# Patient Record
Sex: MALE | Race: BLACK OR AFRICAN AMERICAN | NOT HISPANIC OR LATINO | Employment: FULL TIME | URBAN - METROPOLITAN AREA
[De-identification: names, ages, dates, MRNs, and addresses within clinical notes are randomized per-mention and may not be internally consistent; named-entity substitution may affect disease eponyms.]

---

## 2022-07-13 ENCOUNTER — APPOINTMENT (INPATIENT)
Dept: NON INVASIVE DIAGNOSTICS | Facility: HOSPITAL | Age: 62
DRG: 112 | End: 2022-07-13
Payer: OTHER MISCELLANEOUS

## 2022-07-13 ENCOUNTER — APPOINTMENT (EMERGENCY)
Dept: RADIOLOGY | Facility: HOSPITAL | Age: 62
DRG: 112 | End: 2022-07-13
Payer: OTHER MISCELLANEOUS

## 2022-07-13 ENCOUNTER — APPOINTMENT (EMERGENCY)
Dept: CT IMAGING | Facility: HOSPITAL | Age: 62
DRG: 112 | End: 2022-07-13
Payer: OTHER MISCELLANEOUS

## 2022-07-13 ENCOUNTER — HOSPITAL ENCOUNTER (INPATIENT)
Facility: HOSPITAL | Age: 62
LOS: 3 days | Discharge: HOME | DRG: 112 | End: 2022-07-16
Attending: EMERGENCY MEDICINE | Admitting: INTERNAL MEDICINE
Payer: OTHER MISCELLANEOUS

## 2022-07-13 DIAGNOSIS — I21.3 STEMI (ST ELEVATION MYOCARDIAL INFARCTION) (HCC): ICD-10-CM
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
## 2022-07-13 DIAGNOSIS — R55 SYNCOPE: ICD-10-CM

## 2022-07-13 DIAGNOSIS — S01.81XA FOREHEAD LACERATION: ICD-10-CM

## 2022-07-13 DIAGNOSIS — I63.9 CVA (CEREBRAL VASCULAR ACCIDENT) (HCC): ICD-10-CM

## 2022-07-13 DIAGNOSIS — I21.3 ST ELEVATION MYOCARDIAL INFARCTION (STEMI), UNSPECIFIED ARTERY (HCC): Primary | ICD-10-CM

## 2022-07-13 DIAGNOSIS — R07.9 CHEST PAIN: ICD-10-CM

## 2022-07-13 DIAGNOSIS — I50.20 SYSTOLIC HEART FAILURE (HCC): ICD-10-CM

## 2022-07-13 PROBLEM — D72.829 LEUKOCYTOSIS: Status: ACTIVE | Noted: 2022-07-13

## 2022-07-13 PROBLEM — R73.9 HYPERGLYCEMIA: Status: ACTIVE | Noted: 2022-07-13

## 2022-07-13 LAB
2HR DELTA HS TROPONIN: ABNORMAL NG/L
4HR DELTA HS TROPONIN: ABNORMAL NG/L
ALBUMIN SERPL BCP-MCNC: 4.1 G/DL (ref 3.5–5)
ALP SERPL-CCNC: 82 U/L (ref 34–104)
ALT SERPL W P-5'-P-CCNC: 17 U/L (ref 7–52)
ANION GAP SERPL CALCULATED.3IONS-SCNC: 14 MMOL/L (ref 4–13)
AORTIC ROOT: 3.5 CM
APICAL FOUR CHAMBER EJECTION FRACTION: 39 %
APTT PPP: 25 SECONDS (ref 23–37)
ASCENDING AORTA: 2.7 CM
AST SERPL W P-5'-P-CCNC: 23 U/L (ref 13–39)
ATRIAL RATE: 59 BPM
ATRIAL RATE: 70 BPM
ATRIAL RATE: 70 BPM
ATRIAL RATE: 77 BPM
ATRIAL RATE: 77 BPM
ATRIAL RATE: 83 BPM
AV LVOT PEAK GRADIENT: 2 MMHG
AV PEAK GRADIENT: 7 MMHG
BACTERIA UR QL AUTO: ABNORMAL /HPF
BASOPHILS # BLD AUTO: 0.04 THOUSANDS/ΜL (ref 0–0.1)
BASOPHILS NFR BLD AUTO: 0 % (ref 0–1)
BILIRUB SERPL-MCNC: 0.71 MG/DL (ref 0.2–1)
BILIRUB UR QL STRIP: NEGATIVE
BUN SERPL-MCNC: 19 MG/DL (ref 5–25)
CALCIUM SERPL-MCNC: 9.4 MG/DL (ref 8.4–10.2)
CARDIAC TROPONIN I PNL SERPL HS: 10 NG/L
CARDIAC TROPONIN I PNL SERPL HS: ABNORMAL NG/L
CARDIAC TROPONIN I PNL SERPL HS: ABNORMAL NG/L
CHLORIDE SERPL-SCNC: 100 MMOL/L (ref 96–108)
CLARITY UR: CLEAR
CO2 SERPL-SCNC: 25 MMOL/L (ref 21–32)
COLOR UR: YELLOW
CREAT SERPL-MCNC: 1.1 MG/DL (ref 0.6–1.3)
E WAVE DECELERATION TIME: 183 MS
EOSINOPHIL # BLD AUTO: 0.05 THOUSAND/ΜL (ref 0–0.61)
EOSINOPHIL NFR BLD AUTO: 0 % (ref 0–6)
ERYTHROCYTE [DISTWIDTH] IN BLOOD BY AUTOMATED COUNT: 11.9 % (ref 11.6–15.1)
FRACTIONAL SHORTENING: 18 % (ref 28–44)
GFR SERPL CREATININE-BSD FRML MDRD: 72 ML/MIN/1.73SQ M
GLUCOSE SERPL-MCNC: 132 MG/DL (ref 65–140)
GLUCOSE SERPL-MCNC: 142 MG/DL (ref 65–140)
GLUCOSE SERPL-MCNC: 154 MG/DL (ref 65–140)
GLUCOSE SERPL-MCNC: 164 MG/DL (ref 65–140)
GLUCOSE SERPL-MCNC: 179 MG/DL (ref 65–140)
GLUCOSE SERPL-MCNC: 247 MG/DL (ref 65–140)
GLUCOSE UR STRIP-MCNC: ABNORMAL MG/DL
HCT VFR BLD AUTO: 44.5 % (ref 36.5–49.3)
HGB BLD-MCNC: 14.4 G/DL (ref 12–17)
HGB UR QL STRIP.AUTO: ABNORMAL
IMM GRANULOCYTES # BLD AUTO: 0.16 THOUSAND/UL (ref 0–0.2)
IMM GRANULOCYTES NFR BLD AUTO: 1 % (ref 0–2)
INR PPP: 1.21 (ref 0.84–1.19)
INTERVENTRICULAR SEPTUM IN DIASTOLE (PARASTERNAL SHORT AXIS VIEW): 1.1 CM
INTERVENTRICULAR SEPTUM: 1.1 CM (ref 0.6–1.1)
KCT BLD-ACNC: 122 SEC (ref 89–137)
KCT BLD-ACNC: 231 SEC (ref 89–137)
KCT BLD-ACNC: 98 SEC (ref 89–137)
KETONES UR STRIP-MCNC: NEGATIVE MG/DL
LAAS-AP2: 17.6 CM2
LAAS-AP4: 17.5 CM2
LEFT ATRIUM SIZE: 4.2 CM
LEFT INTERNAL DIMENSION IN SYSTOLE: 3.3 CM (ref 2.1–4)
LEFT VENTRICULAR INTERNAL DIMENSION IN DIASTOLE: 4 CM (ref 3.5–6)
LEFT VENTRICULAR POSTERIOR WALL IN END DIASTOLE: 1.1 CM
LEFT VENTRICULAR STROKE VOLUME: 27 ML
LEUKOCYTE ESTERASE UR QL STRIP: NEGATIVE
LIPASE SERPL-CCNC: 49 U/L (ref 11–82)
LVSV (TEICH): 27 ML
LYMPHOCYTES # BLD AUTO: 3.06 THOUSANDS/ΜL (ref 0.6–4.47)
LYMPHOCYTES NFR BLD AUTO: 17 % (ref 14–44)
MAGNESIUM SERPL-MCNC: 1.8 MG/DL (ref 1.9–2.7)
MCH RBC QN AUTO: 31 PG (ref 26.8–34.3)
MCHC RBC AUTO-ENTMCNC: 32.4 G/DL (ref 31.4–37.4)
MCV RBC AUTO: 96 FL (ref 82–98)
MONOCYTES # BLD AUTO: 1.15 THOUSAND/ΜL (ref 0.17–1.22)
MONOCYTES NFR BLD AUTO: 6 % (ref 4–12)
MV E'TISSUE VEL-SEP: 7 CM/S
MV PEAK A VEL: 0.97 M/S
MV PEAK E VEL: 73 CM/S
MV STENOSIS PRESSURE HALF TIME: 53 MS
MV VALVE AREA P 1/2 METHOD: 4.15 CM2
NEUTROPHILS # BLD AUTO: 13.62 THOUSANDS/ΜL (ref 1.85–7.62)
NEUTS SEG NFR BLD AUTO: 76 % (ref 43–75)
NITRITE UR QL STRIP: NEGATIVE
NON-SQ EPI CELLS URNS QL MICRO: ABNORMAL /HPF
NRBC BLD AUTO-RTO: 0 /100 WBCS
NT-PROBNP SERPL-MCNC: 69 PG/ML
P AXIS: 76 DEGREES
P AXIS: 77 DEGREES
P AXIS: 80 DEGREES
P AXIS: 81 DEGREES
P AXIS: 86 DEGREES
PH UR STRIP.AUTO: 8 [PH]
PHOSPHATE SERPL-MCNC: 2.6 MG/DL (ref 2.3–4.1)
PLATELET # BLD AUTO: 257 THOUSANDS/UL (ref 149–390)
PMV BLD AUTO: 9.7 FL (ref 8.9–12.7)
POTASSIUM SERPL-SCNC: 3.4 MMOL/L (ref 3.5–5.3)
PR INTERVAL: 262 MS
PR INTERVAL: 264 MS
PR INTERVAL: 282 MS
PR INTERVAL: 302 MS
PR INTERVAL: 312 MS
PROT SERPL-MCNC: 7.4 G/DL (ref 6.4–8.4)
PROT UR STRIP-MCNC: ABNORMAL MG/DL
PROTHROMBIN TIME: 15.3 SECONDS (ref 11.6–14.5)
QRS AXIS: -79 DEGREES
QRS AXIS: -89 DEGREES
QRS AXIS: -89 DEGREES
QRS AXIS: 270 DEGREES
QRSD INTERVAL: 114 MS
QRSD INTERVAL: 118 MS
QRSD INTERVAL: 120 MS
QRSD INTERVAL: 120 MS
QRSD INTERVAL: 124 MS
QRSD INTERVAL: 132 MS
QT INTERVAL: 344 MS
QT INTERVAL: 352 MS
QT INTERVAL: 364 MS
QT INTERVAL: 380 MS
QT INTERVAL: 384 MS
QT INTERVAL: 404 MS
QTC INTERVAL: 399 MS
QTC INTERVAL: 404 MS
QTC INTERVAL: 410 MS
QTC INTERVAL: 411 MS
QTC INTERVAL: 434 MS
QTC INTERVAL: 435 MS
RA PRESSURE ESTIMATED: 8 MMHG
RBC # BLD AUTO: 4.64 MILLION/UL (ref 3.88–5.62)
RBC #/AREA URNS AUTO: ABNORMAL /HPF
RIGHT ATRIUM AREA SYSTOLE A4C: 16.8 CM2
RIGHT VENTRICLE ID DIMENSION: 4.2 CM
RV PSP: 47 MMHG
SL CV LEFT ATRIUM LENGTH A2C: 5.5 CM
SL CV LV EF: 40
SL CV PED ECHO LEFT VENTRICLE DIASTOLIC VOLUME (MOD BIPLANE) 2D: 72 ML
SL CV PED ECHO LEFT VENTRICLE SYSTOLIC VOLUME (MOD BIPLANE) 2D: 45 ML
SODIUM SERPL-SCNC: 139 MMOL/L (ref 135–147)
SP GR UR STRIP.AUTO: >=1.05 (ref 1–1.03)
SPECIMEN SOURCE: ABNORMAL
SPECIMEN SOURCE: NORMAL
SPECIMEN SOURCE: NORMAL
T WAVE AXIS: 29 DEGREES
T WAVE AXIS: 68 DEGREES
T WAVE AXIS: 69 DEGREES
T WAVE AXIS: 7 DEGREES
T WAVE AXIS: 71 DEGREES
T WAVE AXIS: 74 DEGREES
TR MAX PG: 39 MMHG
TR PEAK VELOCITY: 3.1 M/S
TRICUSPID VALVE PEAK REGURGITATION VELOCITY: 3.13 M/S
TSH SERPL DL<=0.05 MIU/L-ACNC: 1.04 UIU/ML (ref 0.45–4.5)
UROBILINOGEN UR STRIP-ACNC: 4 MG/DL
VENTRICULAR RATE: 59 BPM
VENTRICULAR RATE: 70 BPM
VENTRICULAR RATE: 77 BPM
VENTRICULAR RATE: 77 BPM
VENTRICULAR RATE: 83 BPM
VENTRICULAR RATE: 92 BPM
WBC # BLD AUTO: 18.08 THOUSAND/UL (ref 4.31–10.16)
WBC #/AREA URNS AUTO: ABNORMAL /HPF

## 2022-07-13 PROCEDURE — 85730 THROMBOPLASTIN TIME PARTIAL: CPT | Performed by: EMERGENCY MEDICINE

## 2022-07-13 PROCEDURE — 93458 L HRT ARTERY/VENTRICLE ANGIO: CPT | Performed by: INTERNAL MEDICINE

## 2022-07-13 PROCEDURE — 83036 HEMOGLOBIN GLYCOSYLATED A1C: CPT | Performed by: NURSE PRACTITIONER

## 2022-07-13 PROCEDURE — 84100 ASSAY OF PHOSPHORUS: CPT | Performed by: EMERGENCY MEDICINE

## 2022-07-13 PROCEDURE — 93010 ELECTROCARDIOGRAM REPORT: CPT | Performed by: INTERNAL MEDICINE

## 2022-07-13 PROCEDURE — 82948 REAGENT STRIP/BLOOD GLUCOSE: CPT

## 2022-07-13 PROCEDURE — 99285 EMERGENCY DEPT VISIT HI MDM: CPT

## 2022-07-13 PROCEDURE — 93005 ELECTROCARDIOGRAM TRACING: CPT

## 2022-07-13 PROCEDURE — 36415 COLL VENOUS BLD VENIPUNCTURE: CPT | Performed by: EMERGENCY MEDICINE

## 2022-07-13 PROCEDURE — C1769 GUIDE WIRE: HCPCS | Performed by: INTERNAL MEDICINE

## 2022-07-13 PROCEDURE — B2151ZZ FLUOROSCOPY OF LEFT HEART USING LOW OSMOLAR CONTRAST: ICD-10-PCS | Performed by: INTERNAL MEDICINE

## 2022-07-13 PROCEDURE — 96376 TX/PRO/DX INJ SAME DRUG ADON: CPT

## 2022-07-13 PROCEDURE — C1894 INTRO/SHEATH, NON-LASER: HCPCS | Performed by: INTERNAL MEDICINE

## 2022-07-13 PROCEDURE — 84484 ASSAY OF TROPONIN QUANT: CPT | Performed by: EMERGENCY MEDICINE

## 2022-07-13 PROCEDURE — C9606 PERC D-E COR REVASC W AMI S: HCPCS | Performed by: INTERNAL MEDICINE

## 2022-07-13 PROCEDURE — 96375 TX/PRO/DX INJ NEW DRUG ADDON: CPT

## 2022-07-13 PROCEDURE — 92941 PRQ TRLML REVSC TOT OCCL AMI: CPT | Performed by: INTERNAL MEDICINE

## 2022-07-13 PROCEDURE — 71250 CT THORAX DX C-: CPT

## 2022-07-13 PROCEDURE — 99152 MOD SED SAME PHYS/QHP 5/>YRS: CPT | Performed by: INTERNAL MEDICINE

## 2022-07-13 PROCEDURE — 99254 IP/OBS CNSLTJ NEW/EST MOD 60: CPT | Performed by: SURGERY

## 2022-07-13 PROCEDURE — C1757 CATH, THROMBECTOMY/EMBOLECT: HCPCS | Performed by: INTERNAL MEDICINE

## 2022-07-13 PROCEDURE — 70450 CT HEAD/BRAIN W/O DYE: CPT

## 2022-07-13 PROCEDURE — 0HQ1XZZ REPAIR FACE SKIN, EXTERNAL APPROACH: ICD-10-PCS | Performed by: SURGERY

## 2022-07-13 PROCEDURE — 12011 RPR F/E/E/N/L/M 2.5 CM/<: CPT | Performed by: SURGERY

## 2022-07-13 PROCEDURE — C1887 CATHETER, GUIDING: HCPCS | Performed by: INTERNAL MEDICINE

## 2022-07-13 PROCEDURE — 90715 TDAP VACCINE 7 YRS/> IM: CPT | Performed by: PHYSICIAN ASSISTANT

## 2022-07-13 PROCEDURE — 99223 1ST HOSP IP/OBS HIGH 75: CPT | Performed by: INTERNAL MEDICINE

## 2022-07-13 PROCEDURE — 99291 CRITICAL CARE FIRST HOUR: CPT | Performed by: NURSE PRACTITIONER

## 2022-07-13 PROCEDURE — 4A023N7 MEASUREMENT OF CARDIAC SAMPLING AND PRESSURE, LEFT HEART, PERCUTANEOUS APPROACH: ICD-10-PCS | Performed by: INTERNAL MEDICINE

## 2022-07-13 PROCEDURE — 84443 ASSAY THYROID STIM HORMONE: CPT | Performed by: NURSE PRACTITIONER

## 2022-07-13 PROCEDURE — 027034Z DILATION OF CORONARY ARTERY, ONE ARTERY WITH DRUG-ELUTING INTRALUMINAL DEVICE, PERCUTANEOUS APPROACH: ICD-10-PCS | Performed by: INTERNAL MEDICINE

## 2022-07-13 PROCEDURE — 81001 URINALYSIS AUTO W/SCOPE: CPT | Performed by: NURSE PRACTITIONER

## 2022-07-13 PROCEDURE — 93306 TTE W/DOPPLER COMPLETE: CPT | Performed by: INTERNAL MEDICINE

## 2022-07-13 PROCEDURE — 83735 ASSAY OF MAGNESIUM: CPT | Performed by: EMERGENCY MEDICINE

## 2022-07-13 PROCEDURE — G1004 CDSM NDSC: HCPCS

## 2022-07-13 PROCEDURE — 72125 CT NECK SPINE W/O DYE: CPT

## 2022-07-13 PROCEDURE — 74176 CT ABD & PELVIS W/O CONTRAST: CPT

## 2022-07-13 PROCEDURE — 99153 MOD SED SAME PHYS/QHP EA: CPT | Performed by: INTERNAL MEDICINE

## 2022-07-13 PROCEDURE — 80053 COMPREHEN METABOLIC PANEL: CPT | Performed by: EMERGENCY MEDICINE

## 2022-07-13 PROCEDURE — NC001 PR NO CHARGE: Performed by: SURGERY

## 2022-07-13 PROCEDURE — 85610 PROTHROMBIN TIME: CPT | Performed by: EMERGENCY MEDICINE

## 2022-07-13 PROCEDURE — 83690 ASSAY OF LIPASE: CPT | Performed by: EMERGENCY MEDICINE

## 2022-07-13 PROCEDURE — 85025 COMPLETE CBC W/AUTO DIFF WBC: CPT | Performed by: EMERGENCY MEDICINE

## 2022-07-13 PROCEDURE — 99285 EMERGENCY DEPT VISIT HI MDM: CPT | Performed by: EMERGENCY MEDICINE

## 2022-07-13 PROCEDURE — 93306 TTE W/DOPPLER COMPLETE: CPT

## 2022-07-13 PROCEDURE — 84484 ASSAY OF TROPONIN QUANT: CPT | Performed by: NURSE PRACTITIONER

## 2022-07-13 PROCEDURE — 83880 ASSAY OF NATRIURETIC PEPTIDE: CPT | Performed by: EMERGENCY MEDICINE

## 2022-07-13 PROCEDURE — C1874 STENT, COATED/COV W/DEL SYS: HCPCS | Performed by: INTERNAL MEDICINE

## 2022-07-13 PROCEDURE — 96374 THER/PROPH/DIAG INJ IV PUSH: CPT

## 2022-07-13 PROCEDURE — 85347 COAGULATION TIME ACTIVATED: CPT

## 2022-07-13 PROCEDURE — B2111ZZ FLUOROSCOPY OF MULTIPLE CORONARY ARTERIES USING LOW OSMOLAR CONTRAST: ICD-10-PCS | Performed by: INTERNAL MEDICINE

## 2022-07-13 DEVICE — XIENCE SKYPOINT™ EVEROLIMUS ELUTING CORONARY STENT SYSTEM 3.50 MM X 18 MM / RAPID-EXCHANGE
Type: IMPLANTABLE DEVICE | Site: CORONARY | Status: FUNCTIONAL
Brand: XIENCE SKYPOINT™

## 2022-07-13 RX ORDER — LIDOCAINE HYDROCHLORIDE AND EPINEPHRINE 20; 5 MG/ML; UG/ML
5 INJECTION, SOLUTION EPIDURAL; INFILTRATION; INTRACAUDAL; PERINEURAL ONCE
Status: DISCONTINUED | OUTPATIENT
Start: 2022-07-13 | End: 2022-07-16 | Stop reason: HOSPADM

## 2022-07-13 RX ORDER — HEPARIN SODIUM 5000 [USP'U]/ML
4000 INJECTION, SOLUTION INTRAVENOUS; SUBCUTANEOUS ONCE
Status: DISCONTINUED | OUTPATIENT
Start: 2022-07-13 | End: 2022-07-13

## 2022-07-13 RX ORDER — INSULIN LISPRO 100 [IU]/ML
1-6 INJECTION, SOLUTION INTRAVENOUS; SUBCUTANEOUS
Status: DISCONTINUED | OUTPATIENT
Start: 2022-07-13 | End: 2022-07-16 | Stop reason: HOSPADM

## 2022-07-13 RX ORDER — METOPROLOL SUCCINATE 25 MG/1
25 TABLET, EXTENDED RELEASE ORAL DAILY
Status: DISCONTINUED | OUTPATIENT
Start: 2022-07-13 | End: 2022-07-16 | Stop reason: HOSPADM

## 2022-07-13 RX ORDER — INSULIN LISPRO 100 [IU]/ML
1-6 INJECTION, SOLUTION INTRAVENOUS; SUBCUTANEOUS EVERY 6 HOURS SCHEDULED
Status: DISCONTINUED | OUTPATIENT
Start: 2022-07-13 | End: 2022-07-13

## 2022-07-13 RX ORDER — HEPARIN SODIUM 5000 [USP'U]/ML
5000 INJECTION, SOLUTION INTRAVENOUS; SUBCUTANEOUS EVERY 8 HOURS SCHEDULED
Status: DISCONTINUED | OUTPATIENT
Start: 2022-07-13 | End: 2022-07-16 | Stop reason: HOSPADM

## 2022-07-13 RX ORDER — HEPARIN SODIUM 1000 [USP'U]/ML
INJECTION, SOLUTION INTRAVENOUS; SUBCUTANEOUS AS NEEDED
Status: DISCONTINUED | OUTPATIENT
Start: 2022-07-13 | End: 2022-07-13 | Stop reason: HOSPADM

## 2022-07-13 RX ORDER — SODIUM CHLORIDE 9 MG/ML
100 INJECTION, SOLUTION INTRAVENOUS CONTINUOUS
Status: DISPENSED | OUTPATIENT
Start: 2022-07-13 | End: 2022-07-13

## 2022-07-13 RX ORDER — LIDOCAINE HYDROCHLORIDE 10 MG/ML
INJECTION, SOLUTION EPIDURAL; INFILTRATION; INTRACAUDAL; PERINEURAL AS NEEDED
Status: DISCONTINUED | OUTPATIENT
Start: 2022-07-13 | End: 2022-07-13 | Stop reason: HOSPADM

## 2022-07-13 RX ORDER — HEPARIN SODIUM 1000 [USP'U]/ML
4000 INJECTION, SOLUTION INTRAVENOUS; SUBCUTANEOUS ONCE
Status: COMPLETED | OUTPATIENT
Start: 2022-07-13 | End: 2022-07-13

## 2022-07-13 RX ORDER — ACETAMINOPHEN 325 MG/1
650 TABLET ORAL EVERY 4 HOURS PRN
Status: DISCONTINUED | OUTPATIENT
Start: 2022-07-13 | End: 2022-07-16 | Stop reason: HOSPADM

## 2022-07-13 RX ORDER — FENTANYL CITRATE 50 UG/ML
INJECTION, SOLUTION INTRAMUSCULAR; INTRAVENOUS AS NEEDED
Status: DISCONTINUED | OUTPATIENT
Start: 2022-07-13 | End: 2022-07-13 | Stop reason: HOSPADM

## 2022-07-13 RX ORDER — VERAPAMIL HCL 2.5 MG/ML
AMPUL (ML) INTRAVENOUS AS NEEDED
Status: DISCONTINUED | OUTPATIENT
Start: 2022-07-13 | End: 2022-07-13 | Stop reason: HOSPADM

## 2022-07-13 RX ORDER — NITROGLYCERIN 0.4 MG/1
0.4 TABLET SUBLINGUAL
Status: DISCONTINUED | OUTPATIENT
Start: 2022-07-13 | End: 2022-07-16 | Stop reason: HOSPADM

## 2022-07-13 RX ORDER — ONDANSETRON 2 MG/ML
4 INJECTION INTRAMUSCULAR; INTRAVENOUS EVERY 6 HOURS PRN
Status: DISCONTINUED | OUTPATIENT
Start: 2022-07-13 | End: 2022-07-16 | Stop reason: HOSPADM

## 2022-07-13 RX ORDER — ATORVASTATIN CALCIUM 40 MG/1
40 TABLET, FILM COATED ORAL
Status: DISCONTINUED | OUTPATIENT
Start: 2022-07-13 | End: 2022-07-16 | Stop reason: HOSPADM

## 2022-07-13 RX ORDER — MAGNESIUM SULFATE HEPTAHYDRATE 40 MG/ML
2 INJECTION, SOLUTION INTRAVENOUS ONCE
Status: COMPLETED | OUTPATIENT
Start: 2022-07-13 | End: 2022-07-13

## 2022-07-13 RX ORDER — ASPIRIN 81 MG/1
TABLET, CHEWABLE ORAL AS NEEDED
Status: DISCONTINUED | OUTPATIENT
Start: 2022-07-13 | End: 2022-07-13 | Stop reason: HOSPADM

## 2022-07-13 RX ORDER — ASPIRIN 81 MG/1
81 TABLET, CHEWABLE ORAL DAILY
Status: DISCONTINUED | OUTPATIENT
Start: 2022-07-14 | End: 2022-07-16 | Stop reason: HOSPADM

## 2022-07-13 RX ORDER — MIDAZOLAM HYDROCHLORIDE 2 MG/2ML
INJECTION, SOLUTION INTRAMUSCULAR; INTRAVENOUS AS NEEDED
Status: DISCONTINUED | OUTPATIENT
Start: 2022-07-13 | End: 2022-07-13 | Stop reason: HOSPADM

## 2022-07-13 RX ORDER — NITROGLYCERIN 20 MG/100ML
INJECTION INTRAVENOUS AS NEEDED
Status: DISCONTINUED | OUTPATIENT
Start: 2022-07-13 | End: 2022-07-13 | Stop reason: HOSPADM

## 2022-07-13 RX ORDER — ONDANSETRON 2 MG/ML
1 INJECTION INTRAMUSCULAR; INTRAVENOUS ONCE
Status: COMPLETED | OUTPATIENT
Start: 2022-07-13 | End: 2022-07-13

## 2022-07-13 RX ORDER — POTASSIUM CHLORIDE 20MEQ/15ML
40 LIQUID (ML) ORAL ONCE
Status: COMPLETED | OUTPATIENT
Start: 2022-07-13 | End: 2022-07-13

## 2022-07-13 RX ADMIN — NITROGLYCERIN 1 INCH: 20 OINTMENT TOPICAL at 09:22

## 2022-07-13 RX ADMIN — MAGNESIUM SULFATE HEPTAHYDRATE 2 G: 40 INJECTION, SOLUTION INTRAVENOUS at 09:12

## 2022-07-13 RX ADMIN — INSULIN LISPRO 1 UNITS: 100 INJECTION, SOLUTION INTRAVENOUS; SUBCUTANEOUS at 16:42

## 2022-07-13 RX ADMIN — HEPARIN SODIUM 4000 UNITS: 1000 INJECTION INTRAVENOUS; SUBCUTANEOUS at 06:39

## 2022-07-13 RX ADMIN — ATORVASTATIN CALCIUM 40 MG: 40 TABLET, FILM COATED ORAL at 16:42

## 2022-07-13 RX ADMIN — TICAGRELOR 180 MG: 90 TABLET ORAL at 06:39

## 2022-07-13 RX ADMIN — TICAGRELOR 90 MG: 90 TABLET ORAL at 18:07

## 2022-07-13 RX ADMIN — TETANUS TOXOID, REDUCED DIPHTHERIA TOXOID AND ACELLULAR PERTUSSIS VACCINE, ADSORBED 0.5 ML: 5; 2.5; 8; 8; 2.5 SUSPENSION INTRAMUSCULAR at 11:13

## 2022-07-13 RX ADMIN — POTASSIUM CHLORIDE 40 MEQ: 20 SOLUTION ORAL at 09:10

## 2022-07-13 RX ADMIN — HEPARIN SODIUM 5000 UNITS: 5000 INJECTION INTRAVENOUS; SUBCUTANEOUS at 21:40

## 2022-07-13 RX ADMIN — SODIUM CHLORIDE 100 ML/HR: 0.9 INJECTION, SOLUTION INTRAVENOUS at 08:28

## 2022-07-13 NOTE — ASSESSMENT & PLAN NOTE
Fall from standing  Laceration repair; patient tolerated well; 5-0 Ethilon sutures simple interrupted  Well approximated  Will continue to monitor wound  Removal of stitches between 5-7 days

## 2022-07-13 NOTE — CONSULTS
Consultation - Cardiology Team 1  Abhilash Jack 62 y o  male MRN: [id number]  Unit/Bed#: ICU 02 Encounter: [id number]    Assessment/Plan     Principal Problem:    STEMI (ST elevation myocardial infarction) [facility])  Active Problems:    Syncope and collapse    Hx CVA (cerebral vascular accident) ([facility] [address] )    Hyperglycemia    Leukocytosis      Assessment/Plan    1  ACS  S/p urgent cath- mid % stenosis, lesion thrombotic  Large thrombus aspirated  ALEJANDRA placed  First diagonal 50% stenosis  He has been loaded with 180 mg Brilinta ( will check cost)  will follow this with 90 mg b i d  Aspirin 81 mg, high-intensity statin, beta-blocker last today if no bradyarrythmia  No further chest pain  Check TTE  Telemetry  F/u with troponins    2  Syncope  Vasovagal versus arrhythmogenic  Continue telemetry  No evidence of high degree AV block or significant bradycardia  Follow-up with TTE  Follow telemetry  Imaging- no acute injuries    3  History of hypertension-currently on no medical therapy  Normotensive  Will monitor    4  History of type 2 diabetes-currently on no medical therapy  Check HgbAIC  Per primary team    5  History of stroke-right-sided weakness and slow speech    6  Hypomagnesemia/hypokalemia-repleted    History of Present Illness   Physician Requesting Consult: Cathi Casas MD  Reason for Consult / Principal Problem: STEMI    HPI: Abhilash Jack is a 62y o  year old male with HTN, diabetes, stroke, hepatitis B who presents with syncope and chest pain  Patient reports that he had been at the end of his work shift as a  he began to feel hot and lightheaded  He started walking to the cafeteria and had a syncopal event and had head strike on the ground  Unclear time of LOC  Bystanders assisted him to a chair  He be felt nauseous at that point and began to vomit  He then had substernal chest pain  EMS was called  There was concern for ST elevations    CT of the head no acute intracranial abnormality  C-spine no acute fracture or traumatic malalignment  CT chest abdomen pelvis no acute pathology  I am seeing the patient this morning in the ICU about 0845  Documentation by the ED at 3292  States that STEMI alert was called under the direction of Dr Mary Sanabria  Patient was given 4000 units of intravenous heparin along with 180 mg of Brilinta  EKG revealed sinus rhythm with a first-degree AV block  ST elevations inferiorly as well as V4 through V6 with ST depression I, AVR  Urgent catheterization reveals mid % stenosis which was the culprit lesion  Lesion is thrombotic  Clot aspirated  ALEJANDRA successfully placed  0% residual stenosis  Large with the 1st diagonal 50% stenosis  Initial troponin 10  BNP 69  WBC 94042  Potassium 3 4    Patient has not sought medical attention in over 2 years  Denies tobacco abuse, illicit drug use, alcohol use  Reports his father  of a heart attack at age 34    Inpatient consult to Cardiology  Consult performed by: ROSEMARY Lobato  Consult ordered by: ROSEMARY Grimes          Review of Systems   Constitutional: Positive for activity change  HENT: Negative  Eyes: Negative  Respiratory: Negative for shortness of breath  Cardiovascular: Positive for chest pain  Gastrointestinal: Negative  Endocrine: Negative  Genitourinary: Negative  Musculoskeletal: Negative  Skin: Negative  Neurological: Positive for syncope, weakness and light-headedness  Hematological: Negative  Psychiatric/Behavioral: Negative  All other systems reviewed and are negative  Historical Information   No past medical history on file  No past surgical history on file    Social History     Substance and Sexual Activity   Alcohol Use Not on file     Social History     Substance and Sexual Activity   Drug Use Not on file     Social History     Tobacco Use   Smoking Status Not on file   Smokeless Tobacco Not on file     Family History: No family history on file  Meds/Allergies   current meds:   Current Facility-Administered Medications   Medication Dose Route Frequency    acetaminophen (TYLENOL) tablet 650 mg  650 mg Oral Q4H PRN    [START ON 7/14/2022] aspirin chewable tablet 81 mg  81 mg Oral Daily    heparin (porcine) subcutaneous injection 5,000 Units  5,000 Units Subcutaneous Q8H [address]    insulin lispro (HumaLOG) 100 units/mL subcutaneous injection 1-6 Units  1-6 Units Subcutaneous Q6H [address]    lidocaine-epinephrine (XYLOCAINE-MPF/EPINEPHRINE) 2 %-1:200,000 injection 5 mL  5 mL Infiltration Once    magnesium sulfate 2 g/50 mL IVPB (premix) 2 g  2 g Intravenous Once    nitroglycerin (NITRO-BID) 2 % TD ointment 1 inch  1 inch Topical Q6H    nitroglycerin (NITROSTAT) SL tablet 0 4 mg  0 4 mg Sublingual Q5 Min PRN    ondansetron (ZOFRAN) injection 4 mg  4 mg Intravenous Q6H PRN    sodium chloride 0 9 % infusion  100 mL/hr Intravenous Continuous    tetanus-diphtheria-acellular pertussis (BOOSTRIX) IM injection 0 5 mL  0 5 mL Intramuscular Once    ticagrelor (BRILINTA) tablet 90 mg  90 mg Oral BID    and PTA meds:    No medications prior to admission  Allergies   Allergen Reactions    Pollen Extract Allergic Rhinitis       Objective   Vitals: Blood pressure 105/72, pulse 74, temperature 97 8 °F (36 6 °C), temperature source Oral, resp  rate 13, height 5' 9" (1 753 m), weight 80 9 kg (178 lb 5 6 oz), SpO2 96 %    Orthostatic Blood Pressures    Flowsheet Row Most Recent Value   Blood Pressure 105/72 filed at 07/13/2022 1017   Patient Position - Orthostatic VS Lying filed at 07/13/2022 0645            Intake/Output Summary (Last 24 hours) at 7/13/2022 1018  Last data filed at 7/13/2022 0750  Gross per 24 hour   Intake --   Output 10 ml   Net -10 ml       Invasive Devices  Report    Peripheral Intravenous Line  Duration           Peripheral IV 07/13/22 Right Antecubital <1 day                Physical Exam: /72   Pulse 74   Temp 97 8 °F (36 6 °C) (Oral)   Resp 13   Ht 5' 9" (1 753 m)   Wt 80 9 kg (178 lb 5 6 oz)   SpO2 96%   BMI 26 34 kg/m²   General Appearance:    Alert, cooperative, no distress, appears stated age   Head:    Normocephalic, no scleral icterus   Eyes:    PERRL   Nose:   Nares normal, septum midline, mucosa normal, no drainage    Throat:   Lips, mucosa, and tongue normal   Neck:   C collar on   Back:     Symmetric   Lungs:     Clear to auscultation bilaterally, respirations unlabored   Chest Wall:    No tenderness or deformity    Heart:    Regular rate and rhythm, S1 and S2 normal, no murmur, rub   or gallop   Abdomen:     Soft, non-tender, bowel sounds active all four quadrants,     no masses, no organomegaly   Extremities:   Extremities normal, atraumatic, no cyanosis or edema   Pulses:   2+ and symmetric all extremities   Skin:   Skin color, texture, turgor normal, no rashes or lesions   Neurologic:   Alert and oriented to person place and time          Lab Results:   Recent Results (from the past 72 hour(s))   ECG 12 lead    Collection Time: 07/13/22  5:52 AM   Result Value Ref Range    Ventricular Rate 77 BPM    Atrial Rate 77 BPM    MO Interval 312 ms    QRSD Interval 114 ms    QT Interval 364 ms    QTC Interval 411 ms    P Axis 80 degrees    QRS Axis -79 degrees    T Wave Axis 69 degrees   Fingerstick Glucose (POCT)    Collection Time: 07/13/22  5:54 AM   Result Value Ref Range    POC Glucose 154 (H) 65 - 140 mg/dl   Comprehensive metabolic panel    Collection Time: 07/13/22  6:07 AM   Result Value Ref Range    Sodium 139 135 - 147 mmol/L    Potassium 3 4 (L) 3 5 - 5 3 mmol/L    Chloride 100 96 - 108 mmol/L    CO2 25 21 - 32 mmol/L    ANION GAP 14 (H) 4 - 13 mmol/L    BUN 19 5 - 25 mg/dL    Creatinine 1 10 0 60 - 1 30 mg/dL    Glucose 247 (H) 65 - 140 mg/dL    Calcium 9 4 8 4 - 10 2 mg/dL    AST 23 13 - 39 U/L    ALT 17 7 - 52 U/L    Alkaline Phosphatase 82 34 - 104 U/L    Total Protein 7 4 6 4 - 8 4 g/dL    Albumin 4 1 3 5 - 5 0 g/dL    Total Bilirubin 0 71 0 20 - 1 00 mg/dL    eGFR 72 ml/min/1 73sq m   CBC and differential    Collection Time: 07/13/22  6:07 AM   Result Value Ref Range    WBC 18 08 (H) 4 31 - 10 16 Thousand/uL    RBC 4 64 3 88 - 5 62 Million/uL    Hemoglobin 14 4 12 0 - 17 0 g/dL    Hematocrit 44 5 36 5 - 49 3 %    MCV 96 82 - 98 fL    MCH 31 0 26 8 - 34 3 pg    MCHC 32 4 31 4 - 37 4 g/dL    RDW 11 9 11 6 - 15 1 %    MPV 9 7 8 9 - 12 7 fL    Platelets 762 922 - 973 Thousands/uL    nRBC 0 /100 WBCs    Neutrophils Relative 76 (H) 43 - 75 %    Immat GRANS % 1 0 - 2 %    Lymphocytes Relative 17 14 - 44 %    Monocytes Relative 6 4 - 12 %    Eosinophils Relative 0 0 - 6 %    Basophils Relative 0 0 - 1 %    Neutrophils Absolute 13 62 (H) 1 85 - 7 62 Thousands/µL    Immature Grans Absolute 0 16 0 00 - 0 20 Thousand/uL    Lymphocytes Absolute 3 06 0 60 - 4 47 Thousands/µL    Monocytes Absolute 1 15 0 17 - 1 22 Thousand/µL    Eosinophils Absolute 0 05 0 00 - 0 61 Thousand/µL    Basophils Absolute 0 04 0 00 - 0 10 Thousands/µL   Protime-INR    Collection Time: 07/13/22  6:07 AM   Result Value Ref Range    Protime 15 3 (H) 11 6 - 14 5 seconds    INR 1 21 (H) 0 84 - 1 19   APTT    Collection Time: 07/13/22  6:07 AM   Result Value Ref Range    PTT 25 23 - 37 seconds   Lipase    Collection Time: 07/13/22  6:07 AM   Result Value Ref Range    Lipase 49 11 - 82 u/L   Magnesium    Collection Time: 07/13/22  6:07 AM   Result Value Ref Range    Magnesium 1 8 (L) 1 9 - 2 7 mg/dL   Phosphorus    Collection Time: 07/13/22  6:07 AM   Result Value Ref Range    Phosphorus 2 6 2 3 - 4 1 mg/dL   HS Troponin 0hr (reflex protocol)    Collection Time: 07/13/22  6:07 AM   Result Value Ref Range    hs TnI 0hr 10 "Refer to ACS Flowchart"- see link ng/L   NT-BNP PRO    Collection Time: 07/13/22  6:07 AM   Result Value Ref Range    NT-proBNP 69 <125 pg/mL   ECG 12 lead    Collection Time: 07/13/22  6:24 AM   Result Value Ref Range    Ventricular Rate 92 BPM    Atrial Rate 70 BPM    MS Interval  ms    QRSD Interval 124 ms    QT Interval 352 ms    QTC Interval 435 ms    P Axis  degrees    QRS Axis -79 degrees    T Wave Axis 74 degrees   ECG 12 lead    Collection Time: 07/13/22  6:25 AM   Result Value Ref Range    Ventricular Rate 83 BPM    Atrial Rate 83 BPM    MS Interval 302 ms    QRSD Interval 118 ms    QT Interval 344 ms    QTC Interval 404 ms    P Axis 86 degrees    QRS Axis -79 degrees    T Wave Axis 71 degrees   Fingerstick Glucose (POCT)    Collection Time: 07/13/22  9:07 AM   Result Value Ref Range    POC Glucose 179 (H) 65 - 140 mg/dl     Left Main   The vessel is large  The vessel exhibits minimal luminal irregularities  Left Anterior Descending   The vessel is large  Mid LAD lesion is 100% stenosed  Culprit lesion  Culprit for recent MI GEMMA flow is 0  The lesion is thrombotic  Large thrombus aspirated   First Diagonal Branch   1st Diag lesion is 50% stenosed  Left Circumflex   The vessel is large and dominant  There is mild diffuse disease throughout the vessel  Right Coronary Artery   The vessel is small and non-dominant  The vessel exhibits minimal luminal irregularities  Mid LAD lesion   Thrombectomy   The clot was removed mechanically or by aspiration  The catheter used was a CATH THROMBECTOMY EXPORT 6FR 140CM  Passes taken: 3  Fluid removed: 15 mL  Supplies used: CATH THROMBECTOMY EXPORT [id number] 140CM   Stent   Drug-eluting stent was successfully placed  The stent used was a STENT XIENCE SKYPOINT 3 5 X 18MM  Stent was deployed by way of balloon expansion  Maximum pressure: 12 holli  Inflation time: 10 sec  Post-Intervention Lesion Assessment   The intervention was successful  Post-intervention GEMMA flow is 2  There were no complications  There is a 0% residual stenosis post intervention  Imaging: I have personally reviewed pertinent reports      EKG: NSR first degree av block, inferior V4-V6 KWASI  VTE Prophylaxis: Enoxaparin (Lovenox)    Code Status: Level 1 - Full Code  Advance Directive and Living Will:      Power of :    POLST:      Counseling / Coordination of Care  Total floor / unit time spent today 60 minutes  Greater than 50% of total time was spent with the patient and / or family counseling and / or coordination of care

## 2022-07-13 NOTE — ED PROVIDER NOTES
History  Chief Complaint   Patient presents with   Andreas LIRIANO  This is 62-year-old male patient with history of type 2 diabetes, hypertension who presents via EMS after syncopized thing and falling, hitting head on pavement  Has difficulty describing exactly how he felt prior to its syncopized and states he felt dizzy and it was similar to when he had heat stroke in the past   He says that the onset was sudden  He struck his head on the pavement and describes a dull headache as well as some dull central chest pain  Has no history of heart attack, no family history of heart disease that he knows of  None       No past medical history on file  No past surgical history on file  No family history on file  I have reviewed and agree with the history as documented  No existing history information found  No existing history information found  Review of Systems   Constitutional: Negative for chills and fever  HENT: Negative for ear pain and sore throat  Eyes: Negative for pain and visual disturbance  Respiratory: Negative for cough and shortness of breath  Cardiovascular: Positive for chest pain  Negative for palpitations  Gastrointestinal: Negative for abdominal pain and vomiting  Genitourinary: Negative for dysuria and hematuria  Musculoskeletal: Negative for arthralgias and back pain  Skin: Negative for color change and rash  Neurological: Positive for dizziness and headaches  Negative for seizures and syncope  All other systems reviewed and are negative        Physical Exam  ED Triage Vitals [07/13/22 0546]   Temperature Pulse Respirations Blood Pressure SpO2   97 8 °F (36 6 °C) 89 18 157/74 96 %      Temp Source Heart Rate Source Patient Position - Orthostatic VS BP Location FiO2 (%)   Oral Monitor Lying Right arm --      Pain Score       4             Orthostatic Vital Signs  Vitals:    07/13/22 0546 07/13/22 0600 07/13/22 0615 07/13/22 0630   BP: 157/74 141/71 132/71 137/71   Pulse: 89 77 80 86   Patient Position - Orthostatic VS: Lying Lying Lying Lying       Physical Exam  Vitals and nursing note reviewed  Constitutional:       General: He is not in acute distress  Appearance: He is not diaphoretic  HENT:      Head: Normocephalic  Comments: Approximately 1 cm laceration above the left eyebrow     Right Ear: External ear normal       Left Ear: External ear normal       Nose: Nose normal       Mouth/Throat:      Mouth: Mucous membranes are moist       Pharynx: Oropharynx is clear  Eyes:      Extraocular Movements: Extraocular movements intact  Pupils: Pupils are equal, round, and reactive to light  Neck:      Comments: Cervical collar in place  Cardiovascular:      Rate and Rhythm: Normal rate and regular rhythm  Pulmonary:      Effort: Pulmonary effort is normal  No respiratory distress  Breath sounds: Normal breath sounds  Abdominal:      General: Abdomen is flat  There is no distension  Palpations: Abdomen is soft  Tenderness: There is no abdominal tenderness  Musculoskeletal:         General: No swelling, tenderness or deformity  Cervical back: Neck supple  No rigidity or tenderness  Comments: No tenderness of the midline spine on palpation in the lumbar, thoracic, or cervical area   Skin:     General: Skin is warm and dry  Findings: Lesion present  Neurological:      General: No focal deficit present  Mental Status: He is alert  GCS: GCS eye subscore is 4  GCS verbal subscore is 5  GCS motor subscore is 6  Cranial Nerves: Cranial nerves are intact  Motor: Motor function is intact  No weakness        Comments: Speech is slow         ED Medications  Medications   aspirin chewable tablet (324 mg Oral Given 7/13/22 0650)   fentanyl citrate (PF) 100 MCG/2ML (50 mcg Intravenous Given 7/13/22 0654)   midazolam (VERSED) injection (1 mg Intravenous Given 7/13/22 0654)   lidocaine (PF) (XYLOCAINE-MPF) 1 % injection (1 mL Infiltration Given 7/13/22 0653)   verapamil (ISOPTIN) injection (2 5 mg Intravenous Given 7/13/22 0655)   heparin (porcine) injection (4,000 Units Intravenous Given 7/13/22 0655)   nitroGLYcerin 200 mcg/mL IA bolus (200 mcg Intra-arterial Given 7/13/22 0656)   ondansetron (FOR EMS ONLY) (ZOFRAN) 4 mg/2 mL injection 4 mg (0 mg Does not apply Given to EMS 7/13/22 0604)   ticagrelor (BRILINTA) tablet 180 mg (180 mg Oral Given 7/13/22 0639)   heparin (porcine) injection 4,000 Units (4,000 Units Intravenous Given 7/13/22 0639)       Diagnostic Studies  Results Reviewed     Procedure Component Value Units Date/Time    CBC and differential [[id number]]  (Abnormal) Collected: 07/13/22 0607    Lab Status: Final result Specimen: Blood from Arm, Right Updated: 07/13/22 0620     WBC 18 08 Thousand/uL      RBC 4 64 Million/uL      Hemoglobin 14 4 g/dL      Hematocrit 44 5 %      MCV 96 fL      MCH 31 0 pg      MCHC 32 4 g/dL      RDW 11 9 %      MPV 9 7 fL      Platelets 363 Thousands/uL      nRBC 0 /100 WBCs      Neutrophils Relative 76 %      Immat GRANS % 1 %      Lymphocytes Relative 17 %      Monocytes Relative 6 %      Eosinophils Relative 0 %      Basophils Relative 0 %      Neutrophils Absolute 13 62 Thousands/µL      Immature Grans Absolute 0 16 Thousand/uL      Lymphocytes Absolute 3 06 Thousands/µL      Monocytes Absolute 1 15 Thousand/µL      Eosinophils Absolute 0 05 Thousand/µL      Basophils Absolute 0 04 Thousands/µL     NT-BNP PRO [[id number]] Collected: 07/13/22 8575    Lab Status: In process Specimen: Blood from Arm, Right Updated: 07/13/22 3131 [facility] [address] [[id number]] Collected: 07/13/22 0607    Lab Status: In process Specimen: Blood from Arm, Right Updated: 07/13/22 0613    APTT [[id number]] Collected: 07/13/22 8403    Lab Status: In process Specimen: Blood from Arm, Right Updated: 07/13/22 0613    HS Troponin 0hr (reflex protocol) [[id number]] Collected: 07/13/22 0607    Lab Status:  In process Specimen: Blood from Arm, Right Updated: 07/13/22 0613    Comprehensive metabolic panel [[id number]] Collected: 07/13/22 0607    Lab Status: In process Specimen: Blood from Arm, Right Updated: 07/13/22 0612    Lipase [[id number]] Collected: 07/13/22 5067    Lab Status: In process Specimen: Blood from Arm, Right Updated: 07/13/22 0612    Magnesium [[id number]] Collected: 07/13/22 0637    Lab Status: In process Specimen: Blood from Arm, Right Updated: 07/13/22 0612    Phosphorus [[id number]] Collected: 07/13/22 0607    Lab Status: In process Specimen: Blood from Arm, Right Updated: 07/13/22 0612    Fingerstick Glucose (POCT) [[id number]]  (Abnormal) Collected: 07/13/22 0554    Lab Status: Final result Updated: 07/13/22 0558     POC Glucose 154 mg/dl                  TRAUMA - CT head wo contrast   Final Result by Angelo Castillo MD (07/13 2485)      No acute intracranial abnormality  Left frontal, left ethmoid and left maxillary sinusitis  I personally discussed this study with Araceli Rodarte on 7/13/2022 at 6:36 AM       Workstation performed: [id number]         TRAUMA - CT spine cervical wo contrast   Final Result by Angelo Castillo MD (07/13 7239)      No cervical spine fracture or traumatic malalignment  Degenerative disc disease from C4 to C7  Large posterior central disc osteophyte complex causing moderate spinal canal narrowing  Follow-up imaging with MR per clinical symptoms  I personally discussed this study with Araceli Rodarte on 7/13/2022 at 6:36 AM       Workstation performed: [id number]         CT chest abdomen pelvis wo contrast   Final Result by Angelo Castillo MD (07/13 4433)      No CT findings of trauma in the chest, abdomen or pelvis               I personally discussed this study with Araceli Rodarte on 7/13/2022 at 6:36 AM                      Workstation performed: [id number]               Procedures  Procedures  Conscious Sedation Assessment Flowsheet Row Classification Score   ASA Scale Assessment 4-Severe incapacitating disease process that is a constant threat to life filed at 07/13/2022 0647   Mallampati Classification Class II: soft palate, uvula, fauces visible - No Difficulty filed at 07/13/2022 2926          ED Course  ED Course as of 07/13/22 0717   Wed Jul 13, 2022   [address] [facility] Patient is 78-year-old male, initially came in as a trauma level IA after syncopized and while standing he is falling forward, hitting his head on pavement  This takes daily baby aspirin  Also with stating he has a mild dull left-sided chest pain  Patient also vomited multiple times after the fall on the way to hospital    6017 Per trauma protocol, CT head without contrast, CT cervical spine without contrast, and CT chest abdomen and pelvis without contrast were ordered  CBC, CMP, coagulation studies, magnesium, troponin, lipase are ordered and in process as well    0712 Twelve lead EKG ordered  Appears sinus rhythm with rate of 83 beats per minute  WV interval prolonged consistent with first-degree AV block  Left axis deviation  ST elevation apparent in leads 2, 3, AVF, V4, V5, V6, 1  Consistent with posterior lateral ST-elevation myocardial infarction  [address] with Dr Sonal Tang on call for cath lab, STEMI alert called and patient transported to cath lab  Spoke with on-call radiologist who confirmed no acute intracranial bleed  Patient was given 4000 units of heparin and 180 mg brillinta prior to arrival to cath lab                 MDM  Number of Diagnoses or Management Options  Chest pain: new and requires workup  Fall, initial encounter: new and requires workup  ST elevation myocardial infarction (STEMI), unspecified artery [facility]): new and requires workup  Syncope: new and requires workup  Diagnosis management comments: Patient is a 78-year-old male with history of type 2 diabetes, hypertension initially presenting as a level C trauma after falling forward and hitting his head while taking a daily aspirin  Fall was caused by an episode of syncope and subsequently the patient had vomiting with nausea and chest pain  EKG on arrival consistent with ST-elevation myocardial infarction  Trauma labs were obtained as well as CT scans of the head, neck, chest, abdomen, and pelvis  After discussing the results of the CT scan with on-call radiologist and determining he does not have any acute head bleed the patient was anticoagulated with Brilinta and heparin and transported to the cath lab for further evaluation and management  Amount and/or Complexity of Data Reviewed  Clinical lab tests: ordered and reviewed  Tests in the radiology section of CPT®: ordered and reviewed  Tests in the medicine section of CPT®: ordered and reviewed  Discussion of test results with the performing providers: yes  Review and summarize past medical records: yes  Discuss the patient with other providers: yes  Independent visualization of images, tracings, or specimens: yes    Risk of Complications, Morbidity, and/or Mortality  Presenting problems: high  Diagnostic procedures: low  Management options: low    Patient Progress  Patient progress: stable      Disposition  Final diagnoses:   STEMI (ST elevation myocardial infarction) ([facility] [address] )     Time reflects when diagnosis was documented in both MDM as applicable and the Disposition within this note     Time User Action Codes Description Comment    7/13/2022  6:32 AM Milagros REHMAN Add [I21 3] STEMI (ST elevation myocardial infarction) [facility])       ED Disposition     None      Follow-up Information    None         Patient's Medications    No medications on file     No discharge procedures on file  PDMP Review     None           ED Provider  Attending physically available and evaluated Amadou Tovar I managed the patient along with the ED Attending      Electronically Signed by         Valentin Mcfarland DO  07/13/22 0776

## 2022-07-13 NOTE — ASSESSMENT & PLAN NOTE
· Per patient with hx DM, not on medications  · Send A1C  · Start Q6H glucose checks and SSI   · Will require OP follow up

## 2022-07-13 NOTE — CONSULTS
Jessie Orlando 76 1960, 62 y o  male MRN: [id number]  Unit/Bed#: ICU 02 Encounter: [id number]  Primary Care Provider: No primary care provider on file  Date and time admitted to hospital: 7/13/2022  5:49 AM    Consults    Forehead laceration, initial encounter  Assessment & Plan  Fall from standing  Laceration repair; patient tolerated well; 5-0 Ethilon sutures simple interrupted  Well approximated  Will continue to monitor wound  Removal of stitches between 5-7 days    Fall from standing  Assessment & Plan  Fall from standing  Head CT, cervical CT, CT CAP with contrast yielded negative traumatic injuries  ECG denoted STEMI; see STEMI related diagnosis for treatment pattern    * STEMI (ST elevation myocardial infarction) [facility])  Assessment & Plan  Patient with ST elevations; treated emergency department  Cardiac catheterization to LAD  Dual anti-platelet therapy per cardiology  ICU for further treatment  Cardiology following  PT/OT with evaluation treatment        Evaluated patient via consult from critical care AP; patient has no traumatic injury; laceration repair over left eyebrow  Trauma will be signing off, remaining available

## 2022-07-13 NOTE — ASSESSMENT & PLAN NOTE
Patient with ST elevations; treated emergency department  Cardiac catheterization to LAD  Dual anti-platelet therapy per cardiology  ICU for further treatment  Cardiology following  PT/OT with evaluation treatment

## 2022-07-13 NOTE — PROCEDURES
Laceration repair    Date/Time: 7/13/2022 10:28 AM  Performed by: Christie Preciado DO  Authorized by: Christie Preciado DO   Consent: Verbal consent obtained  Risks and benefits: risks, benefits and alternatives were discussed  Consent given by: patient  Patient understanding: patient states understanding of the procedure being performed  Patient consent: the patient's understanding of the procedure matches consent given  Patient identity confirmed: verbally with patient  Time out: Immediately prior to procedure a "time out" was called to verify the correct patient, procedure, equipment, support staff and site/side marked as required  Body area: head/neck  Location details: forehead  Laceration length: 2 cm  Tendon involvement: none  Nerve involvement: none  Vascular damage: no  Anesthesia: local infiltration    Anesthesia:  Local Anesthetic: lidocaine 1% with epinephrine  Anesthetic total: 1 mL    Sedation:  Patient sedated: no      Wound Dehiscence:  Superficial Wound Dehiscence: simple closure      Procedure Details:  Preparation: Patient was prepped and draped in the usual sterile fashion    Irrigation solution: saline  Irrigation method: syringe  Amount of cleaning: standard  Debridement: none  Degree of undermining: none  Skin closure: Ethilon (5-0)  Number of sutures: 3  Technique: simple  Approximation: close  Approximation difficulty: simple  Patient tolerance: patient tolerated the procedure well with no immediate complications

## 2022-07-13 NOTE — QUICK NOTE
Cervical Collar Clearance: The patient had a CT scan of the cervical spine demonstrating no acute injury  On exam, the patient had no midline point tenderness or paresthesias/numbness/weakness in the extremities  The patient had full range of motion (was then able to flex, extend, and rotate head laterally) without pain  There were no distracting injuries and the patient was not intoxicated  The patient's cervical spine was cleared radiologically and clinically  Cervical collar removed at this time       Nicolasa Barnes DO  7/13/2022 10:50 AM

## 2022-07-13 NOTE — ASSESSMENT & PLAN NOTE
· S/p R radial cardiac cath with aspiration of thrombus and ALEJANDRA placement   · Continue DAPT per cardiology   · Continue statin   · Cardiology following, appreciate recommendations  · Monitor tele

## 2022-07-13 NOTE — PLAN OF CARE
Problem: Potential for Falls  Goal: Patient will remain free of falls  Description: INTERVENTIONS:  - Educate patient/family on patient safety including physical limitations  - Instruct patient to call for assistance with activity   - Consult OT/PT to assist with strengthening/mobility   - Keep Call bell within reach  - Keep bed low and locked with side rails adjusted as appropriate  - Keep care items and personal belongings within reach  - Initiate and maintain comfort rounds  - Make Fall Risk Sign visible to staff  - Offer Toileting every  Hours, in advance of need  - Initiate/Maintain alarm  - Obtain necessary fall risk management equipment:   - Apply yellow socks and bracelet for high fall risk patients  - Consider moving patient to room near nurses station  Outcome: Progressing     Problem: MOBILITY - ADULT  Goal: Maintain or return to baseline ADL function  Description: INTERVENTIONS:  -  Assess patient's ability to carry out ADLs; assess patient's baseline for ADL function and identify physical deficits which impact ability to perform ADLs (bathing, care of mouth/teeth, toileting, grooming, dressing, etc )  - Assess/evaluate cause of self-care deficits   - Assess range of motion  - Assess patient's mobility; develop plan if impaired  - Assess patient's need for assistive devices and provide as appropriate  - Encourage maximum independence but intervene and supervise when necessary  - Involve family in performance of ADLs  - Assess for home care needs following discharge   - Consider OT consult to assist with ADL evaluation and planning for discharge  - Provide patient education as appropriate  Outcome: Progressing  Goal: Maintains/Returns to pre admission functional level  Description: INTERVENTIONS:  - Perform BMAT or MOVE assessment daily    - Set and communicate daily mobility goal to care team and patient/family/caregiver     - Collaborate with rehabilitation services on mobility goals if consulted  - Perform Range of Motion  times a day  - Reposition patient every  hours    - Dangle patient  times a day  - Stand patient  times a day  - Ambulate patient  times a day  - Out of bed to chair  times a day   - Out of bed for meal times a day  - Out of bed for toileting  - Record patient progress and toleration of activity level   Outcome: Progressing     Problem: CARDIOVASCULAR - ADULT  Goal: Maintains optimal cardiac output and hemodynamic stability  Description: INTERVENTIONS:  - Monitor I/O, vital signs and rhythm  - Monitor for S/S and trends of decreased cardiac output  - Administer and titrate ordered vasoactive medications to optimize hemodynamic stability  - Assess quality of pulses, skin color and temperature  - Assess for signs of decreased coronary artery perfusion  - Instruct patient to report change in severity of symptoms  Outcome: Progressing  Goal: Absence of cardiac dysrhythmias or at baseline rhythm  Description: INTERVENTIONS:  - Continuous cardiac monitoring, vital signs, obtain 12 lead EKG if ordered  - Administer antiarrhythmic and heart rate control medications as ordered  - Monitor electrolytes and administer replacement therapy as ordered  Outcome: Progressing     Problem: METABOLIC, FLUID AND ELECTROLYTES - ADULT  Goal: Electrolytes maintained within normal limits  Description: INTERVENTIONS:  - Monitor labs and assess patient for signs and symptoms of electrolyte imbalances  - Administer electrolyte replacement as ordered  - Monitor response to electrolyte replacements, including repeat lab results as appropriate  - Instruct patient on fluid and nutrition as appropriate  Outcome: Progressing  Goal: Fluid balance maintained  Description: INTERVENTIONS:  - Monitor labs   - Monitor I/O and WT  - Instruct patient on fluid and nutrition as appropriate  - Assess for signs & symptoms of volume excess or deficit  Outcome: Progressing  Goal: Glucose maintained within target range  Description: INTERVENTIONS:  - Monitor Blood Glucose as ordered  - Assess for signs and symptoms of hyperglycemia and hypoglycemia  - Administer ordered medications to maintain glucose within target range  - Assess nutritional intake and initiate nutrition service referral as needed  Outcome: Progressing

## 2022-07-13 NOTE — ASSESSMENT & PLAN NOTE
· Likely reactive in the setting of STEMI   · CT CAP unrevealing for infectious etiology  · Will send UA  · Monitor off abx  · Monitor WBC, fever curve

## 2022-07-13 NOTE — ASSESSMENT & PLAN NOTE
Fall from standing  Head CT, cervical CT, CT CAP with contrast yielded negative traumatic injuries  ECG denoted STEMI; see STEMI related diagnosis for treatment pattern

## 2022-07-13 NOTE — CASE MANAGEMENT
Case Management Progress Note    Patient name Jose Lopez  Location ICU 02/ICU 02 MRN [id number]  : 1960 Date 2022       LOS (days): 0  Geometric Mean LOS (GMLOS) (days):   Days to GMLOS:        OBJECTIVE:        Current admission status: Inpatient  Preferred Pharmacy:   PATIENT/FAMILY REPORTS NO PREFERRED PHARMACY  No address on file      [address] ([place]) [place] [address]  Phone: [phone] Fax: [phone]    Primary Care Provider: No primary care provider on file  Primary Insurance: WORKERS COMPENSATION  Secondary Insurance:     PROGRESS NOTE:        Copy of medical insurance card was received  This was copied and sent to the hospital financial counselors to update patient's MRN  CM department will continue to follow to assist with discharge coordination

## 2022-07-13 NOTE — ED PROCEDURE NOTE
Procedure  ECG 12 Lead Documentation Only    Date/Time: 7/13/2022 7:25 AM  Performed by: Eliecer Spain DO  Authorized by: Eliecer Spain DO     Indications / Diagnosis:  Syncope, chest pain  ECG reviewed by me, the ED Provider: yes    Patient location:  ED  Previous ECG:     Previous ECG:  Unavailable  Interpretation:     Interpretation: abnormal    Rate:     ECG rate:  83    ECG rate assessment: normal    Rhythm:     Rhythm: sinus rhythm    QRS:     QRS axis:  Left  ST segments:     ST segments:  Abnormal    Elevation:  II, III, aVF, V4, V5, V6 and I    Depression:  AVR  Comments:      Acute STEMI                     Eliecer Spain DO  07/13/22 4040

## 2022-07-13 NOTE — ASSESSMENT & PLAN NOTE
· Per patient, hx CVA with resulting dysphagia and R sided weakness in 2020  · No follow up since moving to the area from [facility], no daily preventative medications per patient  · Nursing dysphagia assessment  · PT/OT

## 2022-07-13 NOTE — ASSESSMENT & PLAN NOTE
· Unclear etiology with headstrike  · Noted forehead laceration s/p repair   · Trauma imaging unrevealing, trauma following, appreciate recommendations  · Monitor tele for arrhythmia   · PT/OT when appropriate

## 2022-07-13 NOTE — H&P
[address] 1960, 62 y o  male MRN: [id number]  Unit/Bed#: ICU 02 Encounter: [id number]  Primary Care Provider: No primary care provider on file  Date and time admitted to hospital: 7/13/2022  5:49 AM    * STEMI (ST elevation myocardial infarction) [facility])  Assessment & Plan  · S/p R radial cardiac cath with aspiration of thrombus and ALEJANDRA placement   · Continue DAPT per cardiology   · Continue statin   · Cardiology following, appreciate recommendations  · Monitor tele     Syncope and collapse  Assessment & Plan  · Unclear etiology with headstrike  · Noted forehead laceration s/p repair   · Trauma imaging unrevealing, trauma following, appreciate recommendations  · Monitor tele for arrhythmia   · PT/OT when appropriate     Leukocytosis  Assessment & Plan  · Likely reactive in the setting of STEMI   · CT CAP unrevealing for infectious etiology  · Will send UA  · Monitor off abx  · Monitor WBC, fever curve     Hyperglycemia  Assessment & Plan  · Per patient with hx DM, not on medications  · Send A1C  · Start Q6H glucose checks and SSI   · Will require OP follow up     Hx CVA (cerebral vascular accident) ([facility] [address] )  Assessment & Plan  · Per patient, hx CVA with resulting dysphagia and R sided weakness in 2020  · No follow up since moving to the area from [facility], no daily preventative medications per patient  · Nursing dysphagia assessment  · PT/OT       -------------------------------------------------------------------------------------------------------------  Chief Complaint: syncope, STEMI     History of Present Illness   HX and PE limited by: MEKA Lopez is a 62 y o  male who presents with STEMI s/p cardiac cath  He has PMH CVA in 2020 with residual R sided weakness  This AM, he was outside on the sidewalk, felt lightheaded and passed out hitting his head on the pavement  He was brought to the ED where he developed CP  EKG concerning for STEMI with ST elevations  Patient brought urgently to the cath lab where he underwent R radial cath with ALEJANDRA to LAD  He will be admitted to [facility] for further monitoring and management  History obtained from chart review and the patient   -------------------------------------------------------------------------------------------------------------  Dispo: Admit to Stepdown Level 1    Code Status: Level 1 - Full Code  --------------------------------------------------------------------------------------------------------------  Review of Systems   Constitutional: Negative  HENT: Negative  Eyes: Negative  Respiratory: Negative  Cardiovascular: Negative  Gastrointestinal: Negative  Endocrine: Negative  Genitourinary: Negative  Musculoskeletal: Negative  Skin: Negative  Allergic/Immunologic: Negative  Neurological: Negative  Hematological: Negative  Psychiatric/Behavioral: Negative  A 12-point, complete review of systems was reviewed and negative except as stated above     Physical Exam  Constitutional:       General: He is not in acute distress  Appearance: He is ill-appearing  HENT:      Head: Normocephalic and atraumatic  Mouth/Throat:      Mouth: Mucous membranes are moist    Eyes:      Pupils: Pupils are equal, round, and reactive to light  Cardiovascular:      Rate and Rhythm: Normal rate and regular rhythm  Pulses: Normal pulses  Heart sounds: Normal heart sounds  No murmur heard  No friction rub  No gallop  Pulmonary:      Effort: Pulmonary effort is normal  No respiratory distress  Breath sounds: No wheezing, rhonchi or rales  Comments: diminished  Abdominal:      General: Bowel sounds are normal  There is no distension  Palpations: Abdomen is soft  Tenderness: There is no abdominal tenderness  Musculoskeletal:         General: No swelling  Cervical back: Neck supple  Skin:     General: Skin is warm and dry        Capillary Refill: Capillary refill takes less than 2 seconds  Neurological:      General: No focal deficit present  Mental Status: He is alert and oriented to person, place, and time  Cranial Nerves: No cranial nerve deficit  Sensory: No sensory deficit  Motor: Weakness present  Comments: Face symmetrical, tongue midline  RUE 4/5, LUE 5/5, RLE 5/5, LLE 5/5        --------------------------------------------------------------------------------------------------------------  Vitals:   Vitals:    07/13/22 0915 07/13/22 0945 07/13/22 1017 07/13/22 1050   BP: 128/81 105/72 105/72 106/73   BP Location:    Left arm   Pulse: 81 79 74 68   Resp: 17 15 13 18   Temp:    97 8 °F (36 6 °C)   TempSrc:    Oral   SpO2: 98% 97% 96% 96%   Weight:       Height:         Temp  Min: 97 8 °F (36 6 °C)  Max: 97 8 °F (36 6 °C)  IBW (Ideal Body Weight): 70 7 kg  Height: 5' 9" (175 3 cm)  Body mass index is 26 34 kg/m²  Laboratory and Diagnostics:  Results from last 7 days   Lab Units 07/13/22  0607   WBC Thousand/uL 18 08*   HEMOGLOBIN g/dL 14 4   HEMATOCRIT % 44 5   PLATELETS Thousands/uL 257   NEUTROS PCT % 76*   MONOS PCT % 6     Results from last 7 days   Lab Units 07/13/22  0607   SODIUM mmol/L 139   POTASSIUM mmol/L 3 4*   CHLORIDE mmol/L 100   CO2 mmol/L 25   ANION GAP mmol/L 14*   BUN mg/dL 19   CREATININE mg/dL 1 10   CALCIUM mg/dL 9 4   GLUCOSE RANDOM mg/dL 247*   ALT U/L 17   AST U/L 23   ALK PHOS U/L 82   ALBUMIN g/dL 4 1   TOTAL BILIRUBIN mg/dL 0 71     Results from last 7 days   Lab Units 07/13/22  0607   MAGNESIUM mg/dL 1 8*   PHOSPHORUS mg/dL 2 6      Results from last 7 days   Lab Units 07/13/22  0607   INR  1 21*   PTT seconds 25              ABG:    VBG:          Micro:        EKG: NSR rate 65  Imaging: I have personally reviewed pertinent reports  and I have personally reviewed pertinent films in PACS      Historical Information   No past medical history on file  No past surgical history on file    Social History   Social History     Substance and Sexual Activity   Alcohol Use Not on file     Social History     Substance and Sexual Activity   Drug Use Not on file     Social History     Tobacco Use   Smoking Status Not on file   Smokeless Tobacco Not on file     Family History:   No family history on file  Medications:  Current Facility-Administered Medications   Medication Dose Route Frequency    acetaminophen (TYLENOL) tablet 650 mg  650 mg Oral Q4H PRN    [START ON 7/14/2022] aspirin chewable tablet 81 mg  81 mg Oral Daily    heparin (porcine) subcutaneous injection 5,000 Units  5,000 Units Subcutaneous Q8H [address]    insulin lispro (HumaLOG) 100 units/mL subcutaneous injection 1-6 Units  1-6 Units Subcutaneous Q6H [address]    lidocaine-epinephrine (XYLOCAINE-MPF/EPINEPHRINE) 2 %-1:200,000 injection 5 mL  5 mL Infiltration Once    magnesium sulfate 2 g/50 mL IVPB (premix) 2 g  2 g Intravenous Once    nitroglycerin (NITRO-BID) 2 % TD ointment 1 inch  1 inch Topical Q6H    nitroglycerin (NITROSTAT) SL tablet 0 4 mg  0 4 mg Sublingual Q5 Min PRN    ondansetron (ZOFRAN) injection 4 mg  4 mg Intravenous Q6H PRN    sodium chloride 0 9 % infusion  100 mL/hr Intravenous Continuous    tetanus-diphtheria-acellular pertussis (BOOSTRIX) IM injection 0 5 mL  0 5 mL Intramuscular Once    ticagrelor (BRILINTA) tablet 90 mg  90 mg Oral BID     Home medications:  None     Allergies:   Allergies   Allergen Reactions    Pollen Extract Allergic Rhinitis       ------------------------------------------------------------------------------------------------------------  Advance Directive and Living Will:      Power of :    POLST:    ------------------------------------------------------------------------------------------------------------  Anticipated Length of Stay is > 2 midnights    Care Time Delivered:   Upon my evaluation, this patient had a high probability of imminent or life-threatening deterioration due to STEMI, which required my direct attention, intervention, and personal management  I have personally provided 35 minutes (0900 to 0935) of critical care time, exclusive of procedures, teaching, family meetings, and any prior time recorded by providers other than myself  ROSEMARY Negrete        Portions of the record may have been created with voice recognition software  Occasional wrong word or "sound a like" substitutions may have occurred due to the inherent limitations of voice recognition software    Read the chart carefully and recognize, using context, where substitutions have occurred

## 2022-07-14 ENCOUNTER — APPOINTMENT (INPATIENT)
Dept: NON INVASIVE DIAGNOSTICS | Facility: HOSPITAL | Age: 62
DRG: 112 | End: 2022-07-14
Payer: OTHER MISCELLANEOUS

## 2022-07-14 LAB
ANION GAP SERPL CALCULATED.3IONS-SCNC: 7 MMOL/L (ref 4–13)
BASOPHILS # BLD AUTO: 0.03 THOUSANDS/ΜL (ref 0–0.1)
BASOPHILS NFR BLD AUTO: 0 % (ref 0–1)
BUN SERPL-MCNC: 14 MG/DL (ref 5–25)
CALCIUM SERPL-MCNC: 8.8 MG/DL (ref 8.4–10.2)
CHLORIDE SERPL-SCNC: 105 MMOL/L (ref 96–108)
CO2 SERPL-SCNC: 25 MMOL/L (ref 21–32)
CREAT SERPL-MCNC: 0.72 MG/DL (ref 0.6–1.3)
EOSINOPHIL # BLD AUTO: 0.12 THOUSAND/ΜL (ref 0–0.61)
EOSINOPHIL NFR BLD AUTO: 1 % (ref 0–6)
ERYTHROCYTE [DISTWIDTH] IN BLOOD BY AUTOMATED COUNT: 12 % (ref 11.6–15.1)
EST. AVERAGE GLUCOSE BLD GHB EST-MCNC: 120 MG/DL
GFR SERPL CREATININE-BSD FRML MDRD: 101 ML/MIN/1.73SQ M
GLUCOSE SERPL-MCNC: 120 MG/DL (ref 65–140)
GLUCOSE SERPL-MCNC: 121 MG/DL (ref 65–140)
GLUCOSE SERPL-MCNC: 123 MG/DL (ref 65–140)
GLUCOSE SERPL-MCNC: 130 MG/DL (ref 65–140)
GLUCOSE SERPL-MCNC: 140 MG/DL (ref 65–140)
GLUCOSE SERPL-MCNC: 155 MG/DL (ref 65–140)
HBA1C MFR BLD: 5.8 %
HCT VFR BLD AUTO: 40.2 % (ref 36.5–49.3)
HGB BLD-MCNC: 12.8 G/DL (ref 12–17)
IMM GRANULOCYTES # BLD AUTO: 0.05 THOUSAND/UL (ref 0–0.2)
IMM GRANULOCYTES NFR BLD AUTO: 0 % (ref 0–2)
LYMPHOCYTES # BLD AUTO: 2.52 THOUSANDS/ΜL (ref 0.6–4.47)
LYMPHOCYTES NFR BLD AUTO: 22 % (ref 14–44)
MAGNESIUM SERPL-MCNC: 2.1 MG/DL (ref 1.9–2.7)
MCH RBC QN AUTO: 30.6 PG (ref 26.8–34.3)
MCHC RBC AUTO-ENTMCNC: 31.8 G/DL (ref 31.4–37.4)
MCV RBC AUTO: 96 FL (ref 82–98)
MONOCYTES # BLD AUTO: 1.3 THOUSAND/ΜL (ref 0.17–1.22)
MONOCYTES NFR BLD AUTO: 11 % (ref 4–12)
NEUTROPHILS # BLD AUTO: 7.55 THOUSANDS/ΜL (ref 1.85–7.62)
NEUTS SEG NFR BLD AUTO: 66 % (ref 43–75)
NRBC BLD AUTO-RTO: 0 /100 WBCS
PHOSPHATE SERPL-MCNC: 3 MG/DL (ref 2.3–4.1)
PLATELET # BLD AUTO: 208 THOUSANDS/UL (ref 149–390)
PMV BLD AUTO: 9.9 FL (ref 8.9–12.7)
POTASSIUM SERPL-SCNC: 4.1 MMOL/L (ref 3.5–5.3)
RBC # BLD AUTO: 4.18 MILLION/UL (ref 3.88–5.62)
SL CV LV EF: 30
SODIUM SERPL-SCNC: 137 MMOL/L (ref 135–147)
TR MAX PG: 26 MMHG
TR PEAK VELOCITY: 2.5 M/S
TRICUSPID VALVE PEAK REGURGITATION VELOCITY: 2.53 M/S
WBC # BLD AUTO: 11.57 THOUSAND/UL (ref 4.31–10.16)

## 2022-07-14 PROCEDURE — 99232 SBSQ HOSP IP/OBS MODERATE 35: CPT | Performed by: INTERNAL MEDICINE

## 2022-07-14 PROCEDURE — 93321 DOPPLER ECHO F-UP/LMTD STD: CPT

## 2022-07-14 PROCEDURE — 93308 TTE F-UP OR LMTD: CPT

## 2022-07-14 PROCEDURE — 93325 DOPPLER ECHO COLOR FLOW MAPG: CPT | Performed by: INTERNAL MEDICINE

## 2022-07-14 PROCEDURE — 83735 ASSAY OF MAGNESIUM: CPT | Performed by: NURSE PRACTITIONER

## 2022-07-14 PROCEDURE — 93325 DOPPLER ECHO COLOR FLOW MAPG: CPT

## 2022-07-14 PROCEDURE — 84100 ASSAY OF PHOSPHORUS: CPT | Performed by: NURSE PRACTITIONER

## 2022-07-14 PROCEDURE — 82948 REAGENT STRIP/BLOOD GLUCOSE: CPT

## 2022-07-14 PROCEDURE — 93321 DOPPLER ECHO F-UP/LMTD STD: CPT | Performed by: INTERNAL MEDICINE

## 2022-07-14 PROCEDURE — 85025 COMPLETE CBC W/AUTO DIFF WBC: CPT | Performed by: NURSE PRACTITIONER

## 2022-07-14 PROCEDURE — 93308 TTE F-UP OR LMTD: CPT | Performed by: INTERNAL MEDICINE

## 2022-07-14 PROCEDURE — 80048 BASIC METABOLIC PNL TOTAL CA: CPT | Performed by: NURSE PRACTITIONER

## 2022-07-14 RX ADMIN — HEPARIN SODIUM 5000 UNITS: 5000 INJECTION INTRAVENOUS; SUBCUTANEOUS at 21:26

## 2022-07-14 RX ADMIN — TICAGRELOR 90 MG: 90 TABLET ORAL at 08:02

## 2022-07-14 RX ADMIN — HEPARIN SODIUM 5000 UNITS: 5000 INJECTION INTRAVENOUS; SUBCUTANEOUS at 05:14

## 2022-07-14 RX ADMIN — INSULIN LISPRO 1 UNITS: 100 INJECTION, SOLUTION INTRAVENOUS; SUBCUTANEOUS at 11:05

## 2022-07-14 RX ADMIN — TICAGRELOR 90 MG: 90 TABLET ORAL at 17:11

## 2022-07-14 RX ADMIN — METOPROLOL SUCCINATE 25 MG: 25 TABLET, EXTENDED RELEASE ORAL at 08:02

## 2022-07-14 RX ADMIN — HEPARIN SODIUM 5000 UNITS: 5000 INJECTION INTRAVENOUS; SUBCUTANEOUS at 13:09

## 2022-07-14 RX ADMIN — ASPIRIN 81 MG CHEWABLE TABLET 81 MG: 81 TABLET CHEWABLE at 08:02

## 2022-07-14 RX ADMIN — ATORVASTATIN CALCIUM 40 MG: 40 TABLET, FILM COATED ORAL at 17:11

## 2022-07-14 NOTE — ASSESSMENT & PLAN NOTE
· S/p R radial cardiac cath with aspiration of thrombus and ALEJANDRA placement   · Continue DAPT per cardiology   · Continue statin  · No arrythmias overnight, BB held, can start this morning  · TTE 7/13 - LVEF 18-94%, Systolic function is moderately reduced  Diastolic function is mildly abnormal  The mid anteroseptal, apical anterior, apical septal, apical inferior, apical lateral and apex are akinetic  The following segments are hypokinetic: mid anterior, mid inferoseptal and mid inferior    · Cardiology following, appreciate recommendations  · Monitor tele

## 2022-07-14 NOTE — PROGRESS NOTES
Progress Note - Cardiology Team 1  Nichole Langford 62 y o  male MRN: [id number]  Unit/Bed#: ICU 02 Encounter: [id number]        Principal Problem:    STEMI (ST elevation myocardial infarction) [facility])  Active Problems:    Syncope and collapse    Hx CVA (cerebral vascular accident) ([facility] [address] )    Hyperglycemia    Leukocytosis    Fall from standing    Forehead laceration, initial encounter        Assessment/Plan     1  STEMI  Syncope followed by nausea/vomiting/chest pain  EKG- KWASI inferiorally and V4-V6  7/13 S/p urgent cath- mid % stenosis, lesion thrombotic  Large thrombus aspirated  ALEJANDRA placed  First diagonal 50% stenosis  He has been loaded with 180 mg Brilinta ( will check cost)  will follow this with 90 mg b i d  Aspirin 81 mg, high-intensity statin, beta-blocker  ( as BP tolerates)   No further chest pain  TTE- LVEF 35-40%  Telemetry- NSR short runs NSVT/AIVR  Continue telemetry  1st troponin negative, subsequent >22,973    2  ICMP  LVEF 35-40% with RWMA  BB- toprol 25mg ( held yesterday d/t low BP)  BP limiting start of ACE/ARB at this time  Not requiring diuretic  Will further review echocardiogram for possible recommendation of life vest  Discussed with patient       3  Syncope  Vasovagal versus arrhythmogenic  Was preceded by feeling lightheaded and hot  Head laceration  Continue telemetry  No evidence of high degree AV block or significant bradycardia  LVEF 35-40%  Imaging- no acute injuries     4  History of hypertension-presented on no medical therapy  Normotensive to low normal  Will monitor     5  History of type 2 diabetes-currently on no medical therapy  HgbAIC- 5 8%  Per primary team     6  History of stroke-right-sided weakness and slow speech  Asa, statin     67Hypomagnesemia/hypokalemia-repleted  Improved     Subjective/Objective   Chief Complaint/Subjective  Patient without cp, sob  No palpitations lightheaded  Reviewed diagnosis, meds, activity, follow up   Appropriated questions asked  Discussed with [facility]        Vitals: /72   Pulse 61   Temp 99 2 °F (37 3 °C) (Oral)   Resp 16   Ht 5' 9" (1 753 m)   Wt 80 kg (176 lb 5 9 oz)   SpO2 98%   BMI 26 05 kg/m²     Vitals:    07/13/22 1500 07/14/22 0600   Weight: 80 7 kg (178 lb) 80 kg (176 lb 5 9 oz)     Orthostatic Blood Pressures    Flowsheet Row Most Recent Value   Blood Pressure 110/72 filed at 07/14/2022 0802   Patient Position - Orthostatic VS Lying filed at 07/14/2022 0710            Intake/Output Summary (Last 24 hours) at 7/14/2022 0858  Last data filed at 7/14/2022 0800  Gross per 24 hour   Intake 980 ml   Output 800 ml   Net 180 ml       Invasive Devices  Report    Peripheral Intravenous Line  Duration           Peripheral IV 07/13/22 Right Antecubital 1 day    Peripheral IV 07/13/22 Left Antecubital <1 day                Current Facility-Administered Medications   Medication Dose Route Frequency    acetaminophen (TYLENOL) tablet 650 mg  650 mg Oral Q4H PRN    aspirin chewable tablet 81 mg  81 mg Oral Daily    atorvastatin (LIPITOR) tablet 40 mg  40 mg Oral Daily With Dinner    heparin (porcine) subcutaneous injection 5,000 Units  5,000 Units Subcutaneous Q8H [facility]    insulin lispro (HumaLOG) 100 units/mL subcutaneous injection 1-6 Units  1-6 Units Subcutaneous TID AC    insulin lispro (HumaLOG) 100 units/mL subcutaneous injection 1-6 Units  1-6 Units Subcutaneous HS    lidocaine-epinephrine (XYLOCAINE-MPF/EPINEPHRINE) 2 %-1:200,000 injection 5 mL  5 mL Infiltration Once    metoprolol succinate (TOPROL-XL) 24 hr tablet 25 mg  25 mg Oral Daily    nitroglycerin (NITROSTAT) SL tablet 0 4 mg  0 4 mg Sublingual Q5 Min PRN    ondansetron (ZOFRAN) injection 4 mg  4 mg Intravenous Q6H PRN    ticagrelor (BRILINTA) tablet 90 mg  90 mg Oral BID         Physical Exam: /72   Pulse 61   Temp 99 2 °F (37 3 °C) (Oral)   Resp 16   Ht 5' 9" (1 753 m)   Wt 80 kg (176 lb 5 9 oz)   SpO2 98%   BMI 26 05 kg/m²     General Appearance:    Alert, cooperative, no distress, appears stated age   Head:    Normocephalic, no scleral icterus   Eyes:    PERRL   Nose:   Nares normal, septum midline, no drainage    Throat:   Lips, mucosa, and tongue normal   Neck:   Supple, symmetrical, trachea midline,              Lungs:     Clear to auscultation bilaterally, respirations unlabored   Chest Wall:    No tenderness or deformity    Heart:    Regular rate and rhythm, S1 and S2 normal, no murmur, rub   or gallop   Abdomen:     Soft, non-tender, bowel sounds active all four quadrants,     no masses, no organomegaly   Extremities:   Extremities normal, atraumatic, no cyanosis or edema   Pulses:   2+ and symmetric all extremities   Skin:   Skin color, texture, turgor normal, no rashes or lesions   Neurologic:   Alert and oriented to person place and time                 Lab Results:   Recent Results (from the past 72 hour(s))   ECG 12 lead    Collection Time: 07/13/22  5:52 AM   Result Value Ref Range    Ventricular Rate 77 BPM    Atrial Rate 77 BPM    MI Interval 312 ms    QRSD Interval 114 ms    QT Interval 364 ms    QTC Interval 411 ms    P Axis 80 degrees    QRS Axis -79 degrees    T Wave Axis 69 degrees   Fingerstick Glucose (POCT)    Collection Time: 07/13/22  5:54 AM   Result Value Ref Range    POC Glucose 154 (H) 65 - 140 mg/dl   Comprehensive metabolic panel    Collection Time: 07/13/22  6:07 AM   Result Value Ref Range    Sodium 139 135 - 147 mmol/L    Potassium 3 4 (L) 3 5 - 5 3 mmol/L    Chloride 100 96 - 108 mmol/L    CO2 25 21 - 32 mmol/L    ANION GAP 14 (H) 4 - 13 mmol/L    BUN 19 5 - 25 mg/dL    Creatinine 1 10 0 60 - 1 30 mg/dL    Glucose 247 (H) 65 - 140 mg/dL    Calcium 9 4 8 4 - 10 2 mg/dL    AST 23 13 - 39 U/L    ALT 17 7 - 52 U/L    Alkaline Phosphatase 82 34 - 104 U/L    Total Protein 7 4 6 4 - 8 4 g/dL    Albumin 4 1 3 5 - 5 0 g/dL    Total Bilirubin 0 71 0 20 - 1 00 mg/dL    eGFR 72 ml/min/1 73sq m   CBC and differential Collection Time: 07/13/22  6:07 AM   Result Value Ref Range    WBC 18 08 (H) 4 31 - 10 16 Thousand/uL    RBC 4 64 3 88 - 5 62 Million/uL    Hemoglobin 14 4 12 0 - 17 0 g/dL    Hematocrit 44 5 36 5 - 49 3 %    MCV 96 82 - 98 fL    MCH 31 0 26 8 - 34 3 pg    MCHC 32 4 31 4 - 37 4 g/dL    RDW 11 9 11 6 - 15 1 %    MPV 9 7 8 9 - 12 7 fL    Platelets 304 285 - 207 Thousands/uL    nRBC 0 /100 WBCs    Neutrophils Relative 76 (H) 43 - 75 %    Immat GRANS % 1 0 - 2 %    Lymphocytes Relative 17 14 - 44 %    Monocytes Relative 6 4 - 12 %    Eosinophils Relative 0 0 - 6 %    Basophils Relative 0 0 - 1 %    Neutrophils Absolute 13 62 (H) 1 85 - 7 62 Thousands/µL    Immature Grans Absolute 0 16 0 00 - 0 20 Thousand/uL    Lymphocytes Absolute 3 06 0 60 - 4 47 Thousands/µL    Monocytes Absolute 1 15 0 17 - 1 22 Thousand/µL    Eosinophils Absolute 0 05 0 00 - 0 61 Thousand/µL    Basophils Absolute 0 04 0 00 - 0 10 Thousands/µL   Protime-INR    Collection Time: 07/13/22  6:07 AM   Result Value Ref Range    Protime 15 3 (H) 11 6 - 14 5 seconds    INR 1 21 (H) 0 84 - 1 19   APTT    Collection Time: 07/13/22  6:07 AM   Result Value Ref Range    PTT 25 23 - 37 seconds   Lipase    Collection Time: 07/13/22  6:07 AM   Result Value Ref Range    Lipase 49 11 - 82 u/L   Magnesium    Collection Time: 07/13/22  6:07 AM   Result Value Ref Range    Magnesium 1 8 (L) 1 9 - 2 7 mg/dL   Phosphorus    Collection Time: 07/13/22  6:07 AM   Result Value Ref Range    Phosphorus 2 6 2 3 - 4 1 mg/dL   HS Troponin 0hr (reflex protocol)    Collection Time: 07/13/22  6:07 AM   Result Value Ref Range    hs TnI 0hr 10 "Refer to ACS Flowchart"- see link ng/L   NT-BNP PRO    Collection Time: 07/13/22  6:07 AM   Result Value Ref Range    NT-proBNP 69 <125 pg/mL   Hemoglobin A1C w/ EAG Estimation    Collection Time: 07/13/22  6:07 AM   Result Value Ref Range    Hemoglobin A1C 5 8 (H) Normal 3 8-5 6%; PreDiabetic 5 7-6 4%;  Diabetic >=6 5%; Glycemic control for adults with diabetes <7 0% %     mg/dl   ECG 12 lead    Collection Time: 07/13/22  6:24 AM   Result Value Ref Range    Ventricular Rate 92 BPM    Atrial Rate 70 BPM    MD Interval  ms    QRSD Interval 124 ms    QT Interval 352 ms    QTC Interval 435 ms    P Axis  degrees    QRS Axis -79 degrees    T Wave Axis 74 degrees   ECG 12 lead    Collection Time: 07/13/22  6:25 AM   Result Value Ref Range    Ventricular Rate 83 BPM    Atrial Rate 83 BPM    MD Interval 302 ms    QRSD Interval 118 ms    QT Interval 344 ms    QTC Interval 404 ms    P Axis 86 degrees    QRS Axis -79 degrees    T Wave Axis 71 degrees   POCT activated clotting time    Collection Time: 07/13/22  7:08 AM   Result Value Ref Range    Activated Clotting Time, i-STAT 122 89 - 137 sec    Specimen Type VENOUS    POCT activated clotting time    Collection Time: 07/13/22  7:30 AM   Result Value Ref Range    Activated Clotting Time, i-STAT 98 89 - 137 sec    Specimen Type ARTERIAL    POCT activated clotting time    Collection Time: 07/13/22  7:38 AM   Result Value Ref Range    Activated Clotting Time, i-STAT 231 (H) 89 - 137 sec    Specimen Type ARTERIAL    ECG 12 lead    Collection Time: 07/13/22  8:29 AM   Result Value Ref Range    Ventricular Rate 77 BPM    Atrial Rate 77 BPM    MD Interval 282 ms    QRSD Interval 132 ms    QT Interval 384 ms    QTC Interval 434 ms    P Axis 81 degrees    QRS Axis 270 degrees    T Wave Axis 68 degrees   Fingerstick Glucose (POCT)    Collection Time: 07/13/22  9:07 AM   Result Value Ref Range    POC Glucose 179 (H) 65 - 140 mg/dl   HS Troponin I 2hr    Collection Time: 07/13/22 10:05 AM   Result Value Ref Range    hs TnI 2hr >22,973 (H) "Refer to ACS Flowchart"- see link ng/L    Delta 2hr hsTnI >22,963 (H) <20 ng/L   TSH WITH REFLEX TO FREE T4    Collection Time: 07/13/22 10:05 AM   Result Value Ref Range    TSH 3RD GENERATON 1 041 0 450 - 4 500 uIU/mL   Fingerstick Glucose (POCT)    Collection Time: 07/13/22 11:02 AM   Result Value Ref Range    POC Glucose 142 (H) 65 - 140 mg/dl   HS Troponin I 4hr    Collection Time: 07/13/22 12:13 PM   Result Value Ref Range    hs TnI 4hr >22,973 (H) "Refer to ACS Flowchart"- see link ng/L    Delta 4hr hsTnI >22,963 (H) <20 ng/L   ECG 12 lead    Collection Time: 07/13/22 12:14 PM   Result Value Ref Range    Ventricular Rate 59 BPM    Atrial Rate 59 BPM    NC Interval 262 ms    QRSD Interval 120 ms    QT Interval 404 ms    QTC Interval 399 ms    P [place] 77 degrees    QRS Axis -89 degrees    T Wave Axis 7 degrees   UA w Reflex to Microscopic w Reflex to Culture    Collection Time: 07/13/22  1:29 PM    Specimen: Urine, Other   Result Value Ref Range    Color, UA Yellow     Clarity, UA Clear     Specific Gravity, UA >=1 050 (H) 1 003 - 1 030    pH, UA 8 0 4 5, 5 0, 5 5, 6 0, 6 5, 7 0, 7 5, 8 0    Leukocytes, UA Negative Negative    Nitrite, UA Negative Negative    Protein, UA 30 (1+) (A) Negative mg/dl    Glucose, UA Trace (A) Negative mg/dl    Ketones, UA Negative Negative mg/dl    Urobilinogen, UA 4 0 (A) <2 0 mg/dl mg/dl    Bilirubin, UA Negative Negative    Occult Blood, UA Large (A) Negative   Urine Microscopic    Collection Time: 07/13/22  1:29 PM   Result Value Ref Range    RBC, UA 4-10 (A) None Seen, 1-2 /hpf    WBC, UA 1-2 None Seen, 1-2 /hpf    Epithelial Cells Occasional None Seen, Occasional /hpf    Bacteria, UA None Seen None Seen, Occasional /hpf   ECG 12 lead    Collection Time: 07/13/22  2:37 PM   Result Value Ref Range    Ventricular Rate 70 BPM    Atrial Rate 70 BPM    NC Interval 264 ms    QRSD Interval 120 ms    QT Interval 380 ms    QTC Interval 410 ms    P Axis 76 degrees    QRS Axis -89 degrees    T Wave [place] 29 degrees   Echo complete w/ contrast if indicated    Collection Time: 07/13/22  4:02 PM   Result Value Ref Range    LA size 4 2 cm    Triscuspid Valve Regurgitation Peak Gradient 39 0 mmHg    Tricuspid valve peak regurgitation velocity 3 13 m/s    LVPWd 1 10 cm Left Atrium Area-systolic Apical Two Chamber 17 6 cm2    Left Atrium Area-systolic Four Chamber 94 7 cm2    MV E' Tissue Velocity Septal 7 cm/s    TR Peak [place] 3 1 m/s    IVSd 5 97 cm    LV DIASTOLIC VOLUME (MOD BIPLANE) 2D 72 mL    LEFT VENTRICLE SYSTOLIC VOLUME (MOD BIPLANE) 2D 45 mL    Left ventricular stroke volume (2D) 27 00 mL    A4C EF 39 %    LA length (A2C) 5 50 cm    LVIDd 4 00 cm    IVS 1 1 cm    LVIDS 3 30 cm    FS 18 28 - 44 %    Asc Ao 2 7 cm    Ao root 3 50 cm    RVID d 4 2 cm    AV LVOT peak gradient 2 mmHg    MV valve area p 1/2 method 4 15 cm2    E wave deceleration time 183 ms    AV peak gradient 7 mmHg    MV Peak E [place] 73 cm/s    MV Peak A [place] 0 97 m/s    RAA A4C 16 8 cm2    MV stenosis pressure 1/2 time 53 ms    LVSV, 2D 27 mL    LV EF 40     Est  RA pres 8 0 mmHg    Right Ventricular Peak Systolic Pressure 20 71 mmHg   Fingerstick Glucose (POCT)    Collection Time: 07/13/22  4:26 PM   Result Value Ref Range    POC Glucose 164 (H) 65 - 140 mg/dl   Fingerstick Glucose (POCT)    Collection Time: 07/13/22  8:48 PM   Result Value Ref Range    POC Glucose 132 65 - 140 mg/dl   CBC and differential    Collection Time: 07/14/22  4:31 AM   Result Value Ref Range    WBC 11 57 (H) 4 31 - 10 16 Thousand/uL    RBC 4 18 3 88 - 5 62 Million/uL    Hemoglobin 12 8 12 0 - 17 0 g/dL    Hematocrit 40 2 36 5 - 49 3 %    MCV 96 82 - 98 fL    MCH 30 6 26 8 - 34 3 pg    MCHC 31 8 31 4 - 37 4 g/dL    RDW 12 0 11 6 - 15 1 %    MPV 9 9 8 9 - 12 7 fL    Platelets 956 290 - 163 Thousands/uL    nRBC 0 /100 WBCs    Neutrophils Relative 66 43 - 75 %    Immat GRANS % 0 0 - 2 %    Lymphocytes Relative 22 14 - 44 %    Monocytes Relative 11 4 - 12 %    Eosinophils Relative 1 0 - 6 %    Basophils Relative 0 0 - 1 %    Neutrophils Absolute 7 55 1 85 - 7 62 Thousands/µL    Immature Grans Absolute 0 05 0 00 - 0 20 Thousand/uL    Lymphocytes Absolute 2 52 0 60 - 4 47 Thousands/µL    Monocytes Absolute 1 30 (H) 0 17 - 1 22 Thousand/µL Eosinophils Absolute 0 12 0 00 - 0 61 Thousand/µL    Basophils Absolute 0 03 0 00 - 0 10 Thousands/µL   Basic metabolic panel    Collection Time: 07/14/22  4:31 AM   Result Value Ref Range    Sodium 137 135 - 147 mmol/L    Potassium 4 1 3 5 - 5 3 mmol/L    Chloride 105 96 - 108 mmol/L    CO2 25 21 - 32 mmol/L    ANION GAP 7 4 - 13 mmol/L    BUN 14 5 - 25 mg/dL    Creatinine 0 72 0 60 - 1 30 mg/dL    Glucose 120 65 - 140 mg/dL    Calcium 8 8 8 4 - 10 2 mg/dL    eGFR 101 ml/min/1 73sq m   Magnesium    Collection Time: 07/14/22  4:31 AM   Result Value Ref Range    Magnesium 2 1 1 9 - 2 7 mg/dL   Phosphorus    Collection Time: 07/14/22  4:31 AM   Result Value Ref Range    Phosphorus 3 0 2 3 - 4 1 mg/dL   Fingerstick Glucose (POCT)    Collection Time: 07/14/22  7:33 AM   Result Value Ref Range    POC Glucose 121 65 - 140 mg/dl     Imaging: I have personally reviewed pertinent reports  Tele- nsr nsvt/aivr  Left Ventricle Left ventricular cavity size is normal  Wall thickness is mildly increased  There is mild concentric hypertrophy  The left ventricular ejection fraction is 35-40%  Systolic function is moderately reduced  Diastolic function is mildly abnormal, consistent with grade I (abnormal) relaxation  Left atrial filling pressure is normal    Wall Scoring Baseline     The following segments are akinetic: mid anteroseptal, apical anterior, apical septal, apical inferior, apical lateral and apex  The following segments are hypokinetic: mid anterior, mid inferoseptal and mid inferior  All other segments are normal             Right Ventricle Right ventricular cavity size is normal  Systolic function is normal  Wall thickness is normal    Left Atrium The atrium is normal in size  Right Atrium The atrium is normal in size  Aortic Valve The aortic valve is trileaflet  The leaflets are not thickened  The leaflets are not calcified  The leaflets exhibit normal mobility  There is no evidence of regurgitation   The aortic valve has no significant stenosis  Mitral Valve The mitral valve has normal structure and function  There is mild annular calcification  There is trace regurgitation  There is no evidence of stenosis  Tricuspid Valve Tricuspid valve structure is normal  There is moderate regurgitation  There is no evidence of stenosis  The right ventricular systolic pressure is mildly elevated  The estimated right ventricular systolic pressure is 39 82 mmHg  Pulmonic Valve Pulmonic valve structure is normal  There is trace regurgitation  There is no evidence of stenosis  Ascending Aorta The aortic root is normal in size  IVC/SVC The right atrial pressure is estimated at 8 0 mmHg  The inferior vena cava is normal in size  Respirophasic changes were blunted (less than 50% variation)  Pericardium There is no pericardial effusion  The pericardium is normal in appearance  Counseling / Coordination of Care  Total time spent today 30minutes  Greater than 50% of total time was spent with the patient and / or family counseling and / or coordination of care

## 2022-07-14 NOTE — ASSESSMENT & PLAN NOTE
· Likely reactive in the setting of STEMI   · CT CAP unrevealing for infectious etiology  · UA negative  · Monitor off abx  · Monitor WBC, fever curve

## 2022-07-14 NOTE — ASSESSMENT & PLAN NOTE
· Unclear etiology with headstrike  · Noted forehead laceration s/p repair   · Trauma imaging unrevealing, trauma following, appreciate recommendations  · Monitor tele for arrhythmia, hold BB tonight as above  · PT/OT when appropriate

## 2022-07-14 NOTE — ASSESSMENT & PLAN NOTE
· Per patient with hx DM, not on medications  · HbA1c - Pending  · SSI AC/HS  · Goal glucose 140-180  · Will require OP follow up

## 2022-07-14 NOTE — PROGRESS NOTES
[facility]  Progress Note Trinity Grate 1960, 62 y o  male MRN: [id number]  Unit/Bed#: ICU 02 Encounter: [id number]  Primary Care Provider: No primary care provider on file  Date and time admitted to hospital: 7/13/2022  5:49 AM    * STEMI (ST elevation myocardial infarction) [facility])  Assessment & Plan  · S/p R radial cardiac cath with aspiration of thrombus and ALEJANDRA placement   · Continue DAPT per cardiology   · Continue statin  · No arrythmias overnight, BB held, can start this morning  · TTE 7/13 - LVEF 26-32%, Systolic function is moderately reduced  Diastolic function is mildly abnormal  The mid anteroseptal, apical anterior, apical septal, apical inferior, apical lateral and apex are akinetic  The following segments are hypokinetic: mid anterior, mid inferoseptal and mid inferior    · Cardiology following, appreciate recommendations  · Monitor tele     Syncope and collapse  Assessment & Plan  · Unclear etiology with headstrike  · Noted forehead laceration s/p repair   · Trauma imaging unrevealing, trauma following, appreciate recommendations  · Monitor tele for arrhythmia, hold BB tonight as above  · PT/OT when appropriate     Leukocytosis  Assessment & Plan  · Likely reactive in the setting of STEMI   · CT CAP unrevealing for infectious etiology  · UA negative  · Monitor off abx  · Monitor WBC, fever curve     Hyperglycemia  Assessment & Plan  · Per patient with hx DM, not on medications  · HbA1c - Pending  · SSI AC/HS  · Goal glucose 140-180  · Will require OP follow up     Forehead laceration, initial encounter  Assessment & Plan  · S/p 3 sutures for closure    Hx CVA (cerebral vascular accident) ([facility] [address] )  Assessment & Plan  · Per patient, hx CVA with resulting dysphagia and R sided weakness in 2020  · No follow up since moving to the area from [facility], no daily preventative medications per patient  · Nursing dysphagia assessment  · PT/OT       ----------------------------------------------------------------------------------------  HPI/24hr events: No acute events overnight    Patient appropriate for transfer out of the ICU today?: Patient does not meet criteria for ICU Follow-up Clinic; referral has not been made  Disposition: Transfer to Med Surg with Telemetry   Code Status: Level 1 - Full Code  ---------------------------------------------------------------------------------------  SUBJECTIVE  Pt noted some mild chest pain last night which resolved by this morning, currently pain free on exam  Denies any other pain, shortness of breath increased work of breathing, N/V dizziness, lightheadedness, headache  Review of Systems   Constitutional: Negative  HENT: Negative  Eyes: Negative  Respiratory: Negative  Negative for chest tightness and shortness of breath  Cardiovascular: Negative  Negative for chest pain and palpitations  Gastrointestinal: Negative  Endocrine: Negative  Genitourinary: Negative  Musculoskeletal: Negative  Skin: Negative  Allergic/Immunologic: Negative  Neurological: Negative  Negative for dizziness, syncope, weakness, light-headedness, numbness and headaches  Hematological: Negative  Psychiatric/Behavioral: Negative        Review of systems was reviewed and negative unless stated above in HPI/24-hour events   ---------------------------------------------------------------------------------------  OBJECTIVE    Vitals   Vitals:    22 1345 22 1415 22 1500 22 1905   BP: 100/60 108/67 111/68 119/71   BP Location:   Left arm Left arm   Pulse: 75 62 69 59   Resp: 17 14 16 17   Temp:   97 7 °F (36 5 °C) 97 8 °F (36 6 °C)   TempSrc:   Oral Axillary   SpO2: 97% 98% 97% 97%   Weight:   80 7 kg (178 lb)    Height:   5' 9" (1 753 m)      Temp (24hrs), Av 8 °F (36 6 °C), Min:97 7 °F (36 5 °C), Max:98 °F (36 7 °C)  Current: Temperature: 97 8 °F (36 6 °C)          Respiratory:  SpO2: SpO2: 97 %, SpO2 Device: O2 Device: None (Room air)  Nasal Cannula O2 Flow Rate (L/min): 2 L/min    Invasive/non-invasive ventilation settings   Respiratory  Report   Lab Data (Last 4 hours)    None         O2/Vent Data (Last 4 hours)    None                Physical Exam  Vitals and nursing note reviewed  Constitutional:       General: He is awake  He is not in acute distress  Appearance: Normal appearance  He is not ill-appearing or toxic-appearing  HENT:      Head:        Mouth/Throat:      Mouth: Mucous membranes are moist       Pharynx: Oropharynx is clear  Eyes:      Pupils: Pupils are equal, round, and reactive to light  Cardiovascular:      Rate and Rhythm: Normal rate  Rhythm irregular  Pulses:           Radial pulses are 2+ on the right side and 2+ on the left side  Dorsalis pedis pulses are 2+ on the right side and 2+ on the left side  Pulmonary:      Effort: Pulmonary effort is normal  No respiratory distress  Breath sounds: Normal breath sounds  Abdominal:      General: Abdomen is flat  Bowel sounds are normal  There is no distension  Palpations: Abdomen is soft  Tenderness: There is no abdominal tenderness  There is no guarding  Skin:     General: Skin is warm and dry  Capillary Refill: Capillary refill takes less than 2 seconds  Neurological:      General: No focal deficit present  Mental Status: He is alert  GCS: GCS eye subscore is 4  GCS verbal subscore is 5  GCS motor subscore is 6  Sensory: Sensation is intact  Motor: Motor function is intact  Psychiatric:         Attention and Perception: Attention normal          Mood and Affect: Mood normal  Affect is flat  Speech: Speech normal          Behavior: Behavior normal  Behavior is cooperative           Cognition and Memory: Cognition and memory normal          Judgment: Judgment normal          Laboratory and Diagnostics:  Results from last 7 days   Lab Units 07/13/22  0607   WBC Thousand/uL 18 08*   HEMOGLOBIN g/dL 14 4   HEMATOCRIT % 44 5   PLATELETS Thousands/uL 257   NEUTROS PCT % 76*   MONOS PCT % 6     Results from last 7 days   Lab Units 07/13/22  0607   SODIUM mmol/L 139   POTASSIUM mmol/L 3 4*   CHLORIDE mmol/L 100   CO2 mmol/L 25   ANION GAP mmol/L 14*   BUN mg/dL 19   CREATININE mg/dL 1 10   CALCIUM mg/dL 9 4   GLUCOSE RANDOM mg/dL 247*   ALT U/L 17   AST U/L 23   ALK PHOS U/L 82   ALBUMIN g/dL 4 1   TOTAL BILIRUBIN mg/dL 0 71     Results from last 7 days   Lab Units 07/13/22  0607   MAGNESIUM mg/dL 1 8*   PHOSPHORUS mg/dL 2 6      Results from last 7 days   Lab Units 07/13/22  0607   INR  1 21*   PTT seconds 25              ABG:    VBG:          Micro        EKG: sinus arrhythmia, 1st degree AVB  Imaging: I have personally reviewed pertinent reports  and I have personally reviewed pertinent films in PACS   CT head - No acute intracranial abnormality  CT spine cervical w/o contrast - No cervical spine fracture or traumatic malalignment, Degenerative disc disease from C4 to C7  Large posterior central disc osteophyte complex causing moderate spinal canal narrowing  CT C/A/P - Negative    Intake and Output  I/O       07/12 0701 07/13 0700 07/13 0701 07/14 0700    P  O   480    I V  (mL/kg)  500 (6 2)    Total Intake(mL/kg)  980 (12 1)    Urine (mL/kg/hr)  700 (0 4)    Blood  10    Total Output  710    Net  +270                Height and Weights   Height: 5' 9" (175 3 cm)  IBW (Ideal Body Weight): 70 7 kg  Body mass index is 26 29 kg/m²    Weight (last 2 days)     Date/Time Weight    07/13/22 1500 80 7 (178)    07/13/22 0546 80 9 (178 35)            Nutrition       Diet Orders   (From admission, onward)             Start     Ordered    07/13/22 1311  Diet Cardiovascular; Cardiac; Consistent Carbohydrate Diet Level 3 (6 carb servings/90 grams CHO/meal)  Diet effective now        References:    Nutrtion Support Algorithm Enteral vs  Parenteral   Question Answer Comment   Diet Type Cardiovascular    Cardiac Cardiac    Other Restriction(s): Consistent Carbohydrate Diet Level 3 (6 carb servings/90 grams CHO/meal)    RD to adjust diet per protocol?  Yes        07/13/22 1311                  Active Medications  Scheduled Meds:  Current Facility-Administered Medications   Medication Dose Route Frequency Provider Last Rate    acetaminophen  650 mg Oral Q4H PRN Amadeo Spearing, ROSEMARY      aspirin  81 mg Oral Daily ROSEMARY Patterson      atorvastatin  40 mg Oral Daily With Dinner ROSEMARY Powell      heparin (porcine)  5,000 Units Subcutaneous Q8H [facility] ROSEMARY Patterson      insulin lispro  1-6 Units Subcutaneous TID AC ROSEMARY Patterson      insulin lispro  1-6 Units Subcutaneous HS ROSEMARY Patterson      lidocaine-epinephrine  5 mL Infiltration Once Celestine Sandifer, PA-C      metoprolol succinate  25 mg Oral Daily Dyson ROSEMARY Vegas      nitroglycerin  0 4 mg Sublingual Q5 Min PRN Lucia Lozaain AmbrosiaROSEMARY      ondansetron  4 mg Intravenous Q6H PRN Amadeo ROSEMARY Blair      ticagrelor  90 mg Oral BID ROSEMARY Patterson       Continuous Infusions:     PRN Meds:   acetaminophen, 650 mg, Q4H PRN  nitroglycerin, 0 4 mg, Q5 Min PRN  ondansetron, 4 mg, Q6H PRN        Invasive Devices Review  Invasive Devices  Report    Peripheral Intravenous Line  Duration           Peripheral IV 07/13/22 Left Antecubital <1 day    Peripheral IV 07/13/22 Right Antecubital <1 day                Rationale for remaining devices:   Peripheral IV - Medication administration  ---------------------------------------------------------------------------------------  Advance Directive and Living Will: No  Power of : Pawel MurilloBrother)  POLST: No  ---------------------------------------------------------------------------------------  Care Time Delivered:   No Critical Care time spent       Wilhemena Estimable, ROSEMARY      Portions of the record may have been created with voice recognition software  Occasional wrong word or "sound a like" substitutions may have occurred due to the inherent limitations of voice recognition software    Read the chart carefully and recognize, using context, where substitutions have occurred

## 2022-07-14 NOTE — UTILIZATION REVIEW
Initial Clinical Review    Admission: Date/Time/Statement:   Admission Orders (From admission, onward)     Ordered        07/13/22 0829  Inpatient Admission  Once                      Orders Placed This Encounter   Procedures    Inpatient Admission     Standing Status:   Standing     Number of Occurrences:   1     Order Specific Question:   Level of Care     Answer:   Level 1 Stepdown [13]     Order Specific Question:   Estimated length of stay     Answer:   More than 2 Midnights     Order Specific Question:   Certification     Answer:   I certify that inpatient services are medically necessary for this patient for a duration of greater than two midnights  See H&P and MD Progress Notes for additional information about the patient's course of treatment  ED Arrival Information     Expected   -    Arrival   7/13/2022 05:49    Acuity   Emergent            Means of arrival   Ambulance    Escorted by   [address] [facility]/ICU    Admission type   [address] complaint   -           Chief Complaint   Patient presents with   Thu Child       Initial Presentation: 62 y o  male who presented by EMS to [facility] ED  Inpatient admission for evaluation and treatment of STEMI  PMHx: CVA  Presented w/ syncope w/ head strike on pavement, chest pain developed in ED  EKG concerning for STEMI  Brought urgently to cath lab w/ ALEJANDRA to LAD w/ large thrombus aspirated  On exam, rhonchi, scalp lac  Imaging w/ no traumatic injuries  Initial trop normal, 2hr & 4hr markedly elevated  Plan: telemetry, statin, ASA/brilinta, q6h accuchecks w/ SSI, HgbA1c, PT/OT, local wound care to scalp lac  Cardiology consulted  Cardiology Consult: Pt w/ syncope and STEMI  Plan: telemetry, ASA/Brilinta/statin, echo  Date: 07/14/22   Day 2: Pt noted some chest pain overnight that resolved this morning  On exam, L forehead lac, irregular rhythm, flat affect   Plan: continue current meds, telemetry, PT/OT, local wound care, SSI w/ accuchecks [facility]       ED Triage Vitals [07/13/22 0546]   Temperature Pulse Respirations Blood Pressure SpO2   97 8 °F (36 6 °C) 89 18 157/74 96 %      Temp Source Heart Rate Source Patient Position - Orthostatic VS BP Location FiO2 (%)   Oral Monitor Lying Right arm --      Pain Score       4          Wt Readings from Last 1 Encounters:   07/14/22 80 kg (176 lb 5 9 oz)     Additional Vital Signs:   Date/Time Temp Pulse Resp BP MAP (mmHg) SpO2 Calculated FIO2 (%) - Nasal Cannula Nasal Cannula O2 Flow Rate (L/min) O2 Device   07/14/22 0710 99 2 °F (37 3 °C) 67 16 112/71 87 98 % -- -- None (Room air)   07/14/22 0400 -- 62 18 128/77 95 97 % -- -- None (Room air)   07/13/22 1905 97 8 °F (36 6 °C) 59 17 119/71 90 97 % -- -- None (Room air)   07/13/22 1500 97 7 °F (36 5 °C) 69 16 111/68 85 97 % -- -- None (Room air)   07/13/22 1415 -- 62 14 108/67 82 98 % -- -- --   07/13/22 1345 -- 75 17 100/60 76 97 % -- -- --   07/13/22 1310 -- 74 16 94/66 75 97 % -- -- --   07/13/22 1215 -- 57 15 98/57 73 97 % -- -- --   07/13/22 1115 -- 78 13 106/72 84 98 % -- -- --   07/13/22 1050 97 8 °F (36 6 °C) 68 18 106/73 84 96 % -- -- None (Room air)   07/13/22 1017 -- 74 13 105/72 84 96 % -- -- --   07/13/22 0945 -- 79 15 105/72 -- 97 % -- -- --   07/13/22 0915 -- 81 17 128/81 100 98 % -- -- --   07/13/22 0900 -- 84 22 126/68 92 97 % -- -- --   07/13/22 0845 -- 77 15 113/70 87 96 % -- -- --   07/13/22 0830 98 °F (36 7 °C) 76 13 109/65 82 97 % -- -- --   07/13/22 06:49:58 -- -- -- -- -- -- 28 2 L/min Nasal cannula   07/13/22 0645 -- 85 18 138/79 -- -- -- -- --   07/13/22 0630 -- 86 18 137/71 97 99 % -- -- None (Room air)   07/13/22 0615 -- 80 18 132/71 95 99 % -- -- None (Room air)   07/13/22 0600 -- 77 19 141/71 100 98 % -- -- None (Room air)       Pertinent Labs/Diagnostic Test Results:   7/13 - EKG 1  Sinus rhythm with 1st degree A-V block  Left axis deviation  Pulmonary disease pattern  Inferior infarct , age undetermined  Anterolateral injury pattern    7/13 - EKG 2  Sinus rhythm with 1st degree A-V block  Left axis deviation  Possible Lateral infarct , age undetermined  Inferior infarct    7/13 - Cardiac PCI  · Mid LAD lesion is 100% stenosed  · 1st Diag lesion is 50% stenosed  1v CAD    7/13 - Echo    Left Ventricle: Left ventricular cavity size is normal  Wall thickness is mildly increased  There is mild concentric hypertrophy  The left ventricular ejection fraction is 35-40%  Systolic function is moderately reduced  Diastolic function is mildly abnormal, consistent with grade I (abnormal) relaxation  Left atrial filling pressure is normal     The following segments are akinetic: mid anteroseptal, apical anterior, apical septal, apical inferior, apical lateral and apex    The following segments are hypokinetic: mid anterior, mid inferoseptal and mid inferior    Mitral Valve: There is mild annular calcification    Right Ventricle: Systolic function is normal     Aortic Valve: The aortic valve has no significant stenosis    Tricuspid Valve: There is moderate regurgitation  The right ventricular systolic pressure is mildly elevated  The estimated right ventricular systolic pressure is 24 66 mmHg  TRAUMA - CT head wo contrast   Final Result by Marely Walsh MD (07/13 8903)      No acute intracranial abnormality  Left frontal, left ethmoid and left maxillary sinusitis  I personally discussed this study with Sanjuana Henning on 7/13/2022 at 6:36 AM       Workstation performed: [id number]         TRAUMA - CT spine cervical wo contrast   Final Result by Marely Walsh MD (07/13 9610)      No cervical spine fracture or traumatic malalignment  Degenerative disc disease from C4 to C7  Large posterior central disc osteophyte complex causing moderate spinal canal narrowing  Follow-up imaging with MR per clinical symptoms         I personally discussed this study with Arthor Homans on 7/13/2022 at 6:36 AM       Workstation performed: [id number]         CT chest abdomen pelvis wo contrast   Final Result by Wallace Horowitz MD (07/13 0135)      No CT findings of trauma in the chest, abdomen or pelvis               I personally discussed this study with Arthor Homans on 7/13/2022 at 6:36 AM                      Workstation performed: [id number]           Results from last 7 days   Lab Units 07/14/22  0431 07/13/22  0607   WBC Thousand/uL 11 57* 18 08*   HEMOGLOBIN g/dL 12 8 14 4   HEMATOCRIT % 40 2 44 5   PLATELETS Thousands/uL 208 257   NEUTROS ABS Thousands/µL 7 55 13 62*     Results from last 7 days   Lab Units 07/14/22  0431 07/13/22  0607   SODIUM mmol/L 137 139   POTASSIUM mmol/L 4 1 3 4*   CHLORIDE mmol/L 105 100   CO2 mmol/L 25 25   ANION GAP mmol/L 7 14*   BUN mg/dL 14 19   CREATININE mg/dL 0 72 1 10   EGFR ml/min/1 73sq m 101 72   CALCIUM mg/dL 8 8 9 4   MAGNESIUM mg/dL 2 1 1 8*   PHOSPHORUS mg/dL 3 0 2 6     Results from last 7 days   Lab Units 07/13/22  0607   AST U/L 23   ALT U/L 17   ALK PHOS U/L 82   TOTAL PROTEIN g/dL 7 4   ALBUMIN g/dL 4 1   TOTAL BILIRUBIN mg/dL 0 71     Results from last 7 days   Lab Units 07/14/22  0733 07/13/22  2048 07/13/22  1626 07/13/22  1102 07/13/22  0907 07/13/22  0554   POC GLUCOSE mg/dl 121 132 164* 142* 179* 154*     Results from last 7 days   Lab Units 07/14/22  0431 07/13/22  0607   GLUCOSE RANDOM mg/dL 120 247*         Results from last 7 days   Lab Units 07/13/22  0607   HEMOGLOBIN A1C % 5 8*   EAG mg/dl 120     Results from last 7 days   Lab Units 07/13/22  1213 07/13/22  1005 07/13/22  0607   HS TNI 0HR ng/L  --   --  10   HS TNI 2HR ng/L  --  >22,973*  --    HSTNI D2 ng/L  --  >22,963*  --    HS TNI 4HR ng/L >22,973*  --   --    HSTNI D4 ng/L >22,963*  --   --          Results from last 7 days   Lab Units 07/13/22  0607   PROTIME seconds 15 3*   INR  1 21*   PTT seconds 25     Results from last 7 days   Lab Units 07/13/22  1005   TSH 3RD GENERATON uIU/mL 1 041     Results from last 7 days   Lab Units 07/13/22  0607   NT-PRO BNP pg/mL 69     Results from last 7 days   Lab Units 07/13/22  0607   LIPASE u/L 49     Results from last 7 days   Lab Units 07/13/22  1329   CLARITY UA  Clear   COLOR UA  Yellow   SPEC GRAV UA  >=1 050*   PH UA  8 0   GLUCOSE UA mg/dl Trace*   KETONES UA mg/dl Negative   BLOOD UA  Large*   PROTEIN UA mg/dl 30 (1+)*   NITRITE UA  Negative   BILIRUBIN UA  Negative   UROBILINOGEN UA (BE) mg/dl 4 0*   LEUKOCYTES UA  Negative   WBC UA /hpf 1-2   RBC UA /hpf 4-10*   BACTERIA UA /hpf None Seen   EPITHELIAL CELLS WET PREP /hpf Occasional         ED Treatment:   Medication Administration from 07/13/2022 0548 to 07/13/2022 0827       Date/Time Order Dose Route Action     07/13/2022 0604 ondansetron (FOR EMS ONLY) (ZOFRAN) 4 mg/2 mL injection 4 mg 0 mg Does not apply Given to EMS     07/13/2022 0639 ticagrelor (BRILINTA) tablet 180 mg 180 mg Oral Given     07/13/2022 0639 heparin (porcine) injection 4,000 Units 4,000 Units Intravenous Given          Admitting Diagnosis: Syncope [R55]  Chest pain [R07 9]  Weakness [R53 1]  STEMI (ST elevation myocardial infarction) ([facility]) [I21 3]  ST elevation myocardial infarction (STEMI), unspecified artery ([facility] [address] ) [I21 3]  Age/Sex: 62 y o  male  Admission Orders:  Cardiac Consistent Carbohydrate Diet  Accu-checks ACHS  Cardio-Pulm monitoring  DW   I&O  SCDs  Neuro checks q4h      Scheduled Medications:  aspirin, 81 mg, Oral, Daily  atorvastatin, 40 mg, Oral, Daily With Dinner  heparin (porcine), 5,000 Units, Subcutaneous, Q8H RADAH  insulin lispro, 1-6 Units, Subcutaneous, TID AC  insulin lispro, 1-6 Units, Subcutaneous, HS  lidocaine-epinephrine, 5 mL, Infiltration, Once  metoprolol succinate, 25 mg, Oral, Daily  ticagrelor, 90 mg, Oral, BID    Continuous IV Infusions:    sodium chloride 0 9 %, 100 mL/hr, Intravenous, Continuous; Start: 07/13/22 0800 End: 07/13/22 1327    PRN Meds:  acetaminophen, 650 mg, Oral, Q4H PRN  magnesium sulfate, 2 g, Intravenous; 7/13 x1  nitroglycerin, 0 4 mg, Sublingual, Q5 Min PRN  ondansetron, 4 mg, Intravenous, Q6H PRN  potassium chloride, 40 mEq, Oral; 7/13 x1  tetanus-diptheria-acellular pertussis, 0 5 mL, IM; 7/13 x1        IP CONSULT TO CASE MANAGEMENT  IP CONSULT TO CARDIOLOGY  INPATIENT CONSULT TO TRAUMA (INPATIENT ONLY)  IP CONSULT TO CASE MANAGEMENT    Network Utilization Review Department  ATTENTION: Please call with any questions or concerns to [phone] and carefully listen to the prompts so that you are directed to the right person  All voicemails are confidential   Susan Grand all requests for admission clinical reviews, approved or denied determinations and any other requests to dedicated fax number below belonging to the campus where the patient is receiving treatment   List of dedicated fax numbers for the Facilities:  [address] DENIALS (Administrative/Medical Necessity) [phone]   [address] (Maternity/NICU/Pediatrics) [phone]   [address]  [address] [facility] [place] [address] [address] [place] [address] [address] [facility] [place] [id number] [phone]

## 2022-07-14 NOTE — CASE MANAGEMENT
Case Management Assessment & Discharge Planning Note    Patient name Robert Becker  Location ICU 02/ICU 26 MRN [id number]  : 1960 Date 2022       Current Admission Date: 2022  Current Admission Diagnosis:STEMI (ST elevation myocardial infarction) [facility])   Patient Active Problem List    Diagnosis Date Noted    STEMI (ST elevation myocardial infarction) ([facility] [address] ) 2022    Syncope and collapse 2022    Hx CVA (cerebral vascular accident) ([facility] [address] ) 2022    Hyperglycemia 2022    Leukocytosis 2022    Fall from standing 2022    Forehead laceration, initial encounter 2022      LOS (days): 1  Geometric Mean LOS (GMLOS) (days): 2 10  Days to GMLOS:0 8     OBJECTIVE:    Risk of Unplanned Readmission Score: 9 96         Current admission status: Inpatient  Referral Reason: Other, Medication Assistance (Dispo Planning)    Preferred Pharmacy:   PATIENT/FAMILY REPORTS NO PREFERRED PHARMACY  No address on file      [address] ([facility]) Bhanu Carrion, [address] [facility]  [address] [facility] [address] [address]  Phone: [phone] Fax: [phone]    Primary Care Provider: No primary care provider on file  Primary Insurance: WORKERS COMPENSATION  Secondary Insurance: BLUE CROSS    ASSESSMENT:  Active Health Care Proxies    There are no active Health Care Proxies on file         Advance Directives  Does patient have a [address]?: No  Was patient offered paperwork?: Yes (Declined)  Does patient currently have a Health Care decision maker?: Yes, please see Health Care Proxy section Hannah Butler (Brother))  Does patient have Advance Directives?: No  Was patient offered paperwork?: Yes (Declined)  Primary Contact: Hannah Butler (Brother)         Readmission Root Cause  30 Day Readmission: No    Patient Information  Admitted from[de-identified] Home  Mental Status: Alert  During Assessment patient was accompanied by: Not accompanied during assessment  Assessment information provided by[de-identified] Patient  Primary Caregiver: Self  Support Systems: Self, Family members  [place] of Residence: [address] [place] [address] do you live in?: [place] entry access options   Select all that apply : Stairs  Number of steps to enter home : 3  Do the steps have railings?: No  Type of Current Residence: Apartment  Floor Level: 1  Upon entering residence, is there a bedroom on the main floor (no further steps)?: Yes  Upon entering residence, is there a bathroom on the main floor (no further steps)?: Yes  In the last 12 months, was there a time when you were not able to pay the mortgage or rent on time?: No  In the last 12 months, how many places have you lived?: 1  In the last 12 months, was there a time when you did not have a steady place to sleep or slept in a shelter (including now)?: No  Homeless/housing insecurity resource given?: N/A  Living Arrangements: Other (Comment) (Lives with his brother Simran Myles)    Activities of Daily Living Prior to Admission  Functional Status: Independent  Completes ADLs independently?: Yes  Ambulates independently?: Yes  Does patient use assisted devices?: No  Does patient currently own DME?: No  Does patient have a history of Outpatient Therapy (PT/OT)?: No  Does the patient have a history of Short-Term Rehab?: No  Does patient have a history of HHC?: No  Does patient currently have [address]?: No         Patient Information Continued  Income Source: Employed (Works full time for [employer])  Does patient have prescription coverage?: Yes (Patient reports he has prescription coverage but no preferred pharamcy)  Within the past 12 months, you worried that your food would run out before you got the money to buy more : Never true  Within the past 12 months, the food you bought just didn't last and you didn't have money to get more : Never true  Food insecurity resource given?: N/A  Does patient receive dialysis treatments?: No  Does patient have a history of substance abuse?: No  Does patient have a history of Mental Health Diagnosis?: No         Means of Transportation  Means of Transport to Appts[de-identified] Public Transportation - Bus  In the past 12 months, has lack of transportation kept you from medical appointments or from getting medications?: No  In the past 12 months, has lack of transportation kept you from meetings, work, or from getting things needed for daily living?: No  Was application for public transport provided?: N/A        DISCHARGE DETAILS:    Discharge planning discussed with[de-identified] Patient  Freedom of Choice: Yes  Comments - Freedom of Choice: No current CM DC needs discussed or identifed  CM contacted family/caregiver?: No- see comments (Patient called his brother Kelsie Archibald while CM was in the room just to confirm his phone number but stated there is no reason for CM to call him )  Were Treatment Team discharge recommendations reviewed with patient/caregiver?: Yes          Contacts  Patient Contacts: Patient  Relationship to Patient[de-identified] Other (Comment)  Contact Method: In Person  Reason/Outcome: Discharge Planning, Continuity of Care                      Cardiology had placed a consult that an order for Brilinta was sent to [facility] for price check however [facility] it had not been sent there  CM sent a TT to the cardiology CRNP following patient regarding this  Additional Comments: CM met with patient at bedside  He is alert and oriented sitting in bed  CM completed CM Assessment with patient  He is a self from home  CM discussed with patient that we have it charted he came in as a workman's comp case and inquired if he had started this process with his employer  Patient asked CM if just because this happended at work does that mean it will automatically be a workman's comp case    CM explained that would be up to his employer and their team to make that determination, however, not following up with his employer could potentially prohibit what services could be set up for him at [place]  Patient verbalized understanding  CM reviewed discharge planning process including the following: identifying caregivers at home and preference for d/c planning needs  CM will continue to follow for care coordination and update assessment as necessary

## 2022-07-15 LAB
ANION GAP SERPL CALCULATED.3IONS-SCNC: 7 MMOL/L (ref 4–13)
BASOPHILS # BLD AUTO: 0.03 THOUSANDS/ΜL (ref 0–0.1)
BASOPHILS NFR BLD AUTO: 0 % (ref 0–1)
BUN SERPL-MCNC: 13 MG/DL (ref 5–25)
CALCIUM SERPL-MCNC: 8.8 MG/DL (ref 8.4–10.2)
CHLORIDE SERPL-SCNC: 103 MMOL/L (ref 96–108)
CHOLEST SERPL-MCNC: 164 MG/DL
CO2 SERPL-SCNC: 25 MMOL/L (ref 21–32)
CREAT SERPL-MCNC: 0.74 MG/DL (ref 0.6–1.3)
EOSINOPHIL # BLD AUTO: 0.12 THOUSAND/ΜL (ref 0–0.61)
EOSINOPHIL NFR BLD AUTO: 1 % (ref 0–6)
ERYTHROCYTE [DISTWIDTH] IN BLOOD BY AUTOMATED COUNT: 11.9 % (ref 11.6–15.1)
GFR SERPL CREATININE-BSD FRML MDRD: 100 ML/MIN/1.73SQ M
GLUCOSE SERPL-MCNC: 101 MG/DL (ref 65–140)
GLUCOSE SERPL-MCNC: 107 MG/DL (ref 65–140)
GLUCOSE SERPL-MCNC: 113 MG/DL (ref 65–140)
GLUCOSE SERPL-MCNC: 114 MG/DL (ref 65–140)
GLUCOSE SERPL-MCNC: 150 MG/DL (ref 65–140)
HCT VFR BLD AUTO: 40.6 % (ref 36.5–49.3)
HDLC SERPL-MCNC: 43 MG/DL
HGB BLD-MCNC: 13.2 G/DL (ref 12–17)
IMM GRANULOCYTES # BLD AUTO: 0.02 THOUSAND/UL (ref 0–0.2)
IMM GRANULOCYTES NFR BLD AUTO: 0 % (ref 0–2)
LDLC SERPL CALC-MCNC: 89 MG/DL (ref 0–100)
LYMPHOCYTES # BLD AUTO: 2.65 THOUSANDS/ΜL (ref 0.6–4.47)
LYMPHOCYTES NFR BLD AUTO: 30 % (ref 14–44)
MAGNESIUM SERPL-MCNC: 1.9 MG/DL (ref 1.9–2.7)
MCH RBC QN AUTO: 30.7 PG (ref 26.8–34.3)
MCHC RBC AUTO-ENTMCNC: 32.5 G/DL (ref 31.4–37.4)
MCV RBC AUTO: 94 FL (ref 82–98)
MONOCYTES # BLD AUTO: 1.02 THOUSAND/ΜL (ref 0.17–1.22)
MONOCYTES NFR BLD AUTO: 12 % (ref 4–12)
NEUTROPHILS # BLD AUTO: 5.03 THOUSANDS/ΜL (ref 1.85–7.62)
NEUTS SEG NFR BLD AUTO: 57 % (ref 43–75)
NRBC BLD AUTO-RTO: 0 /100 WBCS
PHOSPHATE SERPL-MCNC: 2.9 MG/DL (ref 2.3–4.1)
PLATELET # BLD AUTO: 204 THOUSANDS/UL (ref 149–390)
PMV BLD AUTO: 9.6 FL (ref 8.9–12.7)
POTASSIUM SERPL-SCNC: 3.8 MMOL/L (ref 3.5–5.3)
RBC # BLD AUTO: 4.3 MILLION/UL (ref 3.88–5.62)
SODIUM SERPL-SCNC: 135 MMOL/L (ref 135–147)
TRIGL SERPL-MCNC: 161 MG/DL
WBC # BLD AUTO: 8.87 THOUSAND/UL (ref 4.31–10.16)

## 2022-07-15 PROCEDURE — 84100 ASSAY OF PHOSPHORUS: CPT | Performed by: NURSE PRACTITIONER

## 2022-07-15 PROCEDURE — 83735 ASSAY OF MAGNESIUM: CPT | Performed by: NURSE PRACTITIONER

## 2022-07-15 PROCEDURE — 85025 COMPLETE CBC W/AUTO DIFF WBC: CPT | Performed by: NURSE PRACTITIONER

## 2022-07-15 PROCEDURE — 82948 REAGENT STRIP/BLOOD GLUCOSE: CPT

## 2022-07-15 PROCEDURE — 99232 SBSQ HOSP IP/OBS MODERATE 35: CPT | Performed by: INTERNAL MEDICINE

## 2022-07-15 PROCEDURE — 80048 BASIC METABOLIC PNL TOTAL CA: CPT | Performed by: NURSE PRACTITIONER

## 2022-07-15 PROCEDURE — 80061 LIPID PANEL: CPT | Performed by: INTERNAL MEDICINE

## 2022-07-15 RX ORDER — POTASSIUM CHLORIDE 20 MEQ/1
40 TABLET, EXTENDED RELEASE ORAL ONCE
Status: COMPLETED | OUTPATIENT
Start: 2022-07-15 | End: 2022-07-15

## 2022-07-15 RX ORDER — LISINOPRIL 2.5 MG/1
2.5 TABLET ORAL DAILY
Status: DISCONTINUED | OUTPATIENT
Start: 2022-07-15 | End: 2022-07-16 | Stop reason: HOSPADM

## 2022-07-15 RX ADMIN — HEPARIN SODIUM 5000 UNITS: 5000 INJECTION INTRAVENOUS; SUBCUTANEOUS at 22:59

## 2022-07-15 RX ADMIN — TICAGRELOR 90 MG: 90 TABLET ORAL at 08:51

## 2022-07-15 RX ADMIN — POTASSIUM CHLORIDE 40 MEQ: 1500 TABLET, EXTENDED RELEASE ORAL at 11:47

## 2022-07-15 RX ADMIN — METOPROLOL SUCCINATE 25 MG: 25 TABLET, EXTENDED RELEASE ORAL at 08:51

## 2022-07-15 RX ADMIN — TICAGRELOR 90 MG: 90 TABLET ORAL at 17:27

## 2022-07-15 RX ADMIN — LISINOPRIL 2.5 MG: 2.5 TABLET ORAL at 13:14

## 2022-07-15 RX ADMIN — INSULIN LISPRO 1 UNITS: 100 INJECTION, SOLUTION INTRAVENOUS; SUBCUTANEOUS at 13:14

## 2022-07-15 RX ADMIN — HEPARIN SODIUM 5000 UNITS: 5000 INJECTION INTRAVENOUS; SUBCUTANEOUS at 13:14

## 2022-07-15 RX ADMIN — ATORVASTATIN CALCIUM 40 MG: 40 TABLET, FILM COATED ORAL at 17:27

## 2022-07-15 RX ADMIN — ASPIRIN 81 MG CHEWABLE TABLET 81 MG: 81 TABLET CHEWABLE at 08:51

## 2022-07-15 RX ADMIN — HEPARIN SODIUM 5000 UNITS: 5000 INJECTION INTRAVENOUS; SUBCUTANEOUS at 05:38

## 2022-07-15 NOTE — ASSESSMENT & PLAN NOTE
Syncopal episode   Fall from standing with head laceration repaired by Trauma team    CT head/Spine without acute lesions

## 2022-07-15 NOTE — ASSESSMENT & PLAN NOTE
· Vasovagal syncope vs Vasovagal versus arrhythmogenic  Was preceded by feeling lightheaded and hot  · Forehead laceration with Negative CT head  · TTE- LVEF 35-40%  · Patient was put on telemetry which revealed NSR short runs NSVT/AIVR first 24 hours   Now resolved  · Repeated ECHO shown LVEF 30%  · Patient  about the need of LifeVest  Patient acknowledge understanding the need, will require more education  · Cardiology recommendations much appreciated  Will need F/U Live Vest/ICD placement in 12 weeks if LVEF remains <35%      Plan:   · F/U LifeVest orders, CM started the process as well Cardiology Team  · Follow up with Cardiology

## 2022-07-15 NOTE — PROGRESS NOTES
[facility]  Progress Note Belen García 1960, 62 y o  male MRN: [id number]  Unit/Bed#: S -01 Encounter: [id number]  Primary Care Provider: No primary care provider on file  Date and time admitted to hospital: 7/13/2022  5:49 AM    Forehead laceration, initial encounter  Assessment & Plan  Forehead laceration secondary to syncopal episode  Repaired by trauma    Fall from standing  Assessment & Plan  Syncopal episode   Fall from standing with head laceration repaired by Trauma team    CT head/Spine without acute lesions  Leukocytosis  Assessment & Plan  Leucocytosis upon admission most likely reactive reaction  WBC trended down from 18 s--11 57--today 8 87  resolved  VS wnl    Hyperglycemia  Assessment & Plan  Per patient pmhx Type 2 DM, no current medical treatment  HbA1c 5 8% most likely prediabetic  Accuchecks between 130-160s    Plan:        F/U with nutritional services recommendations        Will be appropriate to start the patient on Metformin upon D/C  F/U with PCP on the outpatient setting upon D/C                  Hx CVA (cerebral vascular accident) [facility])  Assessment & Plan  Pmhx CVA (2020), with Residual R sided wekaness  Patient did not seek medical treatment since he moved from [facility]  Plan:         Compliance with medical treatment   ASA, Statin     Syncope and collapse  Assessment & Plan  · Vasovagal syncope vs Vasovagal versus arrhythmogenic  Was preceded by feeling lightheaded and hot  · Forehead laceration with Negative CT head  · TTE- LVEF 35-40%  · Patient was put on telemetry which revealed NSR short runs NSVT/AIVR first 24 hours   Now resolved  · Repeated ECHO shown LVEF 30%  · Patient  about the need of LifeVest  Patient acknowledge understanding the need, will require more education  · Cardiology recommendations much appreciated  Will need F/U Live Vest/ICD placement in 12 weeks if LVEF remains <35%      Plan:   · F/U LifeVest orders, CM started the process as well Cardiology Team  · Follow up with Cardiology          * STEMI (ST elevation myocardial infarction) [facility])  Assessment & Plan  · Patient had anterior ST-elevation myocardial infarction (II, III, aVF, V4, V5, V6 and I)  · Initial troponin's 0, increasing to  22,973  · Status post urgent PCI with thrombectomy  · Chest Pain resolve, no dyspnea, SOB  · Patient was  about his current and future treatment  He verbalized understanding the future care he needs and lifestyle modification including but not limited to Diet, Exercise and medication compliance  Plan:  · Continue Dual Antiplatelet therapy  · ASA 81mg  · Ticagrelor 90mg BID (Price checked by CM $30/month)   · Metoprolol Succinate 25 mg q d  as BP tolerated  · Atorvastatin 40mg   · F/U Nutritional Services Recommendations and Education                  VTE Pharmacologic Prophylaxis:   Moderate Risk (Score 3-4) - Pharmacological DVT Prophylaxis Ordered: heparin  Patient Centered Rounds: I performed bedside rounds with nursing staff today  Discussions with Specialists or Other Care Team Provider: Cardiology, Case Management, Nutrition, OT/PT    Education and Discussions with Family / Patient: Patient declined call to   Current Length of Stay: 2 day(s)  Current Patient Status: Inpatient   Discharge Plan: Anticipate discharge in 24-48 hrs to home  Code Status: Level 1 - Full Code    Subjective:   Patient stated chest pain resolved  No SOB, dyspnea  Appetite is well, tolerating diet  No N/V  Otherwise no new complains  Objective:     Vitals:   Temp (24hrs), Av 2 °F (37 3 °C), Min:98 9 °F (37 2 °C), Max:99 5 °F (37 5 °C)    Temp:  [98 9 °F (37 2 °C)-99 5 °F (37 5 °C)] 98 9 °F (37 2 °C)  HR:  [60-80] 70  Resp:  [17-21] 18  BP: (105-117)/(62-74) 108/62  SpO2:  [95 %-99 %] 97 %  Body mass index is 26 05 kg/m²  Input and Output Summary (last 24 hours):      Intake/Output Summary (Last 24 hours) at 7/15/2022 1324  Last data filed at 7/15/2022 0900  Gross per 24 hour   Intake 360 ml   Output 300 ml   Net 60 ml       Physical Exam:   Physical Exam  Vitals and nursing note reviewed  Constitutional:       Appearance: He is well-developed  HENT:      Head: Normocephalic and atraumatic  Eyes:      Conjunctiva/sclera: Conjunctivae normal    Cardiovascular:      Rate and Rhythm: Normal rate and regular rhythm  Heart sounds: No murmur heard  Pulmonary:      Effort: Pulmonary effort is normal  No respiratory distress  Breath sounds: Normal breath sounds  Abdominal:      Palpations: Abdomen is soft  Tenderness: There is no abdominal tenderness  Musculoskeletal:      Cervical back: Neck supple  Right lower leg: No edema  Left lower leg: No edema  Skin:     General: Skin is warm and dry  Neurological:      Mental Status: He is alert and oriented to person, place, and time  Psychiatric:         Behavior: Behavior is cooperative  Comments: Slowed speech  Additional Data:     Labs:  Results from last 7 days   Lab Units 07/15/22  0444   WBC Thousand/uL 8 87   HEMOGLOBIN g/dL 13 2   HEMATOCRIT % 40 6   PLATELETS Thousands/uL 204   NEUTROS PCT % 57   LYMPHS PCT % 30   MONOS PCT % 12   EOS PCT % 1     Results from last 7 days   Lab Units 07/15/22  0444 07/14/22  0431 07/13/22  0607   SODIUM mmol/L 135   < > 139   POTASSIUM mmol/L 3 8   < > 3 4*   CHLORIDE mmol/L 103   < > 100   CO2 mmol/L 25   < > 25   BUN mg/dL 13   < > 19   CREATININE mg/dL 0 74   < > 1 10   ANION GAP mmol/L 7   < > 14*   CALCIUM mg/dL 8 8   < > 9 4   ALBUMIN g/dL  --   --  4 1   TOTAL BILIRUBIN mg/dL  --   --  0 71   ALK PHOS U/L  --   --  82   ALT U/L  --   --  17   AST U/L  --   --  23   GLUCOSE RANDOM mg/dL 107   < > 247*    < > = values in this interval not displayed       Results from last 7 days   Lab Units 07/13/22  0607   INR  1 21*     Results from last 7 days   Lab Units 07/15/22  1116 07/15/22  0746 07/14/22  2153 07/14/22  2102 07/14/22  1711 07/14/22  1104 07/14/22  0733 07/13/22  2048 07/13/22  1626 07/13/22  1102 07/13/22  0907 07/13/22  0554   POC GLUCOSE mg/dl 150* 113 130 140 123 155* 121 132 164* 142* 179* 154*     Results from last 7 days   Lab Units 07/13/22  0607   HEMOGLOBIN A1C % 5 8*           Lines/Drains:  Invasive Devices  Report    Peripheral Intravenous Line  Duration           Peripheral IV 07/13/22 Left Antecubital 2 days    Peripheral IV 07/13/22 Right Antecubital 2 days                  Telemetry:  Telemetry Orders (From admission, onward)             48 Hour Telemetry Monitoring  Continuous x 48 hours        References:    Telemetry Guidelines   Question:  Reason for 48 Hour Telemetry  Answer:  Acute MI, chest pain - R/O MI, or unstable angina                 Telemetry Reviewed: Sinus Bradycardia  Indication for Continued Telemetry Use: Acute MI/Unstable Angina/Rule out ACS             Imaging: Reviewed radiology reports from this admission including: CT head and ECHO    Recent Cultures (last 7 days):         Last 24 Hours Medication List:   Current Facility-Administered Medications   Medication Dose Route Frequency Provider Last Rate    acetaminophen  650 mg Oral Q4H PRN ROSEMARY Espinoza      aspirin  81 mg Oral Daily ROSEMARY Patterson      atorvastatin  40 mg Oral Daily With Dinner ROSEMARY Espinoza      heparin (porcine)  5,000 Units Subcutaneous Q8H [address] ROSEMARY Patterson      insulin lispro  1-6 Units Subcutaneous TID AC ROSEMARY Patterson      insulin lispro  1-6 Units Subcutaneous HS ROSEMARY Patterson      lidocaine-epinephrine  5 mL Infiltration Once ROSEMARY Patterson      lisinopril  2 5 mg Oral Daily Alfonso Albarado DO      metoprolol succinate  25 mg Oral Daily ROSEMARY Patterson      nitroglycerin  0 4 mg Sublingual Q5 Min PRN ROSEMARY Patterson      ondansetron  4 mg Intravenous Q6H PRN ROSEMARY Ramesh      ticagrelor  90 mg Oral BID ROSEMARY Ramesh          Today, Patient Was Seen By: Lea Packer MD    **Please Note: This note may have been constructed using a voice recognition system  **

## 2022-07-15 NOTE — ASSESSMENT & PLAN NOTE
Leucocytosis upon admission most likely reactive reaction  WBC trended down from 18 s--11 57--today 8 87   resolved  VS wnl

## 2022-07-15 NOTE — CASE MANAGEMENT
Case Management Discharge Planning Note    Patient name Sofy So  Location [address]01 MRN [id number]  : 1960 Date 7/15/2022       Current Admission Date: 2022  Current Admission Diagnosis:STEMI (ST elevation myocardial infarction) [facility])   Patient Active Problem List    Diagnosis Date Noted    STEMI (ST elevation myocardial infarction) ([facility] [address] ) 2022    Syncope and collapse 2022    Hx CVA (cerebral vascular accident) ([facility] [address] ) 2022    Hyperglycemia 2022    Leukocytosis 2022    Fall from standing 2022    Forehead laceration, initial encounter 2022      LOS (days): 2  Geometric Mean LOS (GMLOS) (days): 2 10  Days to GMLOS:-0 2     OBJECTIVE:  Risk of Unplanned Readmission Score: 10 32         Current admission status: Inpatient   Preferred Pharmacy:   [address] ([place]) [place] [address]  Phone: [phone] Fax: [phone]    Primary Care Provider: No primary care provider on file  Primary Insurance: WORKERS COMPENSATION  Secondary Insurance: BLUE CROSS    DISCHARGE DETAILS:    Discharge planning discussed with[de-identified] Patient  Freedom of Choice: Yes  Comments - Freedom of Choice: CM discussed disharge plans and he feels he's able to return home without any services or equipments  CM contacted family/caregiver?: No- see comments  Were Treatment Team discharge recommendations reviewed with patient/caregiver?: Yes  Did patient/caregiver verbalize understanding of patient care needs?: Yes  Were patient/caregiver advised of the risks associated with not following Treatment Team discharge recommendations?: Yes    Contacts  Patient Contacts: Patient  Relationship to Patient[de-identified] Other (Comment) (Self)  Contact Method:  In Person  Reason/Outcome: Discharge [address]         Is the patient interested in Joeljuventino Vargas at discharge?: No    DME Referral Provided  Referral made for DME?: No    Other Referral/Resources/Interventions Provided:  Referral Comments: CM discussed disharge plans and he feels he's able to return home without any services or equipments  Treatment Team Recommendation: Home  Discharge Destination Plan[de-identified] Home               [facility] and was told [facility]'s only $30/month  CM informed patient and he's in agreement with cost      CM faxed clinicals to [place] [phone] at 7/15/22  As per Roger Calvert will be delivered tomorrow, 7/16/22, to patient

## 2022-07-15 NOTE — ASSESSMENT & PLAN NOTE
· Patient had anterior ST-elevation myocardial infarction (II, III, aVF, V4, V5, V6 and I)  · Initial troponin's 0, increasing to  22,973  · Status post urgent PCI with thrombectomy  · Chest Pain resolve, no dyspnea, SOB  · Patient was  about his current and future treatment  He verbalized understanding the future care he needs and lifestyle modification including but not limited to Diet, Exercise and medication compliance        Plan:  · Continue Dual Antiplatelet therapy  · ASA 81mg  · Ticagrelor 90mg BID (Price checked by  $30/month)   · Metoprolol Succinate 25 mg q d  as BP tolerated  · Atorvastatin 40mg   · F/U Nutritional Services Recommendations and Education

## 2022-07-15 NOTE — PLAN OF CARE
Problem: Potential for Falls  Goal: Patient will remain free of falls  Description: INTERVENTIONS:  - Educate patient/family on patient safety including physical limitations  - Instruct patient to call for assistance with activity   - Consult OT/PT to assist with strengthening/mobility   - Keep Call bell within reach  - Keep bed low and locked with side rails adjusted as appropriate  - Keep care items and personal belongings within reach  - Initiate and maintain comfort rounds  - Make Fall Risk Sign visible to staff  - Offer Toileting every  Hours, in advance of need  - Initiate/Maintain alarm  - Obtain necessary fall risk management equipment:   - Apply yellow socks and bracelet for high fall risk patients  - Consider moving patient to room near nurses station  Outcome: Progressing     Problem: MOBILITY - ADULT  Goal: Maintain or return to baseline ADL function  Description: INTERVENTIONS:  -  Assess patient's ability to carry out ADLs; assess patient's baseline for ADL function and identify physical deficits which impact ability to perform ADLs (bathing, care of mouth/teeth, toileting, grooming, dressing, etc )  - Assess/evaluate cause of self-care deficits   - Assess range of motion  - Assess patient's mobility; develop plan if impaired  - Assess patient's need for assistive devices and provide as appropriate  - Encourage maximum independence but intervene and supervise when necessary  - Involve family in performance of ADLs  - Assess for home care needs following discharge   - Consider OT consult to assist with ADL evaluation and planning for discharge  - Provide patient education as appropriate  Outcome: Progressing  Goal: Maintains/Returns to pre admission functional level  Description: INTERVENTIONS:  - Perform BMAT or MOVE assessment daily    - Set and communicate daily mobility goal to care team and patient/family/caregiver     - Collaborate with rehabilitation services on mobility goals if consulted  - Perform Range of Motion  times a day  - Reposition patient every  hours    - Dangle patient  times a day  - Stand patient  times a day  - Ambulate patient  times a day  - Out of bed to chair  times a day   - Out of bed for meals imes a day  - Out of bed for toileting  - Record patient progress and toleration of activity level   Outcome: Progressing     Problem: CARDIOVASCULAR - ADULT  Goal: Maintains optimal cardiac output and hemodynamic stability  Description: INTERVENTIONS:Monitor I/O, vital signs and rhythm  - Monitor for S/S and trends of decreased cardiac output  - Administer and titrate ordered vasoactive medications to optimize hemodynamic stability  - Assess quality of pulses, skin color and temperature  - Assess for signs of decreased coronary artery perfusion  - Instruct patient to report change in severity of symptoms  Outcome: Progressing  Goal: Absence of cardiac dysrhythmias or at baseline rhythm  Description: INTERVENTIONS:  - Continuous cardiac monitoring, vital signs, obtain 12 lead EKG if ordered  - Administer antiarrhythmic and heart rate control medications as ordered  - Monitor electrolytes and administer replacement therapy as ordered  Outcome: Progressing     Problem: METABOLIC, FLUID AND ELECTROLYTES - ADULT  Goal: Electrolytes maintained within normal limits  Description: INTERVENTIONS:  - Monitor labs and assess patient for signs and symptoms of electrolyte imbalances  - Administer electrolyte replacement as ordered  - Monitor response to electrolyte replacements, including repeat lab results as appropriate  - Instruct patient on fluid and nutrition as appropriate  Outcome: Progressing  Goal: Fluid balance maintained  Description: INTERVENTIONS:  - Monitor labs   - Monitor I/O and WT  - Instruct patient on fluid and nutrition as appropriate  - Assess for signs & symptoms of volume excess or deficit  Outcome: Progressing  Goal: Glucose maintained within target range  Description: INTERVENTIONS:  - Monitor Blood Glucose as ordered  - Assess for signs and symptoms of hyperglycemia and hypoglycemia  - Administer ordered medications to maintain glucose within target range  - Assess nutritional intake and initiate nutrition service referral as needed  Outcome: Progressing     Problem: Prexisting or High Potential for Compromised Skin Integrity  Goal: Skin integrity is maintained or improved  Description: INTERVENTIONS:  - Identify patients at risk for skin breakdown  - Assess and monitor skin integrity  - Assess and monitor nutrition and hydration status  - Monitor labs   - Assess for incontinence   - Turn and reposition patient  - Assist with mobility/ambulation  - Relieve pressure over bony prominences  - Avoid friction and shearing  - Provide appropriate hygiene as needed including keeping skin clean and dry  - Evaluate need for skin moisturizer/barrier cream  - Collaborate with interdisciplinary team   - Patient/family teaching  - Consider wound care consult   Outcome: Progressing

## 2022-07-15 NOTE — ASSESSMENT & PLAN NOTE
Per patient pmhx Type 2 DM, no current medical treatment  HbA1c 5 8% most likely prediabetic  Accuchecks between 130-160s    Plan:        F/U with nutritional services recommendations        Will be appropriate to start the patient on Metformin upon D/C          F/U with PCP on the outpatient setting upon D/C

## 2022-07-15 NOTE — ASSESSMENT & PLAN NOTE
Pmhx CVA (2020), with Residual R sided wekaness  Patient did not seek medical treatment since he moved from [facility]       Plan:         Compliance with medical treatment   ASA, Statin

## 2022-07-15 NOTE — PROGRESS NOTES
Progress Note - Cardiology Team 1  Robert Becker 62 y o  male MRN: [id number]  Unit/Bed#: S -01 Encounter: [id number]        Principal Problem:    STEMI (ST elevation myocardial infarction) [facility])  Active Problems:    Syncope and collapse    Hx CVA (cerebral vascular accident) ([facility] [address] )    Hyperglycemia    Leukocytosis    Fall from standing    Forehead laceration, initial encounter      Assessment/Plan     1  STEMI  Syncope followed by nausea/vomiting/chest pain  EKG- KWASI inferiorally and V4-V6  7/13 S/p urgent cath- mid % stenosis, lesion thrombotic   Large thrombus aspirated  ALEJANDRA placed   First diagonal 50% stenosis  First troponin negative, subsequent > 22,973  Aspirin 81 mg, brilinta ( cost checked) high-intensity statin, beta-blocker  ( as BP tolerates)   No further chest pain  Repeat echo for LV function - 30% with RWMA  Telemetry- NSR short runs NSVT/AIVR first 24 hours  Resolved  Patient request follow-up at [facility] office     2  ICMP  LVEF 30% with RWMA  BB- toprol 25mg   BP limiting start of ACE/ARB at this time  Will continue to re eval    Not requiring diuretic  Recommend life vest   Spoke with patient  Explained reason for use  He is concerned about the ease comfort of use   Will have rep visit with patient  Re eval LVEF 12 weeks and ICD if LVEF remains less than 35%     3  Syncope  Vasovagal versus arrhythmogenic  Was preceded by feeling lightheaded and hot  Head laceration  Continue telemetry  No evidence of high degree AV block or significant bradycardia  Imaging- no acute injuries  See #2     4  History of hypertension-presented on no medical therapy  Normotensive to low normal  Continue to  monitor     5  History of type 2 diabetes-currently on no medical therapy  HgbAIC- 5 8%  Per primary team     6  History of stroke-right-sided weakness and slow speech  Asa, statin       Subjective/Objective   Chief Complaint/Subjective  Patient without chest pain or shortness of breath  Recommend LifeVest   Discussed with patient  He is a little reluctant as he is unclear of the ease of its use  Agreeable to proceed with talking to the rep          Vitals: /65   Pulse 73   Temp 98 9 °F (37 2 °C) (Oral)   Resp 18   Ht 5' 9" (1 753 m)   Wt 80 kg (176 lb 5 9 oz)   SpO2 95%   BMI 26 05 kg/m²     Vitals:    07/14/22 1435 07/15/22 0500   Weight: 79 8 kg (176 lb) 80 kg (176 lb 5 9 oz)     Orthostatic Blood Pressures    Flowsheet Row Most Recent Value   Blood Pressure 105/65 filed at 07/15/2022 0848   Patient Position - Orthostatic VS Lying filed at 07/14/2022 1926            Intake/Output Summary (Last 24 hours) at 7/15/2022 1022  Last data filed at 7/14/2022 2128  Gross per 24 hour   Intake --   Output 300 ml   Net -300 ml       Invasive Devices  Report    Peripheral Intravenous Line  Duration           Peripheral IV 07/13/22 Right Antecubital 2 days    Peripheral IV 07/13/22 Left Antecubital 1 day                Current Facility-Administered Medications   Medication Dose Route Frequency    acetaminophen (TYLENOL) tablet 650 mg  650 mg Oral Q4H PRN    aspirin chewable tablet 81 mg  81 mg Oral Daily    atorvastatin (LIPITOR) tablet 40 mg  40 mg Oral Daily With Dinner    heparin (porcine) subcutaneous injection 5,000 Units  5,000 Units Subcutaneous Q8H [facility]    insulin lispro (HumaLOG) 100 units/mL subcutaneous injection 1-6 Units  1-6 Units Subcutaneous TID AC    insulin lispro (HumaLOG) 100 units/mL subcutaneous injection 1-6 Units  1-6 Units Subcutaneous HS    lidocaine-epinephrine (XYLOCAINE-MPF/EPINEPHRINE) 2 %-1:200,000 injection 5 mL  5 mL Infiltration Once    metoprolol succinate (TOPROL-XL) 24 hr tablet 25 mg  25 mg Oral Daily    nitroglycerin (NITROSTAT) SL tablet 0 4 mg  0 4 mg Sublingual Q5 Min PRN    ondansetron (ZOFRAN) injection 4 mg  4 mg Intravenous Q6H PRN    ticagrelor (BRILINTA) tablet 90 mg  90 mg Oral BID         Physical Exam: /65   Pulse 73   Temp 98 9 °F (37 2 °C) (Oral)   Resp 18   Ht 5' 9" (1 753 m)   Wt 80 kg (176 lb 5 9 oz)   SpO2 95%   BMI 26 05 kg/m²     General Appearance:    Alert, cooperative, no distress, appears stated age   Head:    Normocephalic, no scleral icterus   Eyes:    PERRL   Nose:   Nares normal, septum midline, no drainage    Throat:   Lips, mucosa, and tongue normal   Neck:   Supple, symmetrical, trachea midline,             Lungs:     Clear to auscultation bilaterally, respirations unlabored   Chest Wall:    No tenderness or deformity    Heart:    Regular rate and rhythm, S1 and S2 normal, no murmur, rub   or gallop   Abdomen:     Soft, non-tender   Extremities:   Extremities normal, atraumatic, no cyanosis or edema       Skin:   Skin warm   Neurologic:   Alert and oriented to person place and time, no focal deficits                 Lab Results:   Recent Results (from the past 72 hour(s))   ECG 12 lead    Collection Time: 07/13/22  5:52 AM   Result Value Ref Range    Ventricular Rate 77 BPM    Atrial Rate 77 BPM    WV Interval 312 ms    QRSD Interval 114 ms    QT Interval 364 ms    QTC Interval 411 ms    P Axis 80 degrees    QRS Axis -79 degrees    T Wave Axis 69 degrees   Fingerstick Glucose (POCT)    Collection Time: 07/13/22  5:54 AM   Result Value Ref Range    POC Glucose 154 (H) 65 - 140 mg/dl   Comprehensive metabolic panel    Collection Time: 07/13/22  6:07 AM   Result Value Ref Range    Sodium 139 135 - 147 mmol/L    Potassium 3 4 (L) 3 5 - 5 3 mmol/L    Chloride 100 96 - 108 mmol/L    CO2 25 21 - 32 mmol/L    ANION GAP 14 (H) 4 - 13 mmol/L    BUN 19 5 - 25 mg/dL    Creatinine 1 10 0 60 - 1 30 mg/dL    Glucose 247 (H) 65 - 140 mg/dL    Calcium 9 4 8 4 - 10 2 mg/dL    AST 23 13 - 39 U/L    ALT 17 7 - 52 U/L    Alkaline Phosphatase 82 34 - 104 U/L    Total Protein 7 4 6 4 - 8 4 g/dL    Albumin 4 1 3 5 - 5 0 g/dL    Total Bilirubin 0 71 0 20 - 1 00 mg/dL    eGFR 72 ml/min/1 73sq m   CBC and differential    Collection Time: 07/13/22  6:07 AM   Result Value Ref Range    WBC 18 08 (H) 4 31 - 10 16 Thousand/uL    RBC 4 64 3 88 - 5 62 Million/uL    Hemoglobin 14 4 12 0 - 17 0 g/dL    Hematocrit 44 5 36 5 - 49 3 %    MCV 96 82 - 98 fL    MCH 31 0 26 8 - 34 3 pg    MCHC 32 4 31 4 - 37 4 g/dL    RDW 11 9 11 6 - 15 1 %    MPV 9 7 8 9 - 12 7 fL    Platelets 972 434 - 252 Thousands/uL    nRBC 0 /100 WBCs    Neutrophils Relative 76 (H) 43 - 75 %    Immat GRANS % 1 0 - 2 %    Lymphocytes Relative 17 14 - 44 %    Monocytes Relative 6 4 - 12 %    Eosinophils Relative 0 0 - 6 %    Basophils Relative 0 0 - 1 %    Neutrophils Absolute 13 62 (H) 1 85 - 7 62 Thousands/µL    Immature Grans Absolute 0 16 0 00 - 0 20 Thousand/uL    Lymphocytes Absolute 3 06 0 60 - 4 47 Thousands/µL    Monocytes Absolute 1 15 0 17 - 1 22 Thousand/µL    Eosinophils Absolute 0 05 0 00 - 0 61 Thousand/µL    Basophils Absolute 0 04 0 00 - 0 10 Thousands/µL   Protime-INR    Collection Time: 07/13/22  6:07 AM   Result Value Ref Range    Protime 15 3 (H) 11 6 - 14 5 seconds    INR 1 21 (H) 0 84 - 1 19   APTT    Collection Time: 07/13/22  6:07 AM   Result Value Ref Range    PTT 25 23 - 37 seconds   Lipase    Collection Time: 07/13/22  6:07 AM   Result Value Ref Range    Lipase 49 11 - 82 u/L   Magnesium    Collection Time: 07/13/22  6:07 AM   Result Value Ref Range    Magnesium 1 8 (L) 1 9 - 2 7 mg/dL   Phosphorus    Collection Time: 07/13/22  6:07 AM   Result Value Ref Range    Phosphorus 2 6 2 3 - 4 1 mg/dL   HS Troponin 0hr (reflex protocol)    Collection Time: 07/13/22  6:07 AM   Result Value Ref Range    hs TnI 0hr 10 "Refer to ACS Flowchart"- see link ng/L   NT-BNP PRO    Collection Time: 07/13/22  6:07 AM   Result Value Ref Range    NT-proBNP 69 <125 pg/mL   Hemoglobin A1C w/ EAG Estimation    Collection Time: 07/13/22  6:07 AM   Result Value Ref Range    Hemoglobin A1C 5 8 (H) Normal 3 8-5 6%; PreDiabetic 5 7-6 4%;  Diabetic >=6 5%; Glycemic control for adults with diabetes <7 0% %     mg/dl   ECG 12 lead    Collection Time: 07/13/22  6:24 AM   Result Value Ref Range    Ventricular Rate 92 BPM    Atrial Rate 70 BPM    IL Interval  ms    QRSD Interval 124 ms    QT Interval 352 ms    QTC Interval 435 ms    P Axis  degrees    QRS Axis -79 degrees    T Wave Axis 74 degrees   ECG 12 lead    Collection Time: 07/13/22  6:25 AM   Result Value Ref Range    Ventricular Rate 83 BPM    Atrial Rate 83 BPM    IL Interval 302 ms    QRSD Interval 118 ms    QT Interval 344 ms    QTC Interval 404 ms    P Axis 86 degrees    QRS Axis -79 degrees    T Wave Axis 71 degrees   POCT activated clotting time    Collection Time: 07/13/22  7:08 AM   Result Value Ref Range    Activated Clotting Time, i-STAT 122 89 - 137 sec    Specimen Type VENOUS    POCT activated clotting time    Collection Time: 07/13/22  7:30 AM   Result Value Ref Range    Activated Clotting Time, i-STAT 98 89 - 137 sec    Specimen Type ARTERIAL    POCT activated clotting time    Collection Time: 07/13/22  7:38 AM   Result Value Ref Range    Activated Clotting Time, i-STAT 231 (H) 89 - 137 sec    Specimen Type ARTERIAL    ECG 12 lead    Collection Time: 07/13/22  8:29 AM   Result Value Ref Range    Ventricular Rate 77 BPM    Atrial Rate 77 BPM    IL Interval 282 ms    QRSD Interval 132 ms    QT Interval 384 ms    QTC Interval 434 ms    P Axis 81 degrees    QRS Axis 270 degrees    T Wave Axis 68 degrees   Fingerstick Glucose (POCT)    Collection Time: 07/13/22  9:07 AM   Result Value Ref Range    POC Glucose 179 (H) 65 - 140 mg/dl   HS Troponin I 2hr    Collection Time: 07/13/22 10:05 AM   Result Value Ref Range    hs TnI 2hr >22,973 (H) "Refer to ACS Flowchart"- see link ng/L    Delta 2hr hsTnI >22,963 (H) <20 ng/L   TSH WITH REFLEX TO FREE T4    Collection Time: 07/13/22 10:05 AM   Result Value Ref Range    TSH 3RD GENERATON 1 041 0 450 - 4 500 uIU/mL   Fingerstick Glucose (POCT)    Collection Time: 07/13/22 11:02 AM   Result Value Ref Range    POC Glucose 142 (H) 65 - 140 mg/dl   HS Troponin I 4hr    Collection Time: 07/13/22 12:13 PM   Result Value Ref Range    hs TnI 4hr >22,973 (H) "Refer to ACS Flowchart"- see link ng/L    Delta 4hr hsTnI >22,963 (H) <20 ng/L   ECG 12 lead    Collection Time: 07/13/22 12:14 PM   Result Value Ref Range    Ventricular Rate 59 BPM    Atrial Rate 59 BPM    GA Interval 262 ms    QRSD Interval 120 ms    QT Interval 404 ms    QTC Interval 399 ms    P [place] 77 degrees    QRS Axis -89 degrees    T Wave Axis 7 degrees   UA w Reflex to Microscopic w Reflex to Culture    Collection Time: 07/13/22  1:29 PM    Specimen: Urine, Other   Result Value Ref Range    Color, UA Yellow     Clarity, UA Clear     Specific Gravity, UA >=1 050 (H) 1 003 - 1 030    pH, UA 8 0 4 5, 5 0, 5 5, 6 0, 6 5, 7 0, 7 5, 8 0    Leukocytes, UA Negative Negative    Nitrite, UA Negative Negative    Protein, UA 30 (1+) (A) Negative mg/dl    Glucose, UA Trace (A) Negative mg/dl    Ketones, UA Negative Negative mg/dl    Urobilinogen, UA 4 0 (A) <2 0 mg/dl mg/dl    Bilirubin, UA Negative Negative    Occult Blood, UA Large (A) Negative   Urine Microscopic    Collection Time: 07/13/22  1:29 PM   Result Value Ref Range    RBC, UA 4-10 (A) None Seen, 1-2 /hpf    WBC, UA 1-2 None Seen, 1-2 /hpf    Epithelial Cells Occasional None Seen, Occasional /hpf    Bacteria, UA None Seen None Seen, Occasional /hpf   ECG 12 lead    Collection Time: 07/13/22  2:37 PM   Result Value Ref Range    Ventricular Rate 70 BPM    Atrial Rate 70 BPM    GA Interval 264 ms    QRSD Interval 120 ms    QT Interval 380 ms    QTC Interval 410 ms    P Axis 76 degrees    QRS Axis -89 degrees    T Wave [place] 29 degrees   Echo complete w/ contrast if indicated    Collection Time: 07/13/22  4:02 PM   Result Value Ref Range    LA size 4 2 cm    Triscuspid Valve Regurgitation Peak Gradient 39 0 mmHg    Tricuspid valve peak regurgitation velocity 3 13 m/s    LVPWd 1 10 cm    Left Atrium Area-systolic Apical Two Chamber 17 6 cm2    Left Atrium Area-systolic Four Chamber 67 5 cm2    MV E' Tissue Velocity Septal 7 cm/s    TR Peak [place] 3 1 m/s    IVSd 8 73 cm    LV DIASTOLIC VOLUME (MOD BIPLANE) 2D 72 mL    LEFT VENTRICLE SYSTOLIC VOLUME (MOD BIPLANE) 2D 45 mL    Left ventricular stroke volume (2D) 27 00 mL    A4C EF 39 %    LA length (A2C) 5 50 cm    LVIDd 4 00 cm    IVS 1 1 cm    LVIDS 3 30 cm    FS 18 28 - 44 %    Asc Ao 2 7 cm    Ao root 3 50 cm    RVID d 4 2 cm    AV LVOT peak gradient 2 mmHg    MV valve area p 1/2 method 4 15 cm2    E wave deceleration time 183 ms    AV peak gradient 7 mmHg    MV Peak E [place] 73 cm/s    MV Peak A [place] 0 97 m/s    RAA A4C 16 8 cm2    MV stenosis pressure 1/2 time 53 ms    LVSV, 2D 27 mL    LV EF 40     Est  RA pres 8 0 mmHg    Right Ventricular Peak Systolic Pressure 71 24 mmHg   Fingerstick Glucose (POCT)    Collection Time: 07/13/22  4:26 PM   Result Value Ref Range    POC Glucose 164 (H) 65 - 140 mg/dl   Fingerstick Glucose (POCT)    Collection Time: 07/13/22  8:48 PM   Result Value Ref Range    POC Glucose 132 65 - 140 mg/dl   CBC and differential    Collection Time: 07/14/22  4:31 AM   Result Value Ref Range    WBC 11 57 (H) 4 31 - 10 16 Thousand/uL    RBC 4 18 3 88 - 5 62 Million/uL    Hemoglobin 12 8 12 0 - 17 0 g/dL    Hematocrit 40 2 36 5 - 49 3 %    MCV 96 82 - 98 fL    MCH 30 6 26 8 - 34 3 pg    MCHC 31 8 31 4 - 37 4 g/dL    RDW 12 0 11 6 - 15 1 %    MPV 9 9 8 9 - 12 7 fL    Platelets 937 413 - 154 Thousands/uL    nRBC 0 /100 WBCs    Neutrophils Relative 66 43 - 75 %    Immat GRANS % 0 0 - 2 %    Lymphocytes Relative 22 14 - 44 %    Monocytes Relative 11 4 - 12 %    Eosinophils Relative 1 0 - 6 %    Basophils Relative 0 0 - 1 %    Neutrophils Absolute 7 55 1 85 - 7 62 Thousands/µL    Immature Grans Absolute 0 05 0 00 - 0 20 Thousand/uL    Lymphocytes Absolute 2 52 0 60 - 4 47 Thousands/µL    Monocytes Absolute 1 30 (H) 0 17 - 1 22 Thousand/µL    Eosinophils Absolute 0 12 0 00 - 0 61 Thousand/µL    Basophils Absolute 0 03 0 00 - 0 10 Thousands/µL   Basic metabolic panel    Collection Time: 07/14/22  4:31 AM   Result Value Ref Range    Sodium 137 135 - 147 mmol/L    Potassium 4 1 3 5 - 5 3 mmol/L    Chloride 105 96 - 108 mmol/L    CO2 25 21 - 32 mmol/L    ANION GAP 7 4 - 13 mmol/L    BUN 14 5 - 25 mg/dL    Creatinine 0 72 0 60 - 1 30 mg/dL    Glucose 120 65 - 140 mg/dL    Calcium 8 8 8 4 - 10 2 mg/dL    eGFR 101 ml/min/1 73sq m   Magnesium    Collection Time: 07/14/22  4:31 AM   Result Value Ref Range    Magnesium 2 1 1 9 - 2 7 mg/dL   Phosphorus    Collection Time: 07/14/22  4:31 AM   Result Value Ref Range    Phosphorus 3 0 2 3 - 4 1 mg/dL   Fingerstick Glucose (POCT)    Collection Time: 07/14/22  7:33 AM   Result Value Ref Range    POC Glucose 121 65 - 140 mg/dl   Fingerstick Glucose (POCT)    Collection Time: 07/14/22 11:04 AM   Result Value Ref Range    POC Glucose 155 (H) 65 - 140 mg/dl   Echo follow up/limited w/ contrast if indicated    Collection Time: 07/14/22  3:08 PM   Result Value Ref Range    Triscuspid Valve Regurgitation Peak Gradient 26 0 mmHg    Tricuspid valve peak regurgitation velocity 2 53 m/s    TR Peak [place] 2 5 m/s    LV EF 30    Fingerstick Glucose (POCT)    Collection Time: 07/14/22  5:11 PM   Result Value Ref Range    POC Glucose 123 65 - 140 mg/dl   Fingerstick Glucose (POCT)    Collection Time: 07/14/22  9:02 PM   Result Value Ref Range    POC Glucose 140 65 - 140 mg/dl   Fingerstick Glucose (POCT)    Collection Time: 07/14/22  9:53 PM   Result Value Ref Range    POC Glucose 130 65 - 140 mg/dl   CBC and differential    Collection Time: 07/15/22  4:44 AM   Result Value Ref Range    WBC 8 87 4 31 - 10 16 Thousand/uL    RBC 4 30 3 88 - 5 62 Million/uL    Hemoglobin 13 2 12 0 - 17 0 g/dL    Hematocrit 40 6 36 5 - 49 3 %    MCV 94 82 - 98 fL    MCH 30 7 26 8 - 34 3 pg    MCHC 32 5 31 4 - 37 4 g/dL    RDW 11 9 11 6 - 15 1 %    MPV 9 6 8 9 - 12 7 fL Platelets 691 158 - 868 Thousands/uL    nRBC 0 /100 WBCs    Neutrophils Relative 57 43 - 75 %    Immat GRANS % 0 0 - 2 %    Lymphocytes Relative 30 14 - 44 %    Monocytes Relative 12 4 - 12 %    Eosinophils Relative 1 0 - 6 %    Basophils Relative 0 0 - 1 %    Neutrophils Absolute 5 03 1 85 - 7 62 Thousands/µL    Immature Grans Absolute 0 02 0 00 - 0 20 Thousand/uL    Lymphocytes Absolute 2 65 0 60 - 4 47 Thousands/µL    Monocytes Absolute 1 02 0 17 - 1 22 Thousand/µL    Eosinophils Absolute 0 12 0 00 - 0 61 Thousand/µL    Basophils Absolute 0 03 0 00 - 0 10 Thousands/µL   Basic metabolic panel    Collection Time: 07/15/22  4:44 AM   Result Value Ref Range    Sodium 135 135 - 147 mmol/L    Potassium 3 8 3 5 - 5 3 mmol/L    Chloride 103 96 - 108 mmol/L    CO2 25 21 - 32 mmol/L    ANION GAP 7 4 - 13 mmol/L    BUN 13 5 - 25 mg/dL    Creatinine 0 74 0 60 - 1 30 mg/dL    Glucose 107 65 - 140 mg/dL    Calcium 8 8 8 4 - 10 2 mg/dL    eGFR 100 ml/min/1 73sq m   Magnesium    Collection Time: 07/15/22  4:44 AM   Result Value Ref Range    Magnesium 1 9 1 9 - 2 7 mg/dL   Phosphorus    Collection Time: 07/15/22  4:44 AM   Result Value Ref Range    Phosphorus 2 9 2 3 - 4 1 mg/dL   Fingerstick Glucose (POCT)    Collection Time: 07/15/22  7:46 AM   Result Value Ref Range    POC Glucose 113 65 - 140 mg/dl     Imaging: I have personally reviewed pertinent reports  Tele- nsr      Counseling / Coordination of Care  Total time spent today 20 minutes  Greater than 50% of total time was spent with the patient and / or family counseling and / or coordination of care

## 2022-07-16 VITALS
OXYGEN SATURATION: 95 % | RESPIRATION RATE: 16 BRPM | HEART RATE: 59 BPM | BODY MASS INDEX: 24.78 KG/M2 | TEMPERATURE: 98.8 F | SYSTOLIC BLOOD PRESSURE: 108 MMHG | WEIGHT: 167.33 LBS | HEIGHT: 69 IN | DIASTOLIC BLOOD PRESSURE: 69 MMHG

## 2022-07-16 LAB
ANION GAP SERPL CALCULATED.3IONS-SCNC: 8 MMOL/L (ref 4–13)
BUN SERPL-MCNC: 16 MG/DL (ref 5–25)
CALCIUM SERPL-MCNC: 9.1 MG/DL (ref 8.4–10.2)
CHLORIDE SERPL-SCNC: 102 MMOL/L (ref 96–108)
CO2 SERPL-SCNC: 25 MMOL/L (ref 21–32)
CREAT SERPL-MCNC: 0.75 MG/DL (ref 0.6–1.3)
GFR SERPL CREATININE-BSD FRML MDRD: 99 ML/MIN/1.73SQ M
GLUCOSE SERPL-MCNC: 124 MG/DL (ref 65–140)
GLUCOSE SERPL-MCNC: 139 MG/DL (ref 65–140)
GLUCOSE SERPL-MCNC: 97 MG/DL (ref 65–140)
MAGNESIUM SERPL-MCNC: 1.8 MG/DL (ref 1.9–2.7)
POTASSIUM SERPL-SCNC: 4 MMOL/L (ref 3.5–5.3)
SODIUM SERPL-SCNC: 135 MMOL/L (ref 135–147)

## 2022-07-16 PROCEDURE — 97165 OT EVAL LOW COMPLEX 30 MIN: CPT

## 2022-07-16 PROCEDURE — 83735 ASSAY OF MAGNESIUM: CPT

## 2022-07-16 PROCEDURE — 99232 SBSQ HOSP IP/OBS MODERATE 35: CPT | Performed by: INTERNAL MEDICINE

## 2022-07-16 PROCEDURE — 82948 REAGENT STRIP/BLOOD GLUCOSE: CPT

## 2022-07-16 PROCEDURE — 80048 BASIC METABOLIC PNL TOTAL CA: CPT

## 2022-07-16 PROCEDURE — 99239 HOSP IP/OBS DSCHRG MGMT >30: CPT | Performed by: INTERNAL MEDICINE

## 2022-07-16 PROCEDURE — 97162 PT EVAL MOD COMPLEX 30 MIN: CPT

## 2022-07-16 RX ORDER — MAGNESIUM SULFATE 1 G/100ML
1 INJECTION INTRAVENOUS ONCE
Status: COMPLETED | OUTPATIENT
Start: 2022-07-16 | End: 2022-07-16

## 2022-07-16 RX ORDER — LISINOPRIL 2.5 MG/1
2.5 TABLET ORAL DAILY
Qty: 30 TABLET | Refills: 0 | Status: SHIPPED | OUTPATIENT
Start: 2022-07-16 | End: 2022-07-26 | Stop reason: DRUGHIGH

## 2022-07-16 RX ORDER — NITROGLYCERIN 0.4 MG/1
0.4 TABLET SUBLINGUAL
Qty: 10 TABLET | Refills: 0 | Status: SHIPPED | OUTPATIENT
Start: 2022-07-16 | End: 2022-09-08

## 2022-07-16 RX ORDER — METOPROLOL SUCCINATE 25 MG/1
25 TABLET, EXTENDED RELEASE ORAL DAILY
Qty: 30 TABLET | Refills: 0 | Status: SHIPPED | OUTPATIENT
Start: 2022-07-16 | End: 2022-09-08 | Stop reason: SDUPTHER

## 2022-07-16 RX ORDER — ASPIRIN 81 MG/1
81 TABLET, CHEWABLE ORAL DAILY
Qty: 30 TABLET | Refills: 0 | Status: SHIPPED | OUTPATIENT
Start: 2022-07-16 | End: 2022-09-08 | Stop reason: SDUPTHER

## 2022-07-16 RX ORDER — ATORVASTATIN CALCIUM 40 MG/1
40 TABLET, FILM COATED ORAL
Qty: 30 TABLET | Refills: 0 | Status: SHIPPED | OUTPATIENT
Start: 2022-07-16 | End: 2022-09-08 | Stop reason: SDUPTHER

## 2022-07-16 RX ADMIN — TICAGRELOR 90 MG: 90 TABLET ORAL at 09:25

## 2022-07-16 RX ADMIN — METOPROLOL SUCCINATE 25 MG: 25 TABLET, EXTENDED RELEASE ORAL at 10:22

## 2022-07-16 RX ADMIN — HEPARIN SODIUM 5000 UNITS: 5000 INJECTION INTRAVENOUS; SUBCUTANEOUS at 05:51

## 2022-07-16 RX ADMIN — MAGNESIUM SULFATE HEPTAHYDRATE 1 G: 1 INJECTION, SOLUTION INTRAVENOUS at 10:24

## 2022-07-16 RX ADMIN — LISINOPRIL 2.5 MG: 2.5 TABLET ORAL at 10:21

## 2022-07-16 RX ADMIN — ASPIRIN 81 MG CHEWABLE TABLET 81 MG: 81 TABLET CHEWABLE at 09:25

## 2022-07-16 NOTE — ASSESSMENT & PLAN NOTE
Pmhx CVA (2020), with Residual R sided wekaness  Patient did not seek medical treatment since he moved from [facility]       Plan:         Compliance with medical treatment   ASA, Statin

## 2022-07-16 NOTE — PLAN OF CARE
Problem: Potential for Falls  Goal: Patient will remain free of falls  Description: INTERVENTIONS:  - Educate patient/family on patient safety including physical limitations  - Instruct patient to call for assistance with activity   - Consult OT/PT to assist with strengthening/mobility   - Keep Call bell within reach  - Keep bed low and locked with side rails adjusted as appropriate  - Keep care items and personal belongings within reach  - Initiate and maintain comfort rounds  - Make Fall Risk Sign visible to staff  - Offer Toileting every  Hours, in advance of need  - Initiate/Maintain alarm  - Obtain necessary fall risk management equipment:   - Apply yellow socks and bracelet for high fall risk patients  - Consider moving patient to room near nurses station  Outcome: Progressing     Problem: MOBILITY - ADULT  Goal: Maintain or return to baseline ADL function  Description: INTERVENTIONS:  -  Assess patient's ability to carry out ADLs; assess patient's baseline for ADL function and identify physical deficits which impact ability to perform ADLs (bathing, care of mouth/teeth, toileting, grooming, dressing, etc )  - Assess/evaluate cause of self-care deficits   - Assess range of motion  - Assess patient's mobility; develop plan if impaired  - Assess patient's need for assistive devices and provide as appropriate  - Encourage maximum independence but intervene and supervise when necessary  - Involve family in performance of ADLs  - Assess for home care needs following discharge   - Consider OT consult to assist with ADL evaluation and planning for discharge  - Provide patient education as appropriate  Outcome: Progressing  Goal: Maintains/Returns to pre admission functional level  Description: INTERVENTIONS:  - Perform BMAT or MOVE assessment daily    - Set and communicate daily mobility goal to care team and patient/family/caregiver     - Collaborate with rehabilitation services on mobility goals if consulted  - Perform Range of Motion  times a day  - Reposition patient every  hours    - Dangle patient  times a day  - Stand patient  times a day  - Ambulate patient  times a day  - Out of bed to chair  times a day   - Out of bed for meals times a day  - Out of bed for toileting  - Record patient progress and toleration of activity level   Outcome: Progressing     Problem: CARDIOVASCULAR - ADULT  Goal: Maintains optimal cardiac output and hemodynamic stability  Description: INTERVENTIONS:  - Monitor I/O, vital signs and rhythm  - Monitor for S/S and trends of decreased cardiac output  - Administer and titrate ordered vasoactive medications to optimize hemodynamic stability  - Assess quality of pulses, skin color and temperature  - Assess for signs of decreased coronary artery perfusion  - Instruct patient to report change in severity of symptoms  Outcome: Progressing  Goal: Absence of cardiac dysrhythmias or at baseline rhythm  Description: INTERVENTIONS:  - Continuous cardiac monitoring, vital signs, obtain 12 lead EKG if ordered  - Administer antiarrhythmic and heart rate control medications as ordered  - Monitor electrolytes and administer replacement therapy as ordered  Outcome: Progressing     Problem: METABOLIC, FLUID AND ELECTROLYTES - ADULT  Goal: Electrolytes maintained within normal limits  Description: INTERVENTIONS:  - Monitor labs and assess patient for signs and symptoms of electrolyte imbalances  - Administer electrolyte replacement as ordered  - Monitor response to electrolyte replacements, including repeat lab results as appropriate  - Instruct patient on fluid and nutrition as appropriate  Outcome: Progressing  Goal: Fluid balance maintained  Description: INTERVENTIONS:  - Monitor labs   - Monitor I/O and WT  - Instruct patient on fluid and nutrition as appropriate  - Assess for signs & symptoms of volume excess or deficit  Outcome: Progressing  Goal: Glucose maintained within target range  Description: INTERVENTIONS:  - Monitor Blood Glucose as ordered  - Assess for signs and symptoms of hyperglycemia and hypoglycemia  - Administer ordered medications to maintain glucose within target range  - Assess nutritional intake and initiate nutrition service referral as needed  Outcome: Progressing     Problem: Prexisting or High Potential for Compromised Skin Integrity  Goal: Skin integrity is maintained or improved  Description: INTERVENTIONS:  - Identify patients at risk for skin breakdown  - Assess and monitor skin integrity  - Assess and monitor nutrition and hydration status  - Monitor labs   - Assess for incontinence   - Turn and reposition patient  - Assist with mobility/ambulation  - Relieve pressure over bony prominences  - Avoid friction and shearing  - Provide appropriate hygiene as needed including keeping skin clean and dry  - Evaluate need for skin moisturizer/barrier cream  - Collaborate with interdisciplinary team   - Patient/family teaching  - Consider wound care consult   Outcome: Progressing

## 2022-07-16 NOTE — PHYSICAL THERAPY NOTE
PHYSICAL THERAPY EVALUATION  NAME: Chico Browning  AGE:   62 y o  MRN:  [id number]  ADMIT DX: Syncope [R55]  Chest pain [R07 9]  Weakness [R53 1]  STEMI (ST elevation myocardial infarction) ([facility] [address] ) [I21 3]  ST elevation myocardial infarction (STEMI), unspecified artery ([facility] [address] ) [I21 3]    PMH: No past medical history on file  LENGTH OF STAY: 3       22 0905   PT Last Visit   PT Visit Date 22   Note Type   Note type Evaluation   Pain Assessment   Pain Assessment Tool 0-10   Pain Score No Pain   Restrictions/Precautions   Weight Bearing Precautions Per Order No   Other Precautions Cardiac/sternal;Fall Risk  ([place])   Home Living   Type of [facility] [facility] [address] One level;Stairs to enter with rails  (1st floor apartment; 3 KWASI)   Bathroom Toilet Standard   Additional Comments Ambulates independently without AD at baseline  Prior Function   Level of [place] Independent with ADLs and functional mobility   Lives With   (brother)   Lester Help From Family   ADL Assistance Independent   IADLs Independent  ((-)  since stroke; uses public transportation)   Falls in the last 6 months 0   Vocational Full time employment   General   Additional Pertinent History prior CVA; residual R sided weakness   Family/Caregiver Present No   Cognition   Overall Cognitive Status WFL   Arousal/Participation Cooperative   Following Commands Follows one step commands without difficulty   Comments Flat affect  Pt identified by name and   Subjective   Subjective Agrees to PT evaluation and is cooperative throughout session  ("Why do you need to know about my toilet? ")   RLE Assessment   RLE Assessment X   Strength RLE   RLE Overall Strength 4-/5  (functionally)   LLE Assessment   LLE Assessment X   Strength LLE   LLE Overall Strength 4+/5  (functionally)   Bed Mobility   Supine to Sit Unable to assess  (OOB in chair pre/post session)   Transfers   Sit to Stand 6  Modified independent   Additional items Increased time required;Armrests   Stand to Sit 6  Modified independent   Additional items Increased time required;Armrests   Ambulation/Elevation   Gait pattern Improper Weight shift;Decreased foot clearance;Decreased R stance; Short stride; Step to;Excessively slow   Gait Assistance 7  Independent   Assistive Device None   Distance 120` x1   Stair Management Assistance 7  Independent   Stair Management Technique No rails  (reciprocal up and nonreciprocal down)   Number of Stairs 8   Balance   Static Sitting Normal   Dynamic Sitting Good   Static Standing Good   Dynamic Standing Fair +   Ambulatory Fair   Endurance Deficit   Endurance Deficit No   Activity Tolerance   Activity Tolerance Patient tolerated treatment well   Nurse Made Aware Per RN, pt appropriate to evaluate   Recommendation   PT Discharge Recommendation No rehabilitation needs   [facility] Basic Mobility Inpatient   Turning in Bed Without Bedrails 4   Lying on Back to Sitting on Edge of Flat Bed 4   Moving Bed to Chair 4   Standing Up From Chair 4   Walk in Room 4   Climb 3-5 Stairs 4   Basic Mobility Inpatient Raw Score 24   Basic Mobility Standardized Score 57 68   Highest Level Of Mobility   -HL Goal 8: Walk 250 feet or more   JH-HLM Achieved 7: Walk 25 feet or more   End of Consult   Patient Position at End of Consult Bedside chair; All needs within reach   The patient's [facility] Basic Mobility Inpatient Short Form Raw Score is 24, Standardized Score is 57 68  A standardized score greater than or equal to 40 78 or Raw Score of 16 suggests the patient may benefit from discharge to home, which DOES coincide with CURRENT above PT recommendations  However please refer to therapist recommendation for discharge planning given other factors that may influence destination  Adapted from [facility] 6-Clicks Basic Mobility and Daily Activity Scores With Discharge Destination   Physical Therapy, 2021;101:1-9  DOI: 10 [id number]      Pt was seen for a co-eval with OT due to potential need for significant physical assist, poor pain control, impaired mental status, limiting behaviors, and poor adherence to precautions  Assessment: Pt is a 62 y o  male seen for PT evaluation s/p admit to [address] [place] [address] on 7/13/2022 w/ STEMI (ST elevation myocardial infarction) ([facility] [address] )  Order placed for PT  Comorbidities affecting pt's physical performance at time of assessment include: CVA  Personal factors affecting pt at time of IE include: steps to enter environment and recent fall(s)  Prior to admission, pt was was independent w/ all functional mobility w/ out AD, lived in one floor environment, had 3 KWASI ? railing, lived with brother, and worked full time  Upon evaluation: Pt requires mod I for sit to stand, independent for ambulation, and independent for stair negotiation  (Please find full objective findings from PT assessment regarding body systems outlined above)  Impairments and limitations also listed above, especially due to  impaired coordination, gait deviations, and fall risk  Pt's clinical presentation is currently unstable/unpredictable seen in pt's presentation of recent history of syncope and collapse, fall risk, and need for LifeVest   No acute skilled PT needs at this point as pt is close to his functional baseline  From PT/mobility standpoint, recommendation at time of d/c would be: anticipate no needs   Will discharge pt from PT caseload at this time       Ace Martinez, PT,DPT

## 2022-07-16 NOTE — OCCUPATIONAL THERAPY NOTE
Occupational Therapy Evaluation     Patient Name: Grady Carlton  XCDYH'Y Date: 7/16/2022  Problem List  Principal Problem:    STEMI (ST elevation myocardial infarction) [facility])  Active Problems:    Syncope and collapse    Hx CVA (cerebral vascular accident) ([address] )    Hyperglycemia    Leukocytosis    Fall from standing    Forehead laceration, initial encounter    Past Medical History  No past medical history on file  Past Surgical History  Past Surgical History:   Procedure Laterality Date    CARDIAC CATHETERIZATION N/A 7/13/2022    Procedure: Cardiac pci;  Surgeon: Malena Lamar MD;  [facility]: [facility]ARD[facility]AC [facility]; Service: Cardiology    CARDIAC CATHETERIZATION N/A 7/13/2022    Procedure: Cardiac Coronary Angiogram;  Surgeon: Malena Lamar MD;  [facility]AC [facility]; Service: Cardiology    CARDIAC CATHETERIZATION Left 7/13/2022    Procedure: Cardiac Left Heart Cath;  Surgeon: Malena Lamar MD;  [facility]: [facility]ARD[facility]AC [facility]; Service: Cardiology         07/16/22 0908   Note Type   Note type Evaluation   Restrictions/Precautions   Weight Bearing Precautions Per Order No   Other Precautions Cardiac/sternal;Chair Alarm; Bed Alarm; Fall Risk   Pain Assessment   Pain Assessment Tool 0-10   Pain Score No Pain   Home Living   Type of Home Apartment   Home Layout One level  (2 KWASI  First floor apartment)   Bathroom Shower/Tub Tub/shower unit   Bathroom Toilet Standard   Additional Comments Per patient ambulates w/o AD   Prior Function   Level of [place] Independent with ADLs and functional mobility   Lives With   (brother)   Receives Help From Family   ADL Assistance Independent   IADLs Independent   Falls in the last 6 months 0   Vocational Full time employment  ([employer] )   Comments Pt does not drive uses public transportation   ADL   Eating Assistance 7  Independent   Grooming Assistance 6  Modified Independent   Grooming Deficit Wash/dry hands; Wash/dry face; Supervision/safety   UB Bathing Assistance 6  Modified Independent   UB Bathing Deficit Right arm;Left arm; Increased time to complete   LB Bathing Assistance Unable to assess   UB Dressing Assistance 6  Modified independent   UB Dressing Deficit Increased time to complete; Thread RUE; Thread LUE   LB Dressing Assistance 6  Modified independent   LB Dressing Deficit Thread RLE into pants; Thread LLE into pants; Don/doff R sock; Don/doff L sock;Pull up over hips; Increased time to complete   Toileting Assistance  6  Modified independent   Bed Mobility   Supine to Sit Unable to assess  (Pt)   Additional Comments Pt was received in recliner   Transfers   Sit to Stand 6  Modified independent   Stand to Sit 6  Modified independent   Functional Mobility   Additional Comments Patient ambulated w/o AD at Mod I household distance   Balance   Static Sitting Good   Dynamic Sitting Good   Static Standing Fair +   Dynamic Standing Fair   Activity Tolerance   Activity Tolerance Patient tolerated treatment well   Nurse Made Aware PARISA Nunez   RUE Assessment   RUE Assessment WFL   RUE Strength   RUE Overall Strength   (4/5)   LUE Assessment   LUE Assessment WFL   LUE Strength   LUE Overall Strength   (4/5)   Hand Function   Gross Motor Coordination Functional   Fine Motor Coordination Functional   Cognition   Overall Cognitive Status WFL   Arousal/Participation Alert; Cooperative  (Flat affect whan speaking)   Orientation Level Oriented X4   Following Commands Follows one step commands without difficulty   Comments Pt was ID by wristband name and    Assessment   Prognosis Fair   Assessment Patient evaluated by Occupational Therapy  Patient admitted with STEMI (ST elevation myocardial infarction) ([facility] [address] )  Patient is S/P catherization on   The patients occupational profile, medical and therapy history includes a expanded review of medical and/or therapy records and additional review of physical, cognitive, or psychosocial history related to current functional performance  Comorbidities affecting functional mobility and ADLS include: CVA  Prior to admission, patient was independent with functional mobility without assistive device, independent with ADLS, independent with IADLS and living with brother in a 1 level home with 2 steps to enter  Patient performed grooming, UB bath, UB dressing, LB dressing and toileting at [facility] 4  Patient performed transfer and functional ambulation at [address]  This evaluation requires clinical decision making of low complexity, because the patients presents with no comorbidities that affect occupational performance and required no modification of tasks or assistance with consideration of a limited number of treatment options  The Barthel Index was used as a functional outcome tool presenting with a score of 90  The patient's raw score on the AM-PAC Daily Activity inpatient short form is 24, standardized score is 54, greater than 39 4  Patients at this level are likely to benefit from DC to home  Patient is performing at baseline with AD,transfers and functional ambulation  Patient at this time does not require skilled OTservices at this time due to functioning at baseline and no safety concerns noted  Recommended home with home support at DC     Goals   Patient Goals Not expressed   Recommendation   OT Discharge Recommendation No rehabilitation needs   AM-PAC Daily Activity Inpatient   Lower Body Dressing 4   Bathing 4   Toileting 4   Upper Body Dressing 4   Grooming 4   Eating 4   Daily Activity Raw Score 24   Daily Activity Standardized Score (Calc for Raw Score >=11) 57 54   AM-PAC Applied Cognition Inpatient   Following a Speech/Presentation 4   Understanding Ordinary Conversation 4   Taking Medications 4   Remembering Where Things Are Placed or Put Away 3   Remembering List of 4-5 Errands 3   Taking Care of Complicated Tasks 3   Applied Cognition Raw Score 21   Applied Cognition Standardized Score 44 3   Barthel Index Feeding 10   Bathing 5   Grooming Score 5   Dressing Score 10   Bladder Score 10   Bowels Score 10   Toilet Use Score 10   Transfers (Bed/Chair) Score 10   Mobility (Level Surface) Score 10   Stairs Score 10   Barthel Index Score 90

## 2022-07-16 NOTE — ASSESSMENT & PLAN NOTE
· Patient had anterior ST-elevation myocardial infarction (II, III, aVF, V4, V5, V6 and I)  · Initial troponin's 0, increasing to  22,973  · Status post urgent PCI with thrombectomy  · Chest Pain resolve, no dyspnea, SOB  · Patient was  about his current and future treatment  He verbalized understanding the future care he needs and lifestyle modification including but not limited to Diet, Exercise and medication compliance  Plan:  · Continue Dual Antiplatelet therapy  · ASA 81mg  · Ticagrelor 90mg BID (Price checked by  $30/month)   · Metoprolol Succinate 25 mg q d     · Continue Atorvastatin 40mg   · F/U Nutritional Services Recommendations and Education

## 2022-07-16 NOTE — ASSESSMENT & PLAN NOTE
· Vasovagal syncope vs Vasovagal versus arrhythmogenic  Was preceded by feeling lightheaded and hot  · Forehead laceration with Negative CT head  · TTE- LVEF 35-40%  · Patient was put on telemetry which revealed NSR short runs NSVT/AIVR first 24 hours   Now resolved  · Repeated ECHO shown LVEF 30%  · Patient  about the need of LifeVest  Patient acknowledge understanding the need, will require more education  · Cardiology recommendations much appreciated  Will need F/U Live Vest/ICD placement in 12 weeks if LVEF remains <35%      Plan:   · LifeVest provided  · Ambulatory referral to cardiac rehab  · Follow up with Cardiology

## 2022-07-16 NOTE — DISCHARGE INSTR - AVS FIRST PAGE
Dear Lani Macias,     It was our pleasure to care for you here at [facility]  It is our hope that we were always able to exceed the expected standards for your care during your stay  You were hospitalized due to STEMI  You were cared for on the 3rd floor by Humera Quintana MD under the service of Stan Whitaker MD with the [facility] who covers for your primary care physician (PCP), No primary care provider on file  , while you were hospitalized  If you have any questions or concerns related to this hospitalization, you may contact us at [phone]  For follow up as well as any medication refills, we recommend that you follow up with your primary care physician  A registered nurse will reach out to you by phone within a few days after your discharge to answer any additional questions that you may have after going home  However, at this time we provide for you here, the most important instructions / recommendations at discharge:     Notable Medication Adjustments -   Please continue taking aspirin 81 mg daily, Lipitor 40 mg daily, metoprolol succinate(Toprol XL) 25 mg daily  Please start taking lisinopril 2 5 mg daily  Nitroglycerin 0 4 mg sublingual tablet as needed for chest pain  Please start taking ticagrelor (Brilinta) 90 mg 1 tablet twice a day  Testing Required after Discharge - none  Important follow up information -   Please set up finding PCP and follow-up with the PCP as instructed  Please follow-up with the Cardiology as an outpatient  Other Instructions -   Ambulatory referral to cardiac rehab  Please review this entire after visit summary as additional general instructions including medication list, appointments, activity, diet, any pertinent wound care, and other additional recommendations from your care team that may be provided for you        Sincerely,     Alvin Sims MD

## 2022-07-16 NOTE — CASE MANAGEMENT
Case Management Progress Note    Patient name Zulma Dorado  Location [address]01 MRN [id number]  : 1960 Date 2022       LOS (days): 3  Geometric Mean LOS (GMLOS) (days): 2 10  Days to GMLOS:-0 9        OBJECTIVE:        Current admission status: Inpatient  Preferred Pharmacy:   [address] [place] [address]) [place] [address]  Phone: [phone] Fax: [phone]    Primary Care Provider: No primary care provider on file      Primary Insurance: WORKERS COMPENSATION  Secondary Insurance: BLUE CROSS    PROGRESS NOTE:    KIRAN Reeves at p: [phone] whom confirmed they are on their way and should be at the hospital by 9:30am with LifeVest   CM informed SLIM Resident Dr Kami Mercer

## 2022-07-16 NOTE — DISCHARGE SUMMARY
[facility]  Discharge- Brenda Bowens 1960, 62 y o  male MRN: [id number]  Unit/Bed#: S -01 Encounter: [id number]  Primary Care Provider: No primary care provider on file  Date and time admitted to hospital: 7/13/2022  5:49 AM    * STEMI (ST elevation myocardial infarction) [facility])  Assessment & Plan  · Patient had anterior ST-elevation myocardial infarction (II, III, aVF, V4, V5, V6 and I)  · Initial troponin's 0, increasing to  22,973  · Status post urgent PCI with thrombectomy  · Chest Pain resolve, no dyspnea, SOB  · Patient was  about his current and future treatment  He verbalized understanding the future care he needs and lifestyle modification including but not limited to Diet, Exercise and medication compliance  Plan:  · Continue Dual Antiplatelet therapy  · ASA 81mg  · Ticagrelor 90mg BID (Price checked by  $30/month)   · Metoprolol Succinate 25 mg q d  · Continue Atorvastatin 40mg   · F/U Nutritional Services Recommendations and Education            Forehead laceration, initial encounter  Assessment & Plan  Forehead laceration secondary to syncopal episode  Repaired by trauma    Fall from standing  Assessment & Plan  Syncopal episode   Fall from standing with head laceration repaired by Trauma team    CT head/Spine without acute lesions  Leukocytosis  Assessment & Plan  Leucocytosis upon admission most likely reactive reaction  WBC trended down from 18 s--11 57--today 8 87  resolved  VS wnl    Hyperglycemia  Assessment & Plan  Per patient pmhx Type 2 DM, no current medical treatment  HbA1c 5 8% most likely prediabetic  Accuchecks between 130-160s    Plan:        F/U with nutritional services recommendations        Will be appropriate to start the patient on Metformin upon D/C          F/U with PCP on the outpatient setting upon D/C                  Hx CVA (cerebral vascular accident) [facility])  Assessment & Plan  Pmhx CVA (2020), with Residual R sided calixto  Patient did not seek medical treatment since he moved from [facility]  Plan:         Compliance with medical treatment   ASA, Statin     Syncope and collapse  Assessment & Plan  · Vasovagal syncope vs Vasovagal versus arrhythmogenic  Was preceded by feeling lightheaded and hot  · Forehead laceration with Negative CT head  · TTE- LVEF 35-40%  · Patient was put on telemetry which revealed NSR short runs NSVT/AIVR first 24 hours   Now resolved  · Repeated ECHO shown LVEF 30%  · Patient  about the need of LifeVest  Patient acknowledge understanding the need, will require more education  · Cardiology recommendations much appreciated  Will need F/U Live Vest/ICD placement in 12 weeks if LVEF remains <35%  Plan:   · LifeVest provided  · Ambulatory referral to cardiac rehab  · Follow up with Cardiology          Medical Problems             Resolved Problems  Date Reviewed: 7/16/2022   None               Discharging Resident: Humera Guerrero MD  Discharging Attending: No att  providers found  PCP: No primary care provider on file  Admission Date:   Admission Orders (From admission, onward)     Ordered        07/13/22 0829  Inpatient Admission  Once                      Discharge Date: 07/16/22    Consultations During Hospital Stay:  · Cardiology consult for STEMI    Procedures Performed:   · Simple closure of the superficial wound dehiscence ( laceration repair) on 7/13 in ICU    Significant Findings / Test Results:   TRAUMA - CT head wo contrast   Final Result by Sanjana House MD (07/13 6401)      No acute intracranial abnormality  Left frontal, left ethmoid and left maxillary sinusitis  I personally discussed this study with Gil Salcido on 7/13/2022 at 6:36 AM       Workstation performed: [id number]         TRAUMA - CT spine cervical wo contrast   Final Result by Sanjana House MD (07/13 8242)      No cervical spine fracture or traumatic malalignment  Degenerative disc disease from C4 to C7  Large posterior central disc osteophyte complex causing moderate spinal canal narrowing  Follow-up imaging with MR per clinical symptoms  I personally discussed this study with Sunita Dubois on 7/13/2022 at 6:36 AM       Workstation performed: [id number]         CT chest abdomen pelvis wo contrast   Final Result by Inocente Rocha MD (07/13 1911)      No CT findings of trauma in the chest, abdomen or pelvis  I personally discussed this study with Sunita Dubois on 7/13/2022 at 6:36 AM                      Workstation performed: [id number]               Incidental Findings:   · none    Test Results Pending at Discharge (will require follow up):  · none     Outpatient Tests Requested:  None      Complications:  none    Reason for Admission: STEMI s/p cardiac cath    Hospital Course: Katarina Juan is a 62 y o  male patient who originally presented to the hospital on 7/13/2022 due to STEMI status post cardiac catheterization with aspiration of thrombus and ALEJANDRA placement and unclear etiology of syncope and collapse  Patient had chronic dysphagia and residual right-sided weakness 2 2 prior CVA in 2020  Patient complaint of chest pain and troponin were elevated more than 22,000 x2  EKG showed ST elevations  Patient proportionate to the cardiac catheterization where he underwent right radial catheterization with ALEJANDRA to LAD  Trauma team evaluated the patient and forehead laceration was sutured  Patient had 2 ICU stay  Started beta blocker and recheck Echo showed LVEF 35-40%  The Cardiology was on board and recommended a get the life vest upon discharge  On 7/16/22, patient was stable from medical standpoint  No more chest pain, shortness of breath  Denied fever, chills, nausea, vomiting  Agreeable to wear the life vest, cardiac rehab and follow up with the Cardiology upon discharge      Please see above list of diagnoses and related plan for additional information  Condition at Discharge: stable    Discharge Day Visit / Exam:   Subjective:  No significant overnight  Patient denied chest pain, shortness of breath, fever, chills  He was sleeping in bed and denied new complaint  He understands that he is waiting for Lifevest for the discharge  Vitals: Blood Pressure: 108/69 (07/16/22 1109)  Pulse: 59 (07/16/22 1109)  Temperature: 98 8 °F (37 1 °C) (07/15/22 2313)  Temp Source: Oral (07/15/22 2313)  Respirations: 16 (07/15/22 2313)  Height: 5' 9" (175 3 cm) (07/14/22 1435)  Weight - Scale: 75 9 kg (167 lb 5 3 oz) (07/16/22 0559)  SpO2: 95 % (07/16/22 1109)  Exam:   Physical Exam  Vitals and nursing note reviewed  Constitutional:       General: He is not in acute distress  Appearance: He is well-developed  HENT:      Head: Normocephalic and atraumatic  Mouth/Throat:      Mouth: Mucous membranes are moist       Pharynx: Oropharynx is clear  Eyes:      Extraocular Movements: Extraocular movements intact  Conjunctiva/sclera: Conjunctivae normal       Pupils: Pupils are equal, round, and reactive to light  Cardiovascular:      Rate and Rhythm: Normal rate and regular rhythm  Heart sounds: Normal heart sounds  No murmur heard  Pulmonary:      Effort: Pulmonary effort is normal  No respiratory distress  Breath sounds: Normal breath sounds  No wheezing or rales  Abdominal:      General: Bowel sounds are normal  There is no distension  Palpations: Abdomen is soft  Tenderness: There is no abdominal tenderness  Musculoskeletal:         General: No swelling  Cervical back: Neck supple  Right lower leg: No edema  Left lower leg: No edema  Skin:     General: Skin is warm and dry  Capillary Refill: Capillary refill takes less than 2 seconds  Coloration: Skin is not jaundiced  Findings: No bruising or lesion     Neurological:      Mental Status: He is alert and oriented to person, place, and time  Discussion with Family: Updated  (brother) via phone  Discharge instructions/Information to patient and family:   See after visit summary for information provided to patient and family  Provisions for Follow-Up Care:  See after visit summary for information related to follow-up care and any pertinent home health orders  Disposition:   Home    Planned Readmission: none    Discharge Medications:  See after visit summary for reconciled discharge medications provided to patient and/or family        **Please Note: This note may have been constructed using a voice recognition system**

## 2022-07-16 NOTE — PLAN OF CARE
Problem: Potential for Falls  Goal: Patient will remain free of falls  Description: INTERVENTIONS:  - Educate patient/family on patient safety including physical limitations  - Instruct patient to call for assistance with activity   - Consult OT/PT to assist with strengthening/mobility   - Keep Call bell within reach  - Keep bed low and locked with side rails adjusted as appropriate  - Keep care items and personal belongings within reach  - Initiate and maintain comfort rounds  - Make Fall Risk Sign visible to staff  - Offer Toileting every 2 Hours, in advance of need  - Initiate/Maintain bed alarm  - Apply yellow socks and bracelet for high fall risk patients  - Consider moving patient to room near nurses station  Outcome: Progressing     Problem: MOBILITY - ADULT  Goal: Maintain or return to baseline ADL function  Description: INTERVENTIONS:  -  Assess patient's ability to carry out ADLs; assess patient's baseline for ADL function and identify physical deficits which impact ability to perform ADLs (bathing, care of mouth/teeth, toileting, grooming, dressing, etc )  - Assess/evaluate cause of self-care deficits   - Assess range of motion  - Assess patient's mobility; develop plan if impaired  - Assess patient's need for assistive devices and provide as appropriate  - Encourage maximum independence but intervene and supervise when necessary  - Involve family in performance of ADLs  - Assess for home care needs following discharge   - Consider OT consult to assist with ADL evaluation and planning for discharge  - Provide patient education as appropriate  Outcome: Progressing  Goal: Maintains/Returns to pre admission functional level  Description: INTERVENTIONS:  - Perform BMAT or MOVE assessment daily    - Set and communicate daily mobility goal to care team and patient/family/caregiver  - Collaborate with rehabilitation services on mobility goals if consulted  - Perform Range of Motion 3 times a day    - Reposition patient every 2 hours    - Dangle patient 3 times a day  - Stand patient 3 times a day  - Ambulate patient 3 times a day  - Out of bed to chair 3 times a day   - Out of bed for meals 3 times a day  - Out of bed for toileting  - Record patient progress and toleration of activity level   Outcome: Progressing     Problem: CARDIOVASCULAR - ADULT  Goal: Maintains optimal cardiac output and hemodynamic stability  Description: INTERVENTIONS:  - Monitor I/O, vital signs and rhythm  - Monitor for S/S and trends of decreased cardiac output  - Administer and titrate ordered vasoactive medications to optimize hemodynamic stability  - Assess quality of pulses, skin color and temperature  - Assess for signs of decreased coronary artery perfusion  - Instruct patient to report change in severity of symptoms  Outcome: Progressing  Goal: Absence of cardiac dysrhythmias or at baseline rhythm  Description: INTERVENTIONS:  - Continuous cardiac monitoring, vital signs, obtain 12 lead EKG if ordered  - Administer antiarrhythmic and heart rate control medications as ordered  - Monitor electrolytes and administer replacement therapy as ordered  Outcome: Progressing     Problem: Prexisting or High Potential for Compromised Skin Integrity  Goal: Skin integrity is maintained or improved  Description: INTERVENTIONS:  - Identify patients at risk for skin breakdown  - Assess and monitor skin integrity  - Assess and monitor nutrition and hydration status  - Monitor labs   - Assess for incontinence   - Turn and reposition patient  - Assist with mobility/ambulation  - Relieve pressure over bony prominences  - Avoid friction and shearing  - Provide appropriate hygiene as needed including keeping skin clean and dry  - Evaluate need for skin moisturizer/barrier cream  - Collaborate with interdisciplinary team   - Patient/family teaching  - Consider wound care consult   Outcome: Progressing

## 2022-07-16 NOTE — PROGRESS NOTES
Progress Note - Cardiology Team 1  Nina Jara 62 y o  male MRN: [id number]  Unit/Bed#: S -01 Encounter: [id number]        Principal Problem:    STEMI (ST elevation myocardial infarction) [facility])  Active Problems:    Syncope and collapse    Hx CVA (cerebral vascular accident) ([facility] [address] )    Hyperglycemia    Leukocytosis    Fall from standing    Forehead laceration, initial encounter      Assessment/Plan     1  STEMI  Syncope followed by nausea/vomiting/chest pain  EKG- KWASI inferiorally and V4-V6  7/13 S/p urgent cath- mid % stenosis, lesion thrombotic   Large thrombus aspirated  ALEJANDRA placed   First diagonal 50% stenosis  First troponin negative, subsequent > 22,973  Aspirin 81 mg, brilinta ( cost checked) high-intensity statin, beta-blocker, ace inhibitor  Repeat echo for LV function - 30% with RWMA  Telemetry- NSR short runs NSVT/AIVR first 24 hours  Resolved  Patient request follow-up at [facility] office  Unable to accommodate  [place]greeable [place] [facility] [place]     2  ICMP  LVEF 30% with RWMA  BB- toprol 25mg   Was able to add low-dose ACE-inhibitor  BP low normal    Evaluated in the office to see if we can up titrate BB/ACE  Not requiring diuretic  Life vest fitted, instructed  No questions at this time  Re eval LVEF 12 weeks and ICD if LVEF remains less than 35%     3  Syncope  Vasovagal versus arrhythmogenic  Was preceded by feeling lightheaded and hot  Head laceration  Continue no significant events  No evidence of high degree AV block or significant bradycardia  Imaging- no acute injuries  See #2     4  History of hypertension-presented on no medical therapy  Normotensive to low normal  Continue to  monitor     5  History of type 2 diabetes-currently on no medical therapy  HgbAIC- 5 8%  Per primary team     6   History of stroke-right-sided weakness and slow speech  Asa, statin    Subjective/Objective   Chief Complaint/Subjective  Patient without any chest pain or shortness of breath  Anxious for discharge  Brother at bedside  He has been fitted for life vest   No additional questions          Vitals: /69   Pulse 59   Temp 98 8 °F (37 1 °C) (Oral)   Resp 16   Ht 5' 9" (1 753 m)   Wt 75 9 kg (167 lb 5 3 oz)   SpO2 95%   BMI 24 71 kg/m²     Vitals:    07/15/22 0500 07/16/22 0559   Weight: 80 kg (176 lb 5 9 oz) 75 9 kg (167 lb 5 3 oz)     Orthostatic Blood Pressures    Flowsheet Row Most Recent Value   Blood Pressure 108/69 filed at 07/16/2022 1109   Patient Position - Orthostatic VS Lying filed at 07/16/2022 0008            Intake/Output Summary (Last 24 hours) at 7/16/2022 1137  Last data filed at 7/15/2022 1317  Gross per 24 hour   Intake 280 ml   Output --   Net 280 ml       Invasive Devices  Report    Peripheral Intravenous Line  Duration           Peripheral IV 07/13/22 Right Antecubital 3 days                Current Facility-Administered Medications   Medication Dose Route Frequency    acetaminophen (TYLENOL) tablet 650 mg  650 mg Oral Q4H PRN    aspirin chewable tablet 81 mg  81 mg Oral Daily    atorvastatin (LIPITOR) tablet 40 mg  40 mg Oral Daily With Dinner    heparin (porcine) subcutaneous injection 5,000 Units  5,000 Units Subcutaneous Q8H [address]    insulin lispro (HumaLOG) 100 units/mL subcutaneous injection 1-6 Units  1-6 Units Subcutaneous TID AC    insulin lispro (HumaLOG) 100 units/mL subcutaneous injection 1-6 Units  1-6 Units Subcutaneous HS    lidocaine-epinephrine (XYLOCAINE-MPF/EPINEPHRINE) 2 %-1:200,000 injection 5 mL  5 mL Infiltration Once    lisinopril (ZESTRIL) tablet 2 5 mg  2 5 mg Oral Daily    metoprolol succinate (TOPROL-XL) 24 hr tablet 25 mg  25 mg Oral Daily    nitroglycerin (NITROSTAT) SL tablet 0 4 mg  0 4 mg Sublingual Q5 Min PRN    ondansetron (ZOFRAN) injection 4 mg  4 mg Intravenous Q6H PRN    ticagrelor (BRILINTA) tablet 90 mg  90 mg Oral BID         Physical Exam: /69   Pulse 59   Temp 98 8 °F (37 1 °C) (Oral)   Resp 16    5' 9" (1 753 m)   Wt 75 9 kg (167 lb 5 3 oz)   SpO2 95%   BMI 24 71 kg/m²     General Appearance:    Alert, cooperative, no distress, appears stated age   Head:    Normocephalic, no scleral icterus   Eyes:    PERRL   Nose:   Nares normal, septum midline, no drainage    Throat:   Lips, mucosa, and tongue normal   Neck:   Supple, symmetrical, trachea midline,              Lungs:     Clear to auscultation bilaterally, respirations unlabored        Heart:    Regular rate and rhythm, S1 and S2 normal, no murmur         Extremities:   Extremities normal, atraumatic, no cyanosis or edema   Right wrist intact       Skin:   Skin color, texture, turgor normal, no rashes or lesions   Neurologic:   Alert and oriented to person place and time                 Lab Results:   Recent Results (from the past 72 hour(s))   HS Troponin I 4hr    Collection Time: 07/13/22 12:13 PM   Result Value Ref Range    hs TnI 4hr >22,973 (H) "Refer to ACS Flowchart"- see link ng/L    Delta 4hr hsTnI >22,963 (H) <20 ng/L   ECG 12 lead    Collection Time: 07/13/22 12:14 PM   Result Value Ref Range    Ventricular Rate 59 BPM    Atrial Rate 59 BPM    AR Interval 262 ms    QRSD Interval 120 ms    QT Interval 404 ms    QTC Interval 399 ms    P [place] 77 degrees    QRS Axis -89 degrees    T Wave Axis 7 degrees   UA w Reflex to Microscopic w Reflex to Culture    Collection Time: 07/13/22  1:29 PM    Specimen: Urine, Other   Result Value Ref Range    Color, UA Yellow     Clarity, UA Clear     Specific Gravity, UA >=1 050 (H) 1 003 - 1 030    pH, UA 8 0 4 5, 5 0, 5 5, 6 0, 6 5, 7 0, 7 5, 8 0    Leukocytes, UA Negative Negative    Nitrite, UA Negative Negative    Protein, UA 30 (1+) (A) Negative mg/dl    Glucose, UA Trace (A) Negative mg/dl    Ketones, UA Negative Negative mg/dl    Urobilinogen, UA 4 0 (A) <2 0 mg/dl mg/dl    Bilirubin, UA Negative Negative    Occult Blood, UA Large (A) Negative   Urine Microscopic    Collection Time: 07/13/22  1:29 PM   Result Value Ref Range    RBC, UA 4-10 (A) None Seen, 1-2 /hpf    WBC, UA 1-2 None Seen, 1-2 /hpf    Epithelial Cells Occasional None Seen, Occasional /hpf    Bacteria, UA None Seen None Seen, Occasional /hpf   ECG 12 lead    Collection Time: 07/13/22  2:37 PM   Result Value Ref Range    Ventricular Rate 70 BPM    Atrial Rate 70 BPM    SC Interval 264 ms    QRSD Interval 120 ms    QT Interval 380 ms    QTC Interval 410 ms    P Axis 76 degrees    QRS Axis -89 degrees    T Wave [place] 29 degrees   Echo complete w/ contrast if indicated    Collection Time: 07/13/22  4:02 PM   Result Value Ref Range    LA size 4 2 cm    Triscuspid Valve Regurgitation Peak Gradient 39 0 mmHg    Tricuspid valve peak regurgitation velocity 3 13 m/s    LVPWd 1 10 cm    Left Atrium Area-systolic Apical Two Chamber 17 6 cm2    Left Atrium Area-systolic Four Chamber 97 3 cm2    MV E' Tissue Velocity Septal 7 cm/s    TR Peak [place] 3 1 m/s    IVSd 8 97 cm    LV DIASTOLIC VOLUME (MOD BIPLANE) 2D 72 mL    LEFT VENTRICLE SYSTOLIC VOLUME (MOD BIPLANE) 2D 45 mL    Left ventricular stroke volume (2D) 27 00 mL    A4C EF 39 %    LA length (A2C) 5 50 cm    LVIDd 4 00 cm    IVS 1 1 cm    LVIDS 3 30 cm    FS 18 28 - 44 %    Asc Ao 2 7 cm    Ao root 3 50 cm    RVID d 4 2 cm    AV LVOT peak gradient 2 mmHg    MV valve area p 1/2 method 4 15 cm2    E wave deceleration time 183 ms    AV peak gradient 7 mmHg    MV Peak E Burton 73 cm/s    MV Peak A Burton 0 97 m/s    RAA A4C 16 8 cm2    MV stenosis pressure 1/2 time 53 ms    LVSV, 2D 27 mL    LV EF 40     Est  RA pres 8 0 mmHg    Right Ventricular Peak Systolic Pressure 49 60 mmHg   Fingerstick Glucose (POCT)    Collection Time: 07/13/22  4:26 PM   Result Value Ref Range    POC Glucose 164 (H) 65 - 140 mg/dl   Fingerstick Glucose (POCT)    Collection Time: 07/13/22  8:48 PM   Result Value Ref Range    POC Glucose 132 65 - 140 mg/dl   CBC and differential    Collection Time: 07/14/22  4:31 AM   Result Value Ref Range WBC 11 57 (H) 4 31 - 10 16 Thousand/uL    RBC 4 18 3 88 - 5 62 Million/uL    Hemoglobin 12 8 12 0 - 17 0 g/dL    Hematocrit 40 2 36 5 - 49 3 %    MCV 96 82 - 98 fL    MCH 30 6 26 8 - 34 3 pg    MCHC 31 8 31 4 - 37 4 g/dL    RDW 12 0 11 6 - 15 1 %    MPV 9 9 8 9 - 12 7 fL    Platelets 717 797 - 203 Thousands/uL    nRBC 0 /100 WBCs    Neutrophils Relative 66 43 - 75 %    Immat GRANS % 0 0 - 2 %    Lymphocytes Relative 22 14 - 44 %    Monocytes Relative 11 4 - 12 %    Eosinophils Relative 1 0 - 6 %    Basophils Relative 0 0 - 1 %    Neutrophils Absolute 7 55 1 85 - 7 62 Thousands/µL    Immature Grans Absolute 0 05 0 00 - 0 20 Thousand/uL    Lymphocytes Absolute 2 52 0 60 - 4 47 Thousands/µL    Monocytes Absolute 1 30 (H) 0 17 - 1 22 Thousand/µL    Eosinophils Absolute 0 12 0 00 - 0 61 Thousand/µL    Basophils Absolute 0 03 0 00 - 0 10 Thousands/µL   Basic metabolic panel    Collection Time: 07/14/22  4:31 AM   Result Value Ref Range    Sodium 137 135 - 147 mmol/L    Potassium 4 1 3 5 - 5 3 mmol/L    Chloride 105 96 - 108 mmol/L    CO2 25 21 - 32 mmol/L    ANION GAP 7 4 - 13 mmol/L    BUN 14 5 - 25 mg/dL    Creatinine 0 72 0 60 - 1 30 mg/dL    Glucose 120 65 - 140 mg/dL    Calcium 8 8 8 4 - 10 2 mg/dL    eGFR 101 ml/min/1 73sq m   Magnesium    Collection Time: 07/14/22  4:31 AM   Result Value Ref Range    Magnesium 2 1 1 9 - 2 7 mg/dL   Phosphorus    Collection Time: 07/14/22  4:31 AM   Result Value Ref Range    Phosphorus 3 0 2 3 - 4 1 mg/dL   Fingerstick Glucose (POCT)    Collection Time: 07/14/22  7:33 AM   Result Value Ref Range    POC Glucose 121 65 - 140 mg/dl   Fingerstick Glucose (POCT)    Collection Time: 07/14/22 11:04 AM   Result Value Ref Range    POC Glucose 155 (H) 65 - 140 mg/dl   Echo follow up/limited w/ contrast if indicated    Collection Time: 07/14/22  3:08 PM   Result Value Ref Range    Triscuspid Valve Regurgitation Peak Gradient 26 0 mmHg    Tricuspid valve peak regurgitation velocity 2 53 m/s TR Peak Burton 2 5 m/s    LV EF 30    Fingerstick Glucose (POCT)    Collection Time: 07/14/22  5:11 PM   Result Value Ref Range    POC Glucose 123 65 - 140 mg/dl   Fingerstick Glucose (POCT)    Collection Time: 07/14/22  9:02 PM   Result Value Ref Range    POC Glucose 140 65 - 140 mg/dl   Fingerstick Glucose (POCT)    Collection Time: 07/14/22  9:53 PM   Result Value Ref Range    POC Glucose 130 65 - 140 mg/dl   CBC and differential    Collection Time: 07/15/22  4:44 AM   Result Value Ref Range    WBC 8 87 4 31 - 10 16 Thousand/uL    RBC 4 30 3 88 - 5 62 Million/uL    Hemoglobin 13 2 12 0 - 17 0 g/dL    Hematocrit 40 6 36 5 - 49 3 %    MCV 94 82 - 98 fL    MCH 30 7 26 8 - 34 3 pg    MCHC 32 5 31 4 - 37 4 g/dL    RDW 11 9 11 6 - 15 1 %    MPV 9 6 8 9 - 12 7 fL    Platelets 213 136 - 261 Thousands/uL    nRBC 0 /100 WBCs    Neutrophils Relative 57 43 - 75 %    Immat GRANS % 0 0 - 2 %    Lymphocytes Relative 30 14 - 44 %    Monocytes Relative 12 4 - 12 %    Eosinophils Relative 1 0 - 6 %    Basophils Relative 0 0 - 1 %    Neutrophils Absolute 5 03 1 85 - 7 62 Thousands/µL    Immature Grans Absolute 0 02 0 00 - 0 20 Thousand/uL    Lymphocytes Absolute 2 65 0 60 - 4 47 Thousands/µL    Monocytes Absolute 1 02 0 17 - 1 22 Thousand/µL    Eosinophils Absolute 0 12 0 00 - 0 61 Thousand/µL    Basophils Absolute 0 03 0 00 - 0 10 Thousands/µL   Basic metabolic panel    Collection Time: 07/15/22  4:44 AM   Result Value Ref Range    Sodium 135 135 - 147 mmol/L    Potassium 3 8 3 5 - 5 3 mmol/L    Chloride 103 96 - 108 mmol/L    CO2 25 21 - 32 mmol/L    ANION GAP 7 4 - 13 mmol/L    BUN 13 5 - 25 mg/dL    Creatinine 0 74 0 60 - 1 30 mg/dL    Glucose 107 65 - 140 mg/dL    Calcium 8 8 8 4 - 10 2 mg/dL    eGFR 100 ml/min/1 73sq m   Magnesium    Collection Time: 07/15/22  4:44 AM   Result Value Ref Range    Magnesium 1 9 1 9 - 2 7 mg/dL   Phosphorus    Collection Time: 07/15/22  4:44 AM   Result Value Ref Range    Phosphorus 2 9 2 3 - 4 1 mg/dL   Fingerstick Glucose (POCT)    Collection Time: 07/15/22  7:46 AM   Result Value Ref Range    POC Glucose 113 65 - 140 mg/dl   Fingerstick Glucose (POCT)    Collection Time: 07/15/22 11:16 AM   Result Value Ref Range    POC Glucose 150 (H) 65 - 140 mg/dl   Lipid Panel with Direct LDL reflex    Collection Time: 07/15/22 12:56 PM   Result Value Ref Range    Cholesterol 164 See Comment mg/dL    Triglycerides 161 (H) See Comment mg/dL    HDL, Direct 43 >=40 mg/dL    LDL Calculated 89 0 - 100 mg/dL   Fingerstick Glucose (POCT)    Collection Time: 07/15/22  4:12 PM   Result Value Ref Range    POC Glucose 101 65 - 140 mg/dl   Fingerstick Glucose (POCT)    Collection Time: 07/15/22  8:35 PM   Result Value Ref Range    POC Glucose 114 65 - 140 mg/dl   Basic metabolic panel    Collection Time: 07/16/22  5:53 AM   Result Value Ref Range    Sodium 135 135 - 147 mmol/L    Potassium 4 0 3 5 - 5 3 mmol/L    Chloride 102 96 - 108 mmol/L    CO2 25 21 - 32 mmol/L    ANION GAP 8 4 - 13 mmol/L    BUN 16 5 - 25 mg/dL    Creatinine 0 75 0 60 - 1 30 mg/dL    Glucose 97 65 - 140 mg/dL    Calcium 9 1 8 4 - 10 2 mg/dL    eGFR 99 ml/min/1 73sq m   Magnesium    Collection Time: 07/16/22  5:53 AM   Result Value Ref Range    Magnesium 1 8 (L) 1 9 - 2 7 mg/dL   Fingerstick Glucose (POCT)    Collection Time: 07/16/22  8:06 AM   Result Value Ref Range    POC Glucose 124 65 - 140 mg/dl   Fingerstick Glucose (POCT)    Collection Time: 07/16/22 11:07 AM   Result Value Ref Range    POC Glucose 139 65 - 140 mg/dl     Imaging: I have personally reviewed pertinent reports  Tele- nsr      Counseling / Coordination of Care  Total time spent today 25 minutes  Greater than 50% of total time was spent with the patient and / or family counseling and / or coordination of care

## 2022-07-18 NOTE — UTILIZATION REVIEW
Notification of Discharge   This is a Notification of Discharge from our facility [address]  Please be advised that this patient has been discharge from our facility  Below you will find the admission and discharge date and time including the patients disposition  UTILIZATION REVIEW CONTACT:  Sarah Díaz MA  Utilization   Network Utilization Review Department  Phone: [phone] x carefully listen to the prompts  All voicemails are confidential   Email: [email or web address]     PHYSICIAN ADVISORY SERVICES:  FOR [id number] REVIEW - MEDICAL NECESSITY DENIAL  Phone: [phone]  Fax: [phone]  Email: [email or web address]     PRESENTATION DATE: 7/13/2022  5:49 AM  OBERVATION ADMISSION DATE:   INPATIENT ADMISSION DATE: 7/13/22  8:27 AM   DISCHARGE DATE: 7/16/2022 12:19 PM  DISPOSITION: Home/Self Care Home/Self Care      IMPORTANT INFORMATION:  Send all requests for admission clinical reviews, approved or denied determinations and any other requests to dedicated fax number below belonging to the campus where the patient is receiving treatment   List of dedicated fax numbers:  [address] DENIALS (Administrative/Medical Necessity) [phone]   [address] (Maternity/NICU/Pediatrics) [phone]   Karly Shepherd [phone]   130 [facility] [phone]   [address] [place] [address] [phone]   2000 [facility] [address] [place] [address],[address] [phone]   [facility]  [phone]   [address] [place] [address], [facility]  2401 [facility] 1000 [facility] [phone]

## 2022-07-20 NOTE — ED ATTENDING ATTESTATION
7/13/2022  I, Susanna Osborne MD, saw and evaluated the patient  I have discussed the patient with the resident/non-physician practitioner and agree with the resident's/non-physician practitioner's findings, Plan of Care, and MDM as documented in the resident's/non-physician practitioner's note, except where noted  All available labs and Radiology studies were reviewed  I was present for key portions of any procedure(s) performed by the resident/non-physician practitioner and I was immediately available to provide assistance  At this point I agree with the current assessment done in the Emergency Department  I have conducted an independent evaluation of this patient a history and physical is as follows:    ED Course         Critical Care Time  Procedures    Patient is a 29-year-old male that presents to the emergency department after a fall  He did hit his head on the pavement  Patient did feel some dizziness  Patient also notes chest pain  Patient was found to have ST elevation in multiple leads on his EKG consistent with STEMI  Patient taken to CT scan 1st to rule out intracranial bleed, then, along with discussion of Cardiology, taken to cath lab  MDM STEMI

## 2022-07-25 NOTE — PROGRESS NOTES
Cardiology Follow Up    Robert Becker  1960  [address] [facility] [address] [address]  [phone]    1  S/P drug eluting coronary stent placement     2  Ischemic cardiomyopathy  lisinopril (ZESTRIL) 5 mg tablet    Echo follow up/limited w/ contrast if indicated    Basic metabolic panel   3  Mixed hyperlipidemia     4  Syncope and collapse     5  Hypertension, unspecified type         Interval History:   Mr Robert Becker was admitted to [address] [place] [address] on 7/13 - 7/16/22 with STEMI  He was at the end of his work shift as a    He was walking to [employer] when he experienced a fall with head striking the ground  Bystander  assisted to a chair when he began to vomit and experience CP  EMS called  On presentation to the ED EKG showed STEMI and troponin elevated to 22,973  He underwent an emergent cardiac catheterization which showed mid % stenosis, 1st Diag 50% stenosis  Sp mechanical aspiration thrombectomy in mid LAD, sp Xience skypoint ALEJANDRA  TTE showed LVEF 35-40%, grade 1 abnormal relaxation, mild MR, mod TR  On 7/14/22 TTE with definity showed LVEF 30%, mild MR and mod TR  He was discharged with a life vest   7/15/22 , , HDL 43, LDL 89  Syncope was felt to be possible vaso vagal vs arrtyhmogenic  He experienced a laceration on the forehead from fall with syncope  Mr Robert Becker presents to our office for a recent hospitalization follow up visit  He   Denies chest pain palpitations lightheadedness dizziness dyspnea with minimal or moderate exertion  He is not wearing his LifeVest   According to Alban Velasquez he takes the bus for transportation and alarms on the bus       Medical History   Hypertension  DM2 HgbA1C 5 8 2020 CVA with right sided weakness and slurred speech  Hepatitis B    Patient Active Problem List   Diagnosis    [facility] infarction) [facility] [address] [facility]    Syncope and collapse    Hx CVA [facility]cerebral vascular accident) ([facility] [address] )    Hyperglycemia    Leukocytosis    Fall from standing    Forehead laceration, initial encounter     No past medical history on file  Social History     Socioeconomic History    Marital status: Unknown     Spouse name: Not on file    Number of children: Not on file    Years of education: Not on file    Highest education level: Not on file   Occupational History    Not on file   Tobacco Use    Smoking status: Not on file    Smokeless tobacco: Not on file   Substance and Sexual Activity    Alcohol use: Not on file    Drug use: Not on file    Sexual activity: Not on file   Other Topics Concern    Not on file   Social History Narrative    Not on file     Social Determinants of Health     Financial Resource Strain: Not on file   Food Insecurity: No Food Insecurity    Worried About Running Out of Food in the Last Year: Never true    Papo of Food in the Last Year: Never true   Transportation Needs: No Transportation Needs    Lack of Transportation (Medical): No    Lack of Transportation (Non-Medical): No   Physical Activity: Not on file   Stress: Not on file   Social Connections: Not on file   Intimate Partner Violence: Not on file   Housing Stability: Low Risk     Unable to Pay for Housing in the Last Year: No    Number of Places Lived in the Last Year: 1    Unstable Housing in the Last Year: No      No family history on file  Past Surgical History:   Procedure Laterality Date    CARDIAC CATHETERIZATION N/A 7/13/2022    Procedure: Cardiac pci;  Surgeon: Kaley Juarez MD;  [facility]; Service: Cardiology    CARDIAC CATHETERIZATION N/A 7/13/2022    Procedure: Cardiac Coronary Angiogram;  Surgeon: Kaley Juarez MD;  [facility];   Service: Cardiology    CARDIAC CATHETERIZATION Left 7/13/2022    Procedure: Cardiac Left Heart Cath;  Surgeon: Yvonne Morris Fabio Parry MD;  [facility]; Service: Cardiology       Current Outpatient Medications:     aspirin 81 mg chewable tablet, Chew 1 tablet (81 mg total) daily, Disp: 30 tablet, Rfl: 0    atorvastatin (LIPITOR) 40 mg tablet, Take 1 tablet (40 mg total) by mouth daily with dinner, Disp: 30 tablet, Rfl: 0    lisinopril (ZESTRIL) 2 5 mg tablet, Take 1 tablet (2 5 mg total) by mouth daily, Disp: 30 tablet, Rfl: 0    metoprolol succinate (TOPROL-XL) 25 mg 24 hr tablet, Take 1 tablet (25 mg total) by mouth daily, Disp: 30 tablet, Rfl: 0    nitroglycerin (NITROSTAT) 0 4 mg SL tablet, Place 1 tablet (0 4 mg total) under the tongue every 5 (five) minutes as needed for chest pain for up to 3 days, Disp: 10 tablet, Rfl: 0    ticagrelor (BRILINTA) 90 MG, Take 1 tablet (90 mg total) by mouth 2 (two) times a day, Disp: 90 tablet, Rfl: 0  Allergies   Allergen Reactions    Pollen Extract Allergic Rhinitis       Labs:  No results displayed because visit has over 200 results  Imaging: CT chest abdomen pelvis wo contrast    Result Date: 7/13/2022  Narrative: CT CHEST, ABDOMEN AND PELVIS WITHOUT IV CONTRAST INDICATION:   Status post fall    COMPARISON:  None  TECHNIQUE: CT examination of the chest, abdomen and pelvis was performed without intravenous contrast  This examination was performed without intravenous contrast in the context of the critical nationwide Omnipaque shortage  Axial, sagittal, and coronal 2D reformatted images were created from the source data and submitted for interpretation  Radiation dose length product (DLP) for this visit:  1003 mGy-cm   This examination, like all CT scans performed in the [facility], was performed utilizing techniques to minimize radiation dose exposure, including the use of iterative reconstruction and automated exposure control  Enteric contrast was administered  FINDINGS: CHEST LUNGS:  Lungs are clear  There is no tracheal or endobronchial lesion  PLEURA:  Unremarkable  HEART/GREAT VESSELS: Heart is unremarkable for patient's age  No thoracic aortic aneurysm  MEDIASTINUM AND FRANNIE:  Unremarkable  CHEST WALL AND LOWER NECK:  Unremarkable  ABDOMEN LIVER/BILIARY TREE:  Small 5 3 mm right hepatic hypodensity may represent cyst  GALLBLADDER:  No calcified gallstones  No pericholecystic inflammatory change  SPLEEN:  Small 6 mm hypodensity in the spleen medially may represents cyst, hemangioma  PANCREAS:  Unremarkable  ADRENAL GLANDS:  Unremarkable  KIDNEYS/URETERS:  Right renal 1 2 cm hypodensity may represent cyst  No hydronephrosis  STOMACH AND BOWEL:  Unremarkable  APPENDIX:  No findings to suggest appendicitis  ABDOMINOPELVIC CAVITY:  No ascites  No pneumoperitoneum  No lymphadenopathy  VESSELS:  Unremarkable for patient's age  PELVIS REPRODUCTIVE ORGANS:  Unremarkable for patient's age  URINARY BLADDER:  Unremarkable  ABDOMINAL WALL/INGUINAL REGIONS:  Unremarkable  OSSEOUS STRUCTURES: Moderate right hip degenerative changes  Degenerative disc changes in the lumbar spine from L3 to S1  No acute fracture or destructive osseous lesion  Impression: No CT findings of trauma in the chest, abdomen or pelvis  I personally discussed this study with Rommel Jaeger on 7/13/2022 at 6:36 AM  Workstation performed: [id number]     TRAUMA - CT head wo contrast    Result Date: 7/13/2022  Narrative: CT BRAIN - WITHOUT CONTRAST INDICATION:   TRAUMA  COMPARISON:  None  TECHNIQUE:  CT examination of the brain was performed  In addition to axial images, sagittal and coronal 2D reformatted images were created and submitted for interpretation  Radiation dose length product (DLP) for this visit:  1070 mGy-cm   This examination, like all CT scans performed in the [facility], was performed utilizing techniques to minimize radiation dose exposure, including the use of iterative reconstruction and automated exposure control  IMAGE QUALITY:  Diagnostic  FINDINGS: PARENCHYMA:  No intracranial mass, mass effect or midline shift  No CT signs of acute infarction  No acute parenchymal hemorrhage  VENTRICLES AND EXTRA-AXIAL SPACES:  Normal for the patient's age  VISUALIZED ORBITS AND PARANASAL SINUSES:  Opacified left frontal, ethmoid and maxillary sinus  CALVARIUM AND EXTRACRANIAL SOFT TISSUES:  Normal      Impression: No acute intracranial abnormality  Left frontal, left ethmoid and left maxillary sinusitis  I personally discussed this study with Dylan Ivey on 7/13/2022 at 6:36 AM  Workstation performed: [id number]     TRAUMA - CT spine cervical wo contrast    Result Date: 7/13/2022  Narrative: CT CERVICAL SPINE - WITHOUT CONTRAST INDICATION:   TRAUMA  COMPARISON:  None  TECHNIQUE:  CT examination of the cervical spine was performed without intravenous contrast   Contiguous axial images were obtained  Sagittal and coronal reconstructions were performed  Radiation dose length product (DLP) for this visit:  534 mGy-cm   This examination, like all CT scans performed in the [facility], was performed utilizing techniques to minimize radiation dose exposure, including the use of iterative reconstruction and automated exposure control  IMAGE QUALITY:  Diagnostic  FINDINGS: ALIGNMENT:  Normal alignment of the cervical spine  No subluxation  VERTEBRAL BODIES:  No fracture  Nonunion of posterior arch of C1  DEGENERATIVE CHANGES:  Degenerative disc changes at C4-C5, C5-C6, C6-C7  Large central disc osteophyte complex seen at C5 and C6 causing moderate spinal canal narrowing  PREVERTEBRAL AND PARASPINAL SOFT TISSUES:  Unremarkable  THORACIC INLET:  Normal      Impression: No cervical spine fracture or traumatic malalignment  Degenerative disc disease from C4 to C7  Large posterior central disc osteophyte complex causing moderate spinal canal narrowing  Follow-up imaging with MR per clinical symptoms    I personally discussed this study with Sweetie Metz Laurita on 7/13/2022 at 6:36 AM  Workstation performed: [id number]     Cardiac catheterization    Result Date: 7/13/2022  Narrative: · Mid LAD lesion is 100% stenosed  · 1st Diag lesion is 50% stenosed  1v CAD     Echo complete w/ contrast if indicated    Result Date: 7/13/2022  Narrative: [facility]  Left Ventricle: Left ventricular cavity size is normal  Wall thickness is mildly increased  There is mild concentric hypertrophy  The left ventricular ejection fraction is 35-40%  Systolic function is moderately reduced  Diastolic function is mildly abnormal, consistent with grade I (abnormal) relaxation  Left atrial filling pressure is normal    The following segments are akinetic: mid anteroseptal, apical anterior, apical septal, apical inferior, apical lateral and apex    The following segments are hypokinetic: mid anterior, mid inferoseptal and mid inferior    Mitral Valve: There is mild annular calcification    Right Ventricle: Systolic function is normal    Aortic Valve: The aortic valve has no significant stenosis    Tricuspid Valve: There is moderate regurgitation  The right ventricular systolic pressure is mildly elevated  The estimated right ventricular systolic pressure is 62 69 mmHg  Echo follow up/limited w/ contrast if indicated    Result Date: 7/14/2022  Narrative: PriyaTestive  Left Ventricle: Wall thickness is mildly increased  The left ventricular ejection fraction is 30%  Systolic function is severely reduced    The following segments are akinetic: apical anterior, apical septal, apical inferior and apex    The following segments are hypokinetic: mid anterior, mid anteroseptal, mid inferior and apical lateral    All other segments are normal    Mitral Valve: There is mild regurgitation    Tricuspid Valve: There is moderate regurgitation  Review of Systems:  Review of Systems   Neurological: Positive for weakness  All other systems reviewed and are negative        Physical Exam:  Physical Exam  Vitals reviewed  Constitutional:       Appearance: Normal appearance  HENT:      Head: Normocephalic  Cardiovascular:      Rate and Rhythm: Normal rate and regular rhythm  Pulses: Normal pulses  Heart sounds: Normal heart sounds  Pulmonary:      Effort: Pulmonary effort is normal       Breath sounds: Normal breath sounds  Abdominal:      General: Bowel sounds are normal       Palpations: Abdomen is soft  Musculoskeletal:         General: Normal range of motion  Cervical back: Normal range of motion and neck supple  Right lower leg: No edema  Left lower leg: No edema  Skin:     General: Skin is warm and dry  Capillary Refill: Capillary refill takes less than 2 seconds  Comments: Right radial cath site appears healed     Forehead suture line appear clean dry intact and approximated  Neurological:      General: No focal deficit present  Mental Status: He is alert and oriented to person, place, and time  Psychiatric:         Mood and Affect: Mood normal          Behavior: Behavior normal          Discussion/Summary:  1  7/13/22 SP ALEJANDRA to Mid LAD,  mid % stenosis, Sp mechanical aspiration thrombectomy in mid LAD, sp Xience skypoint ALEJANDRA  Continue on aspirin 81 mg daily, Brilinta 90 mg b i d  His wear not stop for any reason will continue for 1 year  Continue on with Lipitor 40 mg daily, metoprolol succinate 25 mg daily, increase lisinopril from 2 5 mg daily to 5 mg daily instructed to take at bedtime  Heart healthy diet, cardiac rehabilitation  He is requesting to return back to work  Return back to work in 1 week  He will follow up with his company's physician  2  ICM LVEF 30% by [employer], discharged with Life Flavio Oliver is not wearing the life Vest today  According to [facility] he takes the bus for transportation  When he rides on the bus the  Home	[place] alarms    I depth education in regards to wearing the Life Vest vs risk of not wearing the Life Vest   Moris understands the risk  Continue metoprolol succinate 25 mg daily  Increase lisinopril from 2 5 mg daily 5 mg daily at bedtime  Unable to start Entresto due to lower BP  Will evaluate tolerance of increased dose of Lisinopril  Follow up limited TTE in one month evaluate LVEF  HF booklet provided  In depth education on monitoring for SS of HF  Leesa Garcia is aware if LVEF does not improve on maximum medication in 3 months would suggest ICD placement  3  Hyperlipidemia 7/15/22 , , HDL 43, LDL 89  Continue Lipitor 40 mg daily, heart healthy diet   4  Syncope possible vaso vagal vs arrhythmogenic no further syncope, encouraged to wear Life Vest all the time,  Continue metoprolol succinate 25 mg daily  5  Hypertension RUE sitting [id number]  Continue metoprolol succinate 25 mg daily, increase lisinopril from 2 5 mg daily to 5 mg daily and instructed to take at bedtime    2gm sodium diet

## 2022-07-26 ENCOUNTER — OFFICE VISIT (OUTPATIENT)
Dept: CARDIOLOGY CLINIC | Facility: CLINIC | Age: 62
End: 2022-07-26
Payer: COMMERCIAL

## 2022-07-26 VITALS
WEIGHT: 177.9 LBS | OXYGEN SATURATION: 98 % | HEIGHT: 69 IN | BODY MASS INDEX: 26.35 KG/M2 | HEART RATE: 68 BPM | DIASTOLIC BLOOD PRESSURE: 62 MMHG | SYSTOLIC BLOOD PRESSURE: 100 MMHG

## 2022-07-26 DIAGNOSIS — E78.2 MIXED HYPERLIPIDEMIA: ICD-10-CM

## 2022-07-26 DIAGNOSIS — I10 HYPERTENSION, UNSPECIFIED TYPE: ICD-10-CM

## 2022-07-26 DIAGNOSIS — Z95.5 S/P DRUG ELUTING CORONARY STENT PLACEMENT: Primary | ICD-10-CM

## 2022-07-26 DIAGNOSIS — I25.5 ISCHEMIC CARDIOMYOPATHY: ICD-10-CM

## 2022-07-26 DIAGNOSIS — R55 SYNCOPE AND COLLAPSE: ICD-10-CM

## 2022-07-26 PROCEDURE — 99215 OFFICE O/P EST HI 40 MIN: CPT | Performed by: NURSE PRACTITIONER

## 2022-07-26 RX ORDER — LISINOPRIL 5 MG/1
5 TABLET ORAL
Qty: 30 TABLET | Refills: 4 | Status: SHIPPED | OUTPATIENT
Start: 2022-07-26 | End: 2022-09-08

## 2022-07-26 NOTE — PATIENT INSTRUCTIONS
Maintain a 2 gram daily sodium diet and 1500 ml daily fluid restriction  Check daily weights  If you gained 3 pounds in one day, 5 pounds in one week, or experience worsening shortness of breath or increasing lower leg swelling  Please call the heart failure office at [phone]    Please bring a  list of your current medications and daily weights to the office visit     Increase Lisinopril to 5mg daily at bedtime starting tonight

## 2022-07-26 NOTE — LETTER
July 26, 2022     Patient: Vishnu Hernández  YOB: 1960  Date of Visit: 7/26/2022      To Whom it May Concern:    Vishnu Hernández is under my professional care  Amy Ronlisaparag was seen in my office on 7/26/2022  Amy Phillips may return to work on August 1, 2022  If you have any questions or concerns, please don't hesitate to call           Sincerely,          ROSEMARY Ward        CC: No Recipients

## 2022-08-26 ENCOUNTER — HOSPITAL ENCOUNTER (OUTPATIENT)
Dept: NON INVASIVE DIAGNOSTICS | Facility: HOSPITAL | Age: 62
Discharge: HOME | End: 2022-08-26
Payer: COMMERCIAL

## 2022-08-26 VITALS
WEIGHT: 176 LBS | SYSTOLIC BLOOD PRESSURE: 117 MMHG | BODY MASS INDEX: 26.07 KG/M2 | HEART RATE: 69 BPM | DIASTOLIC BLOOD PRESSURE: 68 MMHG | HEIGHT: 69 IN

## 2022-08-26 DIAGNOSIS — I25.5 ISCHEMIC CARDIOMYOPATHY: ICD-10-CM

## 2022-08-26 PROCEDURE — 93308 TTE F-UP OR LMTD: CPT

## 2022-08-26 PROCEDURE — 93321 DOPPLER ECHO F-UP/LMTD STD: CPT

## 2022-08-26 PROCEDURE — 93325 DOPPLER ECHO COLOR FLOW MAPG: CPT

## 2022-08-27 LAB
APICAL FOUR CHAMBER EJECTION FRACTION: 42 %
E WAVE DECELERATION TIME: 171 MS
LEFT VENTRICLE DIASTOLIC VOLUME (MOD BIPLANE): 132 ML
LEFT VENTRICLE SYSTOLIC VOLUME (MOD BIPLANE): 80 ML
LV EF: 39 %
MV E'TISSUE VEL-SEP: 11 CM/S
MV PEAK A VEL: 0.69 M/S
MV PEAK E VEL: 89 CM/S
MV STENOSIS PRESSURE HALF TIME: 50 MS
MV VALVE AREA P 1/2 METHOD: 4.4 CM2
TR MAX PG: 39 MMHG
TR PEAK VELOCITY: 3.1 M/S
TRICUSPID VALVE PEAK REGURGITATION VELOCITY: 3.12 M/S

## 2022-08-27 PROCEDURE — 93325 DOPPLER ECHO COLOR FLOW MAPG: CPT | Performed by: INTERNAL MEDICINE

## 2022-08-27 PROCEDURE — 93308 TTE F-UP OR LMTD: CPT | Performed by: INTERNAL MEDICINE

## 2022-08-27 PROCEDURE — 93321 DOPPLER ECHO F-UP/LMTD STD: CPT | Performed by: INTERNAL MEDICINE

## 2022-09-06 NOTE — PROGRESS NOTES
Cardiology Consultation     Belem Oro  [id number]  1960 [address] [address]      1  Chronic systolic heart failure (HCC)  losartan (COZAAR) 25 mg tablet   2  ST elevation myocardial infarction (STEMI), unspecified artery (HCC)  aspirin 81 mg chewable tablet    atorvastatin (LIPITOR) 40 mg tablet    metoprolol succinate (TOPROL-XL) 25 mg 24 hr tablet    ticagrelor (BRILINTA) 90 MG    losartan (COZAAR) 25 mg tablet   3  Hx CVA (cerebral vascular accident) ([address] )  aspirin 81 mg chewable tablet    atorvastatin (LIPITOR) 40 mg tablet    metoprolol succinate (TOPROL-XL) 25 mg 24 hr tablet   4   Ischemic cardiomyopathy  Echo follow up/limited w/ contrast if indicated    CANCELED: Echo follow up/limited w/ contrast if indicated       Discussion/Summary:  Coronary disease   -hospitalized from 7-13 to 7- with STEMI  -[facility] 07/13/2022 showed 100% mid LAD and 50% D1, status post ALEJANDRA x1 to mid LAD  -TTE 08/26/2022 showed EF 34%, grade 2 DD, mild LA, mild MR, moderate TR, mild pulmonary hypertension with estimated PA pressure of 42 mmHg  -minimal improvement in EF of 30% on 07/14/2022  -currently on aspirin 81 mg daily, Brilinta 90 mg b i d  and atorvastatin 40 mg daily  -appears euvolemic currently  Chronic systolic heart failure  -ischemic cardiomyopathy  -EF 34% on last echo on 08/26/2022  -currently on Toprol XL 25 mg daily, and lisinopril 5 mg daily  -discontinue lisinopril and started losartan 25 mg daily instead, if EF remains reduced at next follow-up appointment, will consider starting Entresto  -discharged with LifeVest, not wearing currently, reports he gets very hot and sweaty at work and is uncomfortable to wear LifeVest  -instructed him that is his decision with to wear or not, and the risk of not wearing it is possible sudden cardiac death  -will repeat TTE at the end of October for 3 month post MI EF evaluation, if LV function remains reduced, will consider ICD implantation  Hypertension   Hyperlipidemia   Prior CVA    History of Present Illness:  Paco Zambrano is a 62y o  year old male with a past medical history of coronary artery disease, hypertension, hyperlipidemia and prior CVA  Patient reports feeling well today and has no complaints  He did say he ran out of his medications and took the last dose this morning  Sent new prescriptions for his medications to his pharmacy today and instructed to call the office before he runs out in the future  Denies any chest pain, shortness of breath, headache, dizziness or other cardiac symptoms currently  Patient Active Problem List   Diagnosis    STEMI (ST elevation myocardial infarction) ([facility] )    Syncope and collapse    Hx CVA (cerebral vascular accident) ([facility] )    Hyperglycemia    Leukocytosis    Fall from standing    Forehead laceration, initial encounter    Chronic systolic heart failure ([facility] )     History reviewed  No pertinent past medical history  Social History     Socioeconomic History    Marital status: Unknown     Spouse name: Not on file    Number of children: Not on file    Years of education: Not on file    Highest education level: Not on file   Occupational History    Not on file   Tobacco Use    Smoking status: Never Smoker    Smokeless tobacco: Never Used   Substance and Sexual Activity    Alcohol use: Not on file    Drug use: Not on file    Sexual activity: Not on file   Other Topics Concern    Not on file   Social History Narrative    Not on file     Social Determinants of Health     Financial Resource Strain: Not on file   Food Insecurity: No Food Insecurity    Worried About Running Out of Food in the Last Year: Never true    Papo of Food in the Last Year: Never true   Transportation Needs: No Transportation Needs    Lack of Transportation (Medical): No    Lack of Transportation (Non-Medical):  No   Physical Activity: Not on file Stress: Not on file   Social Connections: Not on file   Intimate Partner Violence: Not on file   Housing Stability: Low Risk     Unable to Pay for Housing in the Last Year: No    Number of Places Lived in the Last Year: 1    Unstable Housing in the Last Year: No      History reviewed  No pertinent family history  Past Surgical History:   Procedure Laterality Date    CARDIAC CATHETERIZATION N/A 7/13/2022    Procedure: Cardiac pci;  Surgeon: Kelly Pop MD;  [facility]; Service: Cardiology    CARDIAC CATHETERIZATION N/A 7/13/2022    Procedure: Cardiac Coronary Angiogram;  Surgeon: Kelly Pop MD;  [facility]; Service: Cardiology    CARDIAC CATHETERIZATION Left 7/13/2022    Procedure: Cardiac Left Heart Cath;  Surgeon: Kelly Pop MD;  [facility];   Service: Cardiology       Current Outpatient Medications:     aspirin 81 mg chewable tablet, Chew 1 tablet (81 mg total) daily, Disp: 30 tablet, Rfl: 5    atorvastatin (LIPITOR) 40 mg tablet, Take 1 tablet (40 mg total) by mouth daily with dinner, Disp: 30 tablet, Rfl: 5    losartan (COZAAR) 25 mg tablet, Take 1 tablet (25 mg total) by mouth daily, Disp: 30 tablet, Rfl: 3    metoprolol succinate (TOPROL-XL) 25 mg 24 hr tablet, Take 1 tablet (25 mg total) by mouth daily, Disp: 30 tablet, Rfl: 5    nitroglycerin (NITROSTAT) 0 4 mg SL tablet, Place 1 tablet (0 4 mg total) under the tongue every 5 (five) minutes as needed for chest pain for up to 3 days, Disp: 10 tablet, Rfl: 0    ticagrelor (BRILINTA) 90 MG, Take 1 tablet (90 mg total) by mouth 2 (two) times a day, Disp: 60 tablet, Rfl: 5  Allergies   Allergen Reactions    Pollen Extract Allergic Rhinitis         Labs:  Lab Results   Component Value Date    ALT 17 07/13/2022    AST 23 07/13/2022    BUN 16 07/16/2022    CALCIUM 9 1 07/16/2022     07/16/2022    CO2 25 07/16/2022    CREATININE 0 75 07/16/2022    HDL 43 07/15/2022    HCT 40 6 07/15/2022    HGB 13 2 07/15/2022    HGBA1C 5 8 (H) 2022    MG 1 8 (L) 2022    PHOS 2 9 07/15/2022     07/15/2022    K 4 0 2022    TRIG 161 (H) 07/15/2022    WBC 8 87 07/15/2022       Imaging: Echo follow up/limited w/ contrast if indicated    Result Date: 2022  Narrative: Technically adequate transthoracic echocardiogram study  1  Mildly dilated left ventricular cavity with mild concentric left ventricular hypertrophy with markedly reduced left ventricular systolic function  Global hypokinesis with regional wall motion variability  Ejection fraction is estimated as around 34%  Grade 2 diastolic dysfunction  2  Normal right ventricular size and systolic function  3  Mild with left atrial cavity enlargement by visual assessment  4  Aortic valve sclerosis, trace to mild aortic valve regurgitation  5  Mitral valve sclerosis, mild mitral valve regurgitation  6  Moderate tricuspid valve regurgitation with eccentric regurgitation jet  7  Mild pulmonary hypertension  Estimated RVSP/PASP is 42 mmHg  8  No pericardial effusion  Compared to report of previous echocardiogram from 2022 left ventricular function remains reduced the may be minimally better compared to before  Mild pulmonary hypertension  and trace to mild aortic valve regurgitation are new  EC2022 sinus rhythm with first-degree AV block, PACs, LAD, nonspecific ST T wave changes    Review of Systems:  Review of Systems   Constitutional: Negative for chills, fatigue and fever  HENT: Negative for congestion  Eyes: Negative for photophobia and visual disturbance  Respiratory: Negative for apnea, chest tightness and shortness of breath  Cardiovascular: Negative for chest pain, palpitations and leg swelling  Gastrointestinal: Negative for abdominal distention, diarrhea, nausea and vomiting  Genitourinary: Negative for difficulty urinating     Musculoskeletal: Negative for arthralgias and back pain    Skin: Negative for color change, pallor and rash  Neurological: Negative for dizziness, syncope, light-headedness and headaches  Psychiatric/Behavioral: Negative for agitation and confusion           Vitals:    09/08/22 1356   BP: 120/76   Pulse: 70      Vitals:    09/08/22 1356   Weight: 78 kg (172 lb)     Height: 5' 9" (175 3 cm)     Physical Exam:  General appearance:  Appears stated age, alert, well appearing and in no distress  HEENT:  PERRLA, EOMI, no scleral icterus, no conjunctival pallor  NECK:  Supple, No elevated JVP, no thyromegaly, no carotid bruits  HEART:  Regular rate and rhythm, normal S1/S2, no S3/S4, no murmur or rub  LUNGS:  Clear to auscultation bilaterally  ABDOMEN:  Soft, non-tender, positive bowel sounds, no rebound or guarding, no organomegaly   EXTREMITIES:  No edema  VASCULAR:  Normal pedal pulses   SKIN: No lesions or rashes on exposed skin  NEURO:  CN II-XII intact, no focal deficits

## 2022-09-08 ENCOUNTER — OFFICE VISIT (OUTPATIENT)
Dept: CARDIOLOGY CLINIC | Facility: CLINIC | Age: 62
End: 2022-09-08
Payer: COMMERCIAL

## 2022-09-08 VITALS
HEART RATE: 70 BPM | SYSTOLIC BLOOD PRESSURE: 120 MMHG | DIASTOLIC BLOOD PRESSURE: 76 MMHG | HEIGHT: 69 IN | BODY MASS INDEX: 25.48 KG/M2 | WEIGHT: 172 LBS

## 2022-09-08 DIAGNOSIS — I21.3 ST ELEVATION MYOCARDIAL INFARCTION (STEMI), UNSPECIFIED ARTERY (HCC): ICD-10-CM

## 2022-09-08 DIAGNOSIS — I50.22 CHRONIC SYSTOLIC HEART FAILURE (HCC): Primary | ICD-10-CM

## 2022-09-08 DIAGNOSIS — I63.9 CVA (CEREBRAL VASCULAR ACCIDENT) (HCC): ICD-10-CM

## 2022-09-08 DIAGNOSIS — I25.5 ISCHEMIC CARDIOMYOPATHY: ICD-10-CM

## 2022-09-08 PROCEDURE — 99214 OFFICE O/P EST MOD 30 MIN: CPT | Performed by: STUDENT IN AN ORGANIZED HEALTH CARE EDUCATION/TRAINING PROGRAM

## 2022-09-08 RX ORDER — ATORVASTATIN CALCIUM 40 MG/1
40 TABLET, FILM COATED ORAL
Qty: 30 TABLET | Refills: 5 | Status: SHIPPED | OUTPATIENT
Start: 2022-09-08 | End: 2022-10-03

## 2022-09-08 RX ORDER — LOSARTAN POTASSIUM 25 MG/1
25 TABLET ORAL DAILY
Qty: 30 TABLET | Refills: 3 | Status: SHIPPED | OUTPATIENT
Start: 2022-09-08

## 2022-09-08 RX ORDER — ASPIRIN 81 MG/1
81 TABLET, CHEWABLE ORAL DAILY
Qty: 30 TABLET | Refills: 5 | Status: SHIPPED | OUTPATIENT
Start: 2022-09-08 | End: 2022-10-08

## 2022-09-08 RX ORDER — METOPROLOL SUCCINATE 25 MG/1
25 TABLET, EXTENDED RELEASE ORAL DAILY
Qty: 30 TABLET | Refills: 5 | Status: SHIPPED | OUTPATIENT
Start: 2022-09-08 | End: 2022-10-03

## 2022-10-03 DIAGNOSIS — I63.9 CVA (CEREBRAL VASCULAR ACCIDENT) (HCC): ICD-10-CM

## 2022-10-03 DIAGNOSIS — I21.3 ST ELEVATION MYOCARDIAL INFARCTION (STEMI), UNSPECIFIED ARTERY (HCC): ICD-10-CM

## 2022-10-03 RX ORDER — ATORVASTATIN CALCIUM 40 MG/1
TABLET, FILM COATED ORAL
Qty: 90 TABLET | Refills: 2 | Status: SHIPPED | OUTPATIENT
Start: 2022-10-03

## 2022-10-03 RX ORDER — METOPROLOL SUCCINATE 25 MG/1
25 TABLET, EXTENDED RELEASE ORAL DAILY
Qty: 90 TABLET | Refills: 2 | Status: SHIPPED | OUTPATIENT
Start: 2022-10-03 | End: 2022-11-02

## 2022-10-05 ENCOUNTER — HOSPITAL ENCOUNTER (EMERGENCY)
Facility: HOSPITAL | Age: 62
Discharge: HOME | End: 2022-10-05
Attending: EMERGENCY MEDICINE
Payer: COMMERCIAL

## 2022-10-05 VITALS
WEIGHT: 178.13 LBS | TEMPERATURE: 97.8 F | DIASTOLIC BLOOD PRESSURE: 78 MMHG | RESPIRATION RATE: 17 BRPM | HEART RATE: 58 BPM | SYSTOLIC BLOOD PRESSURE: 132 MMHG | BODY MASS INDEX: 26.31 KG/M2 | OXYGEN SATURATION: 99 %

## 2022-10-05 DIAGNOSIS — M54.9 MUSCULOSKELETAL BACK PAIN: Primary | ICD-10-CM

## 2022-10-05 PROCEDURE — 96372 THER/PROPH/DIAG INJ SC/IM: CPT

## 2022-10-05 PROCEDURE — 99283 EMERGENCY DEPT VISIT LOW MDM: CPT

## 2022-10-05 PROCEDURE — 99284 EMERGENCY DEPT VISIT MOD MDM: CPT | Performed by: EMERGENCY MEDICINE

## 2022-10-05 RX ORDER — KETOROLAC TROMETHAMINE 30 MG/ML
30 INJECTION, SOLUTION INTRAMUSCULAR; INTRAVENOUS ONCE
Status: COMPLETED | OUTPATIENT
Start: 2022-10-05 | End: 2022-10-05

## 2022-10-05 RX ORDER — METHOCARBAMOL 500 MG/1
500 TABLET, FILM COATED ORAL ONCE
Status: COMPLETED | OUTPATIENT
Start: 2022-10-05 | End: 2022-10-05

## 2022-10-05 RX ORDER — METHOCARBAMOL 500 MG/1
500 TABLET, FILM COATED ORAL 2 TIMES DAILY
Qty: 20 TABLET | Refills: 0 | Status: SHIPPED | OUTPATIENT
Start: 2022-10-05 | End: 2022-10-24 | Stop reason: ALTCHOICE

## 2022-10-05 RX ADMIN — KETOROLAC TROMETHAMINE 30 MG: 30 INJECTION, SOLUTION INTRAMUSCULAR; INTRAVENOUS at 16:46

## 2022-10-05 RX ADMIN — METHOCARBAMOL 500 MG: 500 TABLET ORAL at 16:47

## 2022-10-05 NOTE — Clinical Note
Cuca Shelbi was seen and treated in our emergency department on 10/5/2022  Diagnosis:     Anel Javier  may return to work on return date  He may return on this date: 10/06/2022         If you have any questions or concerns, please don't hesitate to call        Nannette Kaplan DO    ______________________________           _______________          _______________  Hospital Representative                              Date                                Time

## 2022-10-05 NOTE — ED PROVIDER NOTES
Pt Name: Robert Becker  MRN: [id number]  [place] 1960  Age/Sex: 62 y o  male  Date of evaluation: 10/5/2022  PCP: No primary care provider on file  CHIEF COMPLAINT    Chief Complaint   Patient presents with    Back Pain     Pt states started with upper back pain that goes to R elbow that started Saturday  Pt states "I couldn't get out of bed Saturday and Sunday" denies dizziness/CP  Pt came to ER to see if he can take alieve and his pain killers that he has at home  Pt requesting a work note as well          HPI    Alban Velasquez presents to the Emergency Department complaining of right sided back pain  Over the weekend he felt like he could not get out of bed at all  It seems to be getting better but he felt like he could not work  No trauma to the area and no prior episodes of similar pain  He had been taking advil but he was worried about what medications he could take with his heart medication  HPI      Past Medical and Surgical History    History reviewed  No pertinent past medical history  Past Surgical History:   Procedure Laterality Date    CARDIAC CATHETERIZATION N/A 7/13/2022    Procedure: Cardiac pci;  Surgeon: Josue Rowley MD;  [facility]; Service: Cardiology    CARDIAC CATHETERIZATION N/A 7/13/2022    Procedure: Cardiac Coronary Angiogram;  Surgeon: Josue Rowley MD;  [facility]; Service: Cardiology    CARDIAC CATHETERIZATION Left 7/13/2022    Procedure: Cardiac Left Heart Cath;  Surgeon: Josue Rowley MD;  [facility]; Service: Cardiology       History reviewed  No pertinent family history  Social History     Tobacco Use    Smoking status: Never Smoker    Smokeless tobacco: Never Used           Allergies    Allergies   Allergen Reactions    Pollen Extract Allergic Rhinitis       Home Medications    Prior to Admission medications    Medication Sig Start Date End Date Taking?  Authorizing Provider   aspirin 81 mg chewable tablet Chew 1 tablet (81 mg total) daily 9/8/22 10/8/22  Harshal Hill MD   atorvastatin (LIPITOR) 40 mg tablet TAKE 1 TABLET BY MOUTH DAILY WITH DINNER 10/3/22   Harshal Hill MD   losartan (COZAAR) 25 mg tablet Take 1 tablet (25 mg total) by mouth daily 9/8/22   Harshal Hill MD   metoprolol succinate (TOPROL-XL) 25 mg 24 hr tablet TAKE 1 TABLET (25 MG TOTAL) BY MOUTH DAILY  10/3/22 11/2/22  Harshal Hill MD   nitroglycerin (NITROSTAT) 0 4 mg SL tablet Place 1 tablet (0 4 mg total) under the tongue every 5 (five) minutes as needed for chest pain for up to 3 days 7/16/22 9/8/22  May Marily Wong MD   ticagrelor (BRILINTA) 90 MG Take 1 tablet (90 mg total) by mouth 2 (two) times a day 9/8/22   Harshal Hill MD           Review of Systems    Review of Systems   Constitutional: Negative for chills and fever  HENT: Negative for ear pain and sore throat  Eyes: Negative for pain and visual disturbance  Respiratory: Negative for cough and shortness of breath  Cardiovascular: Negative for chest pain and palpitations  Gastrointestinal: Negative for abdominal pain and vomiting  Genitourinary: Negative for dysuria and hematuria  Musculoskeletal: Negative for arthralgias and back pain  Skin: Negative for color change and rash  Neurological: Negative for seizures and syncope  All other systems reviewed and are negative  Physical Exam      ED Triage Vitals   Temperature Pulse Respirations Blood Pressure SpO2   10/05/22 1347 10/05/22 1346 10/05/22 1346 10/05/22 1346 10/05/22 1346   97 8 °F (36 6 °C) 55 20 142/74 99 %      Temp Source Heart Rate Source Patient Position - Orthostatic VS BP Location FiO2 (%)   10/05/22 1347 10/05/22 1346 10/05/22 1346 10/05/22 1346 --   Oral Monitor Sitting Right arm       Pain Score       10/05/22 1646       3               Physical Exam  Vitals and nursing note reviewed     Constitutional:       General: He is not in acute distress  Appearance: He is well-developed  He is not diaphoretic  HENT:      Head: Normocephalic and atraumatic  Nose: Nose normal    Eyes:      Conjunctiva/sclera: Conjunctivae normal       Pupils: Pupils are equal, round, and reactive to light  Cardiovascular:      Rate and Rhythm: Normal rate and regular rhythm  Heart sounds: Normal heart sounds  No murmur heard  No friction rub  No gallop  Pulmonary:      Effort: Pulmonary effort is normal  No respiratory distress  Breath sounds: Normal breath sounds  No wheezing or rales  Abdominal:      General: Bowel sounds are normal       Palpations: Abdomen is soft  Tenderness: There is no abdominal tenderness  There is no guarding or rebound  Musculoskeletal:         General: Normal range of motion  Cervical back: Normal, normal range of motion and neck supple  Thoracic back: Normal       Lumbar back: Normal         Back:    Skin:     General: Skin is warm and dry  Neurological:      Mental Status: He is alert and oriented to person, place, and time  Psychiatric:         Behavior: Behavior normal                 Assessment and Plan    Carina Ba is a 62 y o  male who presents with pain overlying his right scapula  Physical examination remarkable for same  Differential diagnosis (not completely inclusive) includes musculoskeletal pain  Plan will be to treat symptomatically        MDM    Diagnostic Results      Labs:    Procedure    Procedures      ED Course of Care and Re-Assessments      Medications   ketorolac (TORADOL) injection 30 mg (30 mg Intramuscular Given 10/5/22 1646)   methocarbamol (ROBAXIN) tablet 500 mg (500 mg Oral Given 10/5/22 1647)           FINAL IMPRESSION    Final diagnoses:   Musculoskeletal back pain         DISPOSITION/PLAN    Time reflects when diagnosis was documented in both MDM as applicable and the Disposition within this note     Time User Action Codes Description Comment 10/5/2022  5:21 PM Rupa Hong Stiven [M54 9] Musculoskeletal back pain       ED Disposition     ED Disposition   Discharge    Condition   Stable    Date/Time   Wed Oct 5, 2022  5:21 PM    Comment   Robert Becker discharge to home/self care  Follow-up Information    None           PATIENT REFERRED TO:    No follow-up provider specified  DISCHARGE MEDICATIONS:    Discharge Medication List as of 10/5/2022  5:22 PM      CONTINUE these medications which have NOT CHANGED    Details   aspirin 81 mg chewable tablet Chew 1 tablet (81 mg total) daily, Starting Thu 9/8/2022, Until Sat 10/8/2022, Normal      atorvastatin (LIPITOR) 40 mg tablet TAKE 1 TABLET BY MOUTH DAILY WITH DINNER, Normal      losartan (COZAAR) 25 mg tablet Take 1 tablet (25 mg total) by mouth daily, Starting Thu 9/8/2022, Normal      metoprolol succinate (TOPROL-XL) 25 mg 24 hr tablet TAKE 1 TABLET (25 MG TOTAL) BY MOUTH DAILY  , Starting Mon 10/3/2022, Until Wed 11/2/2022, Normal      nitroglycerin (NITROSTAT) 0 4 mg SL tablet Place 1 tablet (0 4 mg total) under the tongue every 5 (five) minutes as needed for chest pain for up to 3 days, Starting Sat 7/16/2022, Until Thu 9/8/2022 at 2359, Normal      ticagrelor (BRILINTA) 90 MG Take 1 tablet (90 mg total) by mouth 2 (two) times a day, Starting Thu 9/8/2022, Normal             No discharge procedures on file           Erica Wang, [address] [place] [address],   10/05/22 1240

## 2022-10-11 PROBLEM — S01.81XA FOREHEAD LACERATION, INITIAL ENCOUNTER: Status: RESOLVED | Noted: 2022-07-13 | Resolved: 2022-10-11

## 2022-10-13 ENCOUNTER — HOSPITAL ENCOUNTER (EMERGENCY)
Facility: HOSPITAL | Age: 62
Discharge: HOME | End: 2022-10-13
Attending: EMERGENCY MEDICINE
Payer: COMMERCIAL

## 2022-10-13 VITALS
WEIGHT: 180.56 LBS | OXYGEN SATURATION: 99 % | TEMPERATURE: 98.3 F | SYSTOLIC BLOOD PRESSURE: 128 MMHG | RESPIRATION RATE: 18 BRPM | HEART RATE: 56 BPM | DIASTOLIC BLOOD PRESSURE: 69 MMHG | BODY MASS INDEX: 26.66 KG/M2

## 2022-10-13 DIAGNOSIS — R53.1 WEAKNESS: Primary | ICD-10-CM

## 2022-10-13 LAB
ATRIAL RATE: 62 BPM
P AXIS: 83 DEGREES
PR INTERVAL: 286 MS
QRS AXIS: 268 DEGREES
QRSD INTERVAL: 124 MS
QT INTERVAL: 422 MS
QTC INTERVAL: 428 MS
T WAVE AXIS: -1 DEGREES
VENTRICULAR RATE: 62 BPM

## 2022-10-13 PROCEDURE — 93005 ELECTROCARDIOGRAM TRACING: CPT

## 2022-10-13 PROCEDURE — 99284 EMERGENCY DEPT VISIT MOD MDM: CPT | Performed by: EMERGENCY MEDICINE

## 2022-10-13 PROCEDURE — 93010 ELECTROCARDIOGRAM REPORT: CPT | Performed by: INTERNAL MEDICINE

## 2022-10-13 PROCEDURE — 99284 EMERGENCY DEPT VISIT MOD MDM: CPT

## 2022-10-13 NOTE — ED ATTENDING ATTESTATION
10/13/2022  I, Bee Alatorre MD, saw and evaluated the patient  I have discussed the patient with the resident/non-physician practitioner and agree with the resident's/non-physician practitioner's findings, Plan of Care, and MDM as documented in the resident's/non-physician practitioner's note, except where noted  All available labs and Radiology studies were reviewed  I was present for key portions of any procedure(s) performed by the resident/non-physician practitioner and I was immediately available to provide assistance  At this point I agree with the current assessment done in the Emergency Department  I have conducted an independent evaluation of this patient a history and physical is as follows:  60-year-old male with history of CVA and coronary artery disease  Recent visit for back pain  Presents today with right arm weakness and numbness  No leg or facial complaints  No visual or speech complaints  No chest pain or trouble breathing  Denies neck or back pain  Physical exam:  There is no motor or sensory deficit  Motor strength is 5/5 in all extremities  No sensory deficit  Normal gait  Finger to nose testing is normal   Assessment/plan:  EKG does not show acute ischemia  Neurologic exam is normal   This is not stroke    Suspect this is musculoskeletal   ED Course         Critical Care Time  Procedures

## 2022-10-13 NOTE — Clinical Note
Kiara Jaylenla was seen and treated in our emergency department on 10/13/2022  No restrictions            Diagnosis: malika Lima  may return to work on return date  He may return on this date: 10/13/2022         If you have any questions or concerns, please don't hesitate to call        Sudhir Kee MD    ______________________________           _______________          _______________  [facility] Representative                              Date                                Time

## 2022-10-13 NOTE — ED PROVIDER NOTES
History  Chief Complaint   Patient presents with   • Extremity Weakness     Pt was seen here 10/5 for upper back and right arm pain since then the pain is better but now he feels that he lost strength in his right arm ever since he had the shot denies cp/sob/headache      Patient is a 62year old male seen her for evaluation of right arm weakness  Was recently seen in the ED for back pain and given a shot of ketorolac in the right arm  Since he received the shot, he says that he has had weakness in his right arm, that he has been unable to use his phone and that he cannot perform tasks of work  He says he first noticed the morning after his ED visit and that it has been persistent since then  He does get numbness and tingling in his fingertips on the right side, but not on the left  He does not have any neck pain or stiffness  No chest pain, shortness of breath  Denies changes in vision, lightheadedness, headache, fever, chills, or GI/ changes  Prior to Admission Medications   Prescriptions Last Dose Informant Patient Reported? Taking?   aspirin 81 mg chewable tablet   No No   Sig: Chew 1 tablet (81 mg total) daily   atorvastatin (LIPITOR) 40 mg tablet   No No   Sig: TAKE 1 TABLET BY MOUTH DAILY WITH DINNER   losartan (COZAAR) 25 mg tablet   No No   Sig: Take 1 tablet (25 mg total) by mouth daily   methocarbamol (ROBAXIN) 500 mg tablet   No No   Sig: Take 1 tablet (500 mg total) by mouth 2 (two) times a day   metoprolol succinate (TOPROL-XL) 25 mg 24 hr tablet   No No   Sig: TAKE 1 TABLET (25 MG TOTAL) BY MOUTH DAILY     nitroglycerin (NITROSTAT) 0 4 mg SL tablet   No No   Sig: Place 1 tablet (0 4 mg total) under the tongue every 5 (five) minutes as needed for chest pain for up to 3 days   ticagrelor (BRILINTA) 90 MG   No No   Sig: Take 1 tablet (90 mg total) by mouth 2 (two) times a day      Facility-Administered Medications: None       Past Medical History:   Diagnosis Date   • Coronary artery disease • CVA (cerebral vascular accident) ([facility] [address] )    • Heart failure ([facility] [address] )    • Hyperlipidemia    • Hypertension    • STEMI (ST elevation myocardial infarction) [facility])        Past Surgical History:   Procedure Laterality Date   • CARDIAC CATHETERIZATION N/A 7/13/2022    Procedure: Cardiac pci;  Surgeon: Romelle Gaucher, MD;  [facility]: [facility] CARDIAC CATH LAB; Service: Cardiology   • CARDIAC CATHETERIZATION N/A 7/13/2022    Procedure: Cardiac Coronary Angiogram;  Surgeon: Romelle Gaucher, MD;  [facility] CARD[facility]ATH [facility]; Service: Cardiology   • CARDIAC CATHETERIZATION Left 7/13/2022    Procedure: Cardiac Left Heart Cath;  Surgeon: Romelle Gaucher, MD;  [facility]: [facility] CARDIAC CATH LAB; Service: Cardiology       History reviewed  No pertinent family history  I have reviewed and agree with the history as documented  E-Cigarette/Vaping     E-Cigarette/Vaping Substances     Social History     Tobacco Use   • Smoking status: Never Smoker   • Smokeless tobacco: Never Used        Review of Systems   Constitutional: Negative for appetite change, chills, diaphoresis and fever  HENT: Negative for ear pain, sinus pain and sore throat  Respiratory: Negative for choking, chest tightness, shortness of breath and wheezing  Cardiovascular: Negative for chest pain and palpitations  Gastrointestinal: Negative for abdominal pain, anal bleeding, constipation, diarrhea, nausea and vomiting  Genitourinary: Negative for difficulty urinating, flank pain, penile pain and urgency  Musculoskeletal: Negative for back pain, neck pain and neck stiffness  Skin: Negative for rash and wound  Neurological: Positive for weakness  Negative for dizziness, seizures, light-headedness, numbness and headaches         Physical Exam  ED Triage Vitals [10/13/22 0721]   Temperature Pulse Respirations Blood Pressure SpO2   98 3 °F (36 8 °C) 56 18 128/69 99 %      Temp src Heart Rate Source Patient Position - Orthostatic VS BP Location FiO2 (%)   -- -- Sitting Right arm --      Pain Score       --             Orthostatic Vital Signs  Vitals:    10/13/22 0721   BP: 128/69   Pulse: 56   Patient Position - Orthostatic VS: Sitting       Physical Exam  Constitutional:       General: He is not in acute distress  Appearance: Normal appearance  He is normal weight  HENT:      Head: Normocephalic and atraumatic  Right Ear: External ear normal       Left Ear: External ear normal       Nose: Nose normal       Mouth/Throat:      Pharynx: Oropharynx is clear  Eyes:      General: No visual field deficit  Conjunctiva/sclera: Conjunctivae normal    Cardiovascular:      Rate and Rhythm: Normal rate and regular rhythm  Pulses: Normal pulses  Heart sounds: Normal heart sounds  Pulmonary:      Effort: Pulmonary effort is normal       Breath sounds: Normal breath sounds  Abdominal:      General: Bowel sounds are normal       Palpations: Abdomen is soft  Tenderness: There is no abdominal tenderness  Musculoskeletal:         General: Normal range of motion  Cervical back: Normal range of motion  Skin:     General: Skin is warm and dry  Capillary Refill: Capillary refill takes less than 2 seconds  Neurological:      General: No focal deficit present  Mental Status: He is alert and oriented to person, place, and time  GCS: GCS eye subscore is 4  GCS verbal subscore is 5  GCS motor subscore is 6  Cranial Nerves: Cranial nerves are intact  No facial asymmetry  Sensory: Sensation is intact  No sensory deficit  Motor: Motor function is intact  No tremor, atrophy, abnormal muscle tone, seizure activity or pronator drift  Coordination: Coordination is intact   Finger-Nose-Finger Test normal    Psychiatric:         Mood and Affect: Mood normal          ED Medications  Medications - No data to display    Diagnostic Studies  Results Reviewed     None                 No orders to display         Procedures  ECG 12 Lead Documentation Only    Date/Time: 10/13/2022 8:02 AM  Performed by: Joyce Carrera MD  Authorized by: Joyce Carrera MD     ECG reviewed by me, the ED Provider: yes    Patient location:  ED  Previous ECG:     Previous ECG:  Unavailable    Comparison to cardiac monitor: No    Interpretation:     Interpretation: normal    Rate:     ECG rate:  62    ECG rate assessment: normal    Rhythm:     Rhythm: sinus rhythm    Ectopy:     Ectopy: none    QRS:     QRS axis:  Normal    QRS intervals:  Normal  Conduction:     Conduction: normal    ST segments:     ST segments:  Normal  Comments:      Leads deflecting negatively in II, III, aVF, positively in aVR, aVL, likely lead reversal           ED Course    ECG      MDM  Number of Diagnoses or Management Options  Weakness  Diagnosis management comments: Patient presents complaining of right arm weakness since receiving a Toradol injection on 10/5  Patient has full RoM of the shoulder and elbow, is able to raise the arm passively above the shoulder, and can hold his arm out without a drop or pronator drift  Can successfully tap all fingers to thumb, has good  strength B/L, and has good sensation from the shoulder to his fingers  Denies any arm pain  Cranial nerves intact, no speech deficit, exam overall benign  Currently unconcerned for stroke, ECG is benign  May have some neuropathy but sugar this AM was 123, does not know his A1c  At the moment I believe patient is sore due to the shot, does not appear clinically weak to me on exam  May leave and f/u with His primary doctor       Risk of Complications, Morbidity, and/or Mortality  Presenting problems: minimal  Diagnostic procedures: minimal  Management options: minimal    Patient Progress  Patient progress: stable      Disposition  Final diagnoses:   Weakness     Time reflects when diagnosis was documented in both MDM as applicable and the Disposition within this note     Time User Action Codes Description Comment    10/13/2022  7:47 AM Rebecca Mckeon Add [R53 1] Weakness       ED Disposition     ED Disposition   Discharge    Condition   Stable    Date/Time   Thu Oct 13, 2022  8:12 AM    Comment   Alexandra Philip discharge to home/self care  Follow-up Information    None         Patient's Medications   Discharge Prescriptions    No medications on file     No discharge procedures on file  PDMP Review     None           ED Provider  Attending physically available and evaluated Alexandra Philip I managed the patient along with the ED Attending      Electronically Signed by         Santiago Zuñiga MD  10/13/22 1328

## 2022-10-13 NOTE — Clinical Note
Cuca Mario was seen and treated in our emergency department on 10/13/2022  No restrictions    ? ? Diagnosis: malika Javier  may return to work on return date  He may return on this date: 10/13/2022    ? If you have any questions or concerns, please don't hesitate to call        Deng Baires MD    ______________________________           _______________          _______________  Hospital Representative                              Date                                Time

## 2022-10-24 ENCOUNTER — OFFICE VISIT (OUTPATIENT)
Dept: FAMILY MEDICINE CLINIC | Facility: CLINIC | Age: 62
End: 2022-10-24

## 2022-10-24 VITALS
WEIGHT: 178.4 LBS | HEART RATE: 63 BPM | OXYGEN SATURATION: 95 % | SYSTOLIC BLOOD PRESSURE: 124 MMHG | TEMPERATURE: 98 F | HEIGHT: 68 IN | DIASTOLIC BLOOD PRESSURE: 80 MMHG | RESPIRATION RATE: 20 BRPM | BODY MASS INDEX: 27.04 KG/M2

## 2022-10-24 DIAGNOSIS — Z76.89 ENCOUNTER TO ESTABLISH CARE: Primary | ICD-10-CM

## 2022-10-24 DIAGNOSIS — E66.3 OVERWEIGHT (BMI 25.0-29.9): ICD-10-CM

## 2022-10-24 DIAGNOSIS — Z11.59 ENCOUNTER FOR HEPATITIS C SCREENING TEST FOR LOW RISK PATIENT: ICD-10-CM

## 2022-10-24 DIAGNOSIS — Z11.4 SCREENING FOR HIV (HUMAN IMMUNODEFICIENCY VIRUS): ICD-10-CM

## 2022-10-24 DIAGNOSIS — Z00.00 ENCOUNTER FOR WELL ADULT EXAM WITHOUT ABNORMAL FINDINGS: ICD-10-CM

## 2022-10-24 DIAGNOSIS — E11.9 TYPE 2 DIABETES MELLITUS WITHOUT COMPLICATION, WITHOUT LONG-TERM CURRENT USE OF INSULIN (HCC): ICD-10-CM

## 2022-10-24 DIAGNOSIS — Z12.5 SCREENING FOR MALIGNANT NEOPLASM OF PROSTATE: ICD-10-CM

## 2022-10-24 DIAGNOSIS — R56.9 SEIZURE (HCC): ICD-10-CM

## 2022-10-24 DIAGNOSIS — Z12.11 SCREENING FOR COLON CANCER: ICD-10-CM

## 2022-10-24 DIAGNOSIS — L60.0 INCURVED TOENAIL: ICD-10-CM

## 2022-10-24 DIAGNOSIS — R53.1 RIGHT SIDED WEAKNESS: ICD-10-CM

## 2022-10-24 PROBLEM — D72.829 LEUKOCYTOSIS: Status: RESOLVED | Noted: 2022-07-13 | Resolved: 2022-10-24

## 2022-10-24 PROBLEM — I10 HYPERTENSION: Status: ACTIVE | Noted: 2022-10-24

## 2022-10-24 PROBLEM — I63.9 CVA (CEREBRAL VASCULAR ACCIDENT) (HCC): Status: RESOLVED | Noted: 2022-07-13 | Resolved: 2022-10-24

## 2022-10-24 PROBLEM — E78.5 HYPERLIPIDEMIA: Status: ACTIVE | Noted: 2022-10-24

## 2022-10-24 PROBLEM — R73.9 HYPERGLYCEMIA: Status: RESOLVED | Noted: 2022-07-13 | Resolved: 2022-10-24

## 2022-10-24 PROBLEM — I25.2 HISTORY OF ST ELEVATION MYOCARDIAL INFARCTION (STEMI): Status: ACTIVE | Noted: 2022-10-24

## 2022-10-24 PROBLEM — I25.10 CORONARY ARTERY DISEASE: Status: ACTIVE | Noted: 2022-10-24

## 2022-10-24 PROBLEM — R73.03 PRE-DIABETES: Status: RESOLVED | Noted: 2022-10-24 | Resolved: 2022-10-24

## 2022-10-24 PROBLEM — R73.03 PRE-DIABETES: Status: ACTIVE | Noted: 2022-10-24
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
## 2022-10-24 PROBLEM — I21.3 STEMI (ST ELEVATION MYOCARDIAL INFARCTION) (HCC): Status: RESOLVED | Noted: 2022-07-13 | Resolved: 2022-10-24

## 2022-10-24 PROBLEM — R55 SYNCOPE AND COLLAPSE: Status: RESOLVED | Noted: 2022-07-13 | Resolved: 2022-10-24

## 2022-10-24 LAB
LEFT EYE DIABETIC RETINOPATHY: ABNORMAL
LEFT EYE IMAGE QUALITY: ABNORMAL
LEFT EYE MACULAR EDEMA: ABNORMAL
LEFT EYE OTHER RETINOPATHY: ABNORMAL
RIGHT EYE DIABETIC RETINOPATHY: ABNORMAL
RIGHT EYE IMAGE QUALITY: ABNORMAL
RIGHT EYE MACULAR EDEMA: ABNORMAL
RIGHT EYE OTHER RETINOPATHY: ABNORMAL
SEVERITY (EYE EXAM): ABNORMAL

## 2022-10-24 RX ORDER — ASPIRIN 81 MG/1
81 TABLET ORAL DAILY
COMMUNITY

## 2022-10-24 NOTE — ASSESSMENT & PLAN NOTE
Per patient previously on Lamictal which was discontinued 4 years ago  Await records from previous neurologist in [place]  Patient unaware of type of seizure  Adult onset associated with diabetes diagnosis

## 2022-10-24 NOTE — ASSESSMENT & PLAN NOTE
Patient reports 10 years ago was diagnosed with diabetes and hyperglycemia with sugars in the 300s  He was previously on metformin but discontinued due to improved diet  Last A1c well controlled  Diabetic foot and eye exam performed today     Lab Results   Component Value Date    HGBA1C 5 8 (H) 07/13/2022

## 2022-10-24 NOTE — PROGRESS NOTES
[address] [facility]    NAME: Soto Robbins  AGE: 62 y o  SEX: male  : 1960     DATE: 10/24/2022     Assessment and Plan:   Reviewed previous ER record on 10/05/22 and 10/13/22 regarding right upper thoracic pain and right arm weakness  No objective weakness on exam   Low suspicion for repeat CVA  Will check EMG  Patient reports a weakness occurred after IM deltoid injection of Toradol  Denies neck pain or injury  Possible neuropathy  Problem List Items Addressed This Visit        Endocrine    Type 2 diabetes mellitus ([facility] [address] )     Patient reports 10 years ago was diagnosed with diabetes and hyperglycemia with sugars in the 300s  He was previously on metformin but discontinued due to improved diet  Last A1c well controlled  Diabetic foot and eye exam performed today  Lab Results   Component Value Date    HGBA1C 5 8 (H) 2022            Relevant Orders    Lipid panel    Comprehensive metabolic panel    HEMOGLOBIN A1C W/ EAG ESTIMATION    IRIS Diabetic eye exam    Microalbumin / creatinine urine ratio       Other    Seizure ([facility] [address] )     Per patient previously on Lamictal which was discontinued 4 years ago  Await records from previous neurologist in [place]  Patient unaware of type of seizure  Adult onset associated with diabetes diagnosis  Relevant Orders    CBC and differential    Overweight (BMI 25 0-29  9)      Other Visit Diagnoses     Encounter to establish care    -  Primary    Encounter for well adult exam without abnormal findings        Right sided weakness        Previously evaluated in the ER  History of CVA affecting right side  Subjective weakness  None present on exam   Patient reports occurred after right IM inj      Relevant Orders    EMG 2 Limb Upper Extremity    Screening for colon cancer        Relevant Orders    Cologuard    Screening for malignant neoplasm of prostate Relevant Orders    PSA, Total Screen    Screening for HIV (human immunodeficiency virus)        Relevant Orders    HIV 1/2 Antigen/Antibody (4th Generation) w Reflex [facility]    Encounter for hepatitis C screening test for low risk patient        Relevant Orders    Hepatitis C antibody    Incurved toenail        Refer to podiatry  Relevant Orders    Ambulatory Referral to Podiatry          Immunizations and preventive care screenings were discussed with patient today  Appropriate education was printed on patient's after visit summary  Discussed risks and benefits of prostate cancer screening  We discussed the controversial history of PSA screening for prostate cancer in the [place] as well as the risk of over detection and over treatment of prostate cancer by way of PSA screening  The patient understands that PSA blood testing is an imperfect way to screen for prostate cancer and that elevated PSA levels in the blood may also be caused by infection, inflammation, prostatic trauma or manipulation, urological procedures, or by benign prostatic enlargement  The role of the digital rectal examination in prostate cancer screening was also discussed and I discussed with him that there is large interobserver variability in the findings of digital rectal examination  Counseling:  Alcohol/drug use: discussed moderation in alcohol intake, the recommendations for healthy alcohol use, and avoidance of illicit drug use  Dental Health: discussed importance of regular tooth brushing, flossing, and dental visits  Injury prevention: discussed safety/seat belts, safety helmets, smoke detectors, carbon dioxide detectors, and smoking near bedding or upholstery  Sexual health: discussed sexually transmitted diseases, partner selection, use of condoms, avoidance of unintended pregnancy, and contraceptive alternatives  · Exercise: the importance of regular exercise/physical activity was discussed   Recommend exercise 3-5 times per week for at least 30 minutes  Return in about 6 months (around 2023)  Chief Complaint:     Chief Complaint   Patient presents with   • Establish Care   • Physical Exam   • Shoulder Pain     Right x 1 week      History of Present Illness:     Adult Annual Physical   Patient here for a comprehensive physical exam as a new patient  The patient reports problems - R arm weakness  Patient has history of diabetes despite normal A1c in July  He reports previous diagnosis occurred when he had episodes of syncope and discovered to have sugars in 300s  During that time he had seizure episode  He had a CVA affecting right side in  and was unable to complete PT due to COVID  He had recent MI requiring stent procedure  He recently moved to [facility] area to live with brother who he states is his only living family  He was previously living with the mother who  in [place]  He has 2 kids and 3 grandkids  Patient works 2 jobs as a  and at [employer]  He states about a week ago he fell asleep on his right shoulder  He woke up with stabbing right scapular pain which brought him to the ER  At that time, he had a Toradol injection in his right shoulder deltoid which improved pain but left him with right-sided weakness  Patient reports weakness was directly associated with IM injection and makes it difficult for him to squeeze and complete daily tasks as he is right-handed  He was previously seeing Dr Rory Stack primary care in [place], [place] in following annually with neurologist whose name he does not remember  He denies smoking or alcohol use  No history of illicit drug use  Diet and Physical Activity  · Diet/Nutrition: well balanced diet  · Exercise: no formal exercise        Depression Screening  PHQ-2/9 Depression Screening    Little interest or pleasure in doing things: 0 - not at all  Feeling down, depressed, or hopeless: 0 - not at all  PHQ-2 Score: 0  PHQ-2 Interpretation: Negative depression screen       General Health  · Sleep: sleeps well  · Hearing: normal - bilateral   · Vision: no vision problems  · Dental: brushes teeth twice daily   Health  · Symptoms include: none     Review of Systems:     Review of Systems   Constitutional: Negative for chills and fever  HENT: Negative for ear pain and sore throat  Eyes: Negative for pain and visual disturbance  Respiratory: Negative for cough and shortness of breath  Cardiovascular: Negative for chest pain and palpitations  Gastrointestinal: Negative for abdominal pain and vomiting  Genitourinary: Negative for dysuria and hematuria  Musculoskeletal: Positive for myalgias (Resolved right scapular)  Negative for arthralgias and back pain  Skin: Negative for color change and rash  Neurological: Positive for seizures (Last known seizure 6 years ago), speech difficulty (Since previous CVA) and weakness (Subjective right arm)  Negative for dizziness, tremors, syncope, facial asymmetry, light-headedness, numbness and headaches  All other systems reviewed and are negative  Past Medical History:     Past Medical History:   Diagnosis Date   • Coronary artery disease    • CVA (cerebral vascular accident) ([facility] [address] )     R sided, dysarthria, March 2020 in [place]   • Heart failure [facility])    • Hyperlipidemia    • Hypertension    • Pre-diabetes    • STEMI (ST elevation myocardial infarction) [facility])       Past Surgical History:     Past Surgical History:   Procedure Laterality Date   • CARDIAC CATHETERIZATION N/A 7/13/2022    Procedure: Cardiac pci;  Surgeon: Amanda Velazquez MD;  [facility]: [facility] CARDIAC CATH [facility]; Service: Cardiology   • CARDIAC CATHETERIZATION N/A 7/13/2022    Procedure: Cardiac Coronary Angiogram;  Surgeon: Amanda Velazquez MD;  [facility]: [facility] CARDIAC CATH LAB;   Service: Cardiology   • CARDIAC CATHETERIZATION Left 7/13/2022    Procedure: Cardiac Left Heart Cath; Surgeon: Messi Mcneal MD;  [facility]: [facility] CARDIAC CATH LAB; Service: Cardiology      Family History:     Family History   Problem Relation Age of Onset   • Dementia Mother    • Heart disease Father    • Dementia Maternal Aunt    • Dementia Maternal Uncle    • Cancer Maternal Uncle       Social History:     Social History     Socioeconomic History   • Marital status: Unknown     Spouse name: None   • Number of children: None   • Years of education: None   • Highest education level: None   Occupational History   • None   Tobacco Use   • Smoking status: Never Smoker   • Smokeless tobacco: Never Used   Substance and Sexual Activity   • Alcohol use: None   • Drug use: None   • Sexual activity: None   Other Topics Concern   • None   Social History Narrative   • None     Social Determinants of Health     Financial Resource Strain: Not on file   Food Insecurity: No Food Insecurity   • Worried About Running Out of Food in the Last Year: Never true   • Ran Out of Food in the Last Year: Never true   Transportation Needs: No Transportation Needs   • Lack of Transportation (Medical): No   • Lack of Transportation (Non-Medical): No   Physical Activity: Not on file   Stress: Not on file   Social Connections: Not on file   Intimate Partner Violence: Not on file   Housing Stability: Low Risk    • Unable to Pay for Housing in the Last Year: No   • Number of Places Lived in the Last Year: 1   • Unstable Housing in the Last Year: No      Current Medications:     Current Outpatient Medications   Medication Sig Dispense Refill   • aspirin (ECOTRIN LOW STRENGTH) 81 mg EC tablet Take 81 mg by mouth daily     • atorvastatin (LIPITOR) 40 mg tablet TAKE 1 TABLET BY MOUTH DAILY WITH DINNER 90 tablet 2   • losartan (COZAAR) 25 mg tablet Take 1 tablet (25 mg total) by mouth daily 30 tablet 3   • metoprolol succinate (TOPROL-XL) 25 mg 24 hr tablet TAKE 1 TABLET (25 MG TOTAL) BY MOUTH DAILY   90 tablet 2   • ticagrelor (BRILINTA) 90 MG Take 1 tablet (90 mg total) by mouth 2 (two) times a day 60 tablet 5   • aspirin 81 mg chewable tablet Chew 1 tablet (81 mg total) daily 30 tablet 5   • nitroglycerin (NITROSTAT) 0 4 mg SL tablet Place 1 tablet (0 4 mg total) under the tongue every 5 (five) minutes as needed for chest pain for up to 3 days 10 tablet 0     No current facility-administered medications for this visit  Allergies: Allergies   Allergen Reactions   • Pollen Extract Allergic Rhinitis      Physical Exam:     /80 (BP Location: Left arm, Patient Position: Sitting, Cuff Size: Standard)   Pulse 63   Temp 98 °F (36 7 °C) (Tympanic)   Resp 20   Ht 5' 8" (1 727 m)   Wt 80 9 kg (178 lb 6 4 oz)   SpO2 95%   BMI 27 13 kg/m²     Physical Exam  Vitals and nursing note reviewed  Constitutional:       Appearance: He is well-developed  HENT:      Head: Normocephalic and atraumatic  Right Ear: Tympanic membrane, ear canal and external ear normal       Left Ear: Tympanic membrane, ear canal and external ear normal    Eyes:      Conjunctiva/sclera: Conjunctivae normal    Cardiovascular:      Rate and Rhythm: Normal rate and regular rhythm  Pulses: no weak pulses          Dorsalis pedis pulses are 2+ on the right side and 2+ on the left side  Posterior tibial pulses are 2+ on the right side and 2+ on the left side  Heart sounds: No murmur heard  Pulmonary:      Effort: Pulmonary effort is normal  No respiratory distress  Breath sounds: Normal breath sounds  Abdominal:      Palpations: Abdomen is soft  Tenderness: There is no abdominal tenderness  Musculoskeletal:      Cervical back: Neck supple  Feet:       Comments: 5/5 strength in upper extremity  Normal empty can test    No pronator drift  Normal coordination  Feet:      Right foot:      Skin integrity: No ulcer, skin breakdown, erythema, warmth, callus or dry skin        Left foot:      Skin integrity: No ulcer, skin breakdown, erythema, warmth, callus or dry skin  Skin:     General: Skin is warm and dry  Neurological:      Mental Status: He is alert  Comments: Blunt affect        Diabetic Foot Exam    Patient's shoes and socks removed  Right Foot/Ankle   Right Foot Inspection  Skin Exam: skin normal and skin intact  No dry skin, no warmth, no callus, no erythema, no maceration, no abnormal color, no pre-ulcer, no ulcer and no callus  Toe Exam: ROM and strength within normal limits  Sensory   Monofilament testing: intact    Vascular  Capillary refills: < 3 seconds  The right DP pulse is 2+  The right PT pulse is 2+  Left Foot/Ankle  Left Foot Inspection  Skin Exam: skin normal and skin intact  No dry skin, no warmth, no erythema, no maceration, normal color, no pre-ulcer, no ulcer and no callus  Toe Exam: ROM and strength within normal limits  Sensory   Monofilament testing: intact    Vascular  Capillary refills: < 3 seconds  The left DP pulse is 2+  The left PT pulse is 2+       Assign Risk Category  No deformity present  No loss of protective sensation  No weak pulses  Risk: 0        Jenanette Bergeron PA-C  [address]

## 2022-10-27 ENCOUNTER — TELEPHONE (OUTPATIENT)
Dept: FAMILY MEDICINE CLINIC | Facility: CLINIC | Age: 62
End: 2022-10-27

## 2022-10-27 DIAGNOSIS — H40.003 GLAUCOMA SUSPECT OF BOTH EYES: Primary | ICD-10-CM

## 2022-10-27 NOTE — TELEPHONE ENCOUNTER
----- Message from Juani Greenberg RN sent at 10/27/2022  2:56 PM EDT -----  Regarding: FW: IRIS result  Eye result  ----- Message -----  From: Winston Diaz PA-C  Sent: 10/27/2022   2:54 PM EDT  To: [facility]  Subject: IRIS result                                      Please call patient to schedule ophthalmology appointment within the next 4 weeks because his eye exam in office revealed possible glaucoma - referral already ordered

## 2022-10-28 ENCOUNTER — TELEPHONE (OUTPATIENT)
Dept: ADMINISTRATIVE | Facility: OTHER | Age: 62
End: 2022-10-28

## 2022-10-28 NOTE — TELEPHONE ENCOUNTER
----- Message from Jazzmine Sanabria, [address] sent at 10/27/2022 12:52 PM EDT -----  Regarding: care gap request  10/27/22 12:52 PM    Hello, our patient attached above has had Diabetic Eye Exam completed/performed  Please assist in updating the patient chart by pulling the document from the Media Tab  The date of service is [id number]       Thank you,  [facility] [address]

## 2022-10-28 NOTE — TELEPHONE ENCOUNTER
Upon review of the In Basket request we have found/obtained the documentation  After careful review of the document we are unable to complete this request for Diabetic Eye Exam because [facility] is unable to update [facility] for items that are completed internally  Any additional questions or concerns should be emailed to the Practice Liaisons via the appropriate education email address, please do not reply via In Basket      Thank you  Martell Torre

## 2022-11-02 ENCOUNTER — HOSPITAL ENCOUNTER (OUTPATIENT)
Dept: NON INVASIVE DIAGNOSTICS | Facility: HOSPITAL | Age: 62
Discharge: HOME | End: 2022-11-02
Attending: STUDENT IN AN ORGANIZED HEALTH CARE EDUCATION/TRAINING PROGRAM

## 2022-11-02 VITALS
SYSTOLIC BLOOD PRESSURE: 124 MMHG | BODY MASS INDEX: 26.98 KG/M2 | DIASTOLIC BLOOD PRESSURE: 80 MMHG | HEART RATE: 63 BPM | WEIGHT: 178 LBS | HEIGHT: 68 IN

## 2022-11-02 DIAGNOSIS — I25.5 ISCHEMIC CARDIOMYOPATHY: ICD-10-CM

## 2022-11-02 LAB
AORTIC ROOT: 3.2 CM
APICAL FOUR CHAMBER EJECTION FRACTION: 37 %
FRACTIONAL SHORTENING: 19 (ref 28–44)
INTERVENTRICULAR SEPTUM IN DIASTOLE (PARASTERNAL SHORT AXIS VIEW): 1.4 CM
INTERVENTRICULAR SEPTUM: 1.4 CM (ref 0.6–1.1)
LEFT ATRIUM SIZE: 4.1 CM
LEFT INTERNAL DIMENSION IN SYSTOLE: 3.9 CM (ref 2.1–4)
LEFT VENTRICLE DIASTOLIC VOLUME (MOD BIPLANE): 176 ML
LEFT VENTRICLE SYSTOLIC VOLUME (MOD BIPLANE): 114 ML
LEFT VENTRICULAR INTERNAL DIMENSION IN DIASTOLE: 4.8 CM (ref 3.5–6)
LEFT VENTRICULAR POSTERIOR WALL IN END DIASTOLE: 1.2 CM
LEFT VENTRICULAR STROKE VOLUME: 43 ML
LV EF: 35 %
LVSV (TEICH): 43 ML
SL CV LV EF: 35
SL CV PED ECHO LEFT VENTRICLE DIASTOLIC VOLUME (MOD BIPLANE) 2D: 108 ML
SL CV PED ECHO LEFT VENTRICLE SYSTOLIC VOLUME (MOD BIPLANE) 2D: 65 ML

## 2023-01-04 NOTE — PROGRESS NOTES
Cardiology Consultation     Narciso Souza  [id number]  1960  [facility] [place]  [address] [place] [id number]      1  Coronary artery disease involving native coronary artery of native heart without angina pectoris        2  Chronic systolic heart failure (HCC)  sacubitril-valsartan (Entresto) 24-26 MG TABS      3  Mixed hyperlipidemia            Discussion/Summary:  Coronary disease   -hospitalized from 7-13 to 7- with STEMI  -[facility] 07/13/2022 showed 100% mid LAD and 50% D1, status post ALEJANDRA x1 to mid LAD  -Continue with aspirin 81 mg daily, Brilinta 90 mg b i d  and atorvastatin 40 mg daily  Heart failure with reduced ejection fraction  -ischemic cardiomyopathy  -NYHA class II, ACC/AHA stage B  -TTE 08/26/2022 showed EF 34%, grade 2 DD, mild LA, mild MR, moderate TR, mild pulmonary hypertension with estimated PA pressure of 42 mmHg  -TTE 11/2/2022 showed EF 35%, mild LA and RA, mild MR, no significant change compared to prior  -currently on Toprol XL 25 mg daily, and losartan 25 mg daily  -Will change losartan to Entresto 24/26 mg twice daily, gave 1 month of free samples  -appears euvolemic currently  -Patient reports returning his LifeVest and declines ICD placement, explained risk of ventricular arrhythmias and sudden cardiac death, patient reports understanding the risks  Hypertension   -BP well controlled on current regimen  Hyperlipidemia  - Continue with atorvastatin 40 mg daily  -Lipid panel 7/15/2022 showed total cholesterol 164, triglycerides 161, HDL 43, LDL 89  Prior CVA    History of Present Illness:  Narciso Souza is a 65y o  year old male with a past medical history of coronary artery disease, hypertension, hyperlipidemia and prior CVA  He reports feeling well today and has no complaints    He has any episodes of chest pain, shortness of breath or other cardiac symptoms at rest   He reports being up to walk around without any symptoms, but does not run due to fear for dyspnea on exertion and chest pain  Denies any lower extremity edema, or other signs of heart failure  Patient Active Problem List   Diagnosis   • Chronic systolic heart failure (HCC)   • History of ST elevation myocardial infarction (STEMI)   • Coronary artery disease   • Hyperlipidemia   • Hypertension   • Type 2 diabetes mellitus ([place] [address] )   • Seizure ([place] [address] )   • Overweight (BMI 25 0-29  9)     Past Medical History:   Diagnosis Date   • Coronary artery disease    • CVA (cerebral vascular accident) ([place] [address] )     R sided, dysarthria, March 2020 in [place]   • Heart failure [facility])    • Hyperlipidemia    • Hypertension    • Pre-diabetes    • STEMI (ST elevation myocardial infarction) ([place] [address] )      Social History     Socioeconomic History   • Marital status: Unknown     Spouse name: Not on file   • Number of children: Not on file   • Years of education: Not on file   • Highest education level: Not on file   Occupational History   • Not on file   Tobacco Use   • Smoking status: Never   • Smokeless tobacco: Never   Substance and Sexual Activity   • Alcohol use: Not on file   • Drug use: Not on file   • Sexual activity: Not on file   Other Topics Concern   • Not on file   Social History Narrative   • Not on file     Social Determinants of Health     Financial Resource Strain: Not on file   Food Insecurity: No Food Insecurity   • Worried About Running Out of Food in the Last Year: Never true   • Ran Out of Food in the Last Year: Never true   Transportation Needs: No Transportation Needs   • Lack of Transportation (Medical): No   • Lack of Transportation (Non-Medical):  No   Physical Activity: Not on file   Stress: Not on file   Social Connections: Not on file   Intimate Partner Violence: Not on file   Housing Stability: Low Risk    • Unable to Pay for Housing in the Last Year: No   • Number of Places Lived in the Last Year: 1   • Unstable Housing in the Last Year: No      Family History   Problem Relation Age of Onset • Dementia Mother    • Heart disease Father    • Dementia Maternal Aunt    • Dementia Maternal Uncle    • Cancer Maternal Uncle      Past Surgical History:   Procedure Laterality Date   • CARDIAC CATHETERIZATION N/A 7/13/2022    Procedure: Cardiac pci;  Surgeon: Chelo Nagy MD;  [facility]; Service: Cardiology   • CARDIAC CATHETERIZATION N/A 7/13/2022    Procedure: Cardiac Coronary Angiogram;  Surgeon: Chelo Nagy MD;  [facility]; Service: Cardiology   • CARDIAC CATHETERIZATION Left 7/13/2022    Procedure: Cardiac Left Heart Cath;  Surgeon: Chelo Nagy MD;  [facility]; Service: Cardiology       Current Outpatient Medications:   •  aspirin (ECOTRIN LOW STRENGTH) 81 mg EC tablet, Take 81 mg by mouth daily, Disp: , Rfl:   •  aspirin 81 mg chewable tablet, Chew 1 tablet (81 mg total) daily, Disp: 30 tablet, Rfl: 5  •  atorvastatin (LIPITOR) 40 mg tablet, TAKE 1 TABLET BY MOUTH DAILY WITH DINNER, Disp: 90 tablet, Rfl: 2  •  metoprolol succinate (TOPROL-XL) 25 mg 24 hr tablet, TAKE 1 TABLET (25 MG TOTAL) BY MOUTH DAILY  , Disp: 90 tablet, Rfl: 2  •  nitroglycerin (NITROSTAT) 0 4 mg SL tablet, Place 1 tablet (0 4 mg total) under the tongue every 5 (five) minutes as needed for chest pain for up to 3 days, Disp: 10 tablet, Rfl: 0  •  sacubitril-valsartan (Entresto) 24-26 MG TABS, Take 1 tablet by mouth 2 (two) times a day, Disp: 60 tablet, Rfl: 6  •  ticagrelor (BRILINTA) 90 MG, Take 1 tablet (90 mg total) by mouth 2 (two) times a day, Disp: 60 tablet, Rfl: 5  Allergies   Allergen Reactions   • Pollen Extract Allergic Rhinitis         Labs:  Lab Results   Component Value Date    ALT 17 07/13/2022    AST 23 07/13/2022    BUN 16 07/16/2022    CALCIUM 9 1 07/16/2022     07/16/2022    CO2 25 07/16/2022    CREATININE 0 75 07/16/2022    HDL 43 07/15/2022    HCT 40 6 07/15/2022    HGB 13 2 07/15/2022    HGBA1C 5 8 (H) 07/13/2022    MG 1 8 (L) 07/16/2022 PHOS 2 9 07/15/2022     07/15/2022    K 4 0 2022    TRIG 161 (H) 07/15/2022    WBC 8 87 07/15/2022       Imaging: Echo follow up/limited w/ contrast if indicated    Result Date: 2022  Narrative: Technically adequate transthoracic echocardiogram study  1  Mildly dilated left ventricular cavity with mild concentric left ventricular hypertrophy with markedly reduced left ventricular systolic function  Global hypokinesis with regional wall motion variability  Ejection fraction is estimated as around 34%  Grade 2 diastolic dysfunction  2  Normal right ventricular size and systolic function  3  Mild with left atrial cavity enlargement by visual assessment  4  Aortic valve sclerosis, trace to mild aortic valve regurgitation  5  Mitral valve sclerosis, mild mitral valve regurgitation  6  Moderate tricuspid valve regurgitation with eccentric regurgitation jet  7  Mild pulmonary hypertension  Estimated RVSP/PASP is 42 mmHg  8  No pericardial effusion  Compared to report of previous echocardiogram from 2022 left ventricular function remains reduced the may be minimally better compared to before  Mild pulmonary hypertension  and trace to mild aortic valve regurgitation are new  EC2022 sinus rhythm with first-degree AV block, PACs, LAD, nonspecific ST T wave changes    Review of Systems:  Review of Systems   Constitutional: Negative for chills, fatigue and fever  HENT: Negative for congestion  Eyes: Negative for photophobia and visual disturbance  Respiratory: Negative for apnea, chest tightness and shortness of breath  Cardiovascular: Negative for chest pain, palpitations and leg swelling  Gastrointestinal: Negative for abdominal distention, diarrhea, nausea and vomiting  Genitourinary: Negative for difficulty urinating  Musculoskeletal: Negative for arthralgias and back pain  Skin: Negative for color change, pallor and rash     Neurological: Negative for dizziness, syncope, light-headedness and headaches  Psychiatric/Behavioral: Negative for agitation and confusion           Vitals:    01/05/23 1308   BP: 106/68   Pulse: 58      Vitals:    01/05/23 1308   Weight: 77 1 kg (170 lb)     Height: 5' 8" (172 7 cm)     Physical Exam:  General appearance:  Appears stated age, alert, well appearing and in no distress  HEENT:  PERRLA, EOMI, no scleral icterus, no conjunctival pallor  NECK:  Supple, No elevated JVP, no thyromegaly, no carotid bruits  HEART:  Regular rate and rhythm, normal S1/S2, no S3/S4, no murmur or rub  LUNGS:  Clear to auscultation bilaterally  ABDOMEN:  Soft, non-tender, positive bowel sounds, no rebound or guarding, no organomegaly   EXTREMITIES:  No edema  VASCULAR:  Normal pedal pulses   SKIN: No lesions or rashes on exposed skin  NEURO:  CN II-XII intact, no focal deficits

## 2023-01-05 ENCOUNTER — TELEPHONE (OUTPATIENT)
Dept: CARDIOLOGY CLINIC | Facility: CLINIC | Age: 63
End: 2023-01-05

## 2023-01-05 ENCOUNTER — OFFICE VISIT (OUTPATIENT)
Dept: CARDIOLOGY CLINIC | Facility: CLINIC | Age: 63
End: 2023-01-05

## 2023-01-05 VITALS
DIASTOLIC BLOOD PRESSURE: 68 MMHG | HEIGHT: 68 IN | SYSTOLIC BLOOD PRESSURE: 106 MMHG | WEIGHT: 170 LBS | HEART RATE: 58 BPM | BODY MASS INDEX: 25.76 KG/M2

## 2023-01-05 DIAGNOSIS — I50.22 CHRONIC SYSTOLIC HEART FAILURE (HCC): ICD-10-CM

## 2023-01-05 DIAGNOSIS — E78.2 MIXED HYPERLIPIDEMIA: ICD-10-CM

## 2023-01-05 DIAGNOSIS — I25.10 CORONARY ARTERY DISEASE INVOLVING NATIVE CORONARY ARTERY OF NATIVE HEART WITHOUT ANGINA PECTORIS: Primary | ICD-10-CM

## 2023-01-05 RX ORDER — SACUBITRIL AND VALSARTAN 24; 26 MG/1; MG/1
1 TABLET, FILM COATED ORAL 2 TIMES DAILY
Qty: 60 TABLET | Refills: 6 | Status: SHIPPED | OUTPATIENT
Start: 2023-01-05

## 2023-01-05 NOTE — TELEPHONE ENCOUNTER
Patient here for appointment today  Dr John Olivo starting on [facility] 24-26  Samples given       LOT: [id number]  EXP: FEB 2025  DISP: 2

## 2023-02-13 DIAGNOSIS — I21.3 ST ELEVATION MYOCARDIAL INFARCTION (STEMI), UNSPECIFIED ARTERY (HCC): ICD-10-CM

## 2023-02-16 ENCOUNTER — NURSE TRIAGE (OUTPATIENT)
Dept: OTHER | Facility: OTHER | Age: 63
End: 2023-02-16

## 2023-02-16 DIAGNOSIS — I21.3 ST ELEVATION MYOCARDIAL INFARCTION (STEMI), UNSPECIFIED ARTERY (HCC): ICD-10-CM

## 2023-02-16 NOTE — TELEPHONE ENCOUNTER
Regarding: urgent refill - Brilinta  ----- Message from Tejinder Gil sent at 2/16/2023  6:00 PM EST -----  " I have been out of my medication for 1 week now and the Dr Dylon Lovell has not sent it to the pharmacy "    Medication Refill Request     Name Brilinta   Dose/Frequency 90mg, take 1 tab BID  Quantity 60  Verified pharmacy   [ [facility] on [facility] [address] [facility] [address] [address]]  Verified ordering Provider   Sayge Hazard ]  Does patient have enough for the next 3 days?  Yes [ ] No [ Moses Mckeon

## 2023-02-16 NOTE — TELEPHONE ENCOUNTER
Reason for Disposition  • [1] Caller requesting a prescription renewal (no refills left), no triage required, AND [2] triager able to renew prescription per department policy    Answer Assessment - Initial Assessment Questions  1  DRUG NAME: "What medicine do you need to have refilled?"   Brilinta    2  REFILLS REMAINING: "How many refills are remaining?" (Note: The label on the medicine or pill bottle will show how many refills are remaining  If there are no refills remaining, then a renewal may be needed )     0  4  PRESCRIBING HCP: "Who prescribed it?" Reason: If prescribed by specialist, call should be referred to that group  Cardiology    Rx refilled per protocol      Protocols used: MEDICATION REFILL AND RENEWAL CALL-ADULT-

## 2023-04-25 ENCOUNTER — OFFICE VISIT (OUTPATIENT)
Dept: FAMILY MEDICINE CLINIC | Facility: CLINIC | Age: 63
End: 2023-04-25

## 2023-04-25 VITALS
TEMPERATURE: 97.2 F | DIASTOLIC BLOOD PRESSURE: 74 MMHG | BODY MASS INDEX: 30.19 KG/M2 | HEIGHT: 68 IN | HEART RATE: 66 BPM | SYSTOLIC BLOOD PRESSURE: 122 MMHG | WEIGHT: 199.2 LBS | OXYGEN SATURATION: 96 %

## 2023-04-25 DIAGNOSIS — I25.10 CORONARY ARTERY DISEASE INVOLVING NATIVE CORONARY ARTERY OF NATIVE HEART WITHOUT ANGINA PECTORIS: ICD-10-CM

## 2023-04-25 DIAGNOSIS — I10 PRIMARY HYPERTENSION: ICD-10-CM

## 2023-04-25 DIAGNOSIS — E66.9 OBESITY (BMI 30.0-34.9): ICD-10-CM

## 2023-04-25 DIAGNOSIS — E11.9 TYPE 2 DIABETES MELLITUS WITHOUT COMPLICATION, WITHOUT LONG-TERM CURRENT USE OF INSULIN (HCC): Primary | ICD-10-CM

## 2023-04-25 DIAGNOSIS — R56.9 SEIZURE (HCC): ICD-10-CM

## 2023-04-25 DIAGNOSIS — I50.22 CHRONIC SYSTOLIC HEART FAILURE (HCC): ICD-10-CM

## 2023-04-25 LAB
CREAT UR-MCNC: 80.3 MG/DL
MICROALBUMIN UR-MCNC: 80.3 MG/L (ref 0–20)
MICROALBUMIN/CREAT 24H UR: 100 MG/G CREATININE (ref 0–30)
SL AMB POCT HEMOGLOBIN AIC: 6.2 (ref ?–6.5)

## 2023-04-25 NOTE — ASSESSMENT & PLAN NOTE
Lab Results   Component Value Date    HGBA1C 6 2 04/25/2023     Previous diagnosis, no longer on Metformin as he is well-controlled with diet alone  Continue statin and on ARB with Entresto per cardiology       Lab Results   Component Value Date    HGBA1C 5 8 (H) 07/13/2022

## 2023-04-25 NOTE — PATIENT INSTRUCTIONS
Do blood work fasting 8 hours, you may drink water only  Call for appointment with your cardiologist (heart doctor) in July [address]  [address] [facility] [phone]

## 2023-04-25 NOTE — PROGRESS NOTES
Name: Tangela Dotson      : 1960      MRN: [id number]  Encounter Provider: Rebecca Parish PA-C  Encounter Date: 2023   Encounter department: [facility]     1  Type 2 diabetes mellitus without complication, without long-term current use of insulin (HCC)  Assessment & Plan:  Lab Results   Component Value Date    HGBA1C 6 2 2023     Previous diagnosis, no longer on Metformin as he is well-controlled with diet alone  Continue statin and on ARB with Entresto per cardiology  Lab Results   Component Value Date    HGBA1C 5 8 (H) 2022       Orders:  -     POCT hemoglobin A1c  -     Microalbumin / creatinine urine ratio    2  Chronic systolic heart failure (HCC)  Assessment & Plan:  Wt Readings from Last 3 Encounters:   23 90 4 kg (199 lb 3 2 oz)   23 77 1 kg (170 lb)   22 80 7 kg (178 lb)     Ischemic cardiomyopathy, reviewed last cardiology consult note in 2023  Switched from [facility] to [facility] by cardiology at most recent visit  No signs of fluid overload  Did gain significant weight since last visit, lungs CTA b/l, no LE swelling, no orthopnea  Patient attributes weight gain to quitting second job  CAD with hx of stent, decreased EF but patient refused ICD placement as recommended by cardiology  Discussed with patient today, not agreeable, but compliant with his medications  3  Coronary artery disease involving native coronary artery of native heart without angina pectoris  Assessment & Plan:  Reviewed last cardiology consult note in 2023  Continue statin, asa 81mg, Brilinta for hx of stent  No chest pain, not requiring nitroglycerin  4  Primary hypertension  Assessment & Plan:  Well-controlled with switch from [facility] to [facility] with most recent cardiology consult in 2023  Continue metoprolol 25mg once daily         5  Seizure [facility])  Assessment & Plan:  No seizures in 6 years, adult onset with diagnosis of diabetes, was previously on Lamictal but was discontinued, consider neurology referral, patient declined today  Orders:  -     Ambulatory Referral to Neurology; Future    6  Obesity (BMI 30 0-34  9)  Assessment & Plan:  29 lb weight gain since last visit due to quitting second job and less active, recommend diet/exercise, avoid excessive weight gain  *Subjective      Saad Goldman is a 65 y o  male with a h/o diabetes, CVA, CAD with stent last year, CHF who presents for routine follow-up after recently establishing care after moving from [facility] to live with his brother  No longer working part time job at [employer]  No longer with R sided weakness, resolved and has EMG scheduled for June that he was not aware of  He denies smoking or alcohol use  No history of illicit drug use  Patient is a difficult historian due to apathetic about health  Gained weight, patient reports due to not working as much and eating more and being more sedentary  Review of Systems   Constitutional: Negative for fever and unexpected weight change  Respiratory: Negative for shortness of breath  Cardiovascular: Negative for chest pain, palpitations and leg swelling  Genitourinary: Negative for dysuria  Current Outpatient Medications on File Prior to Visit   Medication Sig   • atorvastatin (LIPITOR) 40 mg tablet TAKE 1 TABLET BY MOUTH DAILY WITH DINNER   • sacubitril-valsartan (Entresto) 24-26 MG TABS Take 1 tablet by mouth 2 (two) times a day   • ticagrelor (BRILINTA) 90 MG Take 1 tablet (90 mg total) by mouth 2 (two) times a day   • aspirin 81 mg chewable tablet Chew 1 tablet (81 mg total) daily   • metoprolol succinate (TOPROL-XL) 25 mg 24 hr tablet TAKE 1 TABLET (25 MG TOTAL) BY MOUTH DAILY     • nitroglycerin (NITROSTAT) 0 4 mg SL tablet Place 1 tablet (0 4 mg total) under the tongue every 5 (five) minutes as needed for chest pain for up to 3 days       Objective     /74 (BP Location: Left "arm, Patient Position: Sitting, Cuff Size: Large)   Pulse 66   Temp (!) 97 2 °F (36 2 °C) (Tympanic)   Ht 5' 8\" (1 727 m)   Wt 90 4 kg (199 lb 3 2 oz)   SpO2 96%   BMI 30 29 kg/m²     Physical Exam  Vitals and nursing note reviewed  Constitutional:       Appearance: Normal appearance  He is obese  HENT:      Head: Normocephalic and atraumatic  Cardiovascular:      Rate and Rhythm: Normal rate and regular rhythm  Pulses: Normal pulses  Heart sounds: Murmur heard  Pulmonary:      Effort: Pulmonary effort is normal       Breath sounds: Normal breath sounds  Neurological:      Mental Status: He is alert and oriented to person, place, and time  Mental status is at baseline         Grzegorz Case PA-C  "

## 2023-04-26 PROBLEM — E66.811 OBESITY (BMI 30.0-34.9): Status: ACTIVE | Noted: 2022-10-24

## 2023-04-26 PROBLEM — E66.9 OBESITY (BMI 30.0-34.9): Status: ACTIVE | Noted: 2022-10-24

## 2023-04-27 NOTE — ASSESSMENT & PLAN NOTE
No seizures in 6 years, adult onset with diagnosis of diabetes, was previously on Lamictal but was discontinued, consider neurology referral, patient declined today

## 2023-04-27 NOTE — ASSESSMENT & PLAN NOTE
29 lb weight gain since last visit due to quitting second job and less active, recommend diet/exercise, avoid excessive weight gain

## 2023-04-27 NOTE — ASSESSMENT & PLAN NOTE
Wt Readings from Last 3 Encounters:   04/25/23 90 4 kg (199 lb 3 2 oz)   01/05/23 77 1 kg (170 lb)   11/02/22 80 7 kg (178 lb)     Ischemic cardiomyopathy, reviewed last cardiology consult note in 1/2023  Switched from [facility] to [facility] by cardiology at most recent visit  No signs of fluid overload  Did gain significant weight since last visit, lungs CTA b/l, no LE swelling, no orthopnea  Patient attributes weight gain to quitting second job  CAD with hx of stent, decreased EF but patient refused ICD placement as recommended by cardiology  Discussed with patient today, not agreeable, but compliant with his medications

## 2023-04-27 NOTE — ASSESSMENT & PLAN NOTE
Reviewed last cardiology consult note in 1/2023  Continue statin, asa 81mg, Brilinta for hx of stent  No chest pain, not requiring nitroglycerin

## 2023-04-27 NOTE — ASSESSMENT & PLAN NOTE
Well-controlled with switch from [facility] to [facility] with most recent cardiology consult in 1/2023  Continue metoprolol 25mg once daily

## 2023-07-17 RX ORDER — FUROSEMIDE 20 MG/1
20 TABLET ORAL DAILY
COMMUNITY
Start: 2023-07-11 | End: 2023-07-18 | Stop reason: SDUPTHER

## 2023-07-18 ENCOUNTER — APPOINTMENT (OUTPATIENT)
Dept: LAB | Facility: HOSPITAL | Age: 63
End: 2023-07-18
Payer: COMMERCIAL

## 2023-07-18 ENCOUNTER — OFFICE VISIT (OUTPATIENT)
Dept: FAMILY MEDICINE CLINIC | Facility: CLINIC | Age: 63
End: 2023-07-18
Payer: COMMERCIAL

## 2023-07-18 VITALS
HEART RATE: 105 BPM | DIASTOLIC BLOOD PRESSURE: 78 MMHG | WEIGHT: 194.8 LBS | OXYGEN SATURATION: 96 % | HEIGHT: 68 IN | BODY MASS INDEX: 29.52 KG/M2 | TEMPERATURE: 97.6 F | RESPIRATION RATE: 17 BRPM | SYSTOLIC BLOOD PRESSURE: 130 MMHG

## 2023-07-18 DIAGNOSIS — E11.9 TYPE 2 DIABETES MELLITUS WITHOUT COMPLICATION, WITHOUT LONG-TERM CURRENT USE OF INSULIN (HCC): ICD-10-CM

## 2023-07-18 DIAGNOSIS — Z11.4 SCREENING FOR HIV (HUMAN IMMUNODEFICIENCY VIRUS): ICD-10-CM

## 2023-07-18 DIAGNOSIS — I10 PRIMARY HYPERTENSION: ICD-10-CM

## 2023-07-18 DIAGNOSIS — I63.9 CVA (CEREBRAL VASCULAR ACCIDENT) (HCC): ICD-10-CM

## 2023-07-18 DIAGNOSIS — I50.22 CHRONIC SYSTOLIC HEART FAILURE (HCC): Primary | ICD-10-CM

## 2023-07-18 DIAGNOSIS — I50.22 CHRONIC SYSTOLIC HEART FAILURE (HCC): ICD-10-CM

## 2023-07-18 DIAGNOSIS — I25.10 CORONARY ARTERY DISEASE INVOLVING NATIVE CORONARY ARTERY OF NATIVE HEART WITHOUT ANGINA PECTORIS: ICD-10-CM

## 2023-07-18 DIAGNOSIS — Z11.59 ENCOUNTER FOR HEPATITIS C SCREENING TEST FOR LOW RISK PATIENT: ICD-10-CM

## 2023-07-18 DIAGNOSIS — R56.9 SEIZURE (HCC): ICD-10-CM

## 2023-07-18 DIAGNOSIS — I25.5 ISCHEMIC CARDIOMYOPATHY: ICD-10-CM

## 2023-07-18 DIAGNOSIS — I21.3 ST ELEVATION MYOCARDIAL INFARCTION (STEMI), UNSPECIFIED ARTERY (HCC): ICD-10-CM

## 2023-07-18 DIAGNOSIS — Z12.5 SCREENING FOR MALIGNANT NEOPLASM OF PROSTATE: ICD-10-CM

## 2023-07-18 LAB
ALBUMIN SERPL BCP-MCNC: 3.5 G/DL (ref 3.5–5)
ALP SERPL-CCNC: 83 U/L (ref 34–104)
ALT SERPL W P-5'-P-CCNC: 42 U/L (ref 7–52)
ANION GAP SERPL CALCULATED.3IONS-SCNC: 16 MMOL/L
AST SERPL W P-5'-P-CCNC: 21 U/L (ref 13–39)
BASOPHILS # BLD AUTO: 0.06 THOUSANDS/ÂΜL (ref 0–0.1)
BASOPHILS NFR BLD AUTO: 1 % (ref 0–1)
BILIRUB SERPL-MCNC: 2.89 MG/DL (ref 0.2–1)
BUN SERPL-MCNC: 13 MG/DL (ref 5–25)
CALCIUM SERPL-MCNC: 9.1 MG/DL (ref 8.4–10.2)
CHLORIDE SERPL-SCNC: 100 MMOL/L (ref 96–108)
CHOLEST SERPL-MCNC: 139 MG/DL
CO2 SERPL-SCNC: 22 MMOL/L (ref 21–32)
CREAT SERPL-MCNC: 1.11 MG/DL (ref 0.6–1.3)
EOSINOPHIL # BLD AUTO: 0.08 THOUSAND/ÂΜL (ref 0–0.61)
EOSINOPHIL NFR BLD AUTO: 1 % (ref 0–6)
ERYTHROCYTE [DISTWIDTH] IN BLOOD BY AUTOMATED COUNT: 13.1 % (ref 11.6–15.1)
EST. AVERAGE GLUCOSE BLD GHB EST-MCNC: 126 MG/DL
GFR SERPL CREATININE-BSD FRML MDRD: 71 ML/MIN/1.73SQ M
GLUCOSE P FAST SERPL-MCNC: 90 MG/DL (ref 65–99)
HBA1C MFR BLD: 6 %
HCT VFR BLD AUTO: 51.1 % (ref 36.5–49.3)
HDLC SERPL-MCNC: 27 MG/DL
HGB BLD-MCNC: 16.4 G/DL (ref 12–17)
IMM GRANULOCYTES # BLD AUTO: 0.02 THOUSAND/UL (ref 0–0.2)
IMM GRANULOCYTES NFR BLD AUTO: 0 % (ref 0–2)
LDLC SERPL CALC-MCNC: 89 MG/DL (ref 0–100)
LYMPHOCYTES # BLD AUTO: 2.31 THOUSANDS/ÂΜL (ref 0.6–4.47)
LYMPHOCYTES NFR BLD AUTO: 32 % (ref 14–44)
MCH RBC QN AUTO: 30.6 PG (ref 26.8–34.3)
MCHC RBC AUTO-ENTMCNC: 32.1 G/DL (ref 31.4–37.4)
MCV RBC AUTO: 95 FL (ref 82–98)
MONOCYTES # BLD AUTO: 0.63 THOUSAND/ÂΜL (ref 0.17–1.22)
MONOCYTES NFR BLD AUTO: 9 % (ref 4–12)
NEUTROPHILS # BLD AUTO: 4.22 THOUSANDS/ÂΜL (ref 1.85–7.62)
NEUTS SEG NFR BLD AUTO: 57 % (ref 43–75)
NONHDLC SERPL-MCNC: 112 MG/DL
NRBC BLD AUTO-RTO: 0 /100 WBCS
PLATELET # BLD AUTO: 271 THOUSANDS/UL (ref 149–390)
PMV BLD AUTO: 9.5 FL (ref 8.9–12.7)
POTASSIUM SERPL-SCNC: 4 MMOL/L (ref 3.5–5.3)
PROT SERPL-MCNC: 6.6 G/DL (ref 6.4–8.4)
PSA SERPL-MCNC: 4.31 NG/ML (ref 0–4)
RBC # BLD AUTO: 5.36 MILLION/UL (ref 3.88–5.62)
SODIUM SERPL-SCNC: 138 MMOL/L (ref 135–147)
TRIGL SERPL-MCNC: 113 MG/DL
WBC # BLD AUTO: 7.32 THOUSAND/UL (ref 4.31–10.16)

## 2023-07-18 PROCEDURE — 83036 HEMOGLOBIN GLYCOSYLATED A1C: CPT

## 2023-07-18 PROCEDURE — 99214 OFFICE O/P EST MOD 30 MIN: CPT | Performed by: PHYSICIAN ASSISTANT

## 2023-07-18 PROCEDURE — G0103 PSA SCREENING: HCPCS

## 2023-07-18 PROCEDURE — 87389 HIV-1 AG W/HIV-1&-2 AB AG IA: CPT

## 2023-07-18 PROCEDURE — 85025 COMPLETE CBC W/AUTO DIFF WBC: CPT

## 2023-07-18 PROCEDURE — 86803 HEPATITIS C AB TEST: CPT

## 2023-07-18 PROCEDURE — 80053 COMPREHEN METABOLIC PANEL: CPT

## 2023-07-18 PROCEDURE — 80061 LIPID PANEL: CPT

## 2023-07-18 PROCEDURE — 36415 COLL VENOUS BLD VENIPUNCTURE: CPT

## 2023-07-18 RX ORDER — METOPROLOL SUCCINATE 25 MG/1
25 TABLET, EXTENDED RELEASE ORAL DAILY
Qty: 90 TABLET | Refills: 2 | Status: SHIPPED | OUTPATIENT
Start: 2023-07-18 | End: 2023-08-17

## 2023-07-18 RX ORDER — FUROSEMIDE 20 MG/1
20 TABLET ORAL DAILY
Qty: 90 TABLET | Refills: 1 | Status: SHIPPED | OUTPATIENT
Start: 2023-07-18 | End: 2024-01-14

## 2023-07-18 RX ORDER — ATORVASTATIN CALCIUM 40 MG/1
TABLET, FILM COATED ORAL
Qty: 90 TABLET | Refills: 2 | Status: SHIPPED | OUTPATIENT
Start: 2023-07-18

## 2023-07-18 NOTE — ASSESSMENT & PLAN NOTE
Wt Readings from Last 3 Encounters:   07/18/23 88.4 kg (194 lb 12.8 oz)   04/25/23 90.4 kg (199 lb 3.2 oz)   01/05/23 77.1 kg (170 lb)     Reviewed last ED visit on 7/11/23 for CHF exacerbation, patient reports that he was gaining weight and experiencing SOB for the week prior. He attributes to poor diet and sub sandwiches in the past few months. Improved LE swelling and resolved SOB with Lasix, continue same dose and check BMP. Discussed importance of low salt diet and daily weight checks. Follow-up as scheduled with cardiology in 9/2023. Reviewed CTA chest done in ER:    Small left pleural effusion with patchy opacity in the left lower lobe posteriorly either atelectasis or infiltrate.

## 2023-07-18 NOTE — PROGRESS NOTES
Name: Lorie Gonzales      : 1960      MRN: [id number]  Encounter Provider: Toshia Munoz PA-C  Encounter Date: 2023   Encounter department: [address] [place] [address]     1. Chronic systolic heart failure (HCC)  Assessment & Plan:  Wt Readings from Last 3 Encounters:   23 88.4 kg (194 lb 12.8 oz)   23 90.4 kg (199 lb 3.2 oz)   23 77.1 kg (170 lb)     Reviewed last ED visit on 23 for CHF exacerbation, patient reports that he was gaining weight and experiencing SOB for the week prior. He attributes to poor diet and sub sandwiches in the past few months. Improved LE swelling and resolved SOB with Lasix, continue same dose and check BMP. Discussed importance of low salt diet and daily weight checks. Follow-up as scheduled with cardiology in 2023. Reviewed CTA chest done in ER:    Small left pleural effusion with patchy opacity in the left lower lobe posteriorly either atelectasis or infiltrate. Orders:  -     Basic metabolic panel; Future  -     furosemide (LASIX) 20 mg tablet; Take 1 tablet (20 mg total) by mouth daily    2. Type 2 diabetes mellitus without complication, without long-term current use of insulin (HCC)  Assessment & Plan:  Well-controlled on diet alone. Lab Results   Component Value Date    HGBA1C 6.0 (H) 2023         3. Coronary artery disease involving native coronary artery of native heart without angina pectoris  Assessment & Plan: With hx of stent, continue aspirin and statin. Follow-up cardiology. 4. Ischemic cardiomyopathy  Assessment & Plan:  Previously recommended ICD placement per cardiology, patient refused. Reviewed last EF 35% in 2022. 5. Primary hypertension  Assessment & Plan:  Stable on Entresto and Metoprolol. Continue same. Recommend low salt, DASH diet and weight loss.              Russ Nguyen is a 65 y.o. male with a h/o diabetes, CVA, CAD with stent last year, CHF with acute exacerbation resulting in ER visit on 7/11/23 and was discharged with 7 days of Lasix with significant improvements. He reports 20 lb weight gain over the past several months due to poor dietary choices but the week prior to ER he was experiencing more sudden weight gain up to 203lb and shortness of breath. No chest pain, cough or fever. He is now doing a detox of the food he was eating before that were high salt and has not felt the desire to eat much since the ER visit. He feels much better. No orthopnea or FAUST. Has next cardiology appointment not until 9/2023. He denies smoking or alcohol use. No history of illicit drug use. Review of Systems   Constitutional: Positive for unexpected weight change (7 lb down since ER per patient, per record 4 lb up). Negative for appetite change (has not eaten much since discharge), chills and fever. Respiratory: Negative for shortness of breath (resolved). Cardiovascular: Positive for leg swelling (improved). Negative for chest pain and palpitations. Gastrointestinal: Negative for abdominal distention. Current Outpatient Medications on File Prior to Visit   Medication Sig   • sacubitril-valsartan (Entresto) 24-26 MG TABS Take 1 tablet by mouth 2 (two) times a day   • ticagrelor (BRILINTA) 90 MG Take 1 tablet (90 mg total) by mouth 2 (two) times a day   • [DISCONTINUED] furosemide (LASIX) 20 mg tablet Take 20 mg by mouth daily   • aspirin 81 mg chewable tablet Chew 1 tablet (81 mg total) daily   • nitroglycerin (NITROSTAT) 0.4 mg SL tablet Place 1 tablet (0.4 mg total) under the tongue every 5 (five) minutes as needed for chest pain for up to 3 days   • [DISCONTINUED] atorvastatin (LIPITOR) 40 mg tablet TAKE 1 TABLET BY MOUTH DAILY WITH DINNER   • [DISCONTINUED] metoprolol succinate (TOPROL-XL) 25 mg 24 hr tablet TAKE 1 TABLET (25 MG TOTAL) BY MOUTH DAILY.        Objective     /78 (BP Location: Right arm, Patient Position: Sitting, Cuff Size: Large)   Pulse 105   Temp 97.6 °F (36.4 °C) (Tympanic)   Resp 17   Ht 5' 8" (1.727 m)   Wt 88.4 kg (194 lb 12.8 oz)   SpO2 96%   BMI 29.62 kg/m²     Physical Exam  Vitals and nursing note reviewed. Constitutional:       Appearance: Normal appearance. HENT:      Head: Normocephalic and atraumatic. Cardiovascular:      Rate and Rhythm: Normal rate and regular rhythm. Pulses: Normal pulses. Heart sounds: Normal heart sounds. Pulmonary:      Effort: Pulmonary effort is normal.      Breath sounds: Normal breath sounds. Musculoskeletal:      Right lower leg: Edema (1+ pitting) present. Left lower leg: Edema (1+ pitting) present. Neurological:      Mental Status: He is alert and oriented to person, place, and time. Mental status is at baseline.        Fady Philip PA-C

## 2023-07-19 LAB
HCV AB SER QL: NORMAL
HIV 1+2 AB+HIV1 P24 AG SERPL QL IA: NORMAL
HIV 2 AB SERPL QL IA: NORMAL
HIV1 AB SERPL QL IA: NORMAL
HIV1 P24 AG SERPL QL IA: NORMAL

## 2023-07-19 NOTE — ASSESSMENT & PLAN NOTE
Previously recommended ICD placement per cardiology, patient refused. Reviewed last EF 35% in 11/2022.

## 2023-07-20 DIAGNOSIS — R97.20 ELEVATED PSA: ICD-10-CM

## 2023-07-20 DIAGNOSIS — R17 ELEVATED BILIRUBIN: Primary | ICD-10-CM

## 2023-08-03 ENCOUNTER — APPOINTMENT (OUTPATIENT)
Dept: LAB | Facility: HOSPITAL | Age: 63
End: 2023-08-03
Payer: COMMERCIAL

## 2023-08-03 DIAGNOSIS — R97.20 ELEVATED PSA: ICD-10-CM

## 2023-08-03 DIAGNOSIS — R17 ELEVATED BILIRUBIN: Primary | ICD-10-CM

## 2023-08-03 LAB
ANION GAP SERPL CALCULATED.3IONS-SCNC: 11 MMOL/L
BILIRUB DIRECT SERPL-MCNC: 0.77 MG/DL (ref 0–0.2)
BILIRUB SERPL-MCNC: 1.7 MG/DL (ref 0.2–1)
BUN SERPL-MCNC: 13 MG/DL (ref 5–25)
CALCIUM SERPL-MCNC: 9 MG/DL (ref 8.4–10.2)
CHLORIDE SERPL-SCNC: 101 MMOL/L (ref 96–108)
CO2 SERPL-SCNC: 25 MMOL/L (ref 21–32)
CREAT SERPL-MCNC: 0.97 MG/DL (ref 0.6–1.3)
GFR SERPL CREATININE-BSD FRML MDRD: 83 ML/MIN/1.73SQ M
GLUCOSE P FAST SERPL-MCNC: 168 MG/DL (ref 65–99)
POTASSIUM SERPL-SCNC: 4 MMOL/L (ref 3.5–5.3)
PSA SERPL-MCNC: 3.75 NG/ML (ref 0–4)
SODIUM SERPL-SCNC: 137 MMOL/L (ref 135–147)

## 2023-08-03 PROCEDURE — 82247 BILIRUBIN TOTAL: CPT

## 2023-08-03 PROCEDURE — G0103 PSA SCREENING: HCPCS

## 2023-08-03 PROCEDURE — 80048 BASIC METABOLIC PNL TOTAL CA: CPT

## 2023-08-03 PROCEDURE — 82248 BILIRUBIN DIRECT: CPT

## 2023-08-03 PROCEDURE — 36415 COLL VENOUS BLD VENIPUNCTURE: CPT

## 2023-08-21 ENCOUNTER — APPOINTMENT (EMERGENCY)
Dept: CT IMAGING | Facility: HOSPITAL | Age: 63
DRG: 065 | End: 2023-08-21
Payer: COMMERCIAL

## 2023-08-21 ENCOUNTER — HOSPITAL ENCOUNTER (INPATIENT)
Facility: HOSPITAL | Age: 63
LOS: 4 days | Discharge: HOME | DRG: 065 | End: 2023-08-25
Attending: EMERGENCY MEDICINE | Admitting: INTERNAL MEDICINE
Payer: COMMERCIAL

## 2023-08-21 DIAGNOSIS — R47.01 APHASIA: Primary | ICD-10-CM

## 2023-08-21 DIAGNOSIS — I63.9 ACUTE CVA (CEREBROVASCULAR ACCIDENT) (HCC): ICD-10-CM

## 2023-08-21 DIAGNOSIS — I63.9 CEREBROVASCULAR ACCIDENT (CVA), UNSPECIFIED MECHANISM (HCC): ICD-10-CM

## 2023-08-21 DIAGNOSIS — I50.22 CHRONIC SYSTOLIC HEART FAILURE (HCC): ICD-10-CM

## 2023-08-21 LAB
ANION GAP SERPL CALCULATED.3IONS-SCNC: 11 MMOL/L
BUN SERPL-MCNC: 12 MG/DL (ref 5–25)
CALCIUM SERPL-MCNC: 8.7 MG/DL (ref 8.4–10.2)
CHLORIDE SERPL-SCNC: 101 MMOL/L (ref 96–108)
CO2 SERPL-SCNC: 27 MMOL/L (ref 21–32)
CREAT SERPL-MCNC: 0.97 MG/DL (ref 0.6–1.3)
ERYTHROCYTE [DISTWIDTH] IN BLOOD BY AUTOMATED COUNT: 14.4 % (ref 11.6–15.1)
GFR SERPL CREATININE-BSD FRML MDRD: 83 ML/MIN/1.73SQ M
GLUCOSE SERPL-MCNC: 173 MG/DL (ref 65–140)
GLUCOSE SERPL-MCNC: 194 MG/DL (ref 65–140)
HCT VFR BLD AUTO: 47.2 % (ref 36.5–49.3)
HGB BLD-MCNC: 14.7 G/DL (ref 12–17)
MCH RBC QN AUTO: 30.7 PG (ref 26.8–34.3)
MCHC RBC AUTO-ENTMCNC: 31.1 G/DL (ref 31.4–37.4)
MCV RBC AUTO: 99 FL (ref 82–98)
PLATELET # BLD AUTO: 237 THOUSANDS/UL (ref 149–390)
PMV BLD AUTO: 9.1 FL (ref 8.9–12.7)
POTASSIUM SERPL-SCNC: 3.7 MMOL/L (ref 3.5–5.3)
RBC # BLD AUTO: 4.79 MILLION/UL (ref 3.88–5.62)
SODIUM SERPL-SCNC: 139 MMOL/L (ref 135–147)
WBC # BLD AUTO: 7.49 THOUSAND/UL (ref 4.31–10.16)

## 2023-08-21 PROCEDURE — 84443 ASSAY THYROID STIM HORMONE: CPT | Performed by: EMERGENCY MEDICINE

## 2023-08-21 PROCEDURE — 85730 THROMBOPLASTIN TIME PARTIAL: CPT | Performed by: EMERGENCY MEDICINE

## 2023-08-21 PROCEDURE — 82948 REAGENT STRIP/BLOOD GLUCOSE: CPT

## 2023-08-21 PROCEDURE — 99285 EMERGENCY DEPT VISIT HI MDM: CPT | Performed by: EMERGENCY MEDICINE

## 2023-08-21 PROCEDURE — 80048 BASIC METABOLIC PNL TOTAL CA: CPT | Performed by: EMERGENCY MEDICINE

## 2023-08-21 PROCEDURE — 36415 COLL VENOUS BLD VENIPUNCTURE: CPT | Performed by: EMERGENCY MEDICINE

## 2023-08-21 PROCEDURE — 70498 CT ANGIOGRAPHY NECK: CPT

## 2023-08-21 PROCEDURE — 80143 DRUG ASSAY ACETAMINOPHEN: CPT | Performed by: EMERGENCY MEDICINE

## 2023-08-21 PROCEDURE — 99285 EMERGENCY DEPT VISIT HI MDM: CPT

## 2023-08-21 PROCEDURE — 93005 ELECTROCARDIOGRAM TRACING: CPT

## 2023-08-21 PROCEDURE — 84484 ASSAY OF TROPONIN QUANT: CPT | Performed by: EMERGENCY MEDICINE

## 2023-08-21 PROCEDURE — 70496 CT ANGIOGRAPHY HEAD: CPT

## 2023-08-21 PROCEDURE — 0241U HB NFCT DS VIR RESP RNA 4 TRGT: CPT | Performed by: EMERGENCY MEDICINE

## 2023-08-21 PROCEDURE — 80179 DRUG ASSAY SALICYLATE: CPT | Performed by: EMERGENCY MEDICINE

## 2023-08-21 PROCEDURE — 82077 ASSAY SPEC XCP UR&BREATH IA: CPT | Performed by: EMERGENCY MEDICINE

## 2023-08-21 PROCEDURE — 85610 PROTHROMBIN TIME: CPT | Performed by: EMERGENCY MEDICINE

## 2023-08-21 PROCEDURE — 85027 COMPLETE CBC AUTOMATED: CPT | Performed by: EMERGENCY MEDICINE

## 2023-08-21 RX ORDER — ASPIRIN 325 MG
325 TABLET ORAL ONCE
Status: COMPLETED | OUTPATIENT
Start: 2023-08-22 | End: 2023-08-21

## 2023-08-21 RX ADMIN — ASPIRIN 325 MG ORAL TABLET 325 MG: 325 PILL ORAL at 23:55

## 2023-08-21 RX ADMIN — IOHEXOL 85 ML: 350 INJECTION, SOLUTION INTRAVENOUS at 23:27

## 2023-08-22 ENCOUNTER — APPOINTMENT (INPATIENT)
Dept: NON INVASIVE DIAGNOSTICS | Facility: HOSPITAL | Age: 63
DRG: 065 | End: 2023-08-22
Payer: COMMERCIAL

## 2023-08-22 PROBLEM — I63.9 ACUTE CVA (CEREBROVASCULAR ACCIDENT) (HCC): Status: ACTIVE | Noted: 2023-08-22

## 2023-08-22 LAB
2HR DELTA HS TROPONIN: 5 NG/L
4HR DELTA HS TROPONIN: 13 NG/L
ANION GAP SERPL CALCULATED.3IONS-SCNC: 9 MMOL/L
AORTIC ROOT: 3.1 CM
APAP SERPL-MCNC: <10 UG/ML (ref 10–20)
APICAL FOUR CHAMBER EJECTION FRACTION: 16 %
APTT PPP: 28 SECONDS (ref 23–37)
ASCENDING AORTA: 2.6 CM
BUN SERPL-MCNC: 10 MG/DL (ref 5–25)
CALCIUM SERPL-MCNC: 8.2 MG/DL (ref 8.4–10.2)
CARDIAC TROPONIN I PNL SERPL HS: 29 NG/L
CARDIAC TROPONIN I PNL SERPL HS: 34 NG/L
CARDIAC TROPONIN I PNL SERPL HS: 42 NG/L
CHLORIDE SERPL-SCNC: 104 MMOL/L (ref 96–108)
CHOLEST SERPL-MCNC: 155 MG/DL
CO2 SERPL-SCNC: 27 MMOL/L (ref 21–32)
CREAT SERPL-MCNC: 0.85 MG/DL (ref 0.6–1.3)
ERYTHROCYTE [DISTWIDTH] IN BLOOD BY AUTOMATED COUNT: 14.3 % (ref 11.6–15.1)
EST. AVERAGE GLUCOSE BLD GHB EST-MCNC: 134 MG/DL
ETHANOL SERPL-MCNC: 11 MG/DL
FLUAV RNA RESP QL NAA+PROBE: NEGATIVE
FLUBV RNA RESP QL NAA+PROBE: NEGATIVE
GFR SERPL CREATININE-BSD FRML MDRD: 94 ML/MIN/1.73SQ M
GLUCOSE SERPL-MCNC: 99 MG/DL (ref 65–140)
HBA1C MFR BLD: 6.3 %
HCT VFR BLD AUTO: 41.5 % (ref 36.5–49.3)
HDLC SERPL-MCNC: 28 MG/DL
HGB BLD-MCNC: 13.1 G/DL (ref 12–17)
INR PPP: 1.35 (ref 0.84–1.19)
INTERVENTRICULAR SEPTUM IN DIASTOLE (PARASTERNAL SHORT AXIS VIEW): 0.8 CM
INTERVENTRICULAR SEPTUM: 0.8 CM (ref 0.6–1.1)
LAAS-AP4: 16.8 CM2
LDLC SERPL CALC-MCNC: 109 MG/DL (ref 0–100)
LEFT ATRIUM SIZE: 4.2 CM
LEFT INTERNAL DIMENSION IN SYSTOLE: 4.9 CM (ref 2.1–4)
LEFT VENTRICLE DIASTOLIC VOLUME (MOD BIPLANE): 116 ML
LEFT VENTRICLE SYSTOLIC VOLUME (MOD BIPLANE): 99 ML
LEFT VENTRICULAR POSTERIOR WALL IN END DIASTOLE: 0.9 CM
LEFT VENTRICULAR STROKE VOLUME: 54 ML
LV EF: 15 %
LVSV (TEICH): 54 ML
MCH RBC QN AUTO: 31 PG (ref 26.8–34.3)
MCHC RBC AUTO-ENTMCNC: 31.6 G/DL (ref 31.4–37.4)
MCV RBC AUTO: 98 FL (ref 82–98)
MV E'TISSUE VEL-LAT: 7 CM/S
MV E'TISSUE VEL-SEP: 5 CM/S
PA ADP BLD-ACNC: 204 PRU (ref 194–418)
PLATELET # BLD AUTO: 211 THOUSANDS/UL (ref 149–390)
PMV BLD AUTO: 9 FL (ref 8.9–12.7)
POTASSIUM SERPL-SCNC: 3.2 MMOL/L (ref 3.5–5.3)
PROTHROMBIN TIME: 17.4 SECONDS (ref 11.6–14.5)
RA PRESSURE ESTIMATED: 8 MMHG
RBC # BLD AUTO: 4.23 MILLION/UL (ref 3.88–5.62)
RIGHT ATRIUM AREA SYSTOLE A4C: 31.2 CM2
RIGHT VENTRICLE ID DIMENSION: 4.9 CM
RSV RNA RESP QL NAA+PROBE: NEGATIVE
RV PSP: 46 MMHG
SALICYLATES SERPL-MCNC: <5 MG/DL (ref 3–20)
SARS-COV-2 RNA RESP QL NAA+PROBE: NEGATIVE
SL CV LV EF: 20
SL CV PED ECHO LEFT VENTRICLE DIASTOLIC VOLUME (MOD BIPLANE) 2D: 166 ML
SL CV PED ECHO LEFT VENTRICLE SYSTOLIC VOLUME (MOD BIPLANE) 2D: 112 ML
SODIUM SERPL-SCNC: 140 MMOL/L (ref 135–147)
TR MAX PG: 38 MMHG
TR PEAK VELOCITY: 3.1 M/S
TRICUSPID ANNULAR PLANE SYSTOLIC EXCURSION: 1.3 CM
TRICUSPID VALVE PEAK REGURGITATION VELOCITY: 3.09 M/S
TRIGL SERPL-MCNC: 91 MG/DL
TSH SERPL DL<=0.05 MIU/L-ACNC: 3.19 UIU/ML (ref 0.45–4.5)
WBC # BLD AUTO: 6.58 THOUSAND/UL (ref 4.31–10.16)

## 2023-08-22 PROCEDURE — 84484 ASSAY OF TROPONIN QUANT: CPT

## 2023-08-22 PROCEDURE — 92610 EVALUATE SWALLOWING FUNCTION: CPT

## 2023-08-22 PROCEDURE — 80048 BASIC METABOLIC PNL TOTAL CA: CPT

## 2023-08-22 PROCEDURE — 93306 TTE W/DOPPLER COMPLETE: CPT

## 2023-08-22 PROCEDURE — 93306 TTE W/DOPPLER COMPLETE: CPT | Performed by: INTERNAL MEDICINE

## 2023-08-22 PROCEDURE — 97163 PT EVAL HIGH COMPLEX 45 MIN: CPT

## 2023-08-22 PROCEDURE — 85027 COMPLETE CBC AUTOMATED: CPT

## 2023-08-22 PROCEDURE — 99223 1ST HOSP IP/OBS HIGH 75: CPT | Performed by: INTERNAL MEDICINE

## 2023-08-22 PROCEDURE — 36415 COLL VENOUS BLD VENIPUNCTURE: CPT

## 2023-08-22 PROCEDURE — 97167 OT EVAL HIGH COMPLEX 60 MIN: CPT

## 2023-08-22 PROCEDURE — 83036 HEMOGLOBIN GLYCOSYLATED A1C: CPT

## 2023-08-22 PROCEDURE — 80061 LIPID PANEL: CPT

## 2023-08-22 PROCEDURE — 85576 BLOOD PLATELET AGGREGATION: CPT

## 2023-08-22 RX ORDER — ASPIRIN 81 MG/1
81 TABLET, CHEWABLE ORAL DAILY
Status: DISCONTINUED | OUTPATIENT
Start: 2023-08-22 | End: 2023-08-25 | Stop reason: HOSPADM

## 2023-08-22 RX ORDER — METOPROLOL SUCCINATE 25 MG/1
25 TABLET, EXTENDED RELEASE ORAL DAILY
Status: DISCONTINUED | OUTPATIENT
Start: 2023-08-22 | End: 2023-08-22

## 2023-08-22 RX ORDER — FUROSEMIDE 40 MG/1
20 TABLET ORAL DAILY
Status: DISCONTINUED | OUTPATIENT
Start: 2023-08-22 | End: 2023-08-22

## 2023-08-22 RX ORDER — ENOXAPARIN SODIUM 100 MG/ML
40 INJECTION SUBCUTANEOUS DAILY
Status: DISCONTINUED | OUTPATIENT
Start: 2023-08-22 | End: 2023-08-25 | Stop reason: HOSPADM

## 2023-08-22 RX ORDER — ATORVASTATIN CALCIUM 40 MG/1
40 TABLET, FILM COATED ORAL
Status: DISCONTINUED | OUTPATIENT
Start: 2023-08-22 | End: 2023-08-24

## 2023-08-22 RX ORDER — POTASSIUM CHLORIDE 14.9 MG/ML
20 INJECTION INTRAVENOUS
Status: DISPENSED | OUTPATIENT
Start: 2023-08-22 | End: 2023-08-22

## 2023-08-22 RX ADMIN — TICAGRELOR 90 MG: 90 TABLET ORAL at 18:20

## 2023-08-22 RX ADMIN — LEVETIRACETAM 2000 MG: 100 INJECTION, SOLUTION INTRAVENOUS at 00:02

## 2023-08-22 RX ADMIN — ATORVASTATIN CALCIUM 40 MG: 40 TABLET, FILM COATED ORAL at 18:20

## 2023-08-22 RX ADMIN — POTASSIUM CHLORIDE 20 MEQ: 14.9 INJECTION, SOLUTION INTRAVENOUS at 07:08

## 2023-08-22 RX ADMIN — ASPIRIN 81 MG 81 MG: 81 TABLET ORAL at 18:20

## 2023-08-22 NOTE — PLAN OF CARE
Problem: MOBILITY - ADULT  Goal: Maintain or return to baseline ADL function  Description: INTERVENTIONS:  -  Assess patient's ability to carry out ADLs; assess patient's baseline for ADL function and identify physical deficits which impact ability to perform ADLs (bathing, care of mouth/teeth, toileting, grooming, dressing, etc.)  - Assess/evaluate cause of self-care deficits   - Assess range of motion  - Assess patient's mobility; develop plan if impaired  - Assess patient's need for assistive devices and provide as appropriate  - Encourage maximum independence but intervene and supervise when necessary  - Involve family in performance of ADLs  - Assess for home care needs following discharge   - Consider OT consult to assist with ADL evaluation and planning for discharge  - Provide patient education as appropriate  Outcome: Progressing  Goal: Maintains/Returns to pre admission functional level  Description: INTERVENTIONS:  - Perform BMAT or MOVE assessment daily.   - Set and communicate daily mobility goal to care team and patient/family/caregiver.    - Collaborate with rehabilitation services on mobility goals if consulted  - Ambulate patient 3 times a day  - Out of bed to chair 2 times a day   - Out of bed for meals 2 times a day  - Out of bed for toileting  - Record patient progress and toleration of activity level   Outcome: Progressing     Problem: NEUROSENSORY - ADULT  Goal: Achieves stable or improved neurological status  Description: INTERVENTIONS  - Monitor and report changes in neurological status  - Monitor vital signs such as temperature, blood pressure, glucose, and any other labs ordered   - Initiate measures to prevent increased intracranial pressure  - Monitor for seizure activity and implement precautions if appropriate      Outcome: Progressing  Goal: Remains free of injury related to seizures activity  Description: INTERVENTIONS  - Maintain airway, patient safety  and administer oxygen as ordered  - Monitor patient for seizure activity, document and report duration and description of seizure to physician/advanced practitioner  - If seizure occurs,  ensure patient safety during seizure  - Reorient patient post seizure  - Seizure pads on all 4 side rails  - Instruct patient/family to notify RN of any seizure activity including if an aura is experienced  - Instruct patient/family to call for assistance with activity based on nursing assessment  - Administer anti-seizure medications if ordered    Outcome: Progressing  Goal: Achieves maximal functionality and self care  Description: INTERVENTIONS  - Monitor swallowing and airway patency with patient fatigue and changes in neurological status  - Encourage and assist patient to increase activity and self care.    - Encourage visually impaired, hearing impaired and aphasic patients to use assistive/communication devices  Outcome: Progressing     Problem: CARDIOVASCULAR - ADULT  Goal: Maintains optimal cardiac output and hemodynamic stability  Description: INTERVENTIONS:  - Monitor I/O, vital signs and rhythm  - Monitor for S/S and trends of decreased cardiac output  - Administer and titrate ordered vasoactive medications to optimize hemodynamic stability  - Assess quality of pulses, skin color and temperature  - Assess for signs of decreased coronary artery perfusion  - Instruct patient to report change in severity of symptoms  Outcome: Progressing  Goal: Absence of cardiac dysrhythmias or at baseline rhythm  Description: INTERVENTIONS:  - Continuous cardiac monitoring, vital signs, obtain 12 lead EKG if ordered  - Administer antiarrhythmic and heart rate control medications as ordered  - Monitor electrolytes and administer replacement therapy as ordered  Outcome: Progressing

## 2023-08-22 NOTE — ASSESSMENT & PLAN NOTE
- Hx of PCI, thrombectomy, stent 7/2022  - P2Y12 inhibitor 204, wnl - unsure if pt is unresponsive or not compliant    Plan:  - Continue home ticagrelor and Aspirin  - Cardiology consulted, appreciate recommendations

## 2023-08-22 NOTE — H&P
[facility]  H&P  Name: Baldev Parra 65 y.o. male I MRN: [id number]  Unit/Bed#: ED-20 I Date of Admission: 8/21/2023   Date of Service: 8/22/2023 I Hospital Day: 1      Assessment/Plan   * Acute CVA (cerebrovascular accident) [facility])  Assessment & Plan  · Presenting w/ aphasia. · Hx of CVA in 2020   · Per ED discussion with Neurology - not a thrombolytic candidate   · Loaded with Keppra in ED for concern of seizure   · On exam, aphasic without any other neurologic deficits  · NIH scale score  = 3, mod-severe aphasia   · 7/18 - HbA1c 6%, Lipid panel WNL except for low HDL   · CT stroke alert vanita - Acute to subacute left MCA territory infarct in the inferior parietal temporal region. No hemorrhagic conversion or significant mass effect  · CTA H/N - No hemodynamically significant stenosis, dissection or occlusion noted. · 24 hrs of permissive HTN   · Continue statin, Aspirin 81 mg   · MRI brain   · Neurology consult   · Echocardiogram   · Telemetry x 24 H   · Neuro checks   · NPO pending Speech therapy eval  · PT and OT eval     Chronic systolic heart failure (HCC)  Assessment & Plan  Wt Readings from Last 3 Encounters:   08/21/23 89.4 kg (197 lb 1.5 oz)   07/18/23 88.4 kg (194 lb 12.8 oz)   04/25/23 90.4 kg (199 lb 3.2 oz)     · Most recent ECHO - EF 35%, multiple akinetic areas. · Per chart review - Follows with [address]  [facility]. In the past refused use of LifeVest and ICD placement   · Home regimen: Entresto 24-26 mg BID, toprol-XL 25 mg daily, lasix 20 mg daily   · Examines overloaded - B/L LE edema +1 pitting, chest clear, no JVD     · Continue home regimen, except for entresto. Holding given stroke pathway    · Given soft BP, holding IV dose of lasix at this time   · I/Os, Daily Wts   · Holding off on checking BNP given use of Entresto. No NT-proBNP at our lab.      Hypertension  Assessment & Plan  Blood Pressure: 99/65    · Continue home Toprol XL 25 mg daily and Lasix 20 gm daily, with holding parameters to avoid hypotension     Hyperlipidemia  Assessment & Plan  · Most recent LP 7/18/2023 WNL except low HDL     · Continue home atorvastatin 40 mg daily     Coronary artery disease  Assessment & Plan  · Hx of PCI, thrombectomy, stent 7/2022     · Continue home ticagrelor and Aspirin        VTE Pharmacologic Prophylaxis: VTE Score: 9 High Risk (Score >/= 5) - Pharmacological DVT Prophylaxis Ordered: enoxaparin (Lovenox). Sequential Compression Devices Ordered. Code Status: Level 1 - Full Code   Discussion with family: Patient declined call to . Anticipated Length of Stay: Patient will be admitted on an inpatient basis with an anticipated length of stay of greater than 2 midnights secondary to STROKE/TIA/SEIZURE. Chief Complaint: Aphasia     History of Present Illness:   Carolin Sharp is a 65 y.o. male with a PMH of CHF, CAD/NSTEMI s/p stent, and CVA (2020) who presents with aphasia. Patient is a poor historian given the aphasia. Patient is able to respond with yes and no mainly. Rest of history from ED provider. Was noted to be aphasic at work. Without loss of consciousness, unresponsiveness, or weakness. Given concern for seizure was loaded with keppra in the ED. ED provider discussed with Neurology - deemed not a candidate for thrombolytic therapy and likely early stroke is main etiology. CT noted acute/subacute stroke in L MCA territory in the inferior parietal temporal region. Labs within normal limit. No additional neuro deficits on exam except aphasia. Does have B/L LE pitting edema. Will be admitted for continued management of acute/subacute stroke.      Review of Systems:  Review of Systems   Unable to perform ROS: Other (GIven aphasia)       Past Medical and Surgical History:   Past Medical History:   Diagnosis Date   • Coronary artery disease    • CVA (cerebral vascular accident) ([address])     R sided, dysarthria, March 2020 in [place]   • Heart failure ([address]) • Hyperlipidemia    • Hypertension    • Pre-diabetes    • STEMI (ST elevation myocardial infarction) [facility])        Past Surgical History:   Procedure Laterality Date   • CARDIAC CATHETERIZATION N/A 7/13/2022    Procedure: Cardiac pci;  Surgeon: Beverly Hyde MD;  [facility]: [facility] CARD[facility]; Service: Cardiology   • CARDIAC CATHETERIZATION N/A 7/13/2022    Procedure: Cardiac Coronary Angiogram;  Surgeon: Beverly Hyde MD;  [facility]; Service: Cardiology   • CARDIAC CATHETERIZATION Left 7/13/2022    Procedure: Cardiac Left Heart Cath;  Surgeon: Beverly Hyde MD;  [facility] CARDIAC [facility]; Service: Cardiology       Meds/Allergies:  Prior to Admission medications    Medication Sig Start Date End Date Taking? Authorizing Provider   aspirin 81 mg chewable tablet Chew 1 tablet (81 mg total) daily 9/8/22 1/5/23  Yessy Scruggs MD   atorvastatin (LIPITOR) 40 mg tablet TAKE 1 TABLET BY MOUTH EVERY DAY WITH DINNER 7/18/23   Yessy Scruggs MD   furosemide (LASIX) 20 mg tablet Take 1 tablet (20 mg total) by mouth daily 7/18/23 1/14/24  Haydee Hernández PA-C   metoprolol succinate (TOPROL-XL) 25 mg 24 hr tablet TAKE 1 TABLET (25 MG TOTAL) BY MOUTH DAILY. 7/18/23 8/17/23  Yessy Scruggs MD   nitroglycerin (NITROSTAT) 0.4 mg SL tablet Place 1 tablet (0.4 mg total) under the tongue every 5 (five) minutes as needed for chest pain for up to 3 days 7/16/22 1/5/23  May Marily Shafer MD   sacubitril-valsartan [facility]) 24-26 MG TABS Take 1 tablet by mouth 2 (two) times a day 1/5/23   Yessy Scruggs MD   [facility]) 90 MG Take 1 tablet (90 mg total) by mouth 2 (two) times a day 2/16/23   Yessy Scruggs MD     I have reviewed home medications with patient personally. Allergies:    Allergies   Allergen Reactions   • Pollen Extract Allergic Rhinitis       Social History:  Marital Status: Unknown   Occupation: Works at [employer]  Patient Pre-hospital Living Situation: Home  Patient Pre-hospital Level of Mobility: unable to be assessed at time of evaluation  Patient Pre-hospital Diet Restrictions: Unable to assess given aphasia  Substance Use History:   Social History     Substance and Sexual Activity   Alcohol Use None     Social History     Tobacco Use   Smoking Status Never   Smokeless Tobacco Never     Social History     Substance and Sexual Activity   Drug Use Not on file       Family History:  Family History   Problem Relation Age of Onset   • Dementia Mother    • Heart disease Father    • Dementia Maternal Aunt    • Dementia Maternal Uncle    • Cancer Maternal Uncle        Physical Exam:     Vitals:   Blood Pressure: 102/77 (08/22/23 0630)  Pulse: 85 (08/22/23 0630)  Temperature: 98.4 °F (36.9 °C) (08/22/23 0426)  Temp Source: Oral (08/22/23 0426)  Respirations: 18 (08/22/23 0630)  Weight - Scale: 89.4 kg (197 lb 1.5 oz) (08/21/23 2335)  SpO2: 97 % (08/22/23 0630)    Physical Exam  Vitals reviewed. Constitutional:       General: He is not in acute distress. Appearance: Normal appearance. He is overweight. He is not ill-appearing. HENT:      Head: Normocephalic and atraumatic. Mouth/Throat:      Mouth: Mucous membranes are moist.      Pharynx: Oropharynx is clear. Eyes:      General: No scleral icterus. Conjunctiva/sclera: Conjunctivae normal.      Pupils: Pupils are equal, round, and reactive to light. Cardiovascular:      Rate and Rhythm: Normal rate and regular rhythm. Pulses: Normal pulses. Heart sounds: No murmur heard. No gallop. Pulmonary:      Effort: Pulmonary effort is normal. No respiratory distress. Breath sounds: Normal breath sounds. No wheezing or rales. Abdominal:      General: Bowel sounds are normal. There is no distension. Palpations: Abdomen is soft. There is no mass. Tenderness: There is no abdominal tenderness. Musculoskeletal:         General: No tenderness.  Normal range of motion. Cervical back: Normal range of motion and neck supple. No tenderness. Right lower le+ Pitting Edema present. Left lower leg: Pitting Edema present. Skin:     General: Skin is warm and dry. Capillary Refill: Capillary refill takes less than 2 seconds. Coloration: Skin is not jaundiced or pale. Neurological:      Mental Status: He is alert. Cranial Nerves: Cranial nerves 2-12 are intact. Sensory: No sensory deficit. Motor: Motor function is intact. No weakness, tremor or abnormal muscle tone. Coordination: Coordination is intact. Finger-Nose-Finger Test and Heel to [facility] Test normal.      Deep Tendon Reflexes: Reflexes normal.      Comments: Aphasia, unable to assess orientation   Psychiatric:         Mood and Affect: Mood normal.         Behavior: Behavior normal.          Additional Data:     Lab Results:  Results from last 7 days   Lab Units 23  0425   WBC Thousand/uL 6.58   HEMOGLOBIN g/dL 13.1   HEMATOCRIT % 41.5   PLATELETS Thousands/uL 211     Results from last 7 days   Lab Units 23  0425   SODIUM mmol/L 140   POTASSIUM mmol/L 3.2*   CHLORIDE mmol/L 104   CO2 mmol/L 27   BUN mg/dL 10   CREATININE mg/dL 0.85   ANION GAP mmol/L 9   CALCIUM mg/dL 8.2*   GLUCOSE RANDOM mg/dL 99     Results from last 7 days   Lab Units 23  2328   INR  1.35*     Results from last 7 days   Lab Units 23  2333   POC GLUCOSE mg/dl 194*               Lines/Drains:  Invasive Devices     Peripheral Intravenous Line  Duration           Peripheral IV 23 Dorsal (posterior); Left Hand <1 day    Peripheral IV 23 Left Antecubital <1 day                    Imaging: Reviewed radiology reports from this admission including: CT head and CTA H/N  CT stroke alert brain   Final Result by Vance Oliveira MD ( 0003)      Acute to subacute left MCA territory infarct in the inferior parietal temporal region.  No hemorrhagic conversion or significant mass effect. Findings were directly discussed with Johnny Mcdonnell at  11:52 PM  .      Workstation performed: [id number]         CTA stroke alert (head/neck)   Final Result by Jessica Rangel MD (08/22 0003)      No hemodynamically significant stenosis, dissection or occlusion of the carotid or vertebral arteries or major vessels of the [place]. Findings were directly discussed with Johnny Mcdonnell at  12:00 AM  .                           Workstation performed: [id number]         MRI inpatient order    (Results Pending)       EKG and Other Studies Reviewed on Admission:   · EKG: NSR. HR 98. QTc 592. ** Please Note: This note has been constructed using a voice recognition system.  **

## 2023-08-22 NOTE — PLAN OF CARE
Problem: PHYSICAL THERAPY ADULT  Goal: Performs mobility at highest level of function for planned discharge setting. See evaluation for individualized goals. Description: Treatment/Interventions: ADL retraining, Functional transfer training, LE strengthening/ROM, Elevations, Therapeutic exercise, Endurance training, Cognitive reorientation, Patient/family training, Equipment eval/education, Bed mobility, Gait training, Compensatory technique education, Spoke to nursing, Spoke to case management, OT          See flowsheet documentation for full assessment, interventions and recommendations. Note:    Problem List: Decreased endurance, Decreased strength, Impaired balance, Decreased mobility, Decreased cognition, Impaired judgement, Decreased safety awareness (aphasia)  Assessment: Patient seen for Physical Therapy evaluation. Patient admitted with Acute CVA (cerebrovascular accident) ([address] Comorbidities affecting patient's physical performance include: CHF, CAD, HLD, HTN, STEMI, DM2, seizure, ischemic cardiomyopathy, obesity. Personal factors affecting patient at time of initial evaluation include: lives in one story house, stairs to enter home, inability to navigate community distances, inability to navigate level surfaces without external assistance, inability to perform dynamic tasks in community, decreased cognition, limited home support, decreased initiation and engagement and limited insight into impairments. Prior to admission, patient was independent with functional mobility without assistive device, independent with ADLS, living with brother? in a one level home with 3 steps to enter, ambulating household distance and works full time.   Please find objective findings from Physical Therapy assessment regarding body systems outlined above with impairments and limitations including weakness, impaired balance, decreased endurance, gait deviations, decreased activity tolerance, decreased functional mobility tolerance, decreased safety awareness, impaired judgement, fall risk and decreased cognition. The Barthel Index was used as a functional outcome tool presenting with a score of Barthel Index Score: 50 today indicating marked limitations of functional mobility and ADLS. Patient's clinical presentation is currently unstable/unpredictable as seen in patient's presentation of vital sign response, varying levels of cognitive performance, increased fall risk, new onset of impairment of functional mobility, decreased endurance and new onset of weakness. Pt would benefit from continued Physical Therapy treatment to address deficits as defined above and maximize level of functional mobility. As demonstrated by objective findings, the assigned level of complexity for this evaluation is high. The patient's AM-[facility] Basic Mobility Inpatient Short Form Raw Score is 18. A Raw score of greater than 16 suggests the patient may benefit from discharge to home. Please also refer to the recommendation of the Physical Therapist for safe discharge planning. PT Discharge Recommendation: Post acute rehabilitation services    See flowsheet documentation for full assessment.

## 2023-08-22 NOTE — ASSESSMENT & PLAN NOTE
- Presenting with aphasia   - Hx of CVA in 2020. Home regimen: aspirin 81 mg, atorvastatin 40 mg qd, Brilinta 90 mg  BID  - Per neurology, not a candidate for TPA. Unclear last known well time  - Per neurology, concern for seizure, loaded with 2 g Keppra  - On exam, expressive aphasia, mild receptive aphasia, and nonsensical speech without any other neurologic deficits  - CT head (8/22) - acute to subacute left MCA territory infarct in the inferior parietal temporal region. No hemorrhagic conversion or significant mass effect  - CTA head/neck (8/22) - No hemodynamically significant stenosis, dissection or occlusion noted. - Cleared by speech, regular diet   - PT recommended for postacute rehab on discharge  - P2Y12 inhibitor 204, wnl - unsure if pt is unresponsive or not compliant    Plan:  - 24 hrs of permissive hypertension, up to sbp > 220.  Hold home metoprolol, Lasix, Entresto  - Continue aspirin, atorvastatin, Brilinta  - Neuro checks   - Telemetry  - MRI brain pending  - EEG pending  - Cardiology consulted, appreciate recommendations    - If blood pressures soft, consider gentle hydration due to Hx of CHF  - If acute change in mental status, obtain stat CT head

## 2023-08-22 NOTE — CONSULTS
Consultation - Stroke   Milagros Henson 65 y.o. male MRN: [id number]  Unit/Bed#: ED-20 Encounter: [id number]      Assessment/Plan     * Acute CVA (cerebrovascular accident) [facility])  Assessment & Plan  Stroke alert on 8/21/2023 11:15 PM with initial NIHSS of 6 per overnight Neuro quick note and LKW unclear, initial Blood Pressure: 136/92. Initial presenting deficits were aphasic and some blank stare. As a result of unclear LKW and stroke on CTH pt was determined to not be a candidate for thrombolysis (TNK). • Current Blood Pressure: 102/77, BP over 24 hours: BP  Min: 94/65  Max: 136/92   • Vascular risk factors: diabetes  • Home meds: Aspirin 81mg daily, Brilinta 90mg BID, and atorvastatin 40mg daily    Workup:  Lab Results   Component Value Date    HGBA1C 6.0 (H) 07/18/2023    CHOLESTEROL 139 07/18/2023    LDLCALC 89 07/18/2023    TRIG 113 07/18/2023    INR 1.35 (H) 08/21/2023      • CTH: acute to subacute L MCA infarct in the inferior parietal temporal region  • CTA: no LVO  • Coma panel: elevated Ethanol 11    Pertinent scores:  - NIHSS: 6 per overnight neuro quick note. This a.m., NIHSS: 4 (2 LOC questions and 2 severe aphasia)  Stroke Modified [place] Score: 0 (No baseline symptoms/disability)    Impression: acute to subacute L MCA infarct likely central embolic    Plan:  Discussed plan with neurology attending, Dr. Mo Merino  - Stroke pathway  - BP: Permissive hypertension for first 24 hours up to 220 SBP  - Antiplatelet agents: loaded with Aspirin 325mg x1 then continue Aspirin 81mg daily and Brilinta 90mg BID  - TTE and MRI brain without contrast pending  -Pending routine EEG. Not on AED right now.  S/p 2g Keppra  -Check P2 Y12 inhibitor level  - DVT ppx and SCDs  - Monitor on telemetry  - Maintain glucose <180, SSI for coverage if indicated  - Medical management as per primary team appreciated  - PT/OT/Speech/PM&R input appreciated  - Stroke education  -Neuro checks- Every 1 hour x 4 hours, then every 2 hours x 4 hours, then every 4 hours x 72 hours     -Stat CT Head for change in neuro status or GCS drop 2pts/1hr        Thrombolytic Decision: Patient not a candidate. Unclear time of onset outside appropriate time window. Jim Vazquez will need follow up in 6-8 weeks with general attending or advance practitioner. He will not require outpatient neurological testing. History of Present Illness     Reason for Consult / Principal Problem: stroke  Hx and PE limited by: Expressive aphasia  Patient last known well: unclear  Stroke alert called: 8/21 11:15pm  Neurology time of arrival: Immediately by on call Neurologist Dr. Milan Ballard    HPI: Jim Vazquez is a 65 y.o. (handedness not determinable) male with CAD/NSTEMI s/p stent, CHF, CVA in 2020 who presents with aphasia with nonsensical phrases and words and some blank starting. Pre hospital stroke alert was called at 11:15pm. LKW unclear. NIHSS 6 per Neurology. Initial /92. Glucose 173. CTH showed acute to subacute L MCA infarct in the inferior parietal temporal region. CTA showed no LVO. No a TNK candidate due to unclear onset. Patient was loaded with aspirin 325 mg x1 and given 2 g bolus of Keppra due to concern for seizure. Per chart review, patient was aphasic at work around [id number], but no LOC, unresponsiveness or weakness. Per Paramedics, he was saying nonsensical phrases and words en route. This morning, patient was quite agitated since he was asked to do the exam again. He was alert and oriented to self. Severe expressive aphasia. He could not name objects. He still has he knew his birth month and day but he said year was 12. When asked where he was, he said yesterday. He did not follow some commands and gave poor efforts. Overall, CN and strength intact. Inpatient consult to Neurology  Consult performed by: Napoleon Cade MD  Consult ordered by: Ana Paula Salmon DO          Review of Systems   Constitutional: Negative for chills and fever. HENT: Negative for ear pain and sore throat. Eyes: Negative for pain and visual disturbance. Respiratory: Negative for cough and shortness of breath. Cardiovascular: Negative for chest pain and palpitations. Gastrointestinal: Negative for abdominal pain and vomiting. Genitourinary: Negative for dysuria and hematuria. Musculoskeletal: Negative for arthralgias and back pain. Skin: Negative for color change and rash. Neurological: Positive for speech difficulty. Negative for seizures, syncope, weakness and headaches. All other systems reviewed and are negative. Historical Information   Past Medical History:   Diagnosis Date   • Coronary artery disease    • CVA (cerebral vascular accident) ([address])     R sided, dysarthria, March 2020 in [place]   • Heart failure [facility])    • Hyperlipidemia    • Hypertension    • Pre-diabetes    • STEMI (ST elevation myocardial infarction) [facility])      Past Surgical History:   Procedure Laterality Date   • CARDIAC CATHETERIZATION N/A 7/13/2022    Procedure: Cardiac pci;  Surgeon: Salima Rolon MD;  [facility]: [facility] CARD[facility]AC [facility]; Service: Cardiology   • CARDIAC CATHETERIZATION N/A 7/13/2022    Procedure: Cardiac Coronary Angiogram;  Surgeon: Salima Rolon MD;  [facility] CARD[facility]AC [facility]; Service: Cardiology   • CARDIAC CATHETERIZATION Left 7/13/2022    Procedure: Cardiac Left Heart Cath;  Surgeon: Salima Rolon MD;  Location: [facility] CARDIAC C[facility];   Service: Cardiology     Social History   Social History     Substance and Sexual Activity   Alcohol Use None     Social History     Substance and Sexual Activity   Drug Use Not on file     E-Cigarette/Vaping     E-Cigarette/Vaping Substances     Social History     Tobacco Use   Smoking Status Never   Smokeless Tobacco Never     Family History:   Family History   Problem Relation Age of Onset   • Dementia Mother    • Heart disease Father    • Dementia Maternal Aunt    • Dementia Maternal Uncle    • Cancer Maternal Uncle        Review of previous medical records was completed. Meds/Allergies   PTA meds:   Prior to Admission Medications   Prescriptions Last Dose Informant Patient Reported? Taking?   aspirin 81 mg chewable tablet   No No   Sig: Chew 1 tablet (81 mg total) daily   atorvastatin (LIPITOR) 40 mg tablet   No No   Sig: TAKE 1 TABLET BY MOUTH EVERY DAY WITH DINNER   furosemide (LASIX) 20 mg tablet   No No   Sig: Take 1 tablet (20 mg total) by mouth daily   metoprolol succinate (TOPROL-XL) 25 mg 24 hr tablet   No No   Sig: TAKE 1 TABLET (25 MG TOTAL) BY MOUTH DAILY. nitroglycerin (NITROSTAT) 0.4 mg SL tablet   No No   Sig: Place 1 tablet (0.4 mg total) under the tongue every 5 (five) minutes as needed for chest pain for up to 3 days   sacubitril-valsartan (Entresto) 24-26 MG TABS   No No   Sig: Take 1 tablet by mouth 2 (two) times a day   ticagrelor (BRILINTA) 90 MG   No No   Sig: Take 1 tablet (90 mg total) by mouth 2 (two) times a day      Facility-Administered Medications: None       Allergies   Allergen Reactions   • Pollen Extract Allergic Rhinitis       Objective   Vitals:Blood pressure 105/69, pulse 83, temperature 98.4 °F (36.9 °C), temperature source Oral, resp. rate 14, weight 89.4 kg (197 lb 1.5 oz), SpO2 94 %. ,Body mass index is 29.97 kg/m². Intake/Output Summary (Last 24 hours) at 8/22/2023 1212  Last data filed at 8/22/2023 0017  Gross per 24 hour   Intake 250 ml   Output --   Net 250 ml       Invasive Devices: Invasive Devices     Peripheral Intravenous Line  Duration           Peripheral IV 08/21/23 Dorsal (posterior); Left Hand <1 day    Peripheral IV 08/21/23 Left Antecubital <1 day                Physical Exam  Vitals and nursing note reviewed. Constitutional:       General: He is not in acute distress. Appearance: He is well-developed. HENT:      Head: Normocephalic and atraumatic.    Eyes:      Extraocular Movements: Extraocular movements intact and EOM normal. Conjunctiva/sclera: Conjunctivae normal.      Pupils: Pupils are equal, round, and reactive to light. Cardiovascular:      Rate and Rhythm: Normal rate. Pulmonary:      Effort: Pulmonary effort is normal. No respiratory distress. Abdominal:      Palpations: Abdomen is soft. Tenderness: There is no abdominal tenderness. Musculoskeletal:         General: No swelling. Normal range of motion. Cervical back: Neck supple. Skin:     General: Skin is warm. Neurological:      Mental Status: He is alert. Deep Tendon Reflexes:      Reflex Scores:       Patellar reflexes are 3+ on the left side. Achilles reflexes are 3+ on the left side. Psychiatric:         Speech: Speech normal.      Comments: Agitated and poor effort       Neurologic Exam     Mental Status   Oriented to person. Disoriented to place. Disoriented to time. Speech: speech is normal   Level of consciousness: arousable by verbal stimuli  Unable to name object. Severe expressive aphasia     Cranial Nerves     CN II   Visual fields full to confrontation. CN III, IV, VI   Pupils are equal, round, and reactive to light. Extraocular motions are normal.     CN V   Facial sensation intact. CN VII   Facial expression full, symmetric. CN VIII   CN VIII normal.     CN IX, X   CN IX normal.   CN X normal.     CN XII   CN XII normal.   When asked to shrug his shoulders, he said no and did not do it. Motor Exam   Muscle bulk: normal  Overall muscle tone: normal    Strength   Strength 5/5 except as noted. Sensory Exam   Light touch normal.     Gait, Coordination, and Reflexes     Reflexes   Left patellar: 3+  Left achilles: 3+  Right ankle clonus: absent  Left ankle clonus: absentRest of reflexes 2+ although patient did not allow the provider to position him due to agitation       NIHSS:  1a.Level of Consciousness: 0 = Alert   1b. LOC Questions: 2 = Answers neither correctly   1c.  LOC Commands: 0 = Obeys both correctly   2. Best Gaze: 0 = Normal   3. Visual: 0 = No visual field loss   4. Facial Palsy: 0=Normal symmetric movement   5a. Motor Right Arm: 0=No drift, limb holds 90 (or 45) degrees for full 10 seconds   5b. Motor Left Arm: 0=No drift, limb holds 90 (or 45) degrees for full 10 seconds   6a. Motor Right Le=No drift, limb holds 90 (or 45) degrees for full 10 seconds   6b. Motor Left Le=No drift, limb holds 90 (or 45) degrees for full 10 seconds   7. Limb Ataxia:  0=Absent   8. Sensory: 0=Normal; no sensory loss   9. Best Language:  2=Severe aphasia; fragmentary expression, inference needed, cannot identify materials   10. Dysarthria: 0=Normal articulation   11. Extinction and Inattention (formerly Neglect): 0=No abnormality   Total Score: 4     Time NIHSS was completed: 9:30am    Modified [place] Score:  0 (No baseline symptoms/disability)    Lab Results: I have personally reviewed pertinent reports. Imaging Studies: I have personally reviewed pertinent reports. EKG, Pathology, and Other Studies: I have personally reviewed pertinent reports. VTE Prophylaxis: Enoxaparin (Lovenox)    Code Status: Level 1 - Full Code  Advance Directive and Living Will:      Power of :    POLST:      Counseling / Coordination of Care  Total Critical Care time spent 56 minutes excluding procedures, teaching and family updates.

## 2023-08-22 NOTE — QUICK NOTE
Patient seen and examined at bedside. Patient still continues to have expressive aphasia, mild receptive aphasia, and has some nonsensical word usage. He repeats some words, his speech is simple. Denies any pain. He becomes frustrated when we are unable to understand him. Otherwise, sitting comfortably in bed. /77, HR 85. Home Lasix, Toprol, and Entresto held to allow for permissive hypertension. Potassium this morning was 3.2, repleted with IV 40 mEq, patient was NPO at the time pending speech eval.  He was cleared by speech therapy and started on a regular diet. MRI head and echo cardiogram pending per stroke pathway.

## 2023-08-22 NOTE — QUICK NOTE
Called by  regarding stroke alert at 11:15 PM, neuro response was immediate. 59year old male presenting with aphasia with nonsensical phrases and words and some blank staring. Clear appendicular weakness was normotensive  - NIHSS 6  - LKW unclear      CTH & CTA were unremarkable for any acute pathology, LVO, or other IR target. IV thrombolysis was not given due to unclear onset, lower clinical suspicion for stroke. Recommend loading with dual antiplatelet therapy +2 g of Keppra and admit for neurological evaluation in the a.m.

## 2023-08-22 NOTE — PHYSICAL THERAPY NOTE
PT EVALUATION     23 1024   PT Last Visit   PT Visit Date 23   Note Type   Note type Evaluation   Pain Assessment   Pain Assessment Tool 0-10   Pain Score No Pain   Restrictions/Precautions   Other Precautions Cognitive; Fall Risk;Bed Alarm; Chair Alarm   Home Living   Type of [address] One level;Stairs to enter with rails  (3 KWASI)   Bathroom Shower/Tub Tub/shower unit   Bathroom Toilet Standard   Additional Comments Pt is a poor and inconsistent historian;information noted previously is taken from prior PT note   Prior Function   Level of [place] Independent with ADLs; Independent with functional mobility   Lives With   (brother;per previous PT note)   Receives Help From Family   IADLs   (per previous PT note, pt uses public transportation - does not drive)   Falls in the last 6 months   ("I don't know")   Vocational Full time employment  ([employer])   Comments Pt generally independent without an AD PTA   General   Additional Pertinent History Pt is adm with aphasia that began while pt was working. CT - Acute to subacute left MCA territory infarct in the inferior parietal temporal region. No hemorrhagic conversion or significant mass effect. Pt with history of prior CVA with R residual weakness   Family/Caregiver Present No   Cognition   Overall Cognitive Status Impaired   Arousal/Participation Persistent stimuli required   Attention Attends with cues to redirect   Orientation Level Oriented to person;Disoriented to place; Disoriented to time;Disoriented to situation  (pt able to recall month and year of  but not day)   Memory Decreased long term memory;Decreased recall of biographical information;Decreased short term memory;Decreased recall of recent events;Decreased recall of precautions   Following Commands Follows one step commands inconsistently   Comments Pt lethargic initially during PT eval. Expressive aphasia noted and questionable receptive aphasia.  Pt becoming minimally agitated with PT interview questions stating "I don't know" - not sure if that was pt's way of refusing to answer questions or receptive aphasia. Pt does not make direct eye contact with PT during eval. At least 2 pt identifiers including name and    Subjective   Subjective "5 questions, 5 questions"   RLE Assessment   RLE Assessment WFL  (grossly 3+/5)   LLE Assessment   LLE Assessment WFL  (grossly 3+/5)   Bed Mobility   Supine to Sit 5  Supervision   Additional items Verbal cues; Impulsive;Assist x 1   Sit to Supine 5  Supervision   Additional items Verbal cues; Impulsive;Assist x 1   Transfers   Sit to Stand 5  Supervision   Additional items Verbal cues; Impulsive; Increased time required   Stand to Sit 5  Supervision   Additional items Verbal cues; Impulsive; Increased time required   Ambulation/Elevation   Gait pattern Step through pattern;Decreased foot clearance   Gait Assistance 5  Supervision   Additional items Assist x 1;Verbal cues; Tactile cues   Assistive Device None   Distance 60 feet with change in direction   Balance   Static Sitting Good   Static Standing   (F/F+)   Ambulatory   (F to occasional F-)   Activity Tolerance   Activity Tolerance Other (Comment)  (impaired cognition;minimal agitation worsening with cont eval)   Medical Staff Made Aware OT [facility], April, RN   Assessment   Problem List Decreased endurance;Decreased strength; Impaired balance;Decreased mobility; Decreased cognition; Impaired judgement;Decreased safety awareness  (aphasia)   Assessment Patient seen for Physical Therapy evaluation. Patient admitted with Acute CVA (cerebrovascular accident) ([address] Comorbidities affecting patient's physical performance include: CHF, CAD, HLD, HTN, STEMI, DM2, seizure, ischemic cardiomyopathy, obesity.   Personal factors affecting patient at time of initial evaluation include: lives in one story house, stairs to enter home, inability to navigate community distances, inability to navigate level surfaces without external assistance, inability to perform dynamic tasks in community, decreased cognition, limited home support, decreased initiation and engagement and limited insight into impairments. Prior to admission, patient was independent with functional mobility without assistive device, independent with ADLS, living with brother? in a one level home with 3 steps to enter, ambulating household distance and works full time. Please find objective findings from Physical Therapy assessment regarding body systems outlined above with impairments and limitations including weakness, impaired balance, decreased endurance, gait deviations, decreased activity tolerance, decreased functional mobility tolerance, decreased safety awareness, impaired judgement, fall risk and decreased cognition. The Barthel Index was used as a functional outcome tool presenting with a score of Barthel Index Score: 50 today indicating marked limitations of functional mobility and ADLS. Patient's clinical presentation is currently unstable/unpredictable as seen in patient's presentation of vital sign response, varying levels of cognitive performance, increased fall risk, new onset of impairment of functional mobility, decreased endurance and new onset of weakness. Pt would benefit from continued Physical Therapy treatment to address deficits as defined above and maximize level of functional mobility. As demonstrated by objective findings, the assigned level of complexity for this evaluation is high. The patient's -[facility] Basic Mobility Inpatient Short Form Raw Score is 18. A Raw score of greater than 16 suggests the patient may benefit from discharge to home. Please also refer to the recommendation of the Physical Therapist for safe discharge planning. Goals   Patient Goals pt unable to state due to impaired cognition, aphasia   STG Expiration Date 09/01/23   Short Term Goal #1 Patient will:  Increase bilateral LE strength 1 grade to facilitate independent mobility, Perform all bed mobility tasks independently to improve pt's independence w/ repositioning for decrease risk of skin breakdown, Perform all transfers independently consistently from various height surfaces in order to improve I w/ engagement w/ real-world environments/situations, Ambulate at least 500+ ft. without assistive device independently w/o LOB to facilitate return and engagement w/ previous living environment, Navigate 3 stairs independently with unilateral handrail to either improve independence w/ entering home and/or so patient can fully access living areas in home, Increase ambulatory and static and dynamic standing balance 1 grade to decrease risk for falls, Tolerate at least 15 consecutive minutes of activity to demonstrate improved activity tolerance and endurance and Tolerate 3 hr OOB to faciliate upright tolerance   Plan   Treatment/Interventions ADL retraining;Functional transfer training;LE strengthening/ROM; Elevations; Therapeutic exercise; Endurance training;Cognitive reorientation;Patient/family training;Equipment eval/education; Bed mobility;Gait training; Compensatory technique education;Spoke to nursing;Spoke to case management;OT   PT Frequency 3-5x/wk   Recommendation   PT Discharge Recommendation Post acute rehabilitation services   Additional Comments Functionally, pt does well with bed mob, trans and amb. However, due to impaired cognition and aphasia, pt is not safe for dc home. Post acute rehab services recommended at this time. Pending medical optimization, LOS, improvement of cognition and aphasia, progress with PT, home/social support, pt may be able to return home with OP PT services.    [facility] Basic Mobility Inpatient   Turning in Flat Bed Without Bedrails 3   Lying on Back to Sitting on Edge of Flat Bed Without Bedrails 3   Moving Bed to Chair 3   Standing Up From Chair Using Arms 3   Walk in Room 3   Climb 3-5 Stairs With Railing 3   Basic Mobility Inpatient Raw Score 18   Basic Mobility Standardized Score 41.05   Highest Level Of Mobility   -[facility] Goal 6: Walk 10 steps or more   -HLM Achieved 7: Walk 25 feet or more   Barthel Index   Feeding 5   Bathing 0   Grooming Score 0   Dressing Score 5   Bladder Score 10   Bowels Score 10   Toilet Use Score 5   Transfers (Bed/Chair) Score 10   Mobility (Level Surface) Score 0   Stairs Score 5   Barthel Index Score 50   End of Consult   Patient Position at End of Consult Supine; All needs within reach   [employer] [place] Insurance Number  Vidhi Merritt, [address]

## 2023-08-22 NOTE — PLAN OF CARE
Problem: OCCUPATIONAL THERAPY ADULT  Goal: Performs self-care activities at highest level of function for planned discharge setting. See evaluation for individualized goals. Description: Treatment Interventions: ADL retraining, Functional transfer training, UE strengthening/ROM, Endurance training, Cognitive reorientation, Compensatory technique education, Activityengagement          See flowsheet documentation for full assessment, interventions and recommendations. Note: Limitation: Decreased ADL status, Decreased Safe judgement during ADL, Decreased cognition, Decreased endurance, Decreased self-care trans, Decreased high-level ADLs (balance, fxnl mobility, act lynne, fxnl reach, strength, attention, direction follow, comm, safety aware, insight, social skills, orientation, prblm solving, learning new tasks, appropriate response, intrpersonal skills, initiation of convo, response time)  Prognosis: Good  Assessment: Pt is a 65 y.o. male seen for OT evaluation s/p [facility] on 8/21/2023 due to aphasia. Diagnosed with Acute CVA (cerebrovascular accident) ([address] Personal and env factors supporting pt at time of IE include (I) PLOF, accessible home environment and FFSU. Personal and env factors inhibiting engagement in occupations include limited social support, difficulty completing ADLs and difficulty completing IADLs. Performance deficits that affect the pt’s occupational performance can be seen above. Due to pt's current functional limitations and medical complications pt is functioning below baseline. Pt would benefit from continued skilled OT treatment in order to maximize safety, independence and overall performance with ADLs, functional mobility, functional transfers and cognition in order to achieve highest level of function.      OT Discharge Recommendation: Post acute rehabilitation services (vs home with OP therapy pending progress with therapy and cognitive improvement)

## 2023-08-22 NOTE — ASSESSMENT & PLAN NOTE
Wt Readings from Last 3 Encounters:   08/21/23 89.4 kg (197 lb 1.5 oz)   07/18/23 88.4 kg (194 lb 12.8 oz)   04/25/23 90.4 kg (199 lb 3.2 oz)     · Most recent ECHO - EF 35%, multiple akinetic areas. · Per chart review - Follows with [address]  [facility]. In the past refused use of LifeVest and ICD placement   · Home regimen: Entresto 24-26 mg BID, toprol-XL 25 mg daily, lasix 20 mg daily   · Examines overloaded - B/L LE edema +1 pitting, chest clear, no JVD     · Continue home regimen   · Given soft BP, holding IV dose of lasix at this time   · I/Os, Daily Wts   · Holding off on checking BNP given use of Entresto. No NT-proBNP at our lab.

## 2023-08-22 NOTE — PLAN OF CARE
Problem: MOBILITY - ADULT  Goal: Maintain or return to baseline ADL function  Description: INTERVENTIONS:  -  Assess patient's ability to carry out ADLs; assess patient's baseline for ADL function and identify physical deficits which impact ability to perform ADLs (bathing, care of mouth/teeth, toileting, grooming, dressing, etc.)  - Assess/evaluate cause of self-care deficits   - Assess range of motion  - Assess patient's mobility; develop plan if impaired  - Assess patient's need for assistive devices and provide as appropriate  - Encourage maximum independence but intervene and supervise when necessary  - Involve family in performance of ADLs  - Assess for home care needs following discharge   - Consider OT consult to assist with ADL evaluation and planning for discharge  - Provide patient education as appropriate  8/22/2023 1503 by Rachel Mirza RN  Outcome: Progressing  8/22/2023 1501 by Rachel Mirza RN  Outcome: Progressing  Goal: Maintains/Returns to pre admission functional level  Description: INTERVENTIONS:  - Perform BMAT or MOVE assessment daily.   - Set and communicate daily mobility goal to care team and patient/family/caregiver.    - Collaborate with rehabilitation services on mobility goals if consulted  - Record patient progress and toleration of activity level   8/22/2023 1503 by Rachel Mirza RN  Outcome: Progressing  8/22/2023 1501 by Rachel Mirza RN  Outcome: Progressing     Problem: CARDIOVASCULAR - ADULT  Goal: Maintains optimal cardiac output and hemodynamic stability  Description: INTERVENTIONS:  - Monitor I/O, vital signs and rhythm  - Monitor for S/S and trends of decreased cardiac output  - Administer and titrate ordered vasoactive medications to optimize hemodynamic stability  - Assess quality of pulses, skin color and temperature  - Assess for signs of decreased coronary artery perfusion  - Instruct patient to report change in severity of symptoms  8/22/2023 1503 by Rachel Mirza RN  Outcome: Progressing  8/22/2023 1501 by Jovan Man RN  Outcome: Progressing  Goal: Absence of cardiac dysrhythmias or at baseline rhythm  Description: INTERVENTIONS:  - Continuous cardiac monitoring, vital signs, obtain 12 lead EKG if ordered  - Administer antiarrhythmic and heart rate control medications as ordered  - Monitor electrolytes and administer replacement therapy as ordered  8/22/2023 1503 by Jovan Man RN  Outcome: Progressing  8/22/2023 1501 by Jovan Man RN  Outcome: Progressing     Problem: Neurological Deficit  Goal: Neurological status is stable or improving  Description: Interventions:  - Monitor and assess patient's level of consciousness, motor function, sensory function, and level of assistance needed for ADLs. - Monitor and report changes from baseline. Collaborate with interdisciplinary team to initiate plan and implement interventions as ordered. - Provide and maintain a safe environment. - Consider seizure precautions. - Consider fall precautions. - Consider aspiration precautions. - Consider bleeding precautions. Outcome: Progressing     Problem: Activity Intolerance/Impaired Mobility  Goal: Mobility/activity is maintained at optimum level for patient  Description: Interventions:  - Assess and monitor patient  barriers to mobility and need for assistive/adaptive devices. - Assess patient's emotional response to limitations. - Collaborate with interdisciplinary team and initiate plans and interventions as ordered. - Encourage independent activity per ability.  - Maintain proper body alignment. - Perform active/passive rom as tolerated/ordered. - Plan activities to conserve energy.  - Turn patient as appropriate  Outcome: Progressing     Problem: Communication Impairment  Goal: Ability to express needs and understand communication  Description: Assess patient's communication skills and ability to understand information.   Patient will demonstrate use of effective communication techniques, alternative methods of communication and understanding even if not able to speak. - Encourage communication and provide alternate methods of communication as needed. - Collaborate with case management/ for discharge needs. - Include patient/family/caregiver in decisions related to communication. Outcome: Progressing     Problem: Potential for Aspiration  Goal: Non-ventilated patient's risk of aspiration is minimized  Description: Assess and monitor vital signs, respiratory status, and labs (WBC). Monitor for signs of aspiration (tachypnea, cough, rales, wheezing, cyanosis, fever). - Assess and monitor patient's ability to swallow. - Place patient up in chair to eat if possible. - HOB up at 90 degrees to eat if unable to get patient up into chair.  - Supervise patient during oral intake. - Instruct patient/ family to take small bites. - Instruct patient/ family to take small single sips when taking liquids. - Follow patient-specific strategies generated by speech pathologist.  Outcome: Progressing  Goal: Ventilated patient's risk of aspiration is minimized  Description: Assess and monitor vital signs, respiratory status, airway cuff pressure, and labs (WBC). Monitor for signs of aspiration (tachypnea, cough, rales, wheezing, cyanosis, fever). - Elevate head of bed 30 degrees if patient has tube feeding.  - Monitor tube feeding. Outcome: Progressing     Problem: Nutrition  Goal: Nutrition/Hydration status is improving  Description: Monitor and assess patient's nutrition/hydration status for malnutrition (ex- brittle hair, bruises, dry skin, pale skin and conjunctiva, muscle wasting, smooth red tongue, and disorientation). Collaborate with interdisciplinary team and initiate plan and interventions as ordered. Monitor patient's weight and dietary intake as ordered or per policy. Utilize nutrition screening tool and intervene per policy.  Determine patient's food preferences and provide high-protein, high-caloric foods as appropriate. - Assist patient with eating.  - Allow adequate time for meals.  - Encourage patient to take dietary supplement as ordered. - Collaborate with clinical nutritionist.  - Include patient/family/caregiver in decisions related to nutrition.   Outcome: Progressing

## 2023-08-22 NOTE — ED PROVIDER NOTES
History  Chief Complaint   Patient presents with   • CVA/TIA-like Symptoms     Pt arrives via EMS. Prehospital stroke alert. 71-year-old male, presented as a prehospital stroke alert. As per paramedics patient was at work, was found by coworkers completely aphasic, sounds like time of onset was 2245 this evening. History difficult to obtain due to patient's current condition and no bystanders present to provide history. ,  As per paramedics he was completely aphasic when they first arrived, during the ambulance ride he was saying nonsensical phrases and words, they initially questioned if he had a facial droop over the radio but eventually when they arrived here they did not feel he had a facial droop. They never observed any unilateral weakness. CVA/TIA-like Symptoms      Prior to Admission Medications   Prescriptions Last Dose Informant Patient Reported? Taking?   aspirin 81 mg chewable tablet   No No   Sig: Chew 1 tablet (81 mg total) daily   atorvastatin (LIPITOR) 40 mg tablet   No No   Sig: TAKE 1 TABLET BY MOUTH EVERY DAY WITH DINNER   furosemide (LASIX) 20 mg tablet   No No   Sig: Take 1 tablet (20 mg total) by mouth daily   metoprolol succinate (TOPROL-XL) 25 mg 24 hr tablet   No No   Sig: TAKE 1 TABLET (25 MG TOTAL) BY MOUTH DAILY.    nitroglycerin (NITROSTAT) 0.4 mg SL tablet   No No   Sig: Place 1 tablet (0.4 mg total) under the tongue every 5 (five) minutes as needed for chest pain for up to 3 days   sacubitril-valsartan (Entresto) 24-26 MG TABS   No No   Sig: Take 1 tablet by mouth 2 (two) times a day   ticagrelor (BRILINTA) 90 MG   No No   Sig: Take 1 tablet (90 mg total) by mouth 2 (two) times a day      Facility-Administered Medications: None       Past Medical History:   Diagnosis Date   • Coronary artery disease    • CVA (cerebral vascular accident) ([address])     R sided, dysarthria, March 2020 in [place]   • Heart failure [facility])    • Hyperlipidemia    • Hypertension    • Pre-diabetes    • STEMI (ST elevation myocardial infarction) [facility])        Past Surgical History:   Procedure Laterality Date   • CARDIAC CATHETERIZATION N/A 7/13/2022    Procedure: Cardiac pci;  Surgeon: Claudeen Lower, MD;  Location: [facility] CARD[facility]AC [facility]; Service: Cardiology   • CARDIAC CATHETERIZATION N/A 7/13/2022    Procedure: Cardiac Coronary Angiogram;  Surgeon: Claudeen Lower, MD;  [facility] CARD[facility]AC [facility]; Service: Cardiology   • CARDIAC CATHETERIZATION Left 7/13/2022    Procedure: Cardiac Left Heart Cath;  Surgeon: Claudeen Lower, MD;  Location: [facility] CARDIAC [facility]; Service: Cardiology       Family History   Problem Relation Age of Onset   • Dementia Mother    • Heart disease Father    • Dementia Maternal Aunt    • Dementia Maternal Uncle    • Cancer Maternal Uncle      I have reviewed and agree with the history as documented. E-Cigarette/Vaping     E-Cigarette/Vaping Substances     Social History     Tobacco Use   • Smoking status: Never   • Smokeless tobacco: Never       Review of Systems   Unable to perform ROS: Acuity of condition       Physical Exam  Physical Exam  Vitals reviewed. Constitutional:       General: He is not in acute distress. Appearance: He is well-developed. He is not diaphoretic. HENT:      Head: Normocephalic and atraumatic. Right Ear: External ear normal.      Left Ear: External ear normal.      Nose: Nose normal.   Eyes:      General:         Right eye: No discharge. Left eye: No discharge. Pupils: Pupils are equal, round, and reactive to light. Neck:      Trachea: No tracheal deviation. Cardiovascular:      Rate and Rhythm: Normal rate and regular rhythm. Heart sounds: Normal heart sounds. No murmur heard. Pulmonary:      Effort: Pulmonary effort is normal. No respiratory distress. Breath sounds: Normal breath sounds. No stridor. Abdominal:      General: There is no distension. Palpations: Abdomen is soft.       Tenderness: There is no abdominal tenderness. There is no guarding or rebound. Musculoskeletal:         General: Normal range of motion. Cervical back: Normal range of motion and neck supple. Skin:     General: Skin is warm and dry. Coloration: Skin is not pale. Findings: No erythema. Neurological:      General: No focal deficit present. Mental Status: He is alert. He is disoriented. Comments: Patient disorientated, unaware of time and place, equal muscle strength bilateral upper and lower extremities.   Cranial nerves II through XII intact, GCS 14    NIH stroke scale of 2         Vital Signs  ED Triage Vitals   Temperature Pulse Respirations Blood Pressure SpO2   08/21/23 2335 08/21/23 2335 08/21/23 2335 08/21/23 2335 08/21/23 2335   99 °F (37.2 °C) 97 20 136/92 96 %      Temp Source Heart Rate Source Patient Position - Orthostatic VS BP Location FiO2 (%)   08/21/23 2335 08/21/23 2335 08/21/23 2335 08/22/23 0045 --   Oral Monitor Sitting Right arm       Pain Score       08/21/23 2345       3           Vitals:    08/22/23 0045 08/22/23 0100 08/22/23 0115 08/22/23 0130   BP: 115/81 109/75 106/69 101/64   Pulse: 87 86 85 83   Patient Position - Orthostatic VS: Sitting Sitting Sitting Lying         Visual Acuity  Visual Acuity    Flowsheet Row Most Recent Value   L Pupil Size (mm) 2   R Pupil Size (mm) 2   L Pupil Shape Round   R Pupil Shape Round          ED Medications  Medications   sacubitril-valsartan (ENTRESTO) 24-26 MG per tablet 1 tablet (has no administration in time range)   ticagrelor (BRILINTA) tablet 90 mg (has no administration in time range)   atorvastatin (LIPITOR) tablet 40 mg (has no administration in time range)   furosemide (LASIX) tablet 20 mg (has no administration in time range)   aspirin chewable tablet 81 mg (has no administration in time range)   metoprolol succinate (TOPROL-XL) 24 hr tablet 25 mg (has no administration in time range)   enoxaparin (LOVENOX) subcutaneous injection 40 mg (has no administration in time range)   iohexol (OMNIPAQUE) 350 MG/ML injection (SINGLE-DOSE) 85 mL (85 mL Intravenous Given 8/21/23 2327)   levETIRAcetam (KEPPRA) 2,000 mg in sodium chloride 0.9 % 250 mL IVPB (0 mg Intravenous Stopped 8/22/23 0017)   aspirin tablet 325 mg (325 mg Oral Given 8/21/23 3705)       Diagnostic Studies  Results Reviewed     Procedure Component Value Units Date/Time    HS Troponin I 2hr [[id number]]  (Normal) Collected: 08/22/23 0123    Lab Status: Final result Specimen: Blood from Arm, Left Updated: 08/22/23 0151     hs TnI 2hr 34 ng/L      Delta 2hr hsTnI 5 ng/L     FLU/RSV/COVID - if FLU/RSV clinically relevant [[id number]]  (Normal) Collected: 08/21/23 2345    Lab Status: Final result Specimen: Nares from Nose Updated: 08/22/23 0034     SARS-CoV-2 Negative     INFLUENZA A PCR Negative     INFLUENZA B PCR Negative     RSV PCR Negative    Narrative:      FOR PEDIATRIC PATIENTS - copy/paste COVID Guidelines URL to browser: [email or web address]    SARS-CoV-2 assay is a Nucleic Acid Amplification assay intended for the  qualitative detection of nucleic acid from SARS-CoV-2 in nasopharyngeal  swabs. Results are for the presumptive identification of SARS-CoV-2 RNA. Positive results are indicative of infection with SARS-CoV-2, the virus  causing COVID-19, but do not rule out bacterial infection or co-infection  with other viruses. Laboratories within the [facility] and its  territories are required to report all positive results to the appropriate  public health authorities. Negative results do not preclude SARS-CoV-2  infection and should not be used as the sole basis for treatment or other  patient management decisions. Negative results must be combined with  clinical observations, patient history, and epidemiological information. This test has not been FDA cleared or approved.     This test has been authorized by FDA under an Emergency Use Authorization  (EUA). This test is only authorized for the duration of time the  declaration that circumstances exist justifying the authorization of the  emergency use of an in vitro diagnostic tests for detection of SARS-CoV-2  virus and/or diagnosis of COVID-19 infection under section 564(b)(1) of  the Act, 21 U. S.C. 723ROF-1(M)(8), unless the authorization is terminated  or revoked sooner. The test has been validated but independent review by FDA  and CLIA is pending. Test performed using [employer]: This RT-PCR assay targets N2,  a region unique to SARS-CoV-2. A conserved region in the E-gene was chosen  for pan-Sarbecovirus detection which includes SARS-CoV-2. According to CMS-2020-01-R, this platform meets the definition of high-throughput technology. Protime-INR [[id number]]  (Abnormal) Collected: 08/21/23 2328    Lab Status: Final result Specimen: Blood from Arm, Left Updated: 08/22/23 0025     Protime 17.4 seconds      INR 1.35    APTT [[id number]]  (Normal) Collected: 08/21/23 2328    Lab Status: Final result Specimen: Blood from Arm, Left Updated: 08/22/23 0025     PTT 28 seconds     TSH, 3rd generation with Free T4 reflex [[id number]]  (Normal) Collected: 08/21/23 2339    Lab Status: Final result Specimen: Blood from Arm, Left Updated: 08/22/23 0022     TSH 3RD GENERATON 3.190 uIU/mL     Ethanol [[id number]]  (Abnormal) Collected: 08/21/23 2339    Lab Status: Final result Specimen: Blood from Arm, Left Updated: 08/22/23 0007     Ethanol Lvl 11 mg/dL     Salicylate level [[id number]]  (Normal) Collected: 08/21/23 2339    Lab Status: Final result Specimen: Blood from Arm, Left Updated: 66/77/26 5629     Salicylate Lvl <5 mg/dL     Acetaminophen level-If concentration is detectable, please discuss with medical  on call.  [[id number]]  (Abnormal) Collected: 08/21/23 2339    Lab Status: Final result Specimen: Blood from Arm, Left Updated: 08/22/23 0006 Acetaminophen Level <10 ug/mL     HS Troponin I 4hr [[id number]]     Lab Status: No result Specimen: Blood     HS Troponin 0hr (reflex protocol) [[id number]]  (Normal) Collected: 08/21/23 2328    Lab Status: Final result Specimen: Blood from Arm, Left Updated: 08/22/23 0002     hs TnI 0hr 29 ng/L     Basic metabolic panel [[id number]]  (Abnormal) Collected: 08/21/23 2328    Lab Status: Final result Specimen: Blood from Arm, Left Updated: 08/21/23 2350     Sodium 139 mmol/L      Potassium 3.7 mmol/L      Chloride 101 mmol/L      CO2 27 mmol/L      ANION GAP 11 mmol/L      BUN 12 mg/dL      Creatinine 0.97 mg/dL      Glucose 173 mg/dL      Calcium 8.7 mg/dL      eGFR 83 ml/min/1.73sq m     Narrative:      [place] guidelines for Chronic Kidney Disease (CKD):   •  Stage 1 with normal or high GFR (GFR > 90 mL/min/1.73 square meters)  •  Stage 2 Mild CKD (GFR = 60-89 mL/min/1.73 square meters)  •  Stage 3A Moderate CKD (GFR = 45-59 mL/min/1.73 square meters)  •  Stage 3B Moderate CKD (GFR = 30-44 mL/min/1.73 square meters)  •  Stage 4 Severe CKD (GFR = 15-29 mL/min/1.73 square meters)  •  Stage 5 End Stage CKD (GFR <15 mL/min/1.73 square meters)  Note: GFR calculation is accurate only with a steady state creatinine    Fingerstick Glucose (POCT) [[id number]]  (Abnormal) Collected: 08/21/23 2333    Lab Status: Final result Updated: 08/21/23 2337     POC Glucose 194 mg/dl     CBC and Platelet [[id number]]  (Abnormal) Collected: 08/21/23 2328    Lab Status: Final result Specimen: Blood from Arm, Left Updated: 08/21/23 2335     WBC 7.49 Thousand/uL      RBC 4.79 Million/uL      Hemoglobin 14.7 g/dL      Hematocrit 47.2 %      MCV 99 fL      MCH 30.7 pg      MCHC 31.1 g/dL      RDW 14.4 %      Platelets 734 Thousands/uL      MPV 9.1 fL                  CT stroke alert brain   Final Result by Kiel Nascimento MD (08/22 0003)      Acute to subacute left MCA territory infarct in the inferior parietal temporal region. No hemorrhagic conversion or significant mass effect. Findings were directly discussed with Dar Jose at  11:52 PM  .      Workstation performed: [id number]         CTA stroke alert (head/neck)   Final Result by Michel Anne MD (08/22 0003)      No hemodynamically significant stenosis, dissection or occlusion of the carotid or vertebral arteries or major vessels of the [place]. Findings were directly discussed with Dar Jose at  12:00 AM  .                           Workstation performed: [id number]                    Procedures  ECG 12 Lead Documentation Only    Date/Time: 8/22/2023 2:06 AM    Performed by: Leigh Nino DO  Authorized by: Leigh Nino DO    ECG reviewed by me, the ED Provider: yes    Patient location:  ED  Interpretation:     Interpretation: non-specific    Rate:     ECG rate:  98    ECG rate assessment: normal    Rhythm:     Rhythm: sinus rhythm    Ectopy:     Ectopy: none    QRS:     QRS axis:  Left    QRS intervals: Wide  Conduction:     Conduction: abnormal    ST segments:     ST segments:  Non-specific  T waves:     T waves: non-specific               ED Course  ED Course as of 08/22/23 0207   Mon Aug 21, 2023   2349 Discussed case again with neurologist, possible postictal state will treat with 2 g of Keppra, neurology thinks acute left parietal stroke with signs of stroke on imaging so no TNK, patient was stroke scale of 1 at this time, agree with no thrombolytics                               SBIRT 22yo+    Flowsheet Row Most Recent Value   Initial Alcohol Screen: US AUDIT-C     1. How often do you have a drink containing alcohol? 0 Filed at: 08/22/2023 0009   2. How many drinks containing alcohol do you have on a typical day you are drinking? 0 Filed at: 08/22/2023 0009   3a. Male UNDER 65: How often do you have five or more drinks on one occasion? 0 Filed at: 08/22/2023 0009   3b. FEMALE Any Age, or MALE 65+:  How often do you have 4 or more drinks on one occassion? 0 Filed at: 08/22/2023 0009   Audit-C Score 0 Filed at: 08/22/2023 0009   LEOBARDO: How many times in the past year have you. .. Used an illegal drug or used a prescription medication for non-medical reasons? Never Filed at: 08/22/2023 0009                    Medical Decision Making        Initial ED assessment: 81-year-old male, brought in as a prehospital stroke alert.,  Very confused but otherwise nonfocal neurological examination    Initial DDx includes but is not limited to:   Seizure, CVA, intoxication, electrolyte abnormality    Initial ED plan:   Blood work, CT, CT head, CTA        Final ED summary/disposition:   After evaluation and workup in the emergency department, questionable early CVA versus postictal state versus both, admitted to internal medicine service for neurologic consultation     Amount and/or Complexity of Data Reviewed  Labs: ordered. Radiology: ordered. Risk  OTC drugs. Prescription drug management. Decision regarding hospitalization. Disposition  Final diagnoses:   Aphasia   Cerebrovascular accident (CVA), unspecified mechanism ([address])     Time reflects when diagnosis was documented in both MDM as applicable and the Disposition within this note     Time User Action Codes Description Comment    8/21/2023 11:25 PM Noralyn Dakins Add [R47.01] Aphasia     8/21/2023 11:58 PM Noralyn Dakins Add [I63.9] Cerebrovascular accident (CVA), unspecified mechanism [facility])       ED Disposition     ED Disposition   Admit    Condition   Stable    Date/Time   Mon Aug 21, 2023 11:58 PM    Comment   Case was discussed with SLIM PA and the patient's admission status was agreed to be Admission Status: inpatient status to the service of Dr. Pk Wakefield . Follow-up Information    None         Patient's Medications   Discharge Prescriptions    No medications on file       No discharge procedures on file.     PDMP Review     None          ED Provider  Electronically Signed by           Patel Gregorio DO  08/22/23 4527

## 2023-08-22 NOTE — ASSESSMENT & PLAN NOTE
Blood Pressure: 101/64    · Continue home Toprol XL 25 mg daily and Lasix 20 gm daily, with holding parameters to avoid hypotension

## 2023-08-22 NOTE — ASSESSMENT & PLAN NOTE
· Presenting w/ aphasia. · Hx of CVA in 2020   · Per ED discussion with Neurology - not a thrombolytic candidate   · Loaded with Keppra in ED for concern of seizure   · On exam, aphasic without any other neurologic deficits  · 7/18 - HbA1c 6%, Lipid panel WNL except for low HDL   · CT stroke alert vanita - Acute to subacute left MCA territory infarct in the inferior parietal temporal region. No hemorrhagic conversion or significant mass effect  · CTA H/N - No hemodynamically significant stenosis, dissection or occlusion noted.      · Confirmed CVA, goal is normotension   · Continue statin, Aspirin 81 mg   · Neurology consult   · Echocardiogram   · Telemetry x 24 H   · Neuro checks   · NPO pending Speech therapy eval  · PT and OT eval

## 2023-08-22 NOTE — UTILIZATION REVIEW
Initial Clinical Review    Admission: Date/Time/Statement:   Admission Orders (From admission, onward)     Ordered        08/21/23 2359  INPATIENT ADMISSION  Once                      Orders Placed This Encounter   Procedures   • INPATIENT ADMISSION     Standing Status:   Standing     Number of Occurrences:   1     Order Specific Question:   Level of Care     Answer:   Med Surg [16]     Order Specific Question:   Estimated length of stay     Answer:   More than 2 Midnights     Order Specific Question:   Certification     Answer:   I certify that inpatient services are medically necessary for this patient for a duration of greater than two midnights. See H&P and MD Progress Notes for additional information about the patient's course of treatment. ED Arrival Information     Expected   -    Arrival   8/21/2023 23:22    Acuity   Emergent            Means of arrival   Ambulance    Escorted by   [facility]    Service   Hospitalist    Admission type   Emergency            Arrival complaint   stroke alert             Chief Complaint   Patient presents with   • CVA/TIA-like Symptoms     Pt arrives via EMS. Prehospital stroke alert. Initial Presentation: 65 y.o. male  with a PMH of CHF, CAD/NSTEMI s/p stent, and CVA (2020) who presents with aphasia. Patient is a poor historian given the aphasia. Patient is able to respond with yes and no mainly. Rest of history from ED provider. Was noted to be aphasic at work. Without loss of consciousness, unresponsiveness, or weakness. Given concern for seizure was loaded with keppra in the ED. ED provider discussed with Neurology - deemed not a candidate for thrombolytic therapy and likely early stroke is main etiology. CT noted acute/subacute stroke in L MCA territory in the inferior parietal temporal region. Labs within normal limit. No additional neuro deficits on exam except aphasia. Does have B/L LE pitting edema.  Plan:  Inpatient admission for evaluation and treatment of acute CVA, GHF, HTN, HLD, CAD: permissive HTN, continue statin ASA, MRI brain, Neurology consult, Echo, telemetry, neuro chcekcs, NPO pending Speech eval, PT/OT eval, continue home Toprol XL and lasix, hold Entresto, continue ticagrelor. Neurology consult: stroke pathway, permissive HTN, continue ASA and Brilinta, TTE and MRI brain pending, routine EEG pending, telemetry, neuro checks, PT/OT/Speech eval.     Date: 8/22   Day 2:     Internal medicine: We will continue to monitor per stroke pathway. Follow-up on echo and MRI results. Continue dual antiplatelets and hold all blood pressure medications to allow for permissive hypertension. He was seen by speech therapy and recommended for regular diet with thin liquids. Seen by PT and recommended for postacute rehab on discharge.     ED Triage Vitals   Temperature Pulse Respirations Blood Pressure SpO2   08/21/23 2335 08/21/23 2335 08/21/23 2335 08/21/23 2335 08/21/23 2335   99 °F (37.2 °C) 97 20 136/92 96 %      Temp Source Heart Rate Source Patient Position - Orthostatic VS BP Location FiO2 (%)   08/21/23 2335 08/21/23 2335 08/21/23 2335 08/22/23 0045 --   Oral Monitor Sitting Right arm       Pain Score       08/21/23 2345       3          Wt Readings from Last 1 Encounters:   08/22/23 89.4 kg (197 lb)     Additional Vital Signs:     Date/Time Temp Pulse Resp BP MAP (mmHg) SpO2 O2 Device   08/22/23 1200 -- 83 17 119/81 94 96 % None (Room air)   08/22/23 1100 -- 83 14 105/69 82 94 % None (Room air)   08/22/23 0830 -- 82 16 98/65 -- 98 % --   08/22/23 0630 -- 85 18 102/77 -- 97 % None (Room air)   08/22/23 0426 98.4 °F (36.9 °C) 80 18 95/68 -- 96 % None (Room air)   08/22/23 0330 -- 77 18 94/65 -- 99 % None (Room air)   08/22/23 0230 -- 84 18 99/65 -- 95 % None (Room air)   08/22/23 0200 -- 80 18 99/65 77 95 % None (Room air)   08/22/23 0130 -- 83 18 101/64 -- 95 % None (Room air)   08/22/23 0115 -- 85 15 106/69 82 94 % None (Room air) 08/22/23 0100 -- 86 17 109/75 87 95 % None (Room air)   08/22/23 0045 -- 87 22 115/81 91 96 % None (Room air)   08/22/23 0030 -- 89 20 112/83 -- 97 % None (Room air)   08/22/23 0015 -- 90 20 107/80 -- 97 % None (Room air)   08/22/23 0000 -- 93 20 111/82 -- 97 % None (Room air)   08/21/23 2345 -- 91 20 124/88 -- 98 % None (Room air)     Pertinent Labs/Diagnostic Test Results:   CT stroke alert brain   Final Result by Roly Pacheco MD (08/22 0003)      Acute to subacute left MCA territory infarct in the inferior parietal temporal region. No hemorrhagic conversion or significant mass effect. Findings were directly discussed with Linden Montilla at  11:52 PM  .      Workstation performed: [id number]         CTA stroke alert (head/neck)   Final Result by Roly Pacheco MD (08/22 0003)      No hemodynamically significant stenosis, dissection or occlusion of the carotid or vertebral arteries or major vessels of the [place]. Findings were directly discussed with Linden Montilla at  12:00 AM  .                           Workstation performed: [id number]         MRI inpatient order    (Results Pending)     8/22 Echo: Interpretation Summary       •  Left Ventricle: Left ventricular cavity size is mildly dilated. Wall thickness is normal. The left ventricular ejection fraction is 20%. Systolic function is severely reduced. Wall motion cannot be accurately assessed. There is severe global hypokinesis with regional variations. The inferoseptal and anteroseptal walls appear akinetic. The apical segments were not adequately visualized. •  Mitral Valve: There is mild regurgitation. •  Tricuspid Valve: There is mild to moderate regurgitation. The estimated right ventricular systolic pressure is 98.53 mmHg. •  Right Ventricle: Right ventricular cavity size is dilated. Systolic function is mildly to moderately reduced.  Wall motion is abnormal. There is moderate hypokinesis of the mid to apical free wall. •  Pericardium: There is a trivial pericardial effusion posterior to the heart.     Results from last 7 days   Lab Units 08/21/23  2345   SARS-COV-2  Negative     Results from last 7 days   Lab Units 08/22/23  0425 08/21/23  2328   WBC Thousand/uL 6.58 7.49   HEMOGLOBIN g/dL 13.1 14.7   HEMATOCRIT % 41.5 47.2   PLATELETS Thousands/uL 211 237         Results from last 7 days   Lab Units 08/22/23  0425 08/21/23  2328   SODIUM mmol/L 140 139   POTASSIUM mmol/L 3.2* 3.7   CHLORIDE mmol/L 104 101   CO2 mmol/L 27 27   ANION GAP mmol/L 9 11   BUN mg/dL 10 12   CREATININE mg/dL 0.85 0.97   EGFR ml/min/1.73sq m 94 83   CALCIUM mg/dL 8.2* 8.7         Results from last 7 days   Lab Units 08/21/23  2333   POC GLUCOSE mg/dl 194*     Results from last 7 days   Lab Units 08/22/23  0425 08/21/23  2328   GLUCOSE RANDOM mg/dL 99 173*           Results from last 7 days   Lab Units 08/22/23  0335 08/22/23  0123 08/21/23  2328   HS TNI 0HR ng/L  --   --  29   HS TNI 2HR ng/L  --  34  --    HSTNI D2 ng/L  --  5  --    HS TNI 4HR ng/L 42  --   --    HSTNI D4 ng/L 13  --   --          Results from last 7 days   Lab Units 08/21/23  2328   PROTIME seconds 17.4*   INR  1.35*   PTT seconds 28     Results from last 7 days   Lab Units 08/21/23  2339   TSH 3RD GENERATON uIU/mL 3.190           Results from last 7 days   Lab Units 08/21/23  2345   INFLUENZA A PCR  Negative   INFLUENZA B PCR  Negative   RSV PCR  Negative             Results from last 7 days   Lab Units 08/21/23  2339   ETHANOL LVL mg/dL 11*   ACETAMINOPHEN LVL ug/mL <83*   SALICYLATE LVL mg/dL <5         ED Treatment:   Medication Administration from 08/21/2023 2313 to 08/22/2023 1316       Date/Time Order Dose Route Action     08/21/2023 2327 EDT iohexol (OMNIPAQUE) 350 MG/ML injection (SINGLE-DOSE) 85 mL 85 mL Intravenous Given     08/22/2023 0002 EDT levETIRAcetam (KEPPRA) 2,000 mg in sodium chloride 0.9 % 250 mL IVPB 2,000 mg Intravenous New Bag     08/21/2023 3092 EDT aspirin tablet 325 mg 325 mg Oral Given     08/22/2023 0708 EDT potassium chloride 20 mEq IVPB (premix) 20 mEq Intravenous New Bag        Past Medical History:   Diagnosis Date   • Coronary artery disease    • CVA (cerebral vascular accident) ([address])     R sided, dysarthria, March 2020 in [place]   • Heart failure [facility])    • Hyperlipidemia    • Hypertension    • Pre-diabetes    • STEMI (ST elevation myocardial infarction) ([address])      Present on Admission:  • Coronary artery disease  • Hyperlipidemia  • Hypertension  • Chronic systolic heart failure (HCC)      Admitting Diagnosis: TIA (transient ischemic attack) [G45.9]  CVA (cerebral vascular accident) ([address]) [I63.9]  Age/Sex: 65 y.o. male  Admission Orders:  Scheduled Medications:  aspirin, 81 mg, Oral, Daily  atorvastatin, 40 mg, Oral, Daily With Dinner  enoxaparin, 40 mg, Subcutaneous, Daily  ticagrelor, 90 mg, Oral, BID      Continuous IV Infusions:     PRN Meds:       IP CONSULT TO NEUROLOGY  IP CONSULT TO CASE MANAGEMENT  IP CONSULT TO NUTRITION SERVICES    Network Utilization Review Department  ATTENTION: Please call with any questions or concerns to [phone] and carefully listen to the prompts so that you are directed to the right person. All voicemails are confidential.  Danilo Roman all requests for admission clinical reviews, approved or denied determinations and any other requests to dedicated fax number below belonging to the campus where the patient is receiving treatment.  List of dedicated fax numbers for the Facilities:  [place] DENIALS (Administrative/Medical Necessity) [phone]   [facility] (Maternity/NICU/Pediatrics) [address] [place] [address] [address] [facility] [phone]   [facility]2 [address] [phone]   [facility] [address] [address] [facility] [address] [facility]  [address]  [phone]

## 2023-08-22 NOTE — ASSESSMENT & PLAN NOTE
Stroke alert on 8/21/2023 11:15 PM with initial NIHSS of 6 per overnight Neuro quick note and LKW unclear, initial Blood Pressure: 136/92. Initial presenting deficits were aphasic and some blank stare. As a result of unclear LKW and stroke on CTH pt was determined to not be a candidate for thrombolysis (TNK). • Current Blood Pressure: 113/81, BP over 24 hours: BP  Min: 98/66  Max: 113/81   • Vascular risk factors: diabetes  • Home meds: Aspirin 81mg daily, Brilinta 90mg BID, and atorvastatin 40mg daily    Workup:  Lab Results   Component Value Date    HGBA1C 6.3 (H) 08/22/2023    CHOLESTEROL 155 08/22/2023    LDLCALC 109 (H) 08/22/2023    TRIG 91 08/22/2023    INR 1.35 (H) 08/21/2023      • CTH: acute to subacute L MCA infarct in the inferior parietal temporal region  • CTA: no LVO  • Coma panel: elevated Ethanol 11  • MRI brain w/o: Subacute infarct of L posterior lateral temporal lobe extending to the temporal occipital junction. Old infarct of L posterior lateral frontal lobe. Chronic microangiopathy. • Echo: LVEF 20%. Global hypokinesis. Dilated L atrium  • Routine EEG: L temporal focal neuronal dysfunction. No epileptiform discharges are identified  • S/p ASA 325mg x1  • P2Y12 inhibitor level: 204 (non responder). Patient is compliant with medications. Pertinent scores:  - NIHSS: 6 per overnight neuro quick note. This a.m., NIHSS: 4 (2 LOC questions and 2 severe aphasia)  Stroke Modified Acme Score: 0 (No baseline symptoms/disability)    Impression: acute to subacute infarct in left posterior lateral temporal lobe extending to temporal occipital junction and he also has old infarct in left posterior lateral frontal lobe. Concerning for cardioembolic etiology given EF 20% and dilated left atrium. Plan:  Discussed plan with neurology attending, Dr. Ashwini Noonan  - BP goal normotension <130/<80  -Patient is not at [facility].  He reports that he has been taking medication consistently  -Discussed with Dr. Alba's [facility] cardiologist and okay to have patient on antiplatelet and anticoagulation  -Continue aspirin 81 mg daily for now. Discontinue Brilinta. -Will initiate Eliquis 5 mg twice daily at 2 weeks post stroke (September 4th, 2023). Primary team will price check  -Follow-up with cardiology outpatient for loop recorder placement  -Per heart failure team, increase atorvastatin to 80 mg daily  - DVT ppx and SCDs  - Monitor on telemetry  - Maintain glucose <180, SSI for coverage if indicated  - Medical management as per primary team appreciated  - PT/OT/Speech/PM&R input appreciated  - Stroke education  -Neuro checks- Every 1 hour x 4 hours, then every 2 hours x 4 hours, then every 4 hours x 72 hours     -Stat CT Head for change in neuro status or GCS drop 2pts/1hr    -No further management from Neurology.  Please call with questions or concerns  -Follow-up with neurovascular outpatient in 4 to 6 weeks

## 2023-08-22 NOTE — ASSESSMENT & PLAN NOTE
- Per chart review - Follows with [address]  Cardiology. In the past refused use of LifeVest and ICD placement   - Echo 11/2022 - EF 35%. LV systolic function moderately reduced. Normal RV systolic function  - Home regimen: Entresto 24-26 mg BID, Toprol-XL 25 mg qd, Lasix 20 mg qd  - Echo (8/22) - EF 20%. LV systolic function severely reduced. Severe global hypokinesis. RV systolic function is mildly to moderately reduced. Wall motion is abnormal.    Plan:  - Hold home Lasix, Entresto, Metoprolol to allow for permissive hypertension  - I/Os, Daily weights  - Holding off on checking BNP given use of Entresto. No NT-proBNP at our lab.   - Cardiology consulted, appreciate recommendations    - If blood pressures are soft, consider gentle hydration

## 2023-08-22 NOTE — SPEECH THERAPY NOTE
Speech-Language Pathology Bedside Swallow Evaluation        Patient Name: Milagros Henson    YPTLF'V Date: 8/22/2023     Problem List  Principal Problem:    Acute CVA (cerebrovascular accident) [facility])  Active Problems:    Chronic systolic heart failure ([address])    Coronary artery disease    Hyperlipidemia    Hypertension         Past Medical History  Past Medical History:   Diagnosis Date   • Coronary artery disease    • CVA (cerebral vascular accident) ([address])     R sided, dysarthria, March 2020 in [place]   • Heart failure [facility])    • Hyperlipidemia    • Hypertension    • Pre-diabetes    • STEMI (ST elevation myocardial infarction) [facility])        Past Surgical History  Past Surgical History:   Procedure Laterality Date   • CARDIAC CATHETERIZATION N/A 7/13/2022    Procedure: Cardiac pci;  Surgeon: Hemalatha Peterson MD;  [facility]: [facility] CARDIAC CATH LAB; Service: Cardiology   • CARDIAC CATHETERIZATION N/A 7/13/2022    Procedure: Cardiac Coronary Angiogram;  Surgeon: Hemalatha Peterson MD;  [facility]: [facility] CARDIAC CATH LAB; Service: Cardiology   • CARDIAC CATHETERIZATION Left 7/13/2022    Procedure: Cardiac Left Heart Cath;  Surgeon: Hemalatha Peterson MD;  [facility]: [facility] CARDIAC CATH LAB; Service: Cardiology       Summary    Pt presents with normal oral and pharyngeal swallowing skills, no c/o food dysphagia or overt s/s aspiration. Recommendations:   Diet: regular diet and thin liquids   Meds: whole with liquid   Frequent Oral care: 2x/day  Other Recommendations/ considerations: will follow up x 1-2 as needed, will plan to complete language evaluation for the aphasia 8/23. Current Medical Status  Pt is a 65 y.o. male who presented to [address] [place] [address]  with acute CVA. Patient is a poor historian given the aphasia. Patient is able to respond with yes and no mainly. Rest of history from ED provider. Was noted to be aphasic at work. Without loss of consciousness, unresponsiveness, or weakness.  Given concern for seizure was loaded with keppra in the ED. ED provider discussed with Neurology - deemed not a candidate for thrombolytic therapy and likely early stroke is main etiology. CT noted acute/subacute stroke in L MCA territory in the inferior parietal temporal region. Labs within normal limit. No additional neuro deficits on exam except aphasia. Does have B/L LE pitting edema. Will be admitted for continued management of acute/subacute stroke. Past medical history:   Please see H&P for details    Special Studies:  MRI: pending  CT-brain: 8/21/23 Acute to subacute left MCA territory infarct in the inferior parietal temporal region. No hemorrhagic conversion or significant mass effect.       Social/Education/Vocational Hx:  Pt lives at home     Swallow Information   Current Risks for Dysphagia & Aspiration: CVA  Current Symptoms/Concerns: acute CVA  Current Diet: NPO   Baseline Diet: regular diet and thin liquids  Takes pills- whole w/ water     Baseline Assessment   Behavior/Cognition: alert  Speech/Language Status: receptive and expressive aphasia noted, able  to follow some commands, limited verbalizations noted   Patient Positioning: upright in bed     Swallow Mechanism Exam   Facial: symmetrical  Labial: WFL  Lingual: WFL  Velum: unable to visualize  Mandible: adequate ROM  Dentition: adequate  Vocal quality:clear/adequate   Volitional Cough: unable to initiate volitional cough   Respiratory: RA    Consistencies Assessed and Performance   Consistencies Administered: thin liquids, puree and hard solids    Oral Stage: pt able to bite crackers, drink liquids from cup and straw. Mastication/manipulation were WNL. Good oral control w/ liquids. No oral residue noted. Pharyngeal Stage: swallow initiation was prompt with complete laryngeal excursion. No overt s/s aspiration noted.        Esophageal Concerns: none reported      Results Reviewed with: patient and MD   Dysphagia Goals: pt will tolerate regular diet  with thin liquids without s/s of aspiration x1-2 sessions.   Discharge recommendation: no follow up needed for swallowing    April Cayetano Bueno MA CCC-SLP  Speech Pathologist  [place] license # [id number]  [address] license # [id number]  Available via [facility] Text

## 2023-08-22 NOTE — ASSESSMENT & PLAN NOTE
- Lipid panel (8/22) - cholesterol 155, TG 91, HDL 28,     Plan:  - Continue home atorvastatin 40 mg daily

## 2023-08-23 ENCOUNTER — APPOINTMENT (INPATIENT)
Dept: NEUROLOGY | Facility: HOSPITAL | Age: 63
DRG: 065 | End: 2023-08-23
Payer: COMMERCIAL

## 2023-08-23 PROCEDURE — 95816 EEG AWAKE AND DROWSY: CPT

## 2023-08-23 PROCEDURE — 99232 SBSQ HOSP IP/OBS MODERATE 35: CPT | Performed by: INTERNAL MEDICINE

## 2023-08-23 PROCEDURE — 95819 EEG AWAKE AND ASLEEP: CPT | Performed by: PSYCHIATRY & NEUROLOGY

## 2023-08-23 PROCEDURE — 92523 SPEECH SOUND LANG COMPREHEN: CPT

## 2023-08-23 RX ORDER — METOPROLOL SUCCINATE 25 MG/1
25 TABLET, EXTENDED RELEASE ORAL DAILY
Status: DISCONTINUED | OUTPATIENT
Start: 2023-08-23 | End: 2023-08-25 | Stop reason: HOSPADM

## 2023-08-23 RX ORDER — FUROSEMIDE 20 MG/1
20 TABLET ORAL DAILY
Status: DISCONTINUED | OUTPATIENT
Start: 2023-08-24 | End: 2023-08-25

## 2023-08-23 RX ADMIN — TICAGRELOR 90 MG: 90 TABLET ORAL at 10:04

## 2023-08-23 RX ADMIN — ENOXAPARIN SODIUM 40 MG: 40 INJECTION SUBCUTANEOUS at 10:04

## 2023-08-23 RX ADMIN — TICAGRELOR 90 MG: 90 TABLET ORAL at 17:29

## 2023-08-23 RX ADMIN — ASPIRIN 81 MG 81 MG: 81 TABLET ORAL at 10:04

## 2023-08-23 RX ADMIN — ATORVASTATIN CALCIUM 40 MG: 40 TABLET, FILM COATED ORAL at 17:29

## 2023-08-23 NOTE — SPEECH THERAPY NOTE
Speech Language/Pathology  Speech/Language Evaluation      Patient Name: Jayme Phillips    BSMOU'E Date: Problem List  Principal Problem:    Cerebrovascular accident (CVA) [facility])  Active Problems:    Chronic systolic heart failure ([address])    Coronary artery disease    Hyperlipidemia    Hypertension    Prediabetes      Past Medical History  Past Medical History:   Diagnosis Date   • Coronary artery disease    • CVA (cerebral vascular accident) ([address])     R sided, dysarthria, March 2020 in [place]   • Heart failure [facility])    • Hyperlipidemia    • Hypertension    • Pre-diabetes    • STEMI (ST elevation myocardial infarction) [facility])        Past Surgical History  Past Surgical History:   Procedure Laterality Date   • CARDIAC CATHETERIZATION N/A 7/13/2022    Procedure: Cardiac pci;  Surgeon: Etienne Mullins MD;  [facility]: [facility] CARDIAC CATH LAB; Service: Cardiology   • CARDIAC CATHETERIZATION N/A 7/13/2022    Procedure: Cardiac Coronary Angiogram;  Surgeon: Etienne Mullins MD;  [facility]: [facility] CARDIAC CATH LAB; Service: Cardiology   • CARDIAC CATHETERIZATION Left 7/13/2022    Procedure: Cardiac Left Heart Cath;  Surgeon: Etienne Mullins MD;  [facility]: [facility] CARDIAC CATH LAB; Service: Cardiology         Summary:  Pt presents w/ mild receptive and mild-moderate expressive aphasia characterized by difficulty comprehending complex info, difficulty w/ verbal reading and reading comprehension, word errors in spont speech, which appeared non-fluent at times; pt also noted to have difficulty w/ writing    Recommendation:  Outpt speech/language therapy. Current Medical:   Pt was admitted to [address] [place] [address] for acute CVA. Pt was noted to have aphasia at work. See bedside swallow eval for further info.       General Cognitive Status:  Alert with adequate attention    Auditory Comprehension:  Body part ID:5/5  100%  Left vs Right Body part: 5/5  100%  One step commands: 5/5  100%  Two step commands: 5/5  100%   Complex or 3 step commands: /  40%  Simple y/n ?'s: /  100%   Complex y/n ?'s: / 50%   Comprehension of Conversation: fair    Reading Comprehension:  Single word comprehension/Match picture given choice of 2 words:  NA  Simple direction following:  NA  Sentence comprehension:  NA  Functional comprehension:  NA  Paragraph comprehension:3/4  75%;  more difficulty w/ verbal reading than comprehending. Speech Mechanism Examination:   Facial: symmetrical  Labial: WFL  Lingual: WFL  Velum: unable to visualize  Mandible: adequate ROM  Dentition: adequate  Vocal quality:clear/adequate       Verbal Expression:  Auto Sequences:   Count 1-21: able to do  Days of week: able to do   Months of year:  Able to do   Repetition: difficulty w/ longer sentences  Phrase Completion:  NA  Picture Namin/12  92%  Responsive Namin/8 88%  Picture Description:  Fluent and appropriate for cookie theft picture  Conversation/ spontaneous speech:  Less fluent, less functor words, some word finding noted and some word errors  Able to make basic needs known:  yes    Written Expression:  Name: able to write his name  Sentence formulation: word error noted in simple sentence. Motor Speech:   WNL- no dysarthria or apraxia appreciated       Results discussed with:  Pt

## 2023-08-23 NOTE — PROGRESS NOTES
Attempted to perform neurological exam on patient, patient non-compliant with further exams, states they're "ridiculous". Indication explained for neuro exams, however pt became agitated with explanations and again refuses. Will continue to monitor. ..

## 2023-08-23 NOTE — PROGRESS NOTES
[facility]  Progress Note  Name: Tracey Mendez  MRN: [id number]  Unit/Bed#: S -01 I Date of Admission: 8/21/2023   Date of Service: 8/23/2023 I Hospital Day: 2    Assessment/Plan   * Cerebrovascular accident (CVA) [facility])  Assessment & Plan  - Presenting with aphasia   - Hx of CVA in 2020. Home regimen: aspirin 81 mg, atorvastatin 40 mg qd, Brilinta 90 mg  BID  - Per neurology, not a candidate for TPA. Unclear last known well time  - Per neurology, concern for seizure, loaded with 2 g Keppra  - On exam, expressive aphasia, mild receptive aphasia, and nonsensical speech without any other neurologic deficits  - CT head (8/22) - acute to subacute left MCA territory infarct in the inferior parietal temporal region. No hemorrhagic conversion or significant mass effect  - CTA head/neck (8/22) - No hemodynamically significant stenosis, dissection or occlusion noted. - Cleared by speech, regular diet   - PT recommended for postacute rehab on discharge  - P2Y12 inhibitor 204, wnl - unsure if pt is unresponsive or not compliant    Plan:  - 24 hrs of permissive hypertension, up to sbp > 220.  Hold home metoprolol, Lasix, Entresto  - Continue aspirin, atorvastatin, Brilinta  - Neuro checks   - Telemetry  - MRI brain pending  - EEG pending  - Cardiology consulted, appreciate recommendations    - If blood pressures soft, consider gentle hydration due to Hx of CHF  - If acute change in mental status, obtain stat CT head    Prediabetes  Assessment & Plan  HgbA1c (8/22) - 6.3    Hypertension  Assessment & Plan  - Blood pressures soft     Plan:  - Permissive hypertension  - Hold home Toprol, Lasix, Entresto    Hyperlipidemia  Assessment & Plan  - Lipid panel (8/22) - cholesterol 155, TG 91, HDL 28,     Plan:  - Continue home atorvastatin 40 mg daily    Coronary artery disease  Assessment & Plan  - Hx of PCI, thrombectomy, stent 7/2022  - P2Y12 inhibitor 204, wnl - unsure if pt is unresponsive or not compliant    Plan:  - Continue home ticagrelor and Aspirin  - Cardiology consulted, appreciate recommendations    Chronic systolic heart failure [facility])  Assessment & Plan  - Per chart review - Follows with [address]  [facility]iology. In the past refused use of LifeVest and ICD placement   - Echo 11/2022 - EF 35%. LV systolic function moderately reduced. Normal RV systolic function  - Home regimen: Entresto 24-26 mg BID, Toprol-XL 25 mg qd, Lasix 20 mg qd  - Echo (8/22) - EF 20%. LV systolic function severely reduced. Severe global hypokinesis. RV systolic function is mildly to moderately reduced. Wall motion is abnormal.    Plan:  - Hold home Lasix, Entresto, Metoprolol to allow for permissive hypertension  - I/Os, Daily weights  - Holding off on checking BNP given use of Entresto. No NT-proBNP at our lab. - Cardiology consulted, appreciate recommendations    - If blood pressures are soft, consider gentle hydration         VTE Pharmacologic Prophylaxis: VTE Score: 9 High Risk (Score >/= 5) - Pharmacological DVT Prophylaxis Ordered: enoxaparin (Lovenox). Sequential Compression Devices Ordered. Patient Centered Rounds: I performed bedside rounds with nursing staff today. Discussions with Specialists or Other Care Team Provider: Neurology, cardiology    Education and Discussions with Family / Patient: Patient declined call to . Current Length of Stay: 2 day(s)  Current Patient Status: Inpatient   Discharge Plan: Anticipate discharge in 24-48 hrs to home. Code Status: Level 1 - Full Code    Subjective:   Patient seen and examined at bedside. He is sitting comfortably in the chair in no acute distress. Denies any pain. Receptive aphasia from yesterday has improved. He is less frustrated compared to yesterday and asking questions about this condition and the next steps going forward. Still has some expressive aphasia and speech is slow. No nonsensical speech.      Objective:     Vitals:   Temp (24hrs), Av.1 °F (36.7 °C), Min:98 °F (36.7 °C), Max:98.3 °F (36.8 °C)    Temp:  [98 °F (36.7 °C)-98.3 °F (36.8 °C)] 98 °F (36.7 °C)  HR:  [83-90] 85  Resp:  [17-18] 18  BP: (103-119)/(73-87) 117/87  SpO2:  [94 %-98 %] 98 %  Body mass index is 29.97 kg/m². Input and Output Summary (last 24 hours): Intake/Output Summary (Last 24 hours) at 2023 1156  Last data filed at 2023 2201  Gross per 24 hour   Intake 0 ml   Output 0 ml   Net 0 ml       Physical Exam:   Physical Exam  Vitals reviewed. Constitutional:       General: He is not in acute distress. Appearance: Normal appearance. He is overweight. He is not ill-appearing. HENT:      Head: Normocephalic and atraumatic. Mouth/Throat:      Mouth: Mucous membranes are moist.      Pharynx: Oropharynx is clear. Eyes:      General: No scleral icterus. Conjunctiva/sclera: Conjunctivae normal.      Pupils: Pupils are equal, round, and reactive to light. Cardiovascular:      Rate and Rhythm: Normal rate and regular rhythm. Pulses: Normal pulses. Heart sounds: No murmur heard. No gallop. Pulmonary:      Effort: Pulmonary effort is normal. No respiratory distress. Breath sounds: Normal breath sounds. No wheezing or rales. Abdominal:      General: Bowel sounds are normal. There is no distension. Palpations: Abdomen is soft. There is no mass. Tenderness: There is no abdominal tenderness. Musculoskeletal:         General: No tenderness, deformity or signs of injury. Normal range of motion. Cervical back: Normal range of motion and neck supple. No tenderness. Skin:     General: Skin is warm and dry. Capillary Refill: Capillary refill takes less than 2 seconds. Coloration: Skin is not jaundiced or pale. Neurological:      Mental Status: He is alert. Cranial Nerves: Cranial nerves 2-12 are intact. Sensory: No sensory deficit. Motor: Motor function is intact.  No weakness, tremor or abnormal muscle tone. Coordination: Coordination is intact. Finger-Nose-Finger Test and Heel to [facility] Test normal.      Deep Tendon Reflexes: Reflexes normal.      Comments: No pronator drift. Normal finger to nose. No difficulty with rapid alternating movements. Receptive aphasia improved from yesterday. Expressive aphasia still present, improved from yesterday. No nonsensical speech. Slow speech. Psychiatric:         Mood and Affect: Mood normal.         Behavior: Behavior normal.         Additional Data:     Labs:  Results from last 7 days   Lab Units 08/22/23  0425   WBC Thousand/uL 6.58   HEMOGLOBIN g/dL 13.1   HEMATOCRIT % 41.5   PLATELETS Thousands/uL 211     Results from last 7 days   Lab Units 08/22/23  0425   SODIUM mmol/L 140   POTASSIUM mmol/L 3.2*   CHLORIDE mmol/L 104   CO2 mmol/L 27   BUN mg/dL 10   CREATININE mg/dL 0.85   ANION GAP mmol/L 9   CALCIUM mg/dL 8.2*   GLUCOSE RANDOM mg/dL 99     Results from last 7 days   Lab Units 08/21/23  2328   INR  1.35*     Results from last 7 days   Lab Units 08/21/23  2333   POC GLUCOSE mg/dl 194*     Results from last 7 days   Lab Units 08/22/23  1240   HEMOGLOBIN A1C % 6.3*           Lines/Drains:  Invasive Devices     Peripheral Intravenous Line  Duration           Peripheral IV 08/21/23 Dorsal (posterior); Left Hand 1 day    Peripheral IV 08/21/23 Left Antecubital 1 day                  Telemetry:  Telemetry Orders (From admission, onward)             24 Hour Telemetry Monitoring  (ED Bridging Orders Panel)  Continuous x 24 Hours (Telem)        Question:  Reason for 24 Hour Telemetry  Answer:  TIA/Suspected CVA/ Confirmed CVA                 Telemetry Reviewed: Normal Sinus Rhythm  Indication for Continued Telemetry Use: Acute CVA             Imaging: Reviewed radiology reports from this admission including: CT head    Recent Cultures (last 7 days):         Last 24 Hours Medication List:   Current Facility-Administered Medications   Medication Dose Route Frequency Provider Last Rate   • aspirin  81 mg Oral Daily Venus Loyd MD     • atorvastatin  40 mg Oral Daily With Anibal Osborne MD     • enoxaparin  40 mg Subcutaneous Daily Venus Loyd MD     • ticagrelor  90 mg Oral BID Venus Loyd MD          Today, Patient Was Seen By: Edenilson Pabon DO    **Please Note: This note may have been constructed using a voice recognition system. **

## 2023-08-24 ENCOUNTER — APPOINTMENT (INPATIENT)
Dept: MRI IMAGING | Facility: HOSPITAL | Age: 63
DRG: 065 | End: 2023-08-24
Payer: COMMERCIAL

## 2023-08-24 LAB
ANION GAP SERPL CALCULATED.3IONS-SCNC: 10 MMOL/L
ATRIAL RATE: 98 BPM
BUN SERPL-MCNC: 11 MG/DL (ref 5–25)
CALCIUM SERPL-MCNC: 8.5 MG/DL (ref 8.4–10.2)
CHLORIDE SERPL-SCNC: 104 MMOL/L (ref 96–108)
CO2 SERPL-SCNC: 25 MMOL/L (ref 21–32)
CREAT SERPL-MCNC: 0.71 MG/DL (ref 0.6–1.3)
GFR SERPL CREATININE-BSD FRML MDRD: 101 ML/MIN/1.73SQ M
GLUCOSE SERPL-MCNC: 112 MG/DL (ref 65–140)
MAGNESIUM SERPL-MCNC: 1.9 MG/DL (ref 1.9–2.7)
POTASSIUM SERPL-SCNC: 3.6 MMOL/L (ref 3.5–5.3)
PR INTERVAL: 186 MS
QRS AXIS: -85 DEGREES
QRSD INTERVAL: 124 MS
QT INTERVAL: 464 MS
QTC INTERVAL: 592 MS
SODIUM SERPL-SCNC: 139 MMOL/L (ref 135–147)
T WAVE AXIS: 82 DEGREES
VENTRICULAR RATE: 98 BPM

## 2023-08-24 PROCEDURE — 97116 GAIT TRAINING THERAPY: CPT

## 2023-08-24 PROCEDURE — 93010 ELECTROCARDIOGRAM REPORT: CPT | Performed by: INTERNAL MEDICINE

## 2023-08-24 PROCEDURE — 70551 MRI BRAIN STEM W/O DYE: CPT

## 2023-08-24 PROCEDURE — 97129 THER IVNTJ 1ST 15 MIN: CPT

## 2023-08-24 PROCEDURE — 83735 ASSAY OF MAGNESIUM: CPT

## 2023-08-24 PROCEDURE — 99223 1ST HOSP IP/OBS HIGH 75: CPT | Performed by: INTERNAL MEDICINE

## 2023-08-24 PROCEDURE — 99232 SBSQ HOSP IP/OBS MODERATE 35: CPT | Performed by: INTERNAL MEDICINE

## 2023-08-24 PROCEDURE — 80048 BASIC METABOLIC PNL TOTAL CA: CPT

## 2023-08-24 PROCEDURE — 97130 THER IVNTJ EA ADDL 15 MIN: CPT

## 2023-08-24 RX ORDER — ATORVASTATIN CALCIUM 40 MG/1
80 TABLET, FILM COATED ORAL
Status: DISCONTINUED | OUTPATIENT
Start: 2023-08-24 | End: 2023-08-25 | Stop reason: HOSPADM

## 2023-08-24 RX ORDER — FUROSEMIDE 10 MG/ML
40 INJECTION INTRAMUSCULAR; INTRAVENOUS ONCE
Status: COMPLETED | OUTPATIENT
Start: 2023-08-24 | End: 2023-08-24

## 2023-08-24 RX ADMIN — ASPIRIN 81 MG 81 MG: 81 TABLET ORAL at 08:30

## 2023-08-24 RX ADMIN — ENOXAPARIN SODIUM 40 MG: 40 INJECTION SUBCUTANEOUS at 08:30

## 2023-08-24 RX ADMIN — ATORVASTATIN CALCIUM 80 MG: 40 TABLET, FILM COATED ORAL at 17:16

## 2023-08-24 RX ADMIN — METOPROLOL SUCCINATE 25 MG: 25 TABLET, EXTENDED RELEASE ORAL at 08:30

## 2023-08-24 RX ADMIN — SACUBITRIL AND VALSARTAN 1 TABLET: 24; 26 TABLET, FILM COATED ORAL at 11:28

## 2023-08-24 RX ADMIN — TICAGRELOR 90 MG: 90 TABLET ORAL at 17:16

## 2023-08-24 RX ADMIN — TICAGRELOR 90 MG: 90 TABLET ORAL at 08:30

## 2023-08-24 RX ADMIN — FUROSEMIDE 40 MG: 10 INJECTION, SOLUTION INTRAMUSCULAR; INTRAVENOUS at 11:27

## 2023-08-24 RX ADMIN — FUROSEMIDE 20 MG: 20 TABLET ORAL at 08:30

## 2023-08-24 NOTE — OCCUPATIONAL THERAPY NOTE
Occupational Therapy Cancellation Note        Patient Name: Lucinda ESPINOZAQT'S Date: 8/24/2023 08/24/23 1112   Note Type   Note type Cancelled Session   Cancel Reasons Patient off floor/test   Additional Comments Attempted OT treatment session, however pt off floor for MRI.  Will re attempt as schedule allows     Mansi Gleason OT

## 2023-08-24 NOTE — ASSESSMENT & PLAN NOTE
- Per chart review - Follows with [address]  Cardiology. In the past refused use of LifeVest and ICD placement   - Echo 11/2022 - EF 35%. LV systolic function moderately reduced. Normal RV systolic function  - Home regimen: Entresto 24-26 mg BID, Toprol-XL 25 mg qd, Lasix 20 mg qd  - Echo (8/22) - EF 20%. LV systolic function severely reduced. Severe global hypokinesis. RV systolic function is mildly to moderately reduced. Wall motion is abnormal.  Patient declined LifeVest   - Heart failure consulted, appreciate recommendations: resume Entresto, Lasix 20 mg, and metoprolol. Give additional dose of IV Lasix 40 mg. Start Jardiance upon discharge.   - Price check for Jardiance $10 per CM    Plan:  - I/Os  - Daily weights

## 2023-08-24 NOTE — PROGRESS NOTES
[facility]  Progress Note  Name: Presley Dance  MRN: [id number]  Unit/Bed#: S -01 I Date of Admission: 8/21/2023   Date of Service: 8/24/2023 I Hospital Day: 3    Assessment/Plan   * Cerebrovascular accident (CVA) [facility])  Assessment & Plan  - Presenting with aphasia   - Hx of CVA in 2020. Home regimen: aspirin 81 mg, atorvastatin 40 mg qd, Brilinta 90 mg BID  - Per neurology, not a candidate for TPA. Unclear last known well time  - Per neurology, concern for seizure, loaded with 2 g Keppra  - CT head (8/22) - acute to subacute left MCA territory infarct in the inferior parietal temporal region. No hemorrhagic conversion or significant mass effect  - CTA head/neck (8/22) - No hemodynamically significant stenosis, dissection or occlusion noted. - Cleared by speech, regular diet   - P2Y12 inhibitor 204, wnl   - EEG (8/23) - Mild nonspecific diffuse cerebral dysfunction. No epileptiform discharges   - MRI brain (8/23) - Subacute infarct within the left posterior lateral temporal lobe with mild cytotoxic edema. No hemorrhagic transformation. Old infarct within the left posterior lateral frontal lobe. Plan:  - Continue home metoprolol, Lasix, and Entresto  - Continue aspirin, atorvastatin, Brilinta  - Telemetry  - PT recommended postacute rehab on discharge    Chronic systolic heart failure [facility])  Assessment & Plan  - Per chart review - Follows with [address]  Cardiology. In the past refused use of LifeVest and ICD placement   - Echo 11/2022 - EF 35%. LV systolic function moderately reduced. Normal RV systolic function  - Home regimen: Entresto 24-26 mg BID, Toprol-XL 25 mg qd, Lasix 20 mg qd  - Echo (8/22) - EF 20%. LV systolic function severely reduced. Severe global hypokinesis. RV systolic function is mildly to moderately reduced. Wall motion is abnormal.  Patient declined LifeVest   - Heart failure consulted, appreciate recommendations: resume Entresto, Lasix 20 mg, and metoprolol. Give additional dose of IV Lasix 40 mg. Start Jardiance upon discharge. - Price check for Jardiance $10 per CM    Plan:  - I/Os  - Daily weights    Hyperlipidemia  Assessment & Plan  - Lipid panel (8/22) - cholesterol 155, TG 91, HDL 28,     Plan:  - Per heart failure team, increase atorvastatin dose to 80 mg daily   - Continue atorvastatin 80 mg qd upon discharge    Prediabetes  Assessment & Plan  HgbA1c (8/22) - 6.3    Hypertension  Assessment & Plan  - bp 114/82    Plan:  - Resume home Toprol, Lasix, Entresto    Coronary artery disease  Assessment & Plan  - Hx of PCI, thrombectomy, stent 7/2022  - P2Y12 inhibitor 204, wnl    Plan:  - Continue home ticagrelor and Aspirin         VTE Pharmacologic Prophylaxis: VTE Score: 9 Moderate Risk (Score 3-4) - Pharmacological DVT Prophylaxis Ordered: enoxaparin (Lovenox). Patient Centered Rounds: I performed bedside rounds with nursing staff today. Discussions with Specialists or Other Care Team Provider: Neurology, heart failure    Education and Discussions with Family / Patient: Patient declined call to . Current Length of Stay: 3 day(s)  Current Patient Status: Inpatient   Discharge Plan: Anticipate discharge tomorrow to home. Code Status: Level 1 - Full Code    Subjective:   Patient seen and examined at bedside. He was sitting comfortably in the couch next to the bed. He does not have any pain. He does not have any complaints at this time. He still continues to have expressive aphasia, slightly improved from yesterday. He did not have receptive aphasia or nonsensical speech. He is requesting when he can go home. No overnight events on telemetry. Per radiology tech, I was made aware that he did not receive his MRI brain scan because the MRI form was not filled out.  I spoke with the radiology tech and told him that the patient can still communicate adequately to fill out all questions, and he would like to proceed with the MRI brain.        Objective:     Vitals:   Temp (24hrs), Av.7 °F (37.1 °C), Min:98.2 °F (36.8 °C), Max:99.6 °F (37.6 °C)    Temp:  [98.2 °F (36.8 °C)-99.6 °F (37.6 °C)] 98.2 °F (36.8 °C)  HR:  [56-98] 56  Resp:  [15-18] 18  BP: (108-114)/(77-82) 110/80  SpO2:  [97 %-98 %] 97 %  Body mass index is 29.2 kg/m². Input and Output Summary (last 24 hours): Intake/Output Summary (Last 24 hours) at 2023 1708  Last data filed at 2023 0800  Gross per 24 hour   Intake 240 ml   Output 0 ml   Net 240 ml       Physical Exam:   Physical Exam  Vitals and nursing note reviewed. Constitutional:       General: He is not in acute distress. Appearance: He is well-developed. HENT:      Head: Normocephalic and atraumatic. Eyes:      Conjunctiva/sclera: Conjunctivae normal.   Cardiovascular:      Rate and Rhythm: Normal rate and regular rhythm. Heart sounds: No murmur heard. Pulmonary:      Effort: Pulmonary effort is normal. No respiratory distress. Breath sounds: Normal breath sounds. Abdominal:      Palpations: Abdomen is soft. Tenderness: There is no abdominal tenderness. Musculoskeletal:         General: No swelling. Cervical back: Neck supple. Skin:     General: Skin is warm and dry. Capillary Refill: Capillary refill takes less than 2 seconds. Neurological:      Mental Status: He is alert and oriented to person, place, and time. Comments: Some expressive aphasia, slightly improved from yesterday. No receptive aphasia. No nonsensical speech.    Psychiatric:         Mood and Affect: Mood normal.          Additional Data:     Labs:  Results from last 7 days   Lab Units 23  0425   WBC Thousand/uL 6.58   HEMOGLOBIN g/dL 13.1   HEMATOCRIT % 41.5   PLATELETS Thousands/uL 211     Results from last 7 days   Lab Units 23  0437   SODIUM mmol/L 139   POTASSIUM mmol/L 3.6   CHLORIDE mmol/L 104   CO2 mmol/L 25   BUN mg/dL 11   CREATININE mg/dL 0.71   ANION GAP mmol/L 10   CALCIUM mg/dL 8.5   GLUCOSE RANDOM mg/dL 112     Results from last 7 days   Lab Units 08/21/23  2328   INR  1.35*     Results from last 7 days   Lab Units 08/21/23  2333   POC GLUCOSE mg/dl 194*     Results from last 7 days   Lab Units 08/22/23  1240   HEMOGLOBIN A1C % 6.3*           Lines/Drains:  Invasive Devices     Peripheral Intravenous Line  Duration           Peripheral IV 08/21/23 Dorsal (posterior); Left Hand 2 days    Peripheral IV 08/21/23 Left Antecubital 2 days                  Telemetry:  Telemetry Orders (From admission, onward)             24 Hour Telemetry Monitoring  (ED Bridging Orders Panel)  Continuous x 24 Hours (Telem)        Question:  Reason for 24 Hour Telemetry  Answer:  TIA/Suspected CVA/ Confirmed CVA                 Telemetry Reviewed: Normal Sinus Rhythm and Sinus Bradycardia  Indication for Continued Telemetry Use: Acute CVA             Imaging: Reviewed radiology reports from this admission including: CT head and MRI brain    Recent Cultures (last 7 days):         Last 24 Hours Medication List:   Current Facility-Administered Medications   Medication Dose Route Frequency Provider Last Rate   • aspirin  81 mg Oral Daily Gautam Clark MD     • atorvastatin  80 mg Oral Daily With Abdirahman Troncoso DO     • enoxaparin  40 mg Subcutaneous Daily Gautam Clark MD     • furosemide  20 mg Oral Daily Saray Hubbard MD     • metoprolol succinate  25 mg Oral Daily Saray Hubbard MD     • sacubitril-valsartan  1 tablet Oral BID Michael Bowles DO     • ticagrelor  90 mg Oral BID Gautam Clark MD          Today, Patient Was Seen By: Michael Bowles DO    **Please Note: This note may have been constructed using a voice recognition system. **

## 2023-08-24 NOTE — PHYSICAL THERAPY NOTE
PHYSICAL THERAPY NOTE    Patient Name: Sharon Noonan  JDJIL'I Date: 23 1304   PT Last Visit   PT Visit Date 23   Note Type   Note Type Treatment   Pain Assessment   Pain Assessment Tool 0-10   Pain Score No Pain   Restrictions/Precautions   Weight Bearing Precautions Per Order No   Other Precautions Cognitive; Chair Alarm; Bed Alarm; Fall Risk   General   Family/Caregiver Present No   Subjective   Subjective Patient seated OOB on couch and is agreeable to participate in therapy session. Patient identifers obtained from name & . Bed Mobility   Supine to Sit Unable to assess   Sit to Supine Unable to assess   Additional Comments Patient seated OOB pre and post session with call bell and belongings in reach. Transfers   Sit to Stand 6  Modified independent   Stand to Sit 6  Modified independent   Ambulation/Elevation   Gait pattern Step through pattern;Decreased foot clearance   Gait Assistance 5  Supervision   Additional items Assist x 1;Verbal cues   Assistive Device None   Distance 250' x1 including turns   Stair Management Assistance 5  Supervision  (close)   Additional items Assist x 1;Verbal cues   Stair Management Technique One rail R;Foreward; Alternating pattern   Number of Stairs 13   Balance   Static Sitting Good   Dynamic Sitting Fair +   Dynamic Standing Fair -   Ambulatory Fair   Activity Tolerance   Activity Tolerance Patient tolerated treatment well   Medical Staff Made Aware Spoke to PARISA KILGORE   Assessment   Problem List Decreased endurance; Impaired balance;Decreased mobility; Decreased cognition; Impaired judgement;Decreased safety awareness   Assessment Patient agreeable to participate in therapy session however requires education and encouragement. Patient demonstrates progression with functional moblity to mod I for transfers with good technique and safety.  Pt able to ambulate increased gait distance with no AD and supervision with vebral instruction for path navigation and pacing with turns. Initiation of stair training with unilateral HR and step over step pattern with verbal instruction for pacing. Pt demonstrated ability to follow simple commands and appropriate conversation with short answers however patient has flat affect throughout and little initiative for conversation engagement. Recommend PT to see next session for mobility and discharge recommendation reassessment as appropriate. Goals   Patient Goals to go back to work   STG Expiration Date 09/01/23   PT Treatment Day 1   Plan   Treatment/Interventions Functional transfer training;Elevations; Endurance training;Patient/family training;Equipment eval/education; Bed mobility;Gait training;Spoke to nursing   PT Frequency 3-5x/wk   Recommendation   PT Discharge Recommendation Post acute rehabilitation services   AM-PAC Basic Mobility Inpatient   Turning in Flat Bed Without Bedrails 3   Lying on Back to Sitting on Edge of Flat Bed Without Bedrails 3   Moving Bed to Chair 3   Standing Up From Chair Using Arms 4   Walk in Room 3   Climb 3-5 Stairs With Railing 3   Basic Mobility Inpatient Raw Score 19   Basic Mobility Standardized Score 42.48   Highest Level Of Mobility   [facility] Goal 6: Walk 10 steps or more     The patient's AM-PAC Basic Mobility Inpatient Short Form Raw Score is 19. A Raw score of greater than 16 suggests the patient may benefit from discharge to home. Please also refer to the recommendation of the Physical Therapist for safe discharge planning.     Hawa Weiner, PTA

## 2023-08-24 NOTE — CASE MANAGEMENT
Case Management Progress Note    Patient name Frances Gates  Location S /S -01 MRN [id number]  : 1960 Date 2023       LOS (days): 3  Geometric Mean LOS (GMLOS) (days):   Days to GMLOS:        OBJECTIVE:        Current admission status: Inpatient  Preferred Pharmacy:   [facility]/pharmacy [address], [address] [facility]  [address] [facility],[address]  Phone: [phone] Fax: [phone]    Primary Care Provider: Maddie Gandara PA-C    Primary Insurance: BLUE CROSS  Secondary Insurance:     PROGRESS NOTE:    Call made to [facility] pharmacy to follow-up on cost of Jardiance; spoke with pharmacist who relayed cost comes to $30/month under patient's insurance plan. Per [address] () website, patient would qualify for $10/month savings card as he has commercial insurance. Call made to them at: [phone] to inquire about getting savings card; spoke with representative Charlie Werner. Provided Savings Card as follows:     ID: [id number]  BIN: [id number]  PCN: [id number]: [id number]  [address] [place] [address] - [address]: [id number]    Pharmacy Support: [phone]    Return call made to pharmacist and provided above information. Pharmacist confirmed card active and cost went from $30/month to $10/month. Will be ready for pick-up tonight. Met with patient at bedside and provided above information. Patient notably more aphasic during conversation, but confirmed understanding of savings card and agreeable to cost. Patient denied any other issues with obtaining medication at this time. Patient continues to decline recommendation for rehab or home services, however unlikely to obtain rehab auth based on PT notes from today (ambulating 250' without device and going up/down 13 stairs). Patient provided with out-patient therapy listing for his records and highlighted [facility] as well as Neuro [address] [place] [address] in [facility]. Patient will need Lyft transport to return home. I have examined Erickson and reviewed Erickson's preoperative history and physical and all labs and there are no changes in the patient's condition.

## 2023-08-24 NOTE — ASSESSMENT & PLAN NOTE
- Hx of PCI, thrombectomy, stent 7/2022  - P2Y12 inhibitor 204, wnl    Plan:  - Continue home ticagrelor and Aspirin

## 2023-08-24 NOTE — OCCUPATIONAL THERAPY NOTE
Occupational Therapy Treatment Note     Patient Name: Flor Garcia  [id number] Date: 8/24/2023  Problem List  Principal Problem:    Cerebrovascular accident (CVA) [facility])  Active Problems:    Chronic systolic heart failure ([address])    Coronary artery disease    Hyperlipidemia    Hypertension    Prediabetes        08/24/23 1412   OT Last Visit   OT Visit Date 08/24/23   Note Type   Note Type Treatment   Pain Assessment   Pain Assessment Tool 0-10   Pain Score No Pain   Restrictions/Precautions   Weight Bearing Precautions Per Order No   Other Precautions Cognitive; Chair Alarm; Bed Alarm; Fall Risk  (+ expressive, receptive aphasia)   Lifestyle   Autonomy PTA pt living with brother in apartment, pt (I) with ADLs and IADLs, (?)fallls, (-)drives, no use of AD at baseline   Reciprocal Relationships brother   Service to Others works at [employer], unable to recall what he does at [employer] did not state   Bed Mobility   Additional Comments Pt seated OOB on couch upon arrival, remained seated throughout session for cognitive assessment. Subjective   Subjective Pt with significant difficulty sequencing more than 2-3 word sentences, difficulty with word finding, often telling this writer "what you're saying doesnt make sense"   Cognition   Overall Cognitive Status Impaired   Arousal/Participation Alert   Attention Attends with cues to redirect   Orientation Level Oriented X4   Memory Decreased recall of precautions;Decreased short term memory  (however unclear if limitations are d/t global aphasia limiting imprinting/ retrieval of information)   Following Commands Follows one step commands with increased time or repetition  (follows written, visual commands (gestures), without difficulty. follows verbal commands approx. 25% of time)   Comments Pt alert, intermittently irritable/resistant to assessment, however improves throughout session.  Increased difficulty communicating with therapist d/t global aphasia noted. Pt able to WRITE address, full name without difficulty. inc'd time to pronounce address. Recommend further aphasia testing   Cognition Assessment Tools MOCA   Score 13   Activity Tolerance   Activity Tolerance   (limited by communication deficits)   Medical Staff Made Aware RN Jacques Nguyen, [address], [facility] Resident Gearld Door. Assessment   Assessment Patient seen for OT treatment on 8/24/2023 s/p admission for Cerebrovascular accident (CVA) [facility]) Patient agreeable to OT session. Patient participated in formal cognitive evaluation with intervention focus on assisting with safe D/C planning. Flor Garcia  is continuing to perform below baseline due to the following deficits: impaired cognitive communication abilities. MoCA complated, however interpret results c caution d/t extensive aphasia limiting performance. Personal factors continuing to impact D/C include: decreased caregiver status  From OT standpoint, patient would benefit from skilled intervention to maximize independence with ADLs and functional mobility. Goals remain appropriate, continue POC. At this time, recommending D/C to: return to previous environment with outpatient rehabilitation PENDING further cognitive assessments that are more suitable to address cognition without communication. Plan   Treatment Interventions Continued evaluation  (cognitive assessments)   Goal Expiration Date 09/01/23   OT Treatment Day 0   OT Frequency 3-5x/wk   Recommendation   OT Discharge Recommendation Home with outpatient rehabilitation  (pending further cognitive assessments to assist with safe D/C planning)   Additional Comments  (S)  Recommend ACLS to assist with D/C planning, further cognitive assessments that are adaptive to aphasia deficits.  Will f/u   AM-PAC Daily Activity Inpatient   Lower Body Dressing 3   Bathing 3   Toileting 3   Upper Body Dressing 4   Grooming 4   Eating 4   Daily Activity Raw Score 21   Daily Activity Standardized Score (Calc for Raw Score >=11) 44.27   AM-PAC Applied Cognition Inpatient   Following a Speech/Presentation 2   Understanding Ordinary Conversation 2   Taking Medications 2   Remembering Where Things Are Placed or Put Away 2   Remembering List of 4-5 Errands 2   Taking Care of Complicated Tasks 2   Applied Cognition Raw Score 12   Applied Cognition Standardized Score 28.82   MOCA   Version 8.1  (CERTIFIED RATER ID: [id number])   Visuopatial/Executive 2   Naming 3  (pt able to spell "rhino", unable to pronounce d/t aphasia)   Memory 0   Attention: Digits 1   Attention: Letters 1   Attention: Serial 0   Language: Repeat 0  (pt with <25% accuracy, difficulty coupling together words)   Language: Fluency 0  (5 words)   Abstraction 0  (pt unable to comprehend question/task despite max cueing)   Delayed Recall 0  (MIS: 5/15)   Orientation 5  (word finding difficulty for city)   Does patient have less than or equal to 12 years of education? 1   MOCA Total Score 13   MOCA Comments (S)  13/30 indicates scores consistent with major neurocognitive disorder. However interpret results with caution d/t significant global aphasia deficits. Anticipate pt to perform significantly better on assessments not requiring communication for assessment.    GOALS:      -Patient will perform grooming tasks standing at sink with overall Mod I in order to increase overall independence     -Patient will be Mod I with UB bathing/dressing using AE and AD as needed in order to increase (I) with ADLs     -Patient will be Mod I with LB bathing/dressing with use of AE and AD as needed in order to increase (I) with ADLs     -Patient will complete toileting w/ Mod I w/ G hygiene/thoroughness in order to reduce caregiver burden     -Patient will demonstrate Mod I with bed mobility for ability to manage own comfort and initiate OOB tasks.      -Patient will perform functional transfers with Mod I to/from all surfaces using DME as needed in order to increase (I) with functional tasks     -Patient will be Mod I with functional mobility to/from bathroom for increased independence with toileting tasks     -Patient will engage in ongoing cognitive assessment in order to assist with safe discharge planning/recommendations.  PROGRESSING     -Patient will follow multi-step instructions with no VC in order to safely complete functional tasks PROGRESSING     -Patient will attend to functional task for 10 min without VC for attention/redirection         Tristian Rainey, OT

## 2023-08-24 NOTE — PLAN OF CARE
Problem: PHYSICAL THERAPY ADULT  Goal: Performs mobility at highest level of function for planned discharge setting. See evaluation for individualized goals. Description: Treatment/Interventions: ADL retraining, Functional transfer training, LE strengthening/ROM, Elevations, Therapeutic exercise, Endurance training, Cognitive reorientation, Patient/family training, Equipment eval/education, Bed mobility, Gait training, Compensatory technique education, Spoke to nursing, Spoke to case management, OT          See flowsheet documentation for full assessment, interventions and recommendations. Outcome: Progressing  Note:    Problem List: Decreased endurance, Impaired balance, Decreased mobility, Decreased cognition, Impaired judgement, Decreased safety awareness  Assessment: Patient agreeable to participate in therapy session however requires education and encouragement. Patient demonstrates progression with functional moblity to mod I for transfers with good technique and safety. Pt able to ambulate increased gait distance with no AD and supervision with vebral instruction for path navigation and pacing with turns. Initiation of stair training with unilateral HR and step over step pattern with verbal instruction for pacing. Pt demonstrated ability to follow simple commands and appropriate conversation with short answers however patient has flat affect throughout and little initiative for conversation engagement. Recommend PT to see next session for mobility and discharge recommendation reassessment as appropriate. PT Discharge Recommendation: Post acute rehabilitation services    See flowsheet documentation for full assessment.

## 2023-08-24 NOTE — CASE MANAGEMENT
Case Management Discharge Planning Note    Patient name Rosibel Morrison  Location S /S -01 MRN [id number]  : 1960 Date 2023       Current Admission Date: 2023  Current Admission Diagnosis:Cerebrovascular accident (CVA) [facility])   Patient Active Problem List    Diagnosis Date Noted   • Cerebrovascular accident (CVA) ([address]) 2023   • Elevated bilirubin 2023   • Elevated PSA 2023   • Ischemic cardiomyopathy 2023   • History of ST elevation myocardial infarction (STEMI) 10/24/2022   • Coronary artery disease 10/24/2022   • Hyperlipidemia 10/24/2022   • Hypertension 10/24/2022   • Prediabetes 10/24/2022   • Type 2 diabetes mellitus ([address]) 10/24/2022   • Seizure ([address]) 10/24/2022   • Obesity (BMI 30.0-34.9) 10/24/2022   • Chronic systolic heart failure ([address]) 2022      LOS (days): 3  Geometric Mean LOS (GMLOS) (days):   Days to GMLOS:     OBJECTIVE:  Risk of Unplanned Readmission Score: 9.8         Current admission status: Inpatient   Preferred Pharmacy:   [address] [place] [address]  Phone: [phone] Fax: [phone]    Primary Care Provider: Parag Patterson PA-C    Primary Insurance: BLUE CROSS  Secondary Insurance:     DISCHARGE DETAILS:    Discharge planning discussed with[de-identified] patient at bedside  [place] of Choice: Yes (re: rehab, home care or out-patient therapy)  Comments - [place] of Choice: patient relayed preference for out-patient therapy  CM contacted family/caregiver?: No- see comments (patient declined; states he "broke up" with his brother)  Were Treatment Team discharge recommendations reviewed with patient/caregiver?: Yes  Did patient/caregiver verbalize understanding of patient care needs?: Yes  Were patient/caregiver advised of the risks associated with not following Treatment Team discharge recommendations?: Yes         Requested [address]         Is the patient interested in [facility] at discharge?: No    DME Referral Provided  Referral made for DME?: No    Other Referral/Resources/Interventions Provided:  Interventions: Short Term Rehab, HHC, Outpatient OT, Outpatient PT  Referral Comments: TT received from MD to price-check cost of Jardiance. [facility] made to [phone] but closed until 2:00pm for lunch break. PTA worked with patient today and he was able to ambulate 200' and go up/down stairs. OT to see patient for updated assessment as well. Met with patient at bedside to discuss d/c plan. Patient reports that he lives alone and works full time using heavy machinery (fork lifts, etc). Patient denies needing a note to return to work, stating that his employer requires him to be evaluated by their physician prior to return to work. Patient states that he utilizes public transportation and does not require any assistance devices. Patient denies any interest in in-patient or home care services and reports plan to return to work as soon as able. Patient states he will consider out-patient therapy for ongoing speech and OT therapy. Offer made to contact his brother, Gigi Walker, but patient reports that they no longer speak and brother does not live in the area. Patient will need Lyft voucher for transport home. MRI results pending and will follow-up on cost of Jardiance once pharmacy re-opens.  Patient declines any prior issue obtaining medication in the past.    Would you like to participate in our [employer] service program?  : No - Declined    Treatment Team Recommendation: Short Term Rehab  Discharge Destination Plan[de-identified] Home

## 2023-08-24 NOTE — PLAN OF CARE
Problem: MOBILITY - ADULT  Goal: Maintain or return to baseline ADL function  Description: INTERVENTIONS:  -  Assess patient's ability to carry out ADLs; assess patient's baseline for ADL function and identify physical deficits which impact ability to perform ADLs (bathing, care of mouth/teeth, toileting, grooming, dressing, etc.)  - Assess/evaluate cause of self-care deficits   - Assess range of motion  - Assess patient's mobility; develop plan if impaired  - Assess patient's need for assistive devices and provide as appropriate  - Encourage maximum independence but intervene and supervise when necessary  - Involve family in performance of ADLs  - Assess for home care needs following discharge   - Consider OT consult to assist with ADL evaluation and planning for discharge  - Provide patient education as appropriate  Outcome: Progressing  Goal: Maintains/Returns to pre admission functional level  Description: INTERVENTIONS:  - Perform BMAT or MOVE assessment daily.   - Set and communicate daily mobility goal to care team and patient/family/caregiver. - Collaborate with rehabilitation services on mobility goals if consulted  - Perform Range of Motion 2 times a day. - Reposition patient every 2 hours.   - Dangle patient 2 times a day  - Stand patient 2 times a day  - Ambulate patient 2 times a day  - Out of bed to chair 2 times a day   - Out of bed for meals 2 times a day  - Out of bed for toileting  - Record patient progress and toleration of activity level   Outcome: Progressing     Problem: NEUROSENSORY - ADULT  Goal: Achieves stable or improved neurological status  Description: INTERVENTIONS  - Monitor and report changes in neurological status  - Monitor vital signs such as temperature, blood pressure, glucose, and any other labs ordered   - Initiate measures to prevent increased intracranial pressure  - Monitor for seizure activity and implement precautions if appropriate      Outcome: Progressing  Goal: Remains free of injury related to seizures activity  Description: INTERVENTIONS  - Maintain airway, patient safety  and administer oxygen as ordered  - Monitor patient for seizure activity, document and report duration and description of seizure to physician/advanced practitioner  - If seizure occurs,  ensure patient safety during seizure  - Reorient patient post seizure  - Seizure pads on all 4 side rails  - Instruct patient/family to notify RN of any seizure activity including if an aura is experienced  - Instruct patient/family to call for assistance with activity based on nursing assessment  - Administer anti-seizure medications if ordered    Outcome: Progressing  Goal: Achieves maximal functionality and self care  Description: INTERVENTIONS  - Monitor swallowing and airway patency with patient fatigue and changes in neurological status  - Encourage and assist patient to increase activity and self care.    - Encourage visually impaired, hearing impaired and aphasic patients to use assistive/communication devices  Outcome: Progressing     Problem: CARDIOVASCULAR - ADULT  Goal: Maintains optimal cardiac output and hemodynamic stability  Description: INTERVENTIONS:  - Monitor I/O, vital signs and rhythm  - Monitor for S/S and trends of decreased cardiac output  - Administer and titrate ordered vasoactive medications to optimize hemodynamic stability  - Assess quality of pulses, skin color and temperature  - Assess for signs of decreased coronary artery perfusion  - Instruct patient to report change in severity of symptoms  Outcome: Progressing  Goal: Absence of cardiac dysrhythmias or at baseline rhythm  Description: INTERVENTIONS:  - Continuous cardiac monitoring, vital signs, obtain 12 lead EKG if ordered  - Administer antiarrhythmic and heart rate control medications as ordered  - Monitor electrolytes and administer replacement therapy as ordered  Outcome: Progressing     Problem: Neurological Deficit  Goal: Neurological status is stable or improving  Description: Interventions:  - Monitor and assess patient's level of consciousness, motor function, sensory function, and level of assistance needed for ADLs. - Monitor and report changes from baseline. Collaborate with interdisciplinary team to initiate plan and implement interventions as ordered. - Provide and maintain a safe environment. - Consider seizure precautions. - Consider fall precautions. - Consider aspiration precautions. - Consider bleeding precautions. Outcome: Progressing     Problem: Activity Intolerance/Impaired Mobility  Goal: Mobility/activity is maintained at optimum level for patient  Description: Interventions:  - Assess and monitor patient  barriers to mobility and need for assistive/adaptive devices. - Assess patient's emotional response to limitations. - Collaborate with interdisciplinary team and initiate plans and interventions as ordered. - Encourage independent activity per ability.  - Maintain proper body alignment. - Perform active/passive rom as tolerated/ordered. - Plan activities to conserve energy.  - Turn patient as appropriate  Outcome: Progressing     Problem: Communication Impairment  Goal: Ability to express needs and understand communication  Description: Assess patient's communication skills and ability to understand information. Patient will demonstrate use of effective communication techniques, alternative methods of communication and understanding even if not able to speak. - Encourage communication and provide alternate methods of communication as needed. - Collaborate with case management/ for discharge needs. - Include patient/family/caregiver in decisions related to communication. Outcome: Progressing     Problem: Potential for Aspiration  Goal: Non-ventilated patient's risk of aspiration is minimized  Description: Assess and monitor vital signs, respiratory status, and labs (WBC). Monitor for signs of aspiration (tachypnea, cough, rales, wheezing, cyanosis, fever). - Assess and monitor patient's ability to swallow. - Place patient up in chair to eat if possible. - HOB up at 90 degrees to eat if unable to get patient up into chair.  - Supervise patient during oral intake. - Instruct patient/ family to take small bites. - Instruct patient/ family to take small single sips when taking liquids. - Follow patient-specific strategies generated by speech pathologist.  Outcome: Progressing  Goal: Ventilated patient's risk of aspiration is minimized  Description: Assess and monitor vital signs, respiratory status, airway cuff pressure, and labs (WBC). Monitor for signs of aspiration (tachypnea, cough, rales, wheezing, cyanosis, fever). - Elevate head of bed 30 degrees if patient has tube feeding.  - Monitor tube feeding. Outcome: Progressing     Problem: Nutrition  Goal: Nutrition/Hydration status is improving  Description: Monitor and assess patient's nutrition/hydration status for malnutrition (ex- brittle hair, bruises, dry skin, pale skin and conjunctiva, muscle wasting, smooth red tongue, and disorientation). Collaborate with interdisciplinary team and initiate plan and interventions as ordered. Monitor patient's weight and dietary intake as ordered or per policy. Utilize nutrition screening tool and intervene per policy. Determine patient's food preferences and provide high-protein, high-caloric foods as appropriate. - Assist patient with eating.  - Allow adequate time for meals.  - Encourage patient to take dietary supplement as ordered. - Collaborate with clinical nutritionist.  - Include patient/family/caregiver in decisions related to nutrition.   Outcome: Progressing

## 2023-08-24 NOTE — CONSULTS
Heart Failure Consult Note - Zaida Roberts 65 y.o. male MRN: [id number]    @ Encounter: [id number]      Assessment/Plan:    Patient Active Problem List    Diagnosis Date Noted   • Cerebrovascular accident (CVA) ([address]) 08/22/2023   • Elevated bilirubin 07/20/2023   • Elevated PSA 07/20/2023   • Ischemic cardiomyopathy 07/18/2023   • History of ST elevation myocardial infarction (STEMI) 10/24/2022   • Coronary artery disease 10/24/2022   • Hyperlipidemia 10/24/2022   • Hypertension 10/24/2022   • Prediabetes 10/24/2022   • Type 2 diabetes mellitus ([address]) 10/24/2022   • Seizure ([address]) 10/24/2022   • Obesity (BMI 30.0-34.9) 10/24/2022   • Chronic systolic heart failure ([address]) 09/08/2022       Acute CVA, p/w with aphasia  CT head 8/21/23: acute/subacute stroke in the left MCA territory in the inferior parietal temporal region. No embolic source seen on transthoracic echo. No events on telemetry    Chronic heart failure with reduced ejection fraction  Etiology: ischemic  Volume up on exam today    Studies- personally reviewed by me    Echocardiogram 8/22/23  LVEF: 20%  LVIDd: 5.8cm, normal wall thicknessRV: dilated, mild to moderately reduced systolic function; moderate hypokinesis of the mid to apical free wall  MR: mild  PASP: 46mmHg, estimated RAP 8mmHg, mild to moderate TR  RVOT: shortened PAAT, some mid to late notching  Other: trivial pericardial effusion    TTE limited 11/2/22: LVEF 35%. TTE limited 8/26/22: LVEF34%. Grade 2 diastology. Mild AI, mild MR. Moderate TR. Estimated PASP 42mmHg  TTE 7/13/23: LVEF 35-40%. Normal RV size and function. Estimated PASP 47mmHg  [facility] 7/13/22: LM with minimal luminal irregularities. Mid % stenosis s/p thrombectomy and stent placement (ALEJANDRA 3.5 x 18mm); D1 50% stenosis.      Diet:  --2g sodium diet  --2000 ml fluid restriction    Neurohormonal Blockade:  --Beta-Blocker: Metoprolol succinate 25 mg daily  --ACEi, ARB or ARNi: Entresto 24 to 26 mg twice daily-on hold  --Aldosterone Receptor Blocker: No  --SGLT2 Inhibitor: No  --Diuretic: Furosemide 20 mg daily    Sudden Cardiac Death Risk Reduction:  --ICD: Was on LifeVest and returned by patient, declined ICD placement on last office visit with Dr. Sharon Mcdaniel 1/5/2023    Electrical Resynchronization:  --Candidacy for BiV device: narrow QRSd    Advanced Therapies (if appropriate): --Inotrope:  --LVAD/Transplant Candidacy:    # Coronary artery disease, s/p ALEJANDRA x 1 to mid LAD 7/2022  Admitted for STEMI 7/2022  Rx: aspirin, brilinta, atorvastatin  # Hypertension  # Hyperlipidemia, goal LDL < 70  8/22/23 TG 91 HDL 28   Rx: atorvastatin 40mg daily  # Prior Stroke      Today's Plan:  Entresto on hold for permissive hypertension due to acute stroke. Resume when okay from neurology standpoint. Volume up on exam, recommend giving lasix 40mg IV x 1 today and monitor response, will likely need more diuresis  Continue metoprolol  Increase atorvastatin to 80mg daily  Price check Jardiance  Declined LifeVest/ICD       HPI:   61-year-old male with past medical history of coronary artery disease with anterolateral STEMI in July 2022 status post thrombectomy and drug-eluting stent to mid LAD, ischemic cardiomyopathy, chronic heart failure reduced ejection fraction, hypertension, hyperlipidemia, prior stroke who presents with aphasia. Per chart review patient was noted to be aphasic at work. No loss of consciousness or unresponsiveness or weakness. Neurology consulted in the ED and patient deemed not a candidate for thrombolytic therapy. CT showed acute/subacute stroke in the left MCA territory in the inferior parietal temporal region. Noted to have bilateral lower extremity edema on presentation. Patient reports having worsening leg swelling and weight gain over the past couple of weeks. Denies shortness of breath, PND or orthopnea. No chest pain, palpitations or lightheadedness.  Still reporting aphasia and word finding difficulty. Past Medical History:   Diagnosis Date   • Coronary artery disease    • CVA (cerebral vascular accident) ([address] [facility])     R sided, dysarthria, March 2020 in [place]   • Heart failure [facility])    • Hyperlipidemia    • Hypertension    • Pre-diabetes    • STEMI (ST elevation myocardial infarction) ([address] )        Review of Systems   Constitutional: Negative for chills and fever. HENT: Negative for ear pain and sore throat. Eyes: Negative for pain and visual disturbance. Respiratory: Negative for cough, chest tightness and shortness of breath. Cardiovascular: Positive for leg swelling. Negative for chest pain and palpitations. Gastrointestinal: Negative for abdominal distention, abdominal pain and vomiting. Genitourinary: Negative for dysuria and hematuria. Musculoskeletal: Negative for arthralgias and back pain. Skin: Negative for color change and rash. Neurological: Negative for seizures and syncope. All other systems reviewed and are negative. Allergies   Allergen Reactions   • Pollen Extract Allergic Rhinitis     .     Current Facility-Administered Medications:   •  aspirin chewable tablet 81 mg, 81 mg, Oral, Daily, Kassandra Orosco MD, 81 mg at 08/23/23 1004  •  atorvastatin (LIPITOR) tablet 40 mg, 40 mg, Oral, Daily With aDle Jarquin MD, 40 mg at 08/23/23 1729  •  enoxaparin (LOVENOX) subcutaneous injection 40 mg, 40 mg, Subcutaneous, Daily, Kassandra Orosco MD, 40 mg at 08/23/23 1004  •  furosemide (LASIX) tablet 20 mg, 20 mg, Oral, Daily, Holger Mercedes MD  •  metoprolol succinate (TOPROL-XL) 24 hr tablet 25 mg, 25 mg, Oral, Daily, Saray Silva MD  •  ticagrelor (BRILINTA) tablet 90 mg, 90 mg, Oral, BID, Kassandra Orosco MD, 90 mg at 08/23/23 1729    Social History     Socioeconomic History   • Marital status: Unknown     Spouse name: Not on file   • Number of children: Not on file   • Years of education: Not on file • Highest education level: Not on file   Occupational History   • Not on file   Tobacco Use   • Smoking status: Never   • Smokeless tobacco: Never   Substance and Sexual Activity   • Alcohol use: Not on file   • Drug use: Not on file   • Sexual activity: Not on file   Other Topics Concern   • Not on file   Social History Narrative   • Not on file     Social Determinants of Health     Financial Resource Strain: Not on file   Food Insecurity: No Food Insecurity (7/14/2022)    Hunger Vital Sign    • Worried About Running Out of Food in the Last Year: Never true    • Ran Out of Food in the Last Year: Never true   Transportation Needs: No Transportation Needs (7/14/2022)    PRAPARE - Transportation    • Lack of Transportation (Medical): No    • Lack of Transportation (Non-Medical): No   Physical Activity: Not on file   Stress: Not on file   Social Connections: Not on file   Intimate Partner Violence: Not on file   Housing Stability: Low Risk  (7/14/2022)    Housing Stability Vital Sign    • Unable to Pay for Housing in the Last Year: No    • Number of Places Lived in the Last Year: 1    • Unstable Housing in the Last Year: No       Family History   Problem Relation Age of Onset   • Dementia Mother    • Heart disease Father    • Dementia Maternal Aunt    • Dementia Maternal Uncle    • Cancer Maternal Uncle        Physical Exam:    Vitals: Blood pressure 114/82, pulse 98, temperature 98.3 °F (36.8 °C), resp. rate 15, height 5' 8" (1.727 m), weight 87.1 kg (192 lb 0.3 oz), SpO2 98 %. , Body mass index is 29.2 kg/m².,   Wt Readings from Last 3 Encounters:   08/24/23 87.1 kg (192 lb 0.3 oz)   07/18/23 88.4 kg (194 lb 12.8 oz)   04/25/23 90.4 kg (199 lb 3.2 oz)       Physical Exam  Constitutional:       General: He is not in acute distress. Appearance: Normal appearance. HENT:      Head: Normocephalic and atraumatic. Mouth/Throat:      Mouth: Mucous membranes are moist.   Eyes:      General: No scleral icterus. Extraocular Movements: Extraocular movements intact. Cardiovascular:      Rate and Rhythm: Normal rate and regular rhythm. Pulses: Normal pulses. Heart sounds: S1 normal and S2 normal. No murmur heard. No friction rub. No gallop. Comments: Elevated JVP  Pulmonary:      Breath sounds: Normal breath sounds. Abdominal:      General: There is no distension. Palpations: Abdomen is soft. Tenderness: There is no abdominal tenderness. There is no guarding or rebound. Musculoskeletal:         General: Normal range of motion. Cervical back: Neck supple. Right lower le+ Edema present. Left lower le+ Edema present. Skin:     General: Skin is warm and dry. Capillary Refill: Capillary refill takes less than 2 seconds. Neurological:      General: No focal deficit present. Mental Status: He is alert and oriented to person, place, and time.    Psychiatric:         Mood and Affect: Mood normal.         Labs & Results:    Lab Results   Component Value Date    WBC 6.58 2023    HGB 13.1 2023    HCT 41.5 2023    MCV 98 2023     2023     Lab Results   Component Value Date    SODIUM 139 2023    K 3.6 2023     2023    CO2 25 2023    BUN 11 2023    CREATININE 0.71 2023    GLUC 112 2023    CALCIUM 8.5 2023     Lab Results   Component Value Date    NTBNP 69 2022      Lab Results   Component Value Date    CHOLESTEROL 155 2023    CHOLESTEROL 139 2023    CHOLESTEROL 164 07/15/2022     Lab Results   Component Value Date    HDL 28 (L) 2023    HDL 27 (L) 2023    HDL 43 07/15/2022     Lab Results   Component Value Date    TRIG 91 2023    TRIG 113 2023    TRIG 161 (H) 07/15/2022     Lab Results   Component Value Date    [address] 112 2023       EKG personally reviewed by Keisha Gonzalez MD.     Counseling / Coordination of Care  Time spent today 40 minutes. Greater than 50% of total time was spent with the patient and / or family counseling and / or coordination of care. We discussed diagnoses, most recent studies and any changes in treatment    Thank you for the opportunity to participate in the care of this patient.     Albino Davis MD  [address] [place] [address]

## 2023-08-24 NOTE — ASSESSMENT & PLAN NOTE
- Presenting with aphasia   - Hx of CVA in 2020. Home regimen: aspirin 81 mg, atorvastatin 40 mg qd, Brilinta 90 mg BID  - Per neurology, not a candidate for TPA. Unclear last known well time  - Per neurology, concern for seizure, loaded with 2 g Keppra  - CT head (8/22) - acute to subacute left MCA territory infarct in the inferior parietal temporal region. No hemorrhagic conversion or significant mass effect  - CTA head/neck (8/22) - No hemodynamically significant stenosis, dissection or occlusion noted. - Cleared by speech, regular diet   - P2Y12 inhibitor 204, wnl   - EEG (8/23) - Mild nonspecific diffuse cerebral dysfunction. No epileptiform discharges   - MRI brain (8/23) - Subacute infarct within the left posterior lateral temporal lobe with mild cytotoxic edema. No hemorrhagic transformation. Old infarct within the left posterior lateral frontal lobe.      Plan:  - Continue home metoprolol, Lasix, and Entresto  - Continue aspirin, atorvastatin, Brilinta  - Telemetry  - PT recommended postacute rehab on discharge

## 2023-08-24 NOTE — PLAN OF CARE
Problem: OCCUPATIONAL THERAPY ADULT  Goal: Performs self-care activities at highest level of function for planned discharge setting. See evaluation for individualized goals. Description: Treatment Interventions: ADL retraining, Functional transfer training, UE strengthening/ROM, Endurance training, Cognitive reorientation, Compensatory technique education, Activityengagement          See flowsheet documentation for full assessment, interventions and recommendations. Note: Limitation: Decreased ADL status, Decreased Safe judgement during ADL, Decreased cognition, Decreased endurance, Decreased self-care trans, Decreased high-level ADLs (balance, fxnl mobility, act lynne, fxnl reach, strength, attention, direction follow, comm, safety aware, insight, social skills, orientation, prblm solving, learning new tasks, appropriate response, intrpersonal skills, initiation of convo, response time)  Prognosis: Good  Assessment: Patient seen for OT treatment on 8/24/2023 s/p admission for Cerebrovascular accident (CVA) [facility]) Patient agreeable to OT session. Patient participated in formal cognitive evaluation with intervention focus on assisting with safe D/C planning. Conrad Ward  is continuing to perform below baseline due to the following deficits: impaired cognitive communication abilities. MoCA complated, however interpret results c caution d/t extensive aphasia limiting performance. Personal factors continuing to impact D/C include: decreased caregiver status  From OT standpoint, patient would benefit from skilled intervention to maximize independence with ADLs and functional mobility. Goals remain appropriate, continue POC. At this time, recommending D/C to: return to previous environment with outpatient rehabilitation PENDING further cognitive assessments that are more suitable to address cognition without communication.      OT Discharge Recommendation: Home with outpatient rehabilitation (pending further cognitive assessments to assist with safe D/C planning)        Mansi Gleason, OT

## 2023-08-24 NOTE — ASSESSMENT & PLAN NOTE
- Lipid panel (8/22) - cholesterol 155, TG 91, HDL 28,     Plan:  - Per heart failure team, increase atorvastatin dose to 80 mg daily   - Continue atorvastatin 80 mg qd upon discharge

## 2023-08-24 NOTE — UTILIZATION REVIEW
NOTIFICATION OF INPATIENT ADMISSION   AUTHORIZATION REQUEST   SERVICING FACILITY:   [address] [place] [address], [address] [place] [address]  Tax ID: [id number]  NPI: [id number]   ATTENDING PROVIDER:  Attending Name and NPI#: Familia Caruso [[id number]]  Address: [address] [place] [address]), [address] [place] [address]  Phone: [phone]     ADMISSION INFORMATION:  [facility] [address] [facility]   Place of Service Code: 21  Inpatient Admission Date/Time: 8/21/23 11:59 PM  Discharge Date/Time: No discharge date for patient encounter. Admitting Diagnosis Code/Description:  Aphasia [R47.01]  TIA (transient ischemic attack) [G45.9]  CVA (cerebral vascular accident) ([address]) [I63.9]  Cerebrovascular accident (CVA), unspecified mechanism ([address]) [I63.9]     UTILIZATION REVIEW CONTACT:  Kenneth Barrera Utilization   Network Utilization Review Department  Phone: [phone]  Fax: [phone]  Email: [email or web address]  Contact for approvals/pending authorizations, clinical reviews, and discharge. PHYSICIAN ADVISORY SERVICES:  Medical Necessity Denial & Vnav-rl-Hmzn Review  Phone: [phone]  Fax: [phone]  Email: [email or web address]

## 2023-08-24 NOTE — CASE MANAGEMENT
Case Management Assessment    Patient name Stella Rosen  Location S /S -01 MRN [id number]  : 1960 Date 2023       Current Admission Date: 2023  Current Admission Diagnosis:Cerebrovascular accident (CVA) [facility])   Patient Active Problem List    Diagnosis Date Noted   • Cerebrovascular accident (CVA) ([address]) 2023   • Elevated bilirubin 2023   • Elevated PSA 2023   • Ischemic cardiomyopathy 2023   • History of ST elevation myocardial infarction (STEMI) 10/24/2022   • Coronary artery disease 10/24/2022   • Hyperlipidemia 10/24/2022   • Hypertension 10/24/2022   • Prediabetes 10/24/2022   • Type 2 diabetes mellitus ([address]) 10/24/2022   • Seizure ([address]) 10/24/2022   • Obesity (BMI 30.0-34.9) 10/24/2022   • Chronic systolic heart failure ([address]) 2022      LOS (days): 3  Geometric Mean LOS (GMLOS) (days):   Days to GMLOS:     OBJECTIVE:    Risk of Unplanned Readmission Score: 9.45         Current admission status: Inpatient       Preferred Pharmacy:   [address] [place] [address]  Phone: [phone] Fax: [phone]    Primary Care Provider: Lucila Blount PA-C    Primary Insurance: BLUE CROSS  Secondary Insurance:     ASSESSMENT:  [facility] Proxies    There are no active Health Care Proxies on file. Patient Information  Admitted from[de-identified] Home  Mental Status: Alert  During Assessment patient was accompanied by: Not accompanied during assessment  Assessment information provided by[de-identified] Patient  Primary Caregiver: Self  Support Systems: Family members  [facility]: [address] [place] [address] do you live in?: [address] [facility] entry access options.  Select all that apply.: Stairs  Number of steps to enter home.: 5  Type of Current Residence: Apartment  Floor Level: 2  Upon entering residence, is there a bedroom on the main floor (no further steps)?: Yes  Upon entering residence, is there a bathroom on the main floor (no further steps)?: Yes  In the last 12 months, was there a time when you were not able to pay the mortgage or rent on time?: No  In the last 12 months, was there a time when you did not have a steady place to sleep or slept in a shelter (including now)?: No  Homeless/housing insecurity resource given?: N/A  Living Arrangements: Lives Alone    Activities of Daily Living Prior to Admission  Functional Status: Independent  Completes ADLs independently?: Yes  Ambulates independently?: Yes  Does patient use assisted devices?: No  Does patient currently own DME?: No  Does patient have a history of Outpatient Therapy (PT/OT)?: No  Does the patient have a history of Short-Term Rehab?: No  Does patient have a history of HHC?: No  Does patient currently have [address]?: No         Patient Information Continued  Within the past 12 months, you worried that your food would run out before you got the money to buy more.: Never true  Within the past 12 months, the food you bought just didn't last and you didn't have money to get more.: Never true  Food insecurity resource given?: N/A         Means of Transportation  Means of Transport to [facility][de-identified] [employer] - Bus  In the past 12 months, has lack of transportation kept you from medical appointments or from getting medications?: No  In the past 12 months, has lack of transportation kept you from meetings, work, or from getting things needed for daily living?: No  Was application for public transport provided?: N/A    CM met with patient re: assessment and stroke consult today. Patient relayed he rents a room on the second level, 4-5 [address], one flight up to bed/bath. Pt independent with ADLS, does not use nor own any dme, has no hx of therapies, and utilizes bus transport to appointments. Confirmed PCP David Monte) and preferred pharmacy ([facility]).  PT/OT currently rcmd STR - patient declined both STR and HHC - expressing he needs to go to work to keep up with nadine BECK to continue to follow patient for any further CM d/c needs identified.

## 2023-08-24 NOTE — PHYSICAL THERAPY NOTE
Physical Therapy Cancellation Note       08/24/23 1105   PT Last Visit   PT Visit Date 08/24/23   Note Type   Note Type Cancelled Session     Pt off floor for MRI. Will continue to follow as appropriate and able.     Emilie Diaz, PTA

## 2023-08-25 VITALS
DIASTOLIC BLOOD PRESSURE: 81 MMHG | BODY MASS INDEX: 28.4 KG/M2 | TEMPERATURE: 98.1 F | HEIGHT: 68 IN | SYSTOLIC BLOOD PRESSURE: 113 MMHG | OXYGEN SATURATION: 98 % | WEIGHT: 187.39 LBS | HEART RATE: 84 BPM | RESPIRATION RATE: 18 BRPM

## 2023-08-25 LAB
ANION GAP SERPL CALCULATED.3IONS-SCNC: 8 MMOL/L
ANION GAP SERPL CALCULATED.3IONS-SCNC: 8 MMOL/L
BUN SERPL-MCNC: 12 MG/DL (ref 5–25)
BUN SERPL-MCNC: 13 MG/DL (ref 5–25)
CALCIUM SERPL-MCNC: 8.5 MG/DL (ref 8.4–10.2)
CALCIUM SERPL-MCNC: 9 MG/DL (ref 8.4–10.2)
CHLORIDE SERPL-SCNC: 103 MMOL/L (ref 96–108)
CHLORIDE SERPL-SCNC: 103 MMOL/L (ref 96–108)
CO2 SERPL-SCNC: 26 MMOL/L (ref 21–32)
CO2 SERPL-SCNC: 27 MMOL/L (ref 21–32)
CREAT SERPL-MCNC: 0.72 MG/DL (ref 0.6–1.3)
CREAT SERPL-MCNC: 0.84 MG/DL (ref 0.6–1.3)
GFR SERPL CREATININE-BSD FRML MDRD: 100 ML/MIN/1.73SQ M
GFR SERPL CREATININE-BSD FRML MDRD: 94 ML/MIN/1.73SQ M
GLUCOSE SERPL-MCNC: 119 MG/DL (ref 65–140)
GLUCOSE SERPL-MCNC: 135 MG/DL (ref 65–140)
MAGNESIUM SERPL-MCNC: 1.9 MG/DL (ref 1.9–2.7)
POTASSIUM SERPL-SCNC: 3.3 MMOL/L (ref 3.5–5.3)
POTASSIUM SERPL-SCNC: 4.1 MMOL/L (ref 3.5–5.3)
SODIUM SERPL-SCNC: 137 MMOL/L (ref 135–147)
SODIUM SERPL-SCNC: 138 MMOL/L (ref 135–147)

## 2023-08-25 PROCEDURE — 83735 ASSAY OF MAGNESIUM: CPT

## 2023-08-25 PROCEDURE — 99239 HOSP IP/OBS DSCHRG MGMT >30: CPT | Performed by: INTERNAL MEDICINE

## 2023-08-25 PROCEDURE — 80048 BASIC METABOLIC PNL TOTAL CA: CPT

## 2023-08-25 PROCEDURE — 97116 GAIT TRAINING THERAPY: CPT

## 2023-08-25 PROCEDURE — 99232 SBSQ HOSP IP/OBS MODERATE 35: CPT | Performed by: INTERNAL MEDICINE

## 2023-08-25 RX ORDER — LANOLIN ALCOHOL/MO/W.PET/CERES
400 CREAM (GRAM) TOPICAL ONCE
Status: COMPLETED | OUTPATIENT
Start: 2023-08-25 | End: 2023-08-25

## 2023-08-25 RX ORDER — FUROSEMIDE 20 MG/1
20 TABLET ORAL ONCE
Status: DISCONTINUED | OUTPATIENT
Start: 2023-08-25 | End: 2023-08-25

## 2023-08-25 RX ORDER — MAGNESIUM SULFATE HEPTAHYDRATE 40 MG/ML
2 INJECTION, SOLUTION INTRAVENOUS ONCE
Status: COMPLETED | OUTPATIENT
Start: 2023-08-25 | End: 2023-08-25

## 2023-08-25 RX ORDER — ATORVASTATIN CALCIUM 80 MG/1
80 TABLET, FILM COATED ORAL
Qty: 30 TABLET | Refills: 0 | Status: SHIPPED | OUTPATIENT
Start: 2023-08-25 | End: 2023-09-24

## 2023-08-25 RX ORDER — POTASSIUM CHLORIDE 20 MEQ/1
40 TABLET, EXTENDED RELEASE ORAL ONCE
Status: COMPLETED | OUTPATIENT
Start: 2023-08-25 | End: 2023-08-25

## 2023-08-25 RX ORDER — FUROSEMIDE 20 MG/1
20 TABLET ORAL DAILY
Status: DISCONTINUED | OUTPATIENT
Start: 2023-08-26 | End: 2023-08-25 | Stop reason: HOSPADM

## 2023-08-25 RX ORDER — FUROSEMIDE 40 MG/1
40 TABLET ORAL DAILY
Status: DISCONTINUED | OUTPATIENT
Start: 2023-08-26 | End: 2023-08-25

## 2023-08-25 RX ADMIN — MAGNESIUM SULFATE HEPTAHYDRATE 2 G: 40 INJECTION, SOLUTION INTRAVENOUS at 10:02

## 2023-08-25 RX ADMIN — SACUBITRIL AND VALSARTAN 1 TABLET: 24; 26 TABLET, FILM COATED ORAL at 08:56

## 2023-08-25 RX ADMIN — ENOXAPARIN SODIUM 40 MG: 40 INJECTION SUBCUTANEOUS at 08:56

## 2023-08-25 RX ADMIN — Medication 400 MG: at 08:56

## 2023-08-25 RX ADMIN — POTASSIUM CHLORIDE 40 MEQ: 1500 TABLET, EXTENDED RELEASE ORAL at 08:55

## 2023-08-25 RX ADMIN — ASPIRIN 81 MG 81 MG: 81 TABLET ORAL at 08:56

## 2023-08-25 RX ADMIN — EMPAGLIFLOZIN 10 MG: 10 TABLET, FILM COATED ORAL at 10:29

## 2023-08-25 RX ADMIN — METOPROLOL SUCCINATE 25 MG: 25 TABLET, EXTENDED RELEASE ORAL at 08:56

## 2023-08-25 RX ADMIN — ATORVASTATIN CALCIUM 80 MG: 40 TABLET, FILM COATED ORAL at 17:36

## 2023-08-25 RX ADMIN — FUROSEMIDE 20 MG: 20 TABLET ORAL at 08:56

## 2023-08-25 RX ADMIN — POTASSIUM CHLORIDE 40 MEQ: 1500 TABLET, EXTENDED RELEASE ORAL at 10:02

## 2023-08-25 RX ADMIN — TICAGRELOR 90 MG: 90 TABLET ORAL at 08:55

## 2023-08-25 NOTE — PROGRESS NOTES
Heart Failure/ Pulmonary Hypertension Progress Note - Wilber Lr 65 y.o. male MRN: [id number]    Unit/Bed#: S -01 Encounter: [id number]      Assessment:    Principal Problem:    Cerebrovascular accident (CVA) [facility])  Active Problems:    Chronic systolic heart failure ([address] )    Coronary artery disease    Hyperlipidemia    Hypertension    Prediabetes    Acute CVA, p/w with aphasia  CT head 8/21/23: acute/subacute stroke in the left MCA territory in the inferior parietal temporal region. No embolic source seen on transthoracic echo. No afib/flutter on telemetry     Chronic heart failure with reduced ejection fraction  Etiology: ischemic  Volume up on exam, improved     Studies- personally reviewed by me     Echocardiogram 8/22/23  LVEF: 20%  LVIDd: 5.8cm, normal wall thicknessRV: dilated, mild to moderately reduced systolic function; moderate hypokinesis of the mid to apical free wall  MR: mild  PASP: 46mmHg, estimated RAP 8mmHg, mild to moderate TR  RVOT: shortened PAAT, some mid to late notching  Other: trivial pericardial effusion     TTE limited 11/2/22: LVEF 35%. TTE limited 8/26/22: LVEF34%. Grade 2 diastology. Mild AI, mild MR. Moderate TR. Estimated PASP 42mmHg  TTE 7/13/23: LVEF 35-40%. Normal RV size and function. Estimated PASP 47mmHg  C 7/13/22: LM with minimal luminal irregularities. Mid % stenosis s/p thrombectomy and stent placement (ALEJANDRA 3.5 x 18mm); D1 50% stenosis.      Diet:  --2g sodium diet  --2000 ml fluid restriction     Neurohormonal Blockade:  --Beta-Blocker: Metoprolol succinate 25 mg daily  --ACEi, ARB or ARNi: Entresto 24 to 26 mg twice daily  --Aldosterone Receptor Blocker: No  --SGLT2 Inhibitor: No  --Diuretic: Furosemide 20 mg daily, furosemide 40mg IV x 1 8/24/23     Sudden Cardiac Death Risk Reduction:  --ICD: Was on LifeVest and returned by patient, declined ICD placement on last office visit with Dr. Paresh Mukherjee 1/5/2023     Electrical Resynchronization:  --Harry Bryan for BiV device: narrow QRSd     Advanced Therapies (if appropriate): --Inotrope:  --LVAD/Transplant Candidacy:     # Coronary artery disease, s/p ALEJANDRA x 1 to mid LAD 7/2022  Admitted for STEMI 7/2022  Rx: aspirin, brilinta, atorvastatin  # Hypertension  # Hyperlipidemia, goal LDL < 70  8/22/23 TG 91 HDL 28   Rx: atorvastatin 40mg daily  # Prior Stroke    Today's Plan:  Weight down from dose of IV lasix yesterday, inaccurate I/Os  Patient back on entresto, continue metoprolol, lasix  Start Jardiance 10mg daily  If discharged today, will schedule for follow up next week      Subjective:   Patient seen and examined. No significant events overnight. Review of Systems   Constitutional: Negative for chills and fever. Respiratory: Negative for chest tightness and shortness of breath. Cardiovascular: Positive for leg swelling. Negative for chest pain and palpitations. Gastrointestinal: Negative for abdominal distention, abdominal pain, nausea and vomiting. Neurological: Negative for dizziness and light-headedness. Objective: Intake/ Output: 240/250 ? Weight:  187 from 192 lbs  Tele: sinus rhythm, PACs with compensatory pause      Vitals: Blood pressure 113/81, pulse 84, temperature 98.1 °F (36.7 °C), resp. rate 18, height 5' 8" (1.727 m), weight 85 kg (187 lb 6.3 oz), SpO2 98 %. , Body mass index is 28.49 kg/m²., I/O last 3 completed shifts: In: 240 [P.O.:240]  Out: 250 [Urine:250]  No intake/output data recorded. Wt Readings from Last 3 Encounters:   08/25/23 85 kg (187 lb 6.3 oz)   07/18/23 88.4 kg (194 lb 12.8 oz)   04/25/23 90.4 kg (199 lb 3.2 oz)       Intake/Output Summary (Last 24 hours) at 8/25/2023 0902  Last data filed at 8/24/2023 2200  Gross per 24 hour   Intake --   Output 250 ml   Net -250 ml     I/O last 3 completed shifts: In: 240 [P.O.:240]  Out: 250 [Urine:250]    No significant arrhythmias seen on telemetry review.        Physical Exam:  Vitals:    08/24/23 1547 08/24/23 2117 08/25/23 0600 08/25/23 0808   BP: 110/80 98/66  113/81   Pulse: 56 (!) 54  84   Resp: 18 18     Temp: 98.2 °F (36.8 °C) 98.4 °F (36.9 °C)  98.1 °F (36.7 °C)   TempSrc:       SpO2: 97% 97%  98%   Weight:   85 kg (187 lb 6.3 oz)    Height:           GEN: Moris Mahajan appears well, alert and oriented x 3, pleasant and cooperative   HEENT: NC/AT, moist mucosa, anicteric sclerae; extraocular muscles intact  NECK: supple, no carotid bruits   HEART: regular rhythm, normal S1 and S2, no murmurs, clicks, gallops or rubs, JVP is  nonelevated  LUNGS: clear to auscultation bilaterally; no wheezes, rales, or rhonchi   ABDOMEN: normal bowel sounds, soft, no tenderness, no distention  EXTREMITIES: peripheral pulses normal; no clubbing, cyanosis; 2+ pitting edema bilaterally  NEURO: no focal findings   SKIN: normal without suspicious lesions on exposed skin      Current Facility-Administered Medications:   •  aspirin chewable tablet 81 mg, 81 mg, Oral, Daily, Rosa Navarrete MD, 81 mg at 08/25/23 0856  •  atorvastatin (LIPITOR) tablet 80 mg, 80 mg, Oral, Daily With Jane Olmedo DO, 80 mg at 08/24/23 1716  •  enoxaparin (LOVENOX) subcutaneous injection 40 mg, 40 mg, Subcutaneous, Daily, Rosa Navarrete MD, 40 mg at 08/25/23 0856  •  furosemide (LASIX) tablet 20 mg, 20 mg, Oral, Daily, Albina Rm MD, 20 mg at 08/25/23 0856  •  metoprolol succinate (TOPROL-XL) 24 hr tablet 25 mg, 25 mg, Oral, Daily, Saray Meredith MD, 25 mg at 08/25/23 0856  •  sacubitril-valsartan (ENTRESTO) 24-26 MG per tablet 1 tablet, 1 tablet, Oral, BID, Huang Tesfaye DO, 1 tablet at 08/25/23 0856  •  ticagrelor (BRILINTA) tablet 90 mg, 90 mg, Oral, BID, Rosa Navarrete MD, 90 mg at 08/25/23 0800      Labs & Results:        Results from last 7 days   Lab Units 08/22/23  0425 08/21/23  2328   WBC Thousand/uL 6.58 7.49   HEMOGLOBIN g/dL 13.1 14.7   HEMATOCRIT % 41.5 47.2   PLATELETS Thousands/uL 211 237 Results from last 7 days   Lab Units 08/25/23  0611 08/24/23  0437 08/22/23  0425   POTASSIUM mmol/L 3.3* 3.6 3.2*   CHLORIDE mmol/L 103 104 104   CO2 mmol/L 26 25 27   BUN mg/dL 12 11 10   CREATININE mg/dL 0.72 0.71 0.85   CALCIUM mg/dL 8.5 8.5 8.2*     Results from last 7 days   Lab Units 08/21/23  2328   INR  1.35*         Albino Davis MD  Advanced Heart Failure and Mechanical Circulatory Support  [address] [facility]

## 2023-08-25 NOTE — DISCHARGE INSTR - AVS FIRST PAGE
Dear Frances Gates,     It was our pleasure to care for you here at [facility]. It is our hope that we were always able to exceed the expected standards for your care during your stay. You were hospitalized due to stroke. You were cared for on the 3rd floor by Huang Tesfaye DO under the service of Albina Rm MD with the [facility] who covers for your primary care physician (PCP), Maddie Gandara PA-C, while you were hospitalized. If you have any questions or concerns related to this hospitalization, you may contact us at [phone]. For follow up as well as any medication refills, we recommend that you follow up with your primary care physician. A registered nurse will reach out to you by phone within a few days after your discharge to answer any additional questions that you may have after going home. However, at this time we provide for you here, the most important instructions / recommendations at discharge:     Notable Medication Adjustments -   Please stop taking your old dose of Atorvastatin. Please start taking Atorvastatin 80 mg every day. Please start taking Jardiance 10 mg every day. Please stop taking Brilinta  Please START taking Eliquis in 2 weeks which will be 9/4/23 ( 5mg twice a day)  Please continue Aspirin 81mg daily   Please continue Lasix 20mg daily  Please continue Entresto twice a day   Please continue Toprol daily  Testing Required after Discharge -   Please complete blood work including a BMP in 1 week to follow up on your electrolytes and kidney function   ** Please contact your PCP to request testing orders for any of the testing recommended here **  Important follow up information -   Please follow up with your primary care physician in 1 week.   Please follow up with your cardiologist.  Other Instructions -   Please return to the ED if you have weakness in your arms/legs, chest pain, palpitations, shortness of breath, abdominal pain, uncontrolled nausea and vomiting, inability to hold your stool/urine, or any other symptoms concerning to you. Please review this entire after visit summary as additional general instructions including medication list, appointments, activity, diet, any pertinent wound care, and other additional recommendations from your care team that may be provided for you.       Sincerely,     Kati Mendoza, DO

## 2023-08-25 NOTE — PLAN OF CARE
Problem: PHYSICAL THERAPY ADULT  Goal: Performs mobility at highest level of function for planned discharge setting. See evaluation for individualized goals. Description: Treatment/Interventions: ADL retraining, Functional transfer training, LE strengthening/ROM, Elevations, Therapeutic exercise, Endurance training, Cognitive reorientation, Patient/family training, Equipment eval/education, Bed mobility, Gait training, Compensatory technique education, Spoke to nursing, Spoke to case management, OT          See flowsheet documentation for full assessment, interventions and recommendations. Outcome: Progressing  Note:    Problem List: Decreased endurance, Impaired balance, Decreased mobility, Decreased cognition, Impaired judgement, Decreased safety awareness, Decreased strength  Assessment: pt's mobility status shows overall improvement w/ increased activity tolerance. pt needed standby assist w/ ambulation and stair use. pt remains at risk for falling and continued inpatient PT is needed to address fall risk factors. discharge recommendation is updated to outpatient PT to maximize level of independence and expedite eventual return to work. pt's current PT goals continue to be appropriate (w/ updated goal for gait speed). PT Discharge Recommendation: (S) Home with outpatient rehabilitation    See flowsheet documentation for full assessment.

## 2023-08-25 NOTE — PLAN OF CARE
Problem: MOBILITY - ADULT  Goal: Maintain or return to baseline ADL function  Description: INTERVENTIONS:  -  Assess patient's ability to carry out ADLs; assess patient's baseline for ADL function and identify physical deficits which impact ability to perform ADLs (bathing, care of mouth/teeth, toileting, grooming, dressing, etc.)  - Assess/evaluate cause of self-care deficits   - Assess range of motion  - Assess patient's mobility; develop plan if impaired  - Assess patient's need for assistive devices and provide as appropriate  - Encourage maximum independence but intervene and supervise when necessary  - Involve family in performance of ADLs  - Assess for home care needs following discharge   - Consider OT consult to assist with ADL evaluation and planning for discharge  - Provide patient education as appropriate  Outcome: Progressing  Goal: Maintains/Returns to pre admission functional level  Description: INTERVENTIONS:  - Perform BMAT or MOVE assessment daily.   - Set and communicate daily mobility goal to care team and patient/family/caregiver. - Collaborate with rehabilitation services on mobility goals if consulted  - Perform Range of Motion 3 times a day. - Reposition patient every 2 hours.   - Dangle patient 3 times a day  - Stand patient 3 times a day  - Ambulate patient 3 times a day  - Out of bed to chair 3 times a day   - Out of bed for meals 3 times a day  - Out of bed for toileting  - Record patient progress and toleration of activity level   Outcome: Progressing     Problem: NEUROSENSORY - ADULT  Goal: Achieves stable or improved neurological status  Description: INTERVENTIONS  - Monitor and report changes in neurological status  - Monitor vital signs such as temperature, blood pressure, glucose, and any other labs ordered   - Initiate measures to prevent increased intracranial pressure  - Monitor for seizure activity and implement precautions if appropriate      Outcome: Progressing  Goal: Remains free of injury related to seizures activity  Description: INTERVENTIONS  - Maintain airway, patient safety  and administer oxygen as ordered  - Monitor patient for seizure activity, document and report duration and description of seizure to physician/advanced practitioner  - If seizure occurs,  ensure patient safety during seizure  - Reorient patient post seizure  - Seizure pads on all 4 side rails  - Instruct patient/family to notify RN of any seizure activity including if an aura is experienced  - Instruct patient/family to call for assistance with activity based on nursing assessment  - Administer anti-seizure medications if ordered    Outcome: Progressing  Goal: Achieves maximal functionality and self care  Description: INTERVENTIONS  - Monitor swallowing and airway patency with patient fatigue and changes in neurological status  - Encourage and assist patient to increase activity and self care. - Encourage visually impaired, hearing impaired and aphasic patients to use assistive/communication devices  Outcome: Progressing     Problem: CARDIOVASCULAR - ADULT  Goal: Maintains optimal cardiac output and hemodynamic stability  Description: INTERVENTIONS:  - Monitor I/O, vital signs and rhythm  - Monitor for S/S and trends of decreased cardiac output  - Administer and titrate ordered vasoactive medications to optimize hemodynamic stability  - Assess quality of pulses, skin color and temperature  - Assess for signs of decreased coronary artery perfusion  - Instruct patient to report change in severity of symptoms  Outcome: Progressing  Goal: Absence of cardiac dysrhythmias or at baseline rhythm  Description: INTERVENTIONS:  - Continuous cardiac monitoring, vital signs, obtain 12 lead EKG if ordered  - Administer antiarrhythmic and heart rate control medications as ordered  - Monitor electrolytes and administer replacement therapy as ordered  Outcome: Progressing     Problem:  Activity Intolerance/Impaired Mobility  Goal: Mobility/activity is maintained at optimum level for patient  Description: Interventions:  - Assess and monitor patient  barriers to mobility and need for assistive/adaptive devices. - Assess patient's emotional response to limitations. - Collaborate with interdisciplinary team and initiate plans and interventions as ordered. - Encourage independent activity per ability.  - Maintain proper body alignment. - Perform active/passive rom as tolerated/ordered. - Plan activities to conserve energy.  - Turn patient as appropriate  Outcome: Progressing     Problem: Communication Impairment  Goal: Ability to express needs and understand communication  Description: Assess patient's communication skills and ability to understand information. Patient will demonstrate use of effective communication techniques, alternative methods of communication and understanding even if not able to speak. - Encourage communication and provide alternate methods of communication as needed. - Collaborate with case management/ for discharge needs. - Include patient/family/caregiver in decisions related to communication. Outcome: Progressing     Problem: Nutrition  Goal: Nutrition/Hydration status is improving  Description: Monitor and assess patient's nutrition/hydration status for malnutrition (ex- brittle hair, bruises, dry skin, pale skin and conjunctiva, muscle wasting, smooth red tongue, and disorientation). Collaborate with interdisciplinary team and initiate plan and interventions as ordered. Monitor patient's weight and dietary intake as ordered or per policy. Utilize nutrition screening tool and intervene per policy. Determine patient's food preferences and provide high-protein, high-caloric foods as appropriate. - Assist patient with eating.  - Allow adequate time for meals.  - Encourage patient to take dietary supplement as ordered.   - Collaborate with clinical nutritionist.  - Include patient/family/caregiver in decisions related to nutrition.   Outcome: Progressing

## 2023-08-25 NOTE — PROGRESS NOTES
NEUROLOGY RESIDENCY PROGRESS NOTE     Name: Vanessa Carter   Age & Sex: 65 y.o. male   MRN: [id number]  Unit/Bed#: S -01   Encounter: [id number]    Vanessa Carter will need follow up in 4-6weeks with neurovascular attending/resident or AP. He will not require outpatient neurological testing. Pending for discharge: None from Neurology standpoint. Will defer to primary team    ASSESSMENT & PLAN     * Cerebrovascular accident (CVA) [facility])  Assessment & Plan  Stroke alert on 8/21/2023 11:15 PM with initial NIHSS of 6 per overnight Neuro quick note and LKW unclear, initial Blood Pressure: 136/92. Initial presenting deficits were aphasic and some blank stare. As a result of unclear LKW and stroke on CTH pt was determined to not be a candidate for thrombolysis (TNK). • Current Blood Pressure: 113/81, BP over 24 hours: BP  Min: 98/66  Max: 113/81   • Vascular risk factors: diabetes  • Home meds: Aspirin 81mg daily, Brilinta 90mg BID, and atorvastatin 40mg daily    Workup:  Lab Results   Component Value Date    HGBA1C 6.3 (H) 08/22/2023    CHOLESTEROL 155 08/22/2023    LDLCALC 109 (H) 08/22/2023    TRIG 91 08/22/2023    INR 1.35 (H) 08/21/2023      • CTH: acute to subacute L MCA infarct in the inferior parietal temporal region  • CTA: no LVO  • Coma panel: elevated Ethanol 11  • MRI brain w/o: Subacute infarct of L posterior lateral temporal lobe extending to the temporal occipital junction. Old infarct of L posterior lateral frontal lobe. Chronic microangiopathy. • Echo: LVEF 20%. Global hypokinesis. Dilated L atrium  • Routine EEG: L temporal focal neuronal dysfunction. No epileptiform discharges are identified  • S/p ASA 325mg x1  • P2Y12 inhibitor level: 204 (non responder). Patient is compliant with medications. Pertinent scores:  - NIHSS: 6 per overnight neuro quick note.   This a.m., NIHSS: 4 (2 LOC questions and 2 severe aphasia)  Stroke Modified [place] Score: 0 (No baseline symptoms/disability)    Impression: acute to subacute infarct in left posterior lateral temporal lobe extending to temporal occipital junction and he also has old infarct in left posterior lateral frontal lobe. Concerning for cardioembolic etiology given EF 20% and dilated left atrium. Plan:  Discussed plan with neurology attending, Dr. Mario Beltrán  - BP goal normotension <130/<80  -Patient is not at [facility]. He reports that he has been taking medication consistently  -Discussed with Dr. Michelle Pedro cardiologist and okay to have patient on antiplatelet and anticoagulation  -Continue aspirin 81 mg daily for now. Discontinue Brilinta. -Will initiate Eliquis 5 mg twice daily at 2 weeks post stroke (September 4th, 2023). Primary team will price check  -Follow-up with cardiology outpatient for loop recorder placement  -Per heart failure team, increase atorvastatin to 80 mg daily  - DVT ppx and SCDs  - Monitor on telemetry  - Maintain glucose <180, SSI for coverage if indicated  - Medical management as per primary team appreciated  - PT/OT/Speech/PM&R input appreciated  - Stroke education  -Neuro checks- Every 1 hour x 4 hours, then every 2 hours x 4 hours, then every 4 hours x 72 hours     -Stat CT Head for change in neuro status or GCS drop 2pts/1hr    -No further management from Neurology. Please call with questions or concerns  -Follow-up with neurovascular outpatient in 4 to 6 weeks      SUBJECTIVE     Patient was seen and examined. No acute events overnight. Patient is annoyed and agitated. However he follows commands appropriately. Today, he was able to name objects faster, can repeat full sentences however still made some errors in the middle of sentences. He cannot read. No other neurological symptoms/deficits. Review of Systems   Constitutional: Negative for chills and fever. HENT: Negative for ear pain and sore throat. Eyes: Negative for pain and visual disturbance.    Respiratory: Negative for cough and shortness of breath. Cardiovascular: Negative for chest pain and palpitations. Gastrointestinal: Negative for abdominal pain and vomiting. Genitourinary: Negative for dysuria and hematuria. Musculoskeletal: Negative for arthralgias and back pain. Skin: Negative for color change and rash. Neurological: Positive for speech difficulty. Negative for seizures, syncope, weakness and headaches. All other systems reviewed and are negative. OBJECTIVE     Patient ID: Sharon Noonan is a 65 y.o. male. Vitals:    23 1547 23 2117 23 0600 23 0808   BP: 110/80 98/66  113/81   Pulse: 56 (!) 54  84   Resp: 18 18     Temp: 98.2 °F (36.8 °C) 98.4 °F (36.9 °C)  98.1 °F (36.7 °C)   TempSrc:       SpO2: 97% 97%  98%   Weight:   85 kg (187 lb 6.3 oz)    Height:          Temperature:   Temp (24hrs), Av.3 °F (36.8 °C), Min:98.1 °F (36.7 °C), Max:98.4 °F (36.9 °C)    Temperature: 98.1 °F (36.7 °C)      Physical Exam  Vitals and nursing note reviewed. Constitutional:       General: He is not in acute distress. Appearance: He is well-developed. HENT:      Head: Normocephalic and atraumatic. Eyes:      Extraocular Movements: Extraocular movements intact and EOM normal.      Conjunctiva/sclera: Conjunctivae normal.      Pupils: Pupils are equal, round, and reactive to light. Cardiovascular:      Rate and Rhythm: Normal rate. Pulmonary:      Effort: Pulmonary effort is normal. No respiratory distress. Abdominal:      Palpations: Abdomen is soft. Tenderness: There is no abdominal tenderness. Musculoskeletal:         General: No swelling. Normal range of motion. Cervical back: Neck supple. Skin:     General: Skin is warm and dry. Capillary Refill: Capillary refill takes less than 2 seconds. Neurological:      Mental Status: He is alert and oriented to person, place, and time.       Motor: Motor strength is normal.     Coordination: Finger-Nose-Finger Test normal.   Psychiatric:         Speech: Speech normal.          Neurologic Exam     Mental Status   Oriented to person, place, and time. Speech: speech is normal   Level of consciousness: alert  Able to name object. Able to read. Able to repeat (has some errors in the middle of the sentence but can repeat better than previously). Mildly delayed expressive aphasia. Improved compared to previous exam     Cranial Nerves     CN II   Visual fields full to confrontation. CN III, IV, VI   Pupils are equal, round, and reactive to light. Extraocular motions are normal.     CN V   Facial sensation intact. CN VII   Facial expression full, symmetric. CN VIII   CN VIII normal.     CN IX, X   CN IX normal.   CN X normal.     CN XI   CN XI normal.     CN XII   CN XII normal.     Motor Exam   Muscle bulk: normal  Overall muscle tone: normal  Right arm pronator drift: absent  Left arm pronator drift: absent    Strength   Strength 5/5 throughout. Sensory Exam   Light touch normal.     Gait, Coordination, and Reflexes     Coordination   Finger to nose coordination: normal    Reflexes   Reflexes 2+ except as noted. LABORATORY DATA     Labs: I have personally reviewed pertinent reports.     Results from last 7 days   Lab Units 08/22/23  0425 08/21/23  2328   WBC Thousand/uL 6.58 7.49   HEMOGLOBIN g/dL 13.1 14.7   HEMATOCRIT % 41.5 47.2   PLATELETS Thousands/uL 211 237      Results from last 7 days   Lab Units 08/25/23  1515 08/25/23  0611 08/24/23  0437   SODIUM mmol/L 138 137 139   POTASSIUM mmol/L 4.1 3.3* 3.6   CHLORIDE mmol/L 103 103 104   CO2 mmol/L 27 26 25   BUN mg/dL 13 12 11   CREATININE mg/dL 0.84 0.72 0.71   CALCIUM mg/dL 9.0 8.5 8.5     Results from last 7 days   Lab Units 08/25/23  0611 08/24/23  0437   MAGNESIUM mg/dL 1.9 1.9          Results from last 7 days   Lab Units 08/21/23  2328   INR  1.35*   PTT seconds 28               IMAGING & DIAGNOSTIC TESTING     Radiology Results: I have personally reviewed pertinent reports. MRI brain wo contrast   Final Result by Emanuel Larson DO (08/24 1342)      Subacute infarct within the left posterior lateral temporal lobe extending to the temporal occipital junction with mild cytotoxic edema. No hemorrhagic transformation. Old infarct within the left posterior lateral frontal lobe. Scattered white matter changes consistent with chronic microangiopathy. Left-sided sinus disease with extensive opacification of the frontal sinus, anterior ethmoid air cells and maxillary sinus similar to prior CT scan. Workstation performed: [id number]         CT stroke alert brain   Final Result by Roly Pacheco MD (08/22 0003)      Acute to subacute left MCA territory infarct in the inferior parietal temporal region. No hemorrhagic conversion or significant mass effect. Findings were directly discussed with Linden Montilla at  11:52 PM  .      Workstation performed: [id number]         CTA stroke alert (head/neck)   Final Result by Roly Pacheco MD (08/22 0003)      No hemodynamically significant stenosis, dissection or occlusion of the carotid or vertebral arteries or major vessels of the [place]. Findings were directly discussed with Linden Montilla at  12:00 AM  .                           Workstation performed: [id number]             Other Diagnostic Testing: I have personally reviewed pertinent reports.       ACTIVE MEDICATIONS     Current Facility-Administered Medications   Medication Dose Route Frequency   • aspirin chewable tablet 81 mg  81 mg Oral Daily   • atorvastatin (LIPITOR) tablet 80 mg  80 mg Oral Daily With Dinner   • Empagliflozin (JARDIANCE) tablet 10 mg  10 mg Oral Daily   • enoxaparin (LOVENOX) subcutaneous injection 40 mg  40 mg Subcutaneous Daily   • [START ON 8/26/2023] furosemide (LASIX) tablet 20 mg  20 mg Oral Daily   • metoprolol succinate (TOPROL-XL) 24 hr tablet 25 mg  25 mg Oral Daily   • sacubitril-valsartan (ENTRESTO) 24-26 MG per tablet 1 tablet  1 tablet Oral BID   • ticagrelor (BRILINTA) tablet 90 mg  90 mg Oral BID       Prior to Admission medications    Medication Sig Start Date End Date Taking? Authorizing Provider   Empagliflozin (JARDIANCE) 10 MG TABS tablet Take 1 tablet (10 mg total) by mouth every morning 8/24/23 9/23/23 Yes Joe Licea MD   aspirin 81 mg chewable tablet Chew 1 tablet (81 mg total) daily 9/8/22 1/5/23  Sara Bright MD   atorvastatin (LIPITOR) 40 mg tablet TAKE 1 TABLET BY MOUTH EVERY DAY WITH DINNER 7/18/23   Sara Bright MD   furosemide (LASIX) 20 mg tablet Take 1 tablet (20 mg total) by mouth daily 7/18/23 1/14/24  Kai Walker PA-C   metoprolol succinate (TOPROL-XL) 25 mg 24 hr tablet TAKE 1 TABLET (25 MG TOTAL) BY MOUTH DAILY.  7/18/23 8/17/23  Sara Bright MD   nitroglycerin (NITROSTAT) 0.4 mg SL tablet Place 1 tablet (0.4 mg total) under the tongue every 5 (five) minutes as needed for chest pain for up to 3 days 7/16/22 1/5/23  May Manjitu Deng Becker MD   sacubitril-valsartan Ryann Closs) 24-26 MG TABS Take 1 tablet by mouth 2 (two) times a day 1/5/23   Sara Bright MD   ticagrelor [facility]) 90 MG Take 1 tablet (90 mg total) by mouth 2 (two) times a day 2/16/23   Sara Bright MD         VTE Pharmacologic Prophylaxis: Enoxaparin (Lovenox)  VTE Mechanical Prophylaxis: sequential compression device    ==  Luis Manuel Deutsch MD  [facility] Neurology Residency, PGY-3

## 2023-08-25 NOTE — PHYSICAL THERAPY NOTE
PHYSICAL THERAPY TREATMENT NOTE    Patient Name: Harshal Elkins  NPGJP'S Date: 8/25/2023 08/25/23 1555   PT Last Visit   PT Visit Date 08/25/23   Pain Assessment   Pain Assessment Tool 0-10   Pain Score No Pain   Restrictions/Precautions   Other Precautions Cognitive   General   Chart Reviewed Yes   Family/Caregiver Present No   Cognition   Arousal/Participation Cooperative   Attention Attends with cues to redirect   Orientation Level Oriented to person;Oriented to place; Other (Comment)  (pt was identified w/ full name, birth date)   Following Commands Follows one step commands with increased time or repetition   Subjective   Subjective pt seen standing in doorway of room. pt agreed to PT session. Transfers   Sit to Stand 7  Independent   Additional items Increased time required   Stand to Sit 7  Independent   Additional items Increased time required   Ambulation/Elevation   Gait pattern Foward flexed; Short stride; Inconsistent cecil   Gait Assistance 5  Supervision   Additional items Verbal cues  (for posture, full step length)   Assistive Device None   Distance 200 feet. standing rest break. 180 feet   Stair Management Assistance 5  Supervision   Additional items Verbal cues; Increased time required  (for consistent railing use, foot clearance)   Stair Management Technique One rail R;Step to pattern   Number of Stairs 10  (additional not possible due to fatigue)   Ambulation/Elevation Additional Comments Comfortable Gait Speed: 0.64 m/s   Balance   Static Sitting Good   Dynamic Sitting Fair +   Static Standing Fair   Dynamic Standing Fair   Ambulatory Fair -   Activity Tolerance   Activity Tolerance Patient limited by fatigue   Medical Staff Made Aware spoke to Peri Polo CM   Assessment   Problem List Decreased endurance; Impaired balance;Decreased mobility; Decreased cognition; Impaired judgement;Decreased safety awareness;Decreased strength   Assessment pt's mobility status shows overall improvement w/ increased activity tolerance. pt needed standby assist w/ ambulation and stair use. pt remains at risk for falling and continued inpatient PT is needed to address fall risk factors. discharge recommendation is updated to outpatient PT to maximize level of independence and expedite eventual return to work. pt's current PT goals continue to be appropriate (w/ updated goal for gait speed). Goals   Patient Goals I want to go back to work and pay my rent   STG Expiration Date 09/01/23   Short Term Goal #1 (S)  ADDENDUM TO PRESENT GOALS: pt will improve Comfortable Gait Speed to 0.74 m/s to decrease fall risk and improve community access. Patient will: Increase bilateral LE strength 1 grade to facilitate independent mobility, Perform all bed mobility tasks independently to improve pt's independence w/ repositioning for decrease risk of skin breakdown, Perform all transfers independently consistently from various height surfaces in order to improve I w/ engagement w/ real-world environments/situations, Ambulate at least 500+ ft. without assistive device independently w/o LOB to facilitate return and engagement w/ previous living environment, Navigate 3 stairs independently with unilateral handrail to either improve independence w/ entering home and/or so patient can fully access living areas in home, Increase ambulatory and static and dynamic standing balance 1 grade to decrease risk for falls, Tolerate at least 15 consecutive minutes of activity to demonstrate improved activity tolerance and endurance and Tolerate 3 hr OOB to faciliate upright tolerance   PT Treatment Day 2   Plan   Treatment/Interventions Functional transfer training;Elevations; Endurance training;Patient/family training;Equipment eval/education; Bed mobility;Gait training;LE strengthening/ROM   Progress Progressing toward goals   PT Frequency 3-5x/wk Recommendation   PT Discharge Recommendation (S)  Home with outpatient rehabilitation   AM-PAC Basic Mobility Inpatient   Turning in Flat Bed Without Bedrails 4   Lying on Back to Sitting on Edge of Flat Bed Without Bedrails 4   Moving Bed to Chair 3   Standing Up From Chair Using Arms 4   Walk in Room 3   Climb 3-5 Stairs With Railing 3   Basic Mobility Inpatient Raw Score 21   Basic Mobility Standardized Score 45.55   Highest Level Of Mobility   JH-HLM Goal 6: Walk 10 steps or more   JH-HLM Achieved 8: Walk 250 feet ot more   End of Consult   Patient Position at End of Consult Seated edge of bed; All needs within reach     The patient's AM-PAC Basic Mobility Inpatient Short Form Raw Score is 21. A Raw score of greater than 16 suggests the patient may benefit from discharge to home. Please also refer to the recommendation of the Physical Therapist for safe discharge planning. Comfortable Gait Speed: 0.64 m/s  Gait Speed Interpretation:  Gain of 0.1 m/s is a predictor of well-being in those w/ abnormal walking speed compared to age matched peers  <0.7m/s is at an increased risk for falls    Household ambulator: <0.4 m/s  Limited community ambulator: 0.4-0.8 m/s  [employer] ambulator: 0.8-1.2 m/s  Able to safely cross streets: >1.2 m/s     Skilled inpatient PT recommended while in hospital to progress pt toward treatment goals.     Christiano Bird, PT

## 2023-08-25 NOTE — CASE MANAGEMENT
Case Management Progress Note    Patient name Enma Jordan  Location S /S -01 MRN [id number]  : 1960 Date 2023       LOS (days): 4  Geometric Mean LOS (GMLOS) (days): 2.90  Days to GMLOS:-0.8        OBJECTIVE:        Current admission status: Inpatient  Preferred Pharmacy:   [address] [place] [address],[address]  Phone: [phone] Fax: [phone]    Primary Care Provider: Juan Rodriguez PA-C    Primary Insurance: BLUE CROSS  Secondary Insurance:     PROGRESS NOTE:    TT received from MD requesting price check for Eliquis. Call made to pharmacy ([facility] - [phone]) and spoke with pharmacy [facility] who states they aren't able to pull order up in their system. Per Epic, order was placed initially at 3:34pm and then discontinued and re-sent at 4:52pm. Per tech, it can take up to 2 hours for system to process electronic orders, and they can't relay cost until order received. Patient would qualify for both the Eliquis free trial and copay card; both provided to patient for his records. TT sent to attending relaying above.

## 2023-08-26 NOTE — ASSESSMENT & PLAN NOTE
- Per chart review - Follows with [address] [place] [address] Cardiology. In the past refused use of LifeVest and ICD placement   - Echo 11/2022 - EF 35%. LV systolic function moderately reduced. Normal RV systolic function  - Home regimen: Entresto 24-26 mg BID, Toprol-XL 25 mg qd, Lasix 20 mg qd  - Echo (8/22) - EF 20%. LV systolic function severely reduced. Severe global hypokinesis. RV systolic function is mildly to moderately reduced. Wall motion is abnormal.  Patient declined LifeVest   - Heart failure consulted, appreciate recommendations: resume Entresto, Lasix 20 mg, and metoprolol. Give additional dose of IV Lasix 40 mg. Start Jardiance upon discharge.   - Price check for Jardiance $10 per CM    Plan:  - Start Jardiance 10 mg  - Follow-up with cardiology

## 2023-08-26 NOTE — DISCHARGE SUMMARY
[address]  Discharge- Harshal Ramirezthorn 1960, 65 y.o. male MRN: [id number]  Unit/Bed#: S -[place]aine Encounter: [id number]  Primary Care Provider: Mikal Duncan PA-C   Date and time admitted to hospital: 8/21/2023 11:22 PM    * Cerebrovascular accident (CVA) [facility])  [id number]  - Presenting with aphasia   - Hx of CVA in 2020. Home regimen: aspirin 81 mg, atorvastatin 40 mg qd, Brilinta 90 mg BID  - Per neurology, not a candidate for TPA. Unclear last known well time  - Per neurology, concern for seizure, loaded with 2 g Keppra  - CT head (8/22) - acute to subacute left MCA territory infarct in the inferior parietal temporal region. No hemorrhagic conversion or significant mass effect  - CTA head/neck (8/22) - No hemodynamically significant stenosis, dissection or occlusion noted. - Cleared by speech, regular diet   - P2Y12 inhibitor 204, wnl   - EEG (8/23) - Mild nonspecific diffuse cerebral dysfunction. No epileptiform discharges   - MRI brain (8/23) - Subacute infarct within the left posterior lateral temporal lobe with mild cytotoxic edema. No hemorrhagic transformation. Old infarct within the left posterior lateral frontal lobe. -Per discussion with neurology and cardiology, discontinue Brilinta. Continue aspirin 81 mg daily. Start Eliquis 5 mg twice daily 2 weeks post stroke. Plan:  - Continue home metoprolol, Lasix, and Entresto  - Start Jardiance 10 mg   - Continue aspirin 81 mg. Start Eliquis 5 mg twice daily 2 weeks post stroke. - PT recommended outpatient postacute rehab on discharge    Chronic systolic heart failure [facility])  Assessment & Plan  - Per chart review - Follows with [facility]) Cardiology. In the past refused use of LifeVest and ICD placement   - Echo 11/2022 - EF 35%. LV systolic function moderately reduced. Normal RV systolic function  - Home regimen: Entresto 24-26 mg BID, Toprol-XL 25 mg qd, Lasix 20 mg qd  - Echo (8/22) - EF 20%.  LV systolic function severely reduced. Severe global hypokinesis. RV systolic function is mildly to moderately reduced. Wall motion is abnormal.  Patient declined LifeVest   - Heart failure consulted, appreciate recommendations: resume Entresto, Lasix 20 mg, and metoprolol. Give additional dose of IV Lasix 40 mg. Start Jardiance upon discharge. - Price check for Jardiance $10 per CM    Plan:  - Start Jardiance 10 mg  - Follow-up with cardiology    Hyperlipidemia  Assessment & Plan  - Lipid panel (8/22) - cholesterol 155, TG 91, HDL 28,     Plan:  - Per heart failure team, increase atorvastatin dose to 80 mg daily   - Continue atorvastatin 80 mg qd upon discharge    Type 2 diabetes mellitus ([address])  Assessment & Plan  Lab Results   Component Value Date    HGBA1C 6.3 (H) 08/22/2023       No results for input(s): "POCGLU" in the last 72 hours. Blood Sugar Average: Last 72 hrs:      Prediabetes  Assessment & Plan  HgbA1c (8/22) - 6.3    Hypertension  Assessment & Plan  - bp 113/81 today   - Overnight, patient had episodes of bradycardic heart rate in the 40s and 50s, while sleeping, on telemetry. Metoprolol held. Repeat heart rate later in the morning HR 84. Asymptomatic this whole time    Plan:  - Continue home Toprol, Lasix, Entresto  - Start Jardiance    Coronary artery disease  Assessment & Plan  - Hx of PCI, thrombectomy, stent 7/2022  - P2Y12 inhibitor 204, wnl    Plan:  -Per neurology and cardiology, discontinue Brilinta. Start Eliquis 5 mg BID at two weeks post stroke.  Until then aspirin 81 mg qd    Medical Problems     Resolved Problems  Date Reviewed: 8/25/2023   None       Discharging Resident: Benjamin Quiroz DO  Discharging Attending: No att. providers found  PCP: Chi Arias PA-C  Admission Date:   Admission Orders (From admission, onward)     Ordered        08/21/23 [address]  Once                      Discharge Date: 08/25/23    Consultations During Hospital Stay:  · Cardiology, neurology, heart failure    Procedures Performed:   · None    Significant Findings / Test Results:   MRI brain wo contrast   Final Result by Emanuel Booth DO (08/24 1342)      Subacute infarct within the left posterior lateral temporal lobe extending to the temporal occipital junction with mild cytotoxic edema. No hemorrhagic transformation. Old infarct within the left posterior lateral frontal lobe. Scattered white matter changes consistent with chronic microangiopathy. Left-sided sinus disease with extensive opacification of the frontal sinus, anterior ethmoid air cells and maxillary sinus similar to prior CT scan. Workstation performed: [id number]         CT stroke alert brain   Final Result by Elijah Gates MD (08/22 0003)      Acute to subacute left MCA territory infarct in the inferior parietal temporal region. No hemorrhagic conversion or significant mass effect. Findings were directly discussed with Karyn Escobedo at  11:52 PM  .      Workstation performed: [id number]         CTA stroke alert (head/neck)   Final Result by Elijah Gates MD (08/22 0003)      No hemodynamically significant stenosis, dissection or occlusion of the carotid or vertebral arteries or major vessels of the [place]. Findings were directly discussed with Karyn Escobedo at  12:00 AM  .                           Workstation performed: [id number]           ·     Incidental Findings:   · None    Test Results Pending at Discharge (will require follow up): · None     Outpatient Tests Requested:  · None    Complications: None    Reason for Admission: Expressive aphasia    Hospital Course:   Vanessa Carter is a 65 y.o. male patient who originally presented to the hospital on 8/21/2023 due to expressive aphasia while at work with unknown last well known time. He did not have any loss of consciousness or unilateral weakness. In the emergency room, vital signs are stable.   He had a blood pressure of 99/65, otherwise vital signs were unremarkable. He had evident expressive aphasia, receptive aphasia, and a nonsensical speech without any other neurological deficits. Lab work was unremarkable. CT head showed acute to subacute acute left MCA territory infarct in the inferior parietal region without hemorrhagic conversion or significant mass effect. CTA head/neck showed no significant stenosis, dissection, or occlusion. Per neurology, he was not a candidate for tPA due to unknown last well known time. Per neurology, he was loaded with 2 g Keppra for concern of possible seizure. EKG showed NSR 98, QTc 592. Hemoglobin A1c was 6.3. Lipid panel showed cholesterol 155, TG 91, HDL 28, . P2Y12 level was 204, within normal limits. Patient reports being compliant with his home medications and was already on dual antiplatelet therapy with aspirin and Brilinta. Echocardiogram showed an ejection fraction of 20%, which was decreased from his prior echo, which showed 35%. Both times, he declined a LifeVest.  An EEG was performed to look for seizure activity, which showed mild nonspecific diffuse cerebral dysfunction and no seizure activity. MRI of his brain showed subacute infarct within the left posterior lateral temporal lobe with mild cytotoxic edema without hemorrhagic transformation as well as old infarct within the left posterior lateral frontal lobe. During his stay in the hospital, his receptive aphasia and nonsensical speech pattern resolved. He continued to have improvement in his expressive aphasia, although slow speech. Per discussion with neurology and cardiology, Brilinta was discontinued. He was continued on aspirin 81 mg daily and started on Eliquis 5 mg twice daily starting 2 weeks after the stroke.   For heart failure, patient was also started on Jardiance 10 mg.  Price check was performed, per CM, the new medication would only cost him $10.  Plan for outpatient follow-up with cardiology or loop recorder. Plan for outpatient follow-up with primary care physician. PT recommended outpatient postacute rehab on discharge. Stable for discharge. Please see above list of diagnoses and related plan for additional information. Condition at Discharge: good    Discharge Day Visit / Exam:   Subjective:     Overnight, patient had episodes of bradycardia with heart rate in the 40s and 50s that were captured on telemetry. Most likely, while he was sleeping, and was asymptomatic. Morning metoprolol was held. Blood pressure was also soft at that time at 98/66. Repeat blood pressure later on in the morning was 113/81 with heart rate 84. Patient was seen and examined at bedside. He was sitting comfortably in the couch next to the bed. He does not have any pain. He still continues to have expressive aphasia. No receptive aphasia or nonsensical speech. He does not have any other neurological deficits on examination. He is asking when he can go home. Potassium was low at 3.3 and was orally repleted. Magnesium was at the low end of normal at 1.9, which was replenished with both oral and IV magnesium. Vitals: Blood Pressure: 113/81 (08/25/23 0808)  Pulse: 84 (08/25/23 0808)  Temperature: 98.1 °F (36.7 °C) (08/25/23 0808)  Temp Source: Oral (08/23/23 2101)  Respirations: 18 (08/24/23 2117)  Height: 5' 8" (172.7 cm) (08/22/23 1200)  Weight - Scale: 85 kg (187 lb 6.3 oz) (08/25/23 0600)  SpO2: 98 % (08/25/23 1855)  Exam:   Physical Exam  Vitals and nursing note reviewed. Constitutional:       General: He is not in acute distress. Appearance: He is well-developed. HENT:      Head: Normocephalic and atraumatic. Eyes:      Conjunctiva/sclera: Conjunctivae normal.   Cardiovascular:      Rate and Rhythm: Normal rate and regular rhythm. Heart sounds: No murmur heard. Pulmonary:      Effort: Pulmonary effort is normal. No respiratory distress.       Breath sounds: Normal breath sounds. Abdominal:      Palpations: Abdomen is soft. Tenderness: There is no abdominal tenderness. Musculoskeletal:         General: No swelling. Cervical back: Neck supple. Skin:     General: Skin is warm and dry. Capillary Refill: Capillary refill takes less than 2 seconds. Neurological:      Mental Status: He is alert. Comments: Continues to have expressive aphasia. Slow speech. No receptive aphasia or nonsensical speech. Psychiatric:         Mood and Affect: Mood normal.          Discussion with Family: Patient declined call to . Discharge instructions/Information to patient and family:   See after visit summary for information provided to patient and family. Provisions for Follow-Up Care:  See after visit summary for information related to follow-up care and any pertinent home health orders. Disposition:   Home    Planned Readmission: No    Discharge Medications:  See after visit summary for reconciled discharge medications provided to patient and/or family.       **Please Note: This note may have been constructed using a voice recognition system**

## 2023-08-26 NOTE — ASSESSMENT & PLAN NOTE
- Presenting with aphasia   - Hx of CVA in 2020. Home regimen: aspirin 81 mg, atorvastatin 40 mg qd, Brilinta 90 mg BID  - Per neurology, not a candidate for TPA. Unclear last known well time  - Per neurology, concern for seizure, loaded with 2 g Keppra  - CT head (8/22) - acute to subacute left MCA territory infarct in the inferior parietal temporal region. No hemorrhagic conversion or significant mass effect  - CTA head/neck (8/22) - No hemodynamically significant stenosis, dissection or occlusion noted. - Cleared by speech, regular diet   - P2Y12 inhibitor 204, wnl   - EEG (8/23) - Mild nonspecific diffuse cerebral dysfunction. No epileptiform discharges   - MRI brain (8/23) - Subacute infarct within the left posterior lateral temporal lobe with mild cytotoxic edema. No hemorrhagic transformation. Old infarct within the left posterior lateral frontal lobe. -Per discussion with neurology and cardiology, discontinue Brilinta. Continue aspirin 81 mg daily. Start Eliquis 5 mg twice daily 2 weeks post stroke. Plan:  - Continue home metoprolol, Lasix, and Entresto  - Start Jardiance 10 mg   - Continue aspirin 81 mg. Start Eliquis 5 mg twice daily 2 weeks post stroke.   - PT recommended outpatient postacute rehab on discharge

## 2023-08-26 NOTE — ASSESSMENT & PLAN NOTE
Lab Results   Component Value Date    HGBA1C 6.3 (H) 08/22/2023       No results for input(s): "POCGLU" in the last 72 hours.     Blood Sugar Average: Last 72 hrs:

## 2023-08-26 NOTE — ASSESSMENT & PLAN NOTE
- Hx of PCI, thrombectomy, stent 7/2022  - P2Y12 inhibitor 204, wnl    Plan:  -Per neurology and cardiology, discontinue Brilinta. Start Eliquis 5 mg BID at two weeks post stroke.  Until then aspirin 81 mg qd

## 2023-08-28 ENCOUNTER — TRANSITIONAL CARE MANAGEMENT (OUTPATIENT)
Dept: FAMILY MEDICINE CLINIC | Facility: CLINIC | Age: 63
End: 2023-08-28

## 2023-08-28 NOTE — UTILIZATION REVIEW
NOTIFICATION OF ADMISSION DISCHARGE   This is a Notification of Discharge from [facility]. Please be advised that this patient has been discharge from our facility. Below you will find the admission and discharge date and time including the patient’s disposition. UTILIZATION REVIEW CONTACT:  Sally Franz  Utilization   Network Utilization Review Department  Phone: [phone] x carefully listen to the prompts. All voicemails are confidential.  Email: [email or web address]     ADMISSION INFORMATION  PRESENTATION DATE: 8/21/2023 11:22 PM  OBERVATION ADMISSION DATE:   INPATIENT ADMISSION DATE: 8/21/23 11:59 PM   DISCHARGE DATE: 8/25/2023  6:20 PM   DISPOSITION:Home/Self Care    IMPORTANT INFORMATION:  Send all requests for admission clinical reviews, approved or denied determinations and any other requests to dedicated fax number below belonging to the campus where the patient is receiving treatment.  List of dedicated fax numbers:  [place] DENIALS (Administrative/Medical Necessity) [phone] [address] [facility] (Maternity/NICU/Pediatrics) [phone]   [facility] [phone]   [facility] [phone] [address] [phone]   [address] [facility] [phone]   [facility] [phone]   [address] [facility] [address] [facility] [phone]   [address] [place] [address] [phone] [address] [facility] [phone]   [address] [facility] [address] [phone]

## 2023-08-30 ENCOUNTER — OFFICE VISIT (OUTPATIENT)
Dept: FAMILY MEDICINE CLINIC | Facility: CLINIC | Age: 63
End: 2023-08-30

## 2023-08-30 VITALS
RESPIRATION RATE: 17 BRPM | BODY MASS INDEX: 27.1 KG/M2 | DIASTOLIC BLOOD PRESSURE: 75 MMHG | HEART RATE: 91 BPM | TEMPERATURE: 98.5 F | OXYGEN SATURATION: 96 % | HEIGHT: 68 IN | SYSTOLIC BLOOD PRESSURE: 119 MMHG | WEIGHT: 178.8 LBS

## 2023-08-30 DIAGNOSIS — I50.22 CHRONIC SYSTOLIC HEART FAILURE (HCC): ICD-10-CM

## 2023-08-30 DIAGNOSIS — I25.5 ISCHEMIC CARDIOMYOPATHY: ICD-10-CM

## 2023-08-30 DIAGNOSIS — I63.9 CEREBROVASCULAR ACCIDENT (CVA), UNSPECIFIED MECHANISM (HCC): Primary | ICD-10-CM

## 2023-08-30 DIAGNOSIS — E11.9 TYPE 2 DIABETES MELLITUS WITHOUT COMPLICATION, WITHOUT LONG-TERM CURRENT USE OF INSULIN (HCC): ICD-10-CM

## 2023-08-30 DIAGNOSIS — R56.9 SEIZURE (HCC): ICD-10-CM

## 2023-08-30 DIAGNOSIS — R97.20 ELEVATED PSA: ICD-10-CM

## 2023-08-30 DIAGNOSIS — I10 PRIMARY HYPERTENSION: ICD-10-CM

## 2023-08-30 PROBLEM — E78.5 HYPERLIPIDEMIA: Chronic | Status: ACTIVE | Noted: 2022-10-24

## 2023-08-30 PROBLEM — R73.03 PREDIABETES: Status: RESOLVED | Noted: 2022-10-24 | Resolved: 2023-08-30

## 2023-08-30 PROBLEM — I25.10 CORONARY ARTERY DISEASE: Chronic | Status: ACTIVE | Noted: 2022-10-24

## 2023-08-30 PROBLEM — I25.2 HISTORY OF ST ELEVATION MYOCARDIAL INFARCTION (STEMI): Chronic | Status: ACTIVE | Noted: 2022-10-24

## 2023-08-30 RX ORDER — SACUBITRIL AND VALSARTAN 24; 26 MG/1; MG/1
1 TABLET, FILM COATED ORAL 2 TIMES DAILY
Qty: 60 TABLET | Refills: 0 | Status: SHIPPED | OUTPATIENT
Start: 2023-08-30 | End: 2023-08-30 | Stop reason: SDUPTHER

## 2023-08-30 RX ORDER — SACUBITRIL AND VALSARTAN 24; 26 MG/1; MG/1
1 TABLET, FILM COATED ORAL 2 TIMES DAILY
Qty: 180 TABLET | Refills: 0 | Status: SHIPPED | OUTPATIENT
Start: 2023-08-30

## 2023-08-30 NOTE — ASSESSMENT & PLAN NOTE
Caution with low BP with metoprolol and starting Entresto again. Patient was not taking due to cost previously. Called [facility] to confirm and stated that Valerie Beady was never filled. Current price check is $30 per month.  Encouraged patient to start taking and follow-up with cardiology tomorrow at 2:20pm.

## 2023-08-30 NOTE — ASSESSMENT & PLAN NOTE
Reviewed hospital admission and discharge. Initially admitted for aphasia which has improved significantly. CT head showed acute/subacute L MCA infarct in inferior parietal temporal region. Follow-up MRI brain on 8/23/23 with subacute infarct in L posterior lateral temporal lobe with mild cytotoxic edema. History of previous CVA in L posterior lateral frontal lobe. Was discontinued Brilinta and start on Eliquis 5mg BID. Continue same dose baby aspirin.

## 2023-08-30 NOTE — ASSESSMENT & PLAN NOTE
Wt Readings from Last 3 Encounters:   08/30/23 81.1 kg (178 lb 12.8 oz)   08/25/23 85 kg (187 lb 6.3 oz)   07/18/23 88.4 kg (194 lb 12.8 oz)     Ischemic cardiomyopathy. Most recent echo in 8/22/23 showed 20% EF which is reduced from previous in 11/2022 with 35% EF. Has not been taking Entresto due to high copay cost. Refilled Entresto today, discuss with cardiology tomorrow as does not seem to be aware that he has not been taking. Started on Jardiance which patient is taking 10 mg. Follow-up with heart failure specialist as he has refused lifevest and ICD in past due to feeling well.

## 2023-08-30 NOTE — ASSESSMENT & PLAN NOTE
Well-controlled on diet, started on Jardiance 10mg for heart failure. Continue same.    Lab Results   Component Value Date    HGBA1C 6.3 (H) 08/22/2023

## 2023-08-30 NOTE — ASSESSMENT & PLAN NOTE
Discussed importance of medication compliance. Previously refused ICD placement suspect due to poor understanding and medical literacy. Attempted explanation today but patient reports feeling better with heart and fluid overload since starting Lasix 20mg and does not want invasive procedure. Discussed last echo in 8/2023 with following:   left ventricular ejection fraction is 20%. Systolic function is severely reduced. Wall motion cannot be accurately assessed. There is severe global hypokinesis with regional variations. The inferoseptal and anteroseptal walls appear akinetic.   Lab Results   Component Value Date    LDLCALC 109 (H) 08/22/2023

## 2023-08-30 NOTE — ASSESSMENT & PLAN NOTE
Lab Results   Component Value Date    PSA 3.75 08/03/2023    PSA 4.31 (H) 07/18/2023     Resolved with repeat, continue to monitor, no urinary symptoms.

## 2023-08-31 ENCOUNTER — PATIENT OUTREACH (OUTPATIENT)
Dept: FAMILY MEDICINE CLINIC | Facility: CLINIC | Age: 63
End: 2023-08-31

## 2023-08-31 ENCOUNTER — OFFICE VISIT (OUTPATIENT)
Dept: CARDIOLOGY CLINIC | Facility: CLINIC | Age: 63
End: 2023-08-31
Payer: COMMERCIAL

## 2023-08-31 VITALS
WEIGHT: 180.2 LBS | OXYGEN SATURATION: 99 % | BODY MASS INDEX: 27.31 KG/M2 | HEIGHT: 68 IN | DIASTOLIC BLOOD PRESSURE: 70 MMHG | HEART RATE: 70 BPM | SYSTOLIC BLOOD PRESSURE: 102 MMHG

## 2023-08-31 DIAGNOSIS — I25.5 ISCHEMIC CARDIOMYOPATHY: Chronic | ICD-10-CM

## 2023-08-31 DIAGNOSIS — I63.9 CEREBROVASCULAR ACCIDENT (CVA), UNSPECIFIED MECHANISM (HCC): Chronic | ICD-10-CM

## 2023-08-31 DIAGNOSIS — I25.10 CORONARY ARTERY DISEASE INVOLVING NATIVE CORONARY ARTERY OF NATIVE HEART WITHOUT ANGINA PECTORIS: ICD-10-CM

## 2023-08-31 DIAGNOSIS — Z09 HOSPITAL DISCHARGE FOLLOW-UP: Primary | ICD-10-CM

## 2023-08-31 DIAGNOSIS — I50.22 CHRONIC HFREF (HEART FAILURE WITH REDUCED EJECTION FRACTION) (HCC): ICD-10-CM

## 2023-08-31 DIAGNOSIS — I50.814 BIVENTRICULAR HEART FAILURE WITH REDUCED LEFT VENTRICULAR FUNCTION (HCC): ICD-10-CM

## 2023-08-31 DIAGNOSIS — Z86.73 HISTORY OF RECENT STROKE: ICD-10-CM

## 2023-08-31 PROCEDURE — 99214 OFFICE O/P EST MOD 30 MIN: CPT | Performed by: PHYSICIAN ASSISTANT

## 2023-08-31 NOTE — PROGRESS NOTES
General Cardiology Outpatient Progress Note    Milagros Henson 65 y.o. male   MRN: [id number]  Encounter: [id number]    Assessment:  Patient Active Problem List    Diagnosis Date Noted   • Cerebrovascular accident (CVA) ([address]) 08/22/2023   • Elevated bilirubin 07/20/2023   • Elevated PSA 07/20/2023   • Ischemic cardiomyopathy 07/18/2023   • History of ST elevation myocardial infarction (STEMI) 10/24/2022   • Coronary artery disease 10/24/2022   • Hyperlipidemia 10/24/2022   • Hypertension 10/24/2022   • Type 2 diabetes mellitus ([address]) 10/24/2022   • Seizure ([address]) 10/24/2022   • Obesity (BMI 30.0-34.9) 10/24/2022   • Chronic systolic heart failure ([address]) 09/08/2022       Today's Plan:  • Continue current medications. Picked up Satinder from pharmacy yesterday and began taking this AM.   o Consider increasing dose at next visit if appropriate. • Referral placed to advanced HF. • Planning on applying for disability s/p stroke. Referral placed to outpatient social work for further [place] to activate voice mail on phone in case unable to be reached. • Provided information about [employer] for low sodium meal options. Plan:  Chronic biventricular HFrEF; LVEF 20%; LVIDd 5.8 cm; NYHA II; ACC/AHA Stage C   Etiology: ischemic; diagnosed at time of STEMI in 07/2022. [facility] 07/13/2022: 100% stenosis mid LAD (underwent thrombectomy + ALEJANDRA x1). 50% stenosis of D1. Mild diffuse disease of LCx. TTE 07/13/2022: LVEF 35-40%. LVIDd 4 cm. Grade 1 DD. RWMA noted. Normal RV. Trace MR. Moderate TR. Normal IVC. TTE (limited) 11/02/2022: LVEF 35%. LVIDd 4.8 cm. RWMA noted. Normal RV. Mild FREDA. Mild MR. TTE 08/22/2023: LVEF 20%. Severe global hypokinesis with RWMA. Dilated RV with mildly to moderately reduced RVSF. FREDA. Mild MR. Mild to moderate TR. Normal IVC. Weight of 187 lbs on 08/25 (day of discharge). Today, weighs 180 lbs.     Most recent BMP from 08/25/2023: sodium 138; potassium 4.1; BUN 13; creatinine 0.84; eGFR 94. Pharmacotherapies / Neurohormonal Blockade:  --Beta Blocker: metoprolol succinate 25 mg daily. --ARNi / ACEi / ARB: Entresto 24-26 mg BID. --Aldosterone Antagonist: No.   --SGLT2 Inhibitor: empagliflozin 10 mg daily. --Diuretic: Lasix 20 mg daily. Sudden Cardiac Death Risk Reduction:  --LVEF ~35% since 07/2022. Declined ICD in 01/2023. Electrical Resynchronization:  --Candidacy for BiV device: narrow QRS. Advanced Therapies: Will continue to monitor. Coronary artery disease   Without active chest pain. S/p anterolateral STEMI in 07/2022: underwent thrombectomy + ALEJANDRA to mid LAD. Continue on aspirin, statin, and BB as above. PRN SL nitro prescribed. Hypertension   BP of 102/70 mmHg in office today. Continue on medications as above. History of stroke(s): most recently with with left MCA stroke with aphasia in 08/2023. AC on Eliquis. Continues on ASA and statin. Hyperlipidemia   Diabetes mellitus, type II    HPI:   Wilber Lr is a 58-year-old man with a PMH as above who presents to the office for hospital follow-up. Follows with Dr. Paresh Mukherjee. Admitted to [facility] from 08/21 to 08/25/2023 after presenting with new onset aphasia. Stroke alert called, diagnosed with acute left MCA stroke. TTE with LVEF drop from 35% to 20%. Home BB and ARNi gradually added back (s/p permissive hypertension). Received one dose IV Lasix inpatient; outpatient dose restarted on day before discharge. Lost 5-10 lbs this admission (bed and standing scales used). Brilinta stopped; Eliquis and Jardiance started. Statin dose increased. 08/31/2023: Patient presents for hospital follow-up. Continues with some speech/word finding difficulty. Planning to start speech therapy and apply for disability s/p stroke. Continues with mild LE swelling but feels this has been improving since returning home. Denies FAUST, PND, and orthopnea.  Recent meals/foods include oatmeal, watermelon, applesauce, banana, and salmon/mashed potatoes/broccoli from [employer]. Drinking ~65-70 oz fluid daily. Does have scale at home; denies rapid gains since discharge. Past Medical History:   Diagnosis Date   • Coronary artery disease    • CVA (cerebral vascular accident) ([address])     R sided, dysarthria, March 2020 in [place]   • Heart failure [facility])    • Hyperlipidemia    • Hypertension    • Pre-diabetes    • STEMI (ST elevation myocardial infarction) ([address])        Review of Systems   Constitutional: Negative for activity change, appetite change, fatigue and unexpected weight change. Eyes: Negative. Respiratory: Negative for cough, chest tightness and shortness of breath. Cardiovascular: Positive for leg swelling (improving). Negative for chest pain and palpitations. Gastrointestinal: Negative for abdominal distention, abdominal pain, diarrhea and nausea. Endocrine: Negative. Genitourinary: Negative for decreased urine volume, difficulty urinating, dysuria and urgency. Musculoskeletal: Negative. Skin: Negative. Allergic/Immunologic: Negative. Neurological: Positive for speech difficulty. Negative for dizziness, syncope, weakness and light-headedness. Psychiatric/Behavioral: Negative for confusion and sleep disturbance. The patient is not nervous/anxious.         Allergies   Allergen Reactions   • Pollen Extract Allergic Rhinitis         Current Outpatient Medications:   •  [START ON 9/4/2023] apixaban (Eliquis) 5 mg, Take 1 tablet (5 mg total) by mouth 2 (two) times a day Start this medication on 9/4/2023 Do not start before September 4, 2023., Disp: 60 tablet, Rfl: 0  •  aspirin 81 mg chewable tablet, Chew 1 tablet (81 mg total) daily, Disp: 30 tablet, Rfl: 5  •  atorvastatin (LIPITOR) 80 mg tablet, Take 1 tablet (80 mg total) by mouth daily with dinner, Disp: 30 tablet, Rfl: 0  •  Empagliflozin (JARDIANCE) 10 MG TABS tablet, Take 1 tablet (10 mg total) by mouth every morning, Disp: 30 tablet, Rfl: 0  •  furosemide (LASIX) 20 mg tablet, Take 1 tablet (20 mg total) by mouth daily, Disp: 90 tablet, Rfl: 1  •  metoprolol succinate (TOPROL-XL) 25 mg 24 hr tablet, TAKE 1 TABLET (25 MG TOTAL) BY MOUTH DAILY. , Disp: 90 tablet, Rfl: 2  •  sacubitril-valsartan (Entresto) 24-26 MG TABS, Take 1 tablet by mouth 2 (two) times a day, Disp: 180 tablet, Rfl: 0  •  nitroglycerin (NITROSTAT) 0.4 mg SL tablet, Place 1 tablet (0.4 mg total) under the tongue every 5 (five) minutes as needed for chest pain for up to 3 days (Patient not taking: Reported on 8/31/2023), Disp: 10 tablet, Rfl: 0    Social History     Socioeconomic History   • Marital status: Unknown     Spouse name: Not on file   • Number of children: Not on file   • Years of education: Not on file   • Highest education level: Not on file   Occupational History   • Not on file   Tobacco Use   • Smoking status: Never   • Smokeless tobacco: Never   Substance and Sexual Activity   • Alcohol use: Not on file   • Drug use: Not on file   • Sexual activity: Not on file   Other Topics Concern   • Not on file   Social History Narrative   • Not on file     Social Determinants of Health     Financial Resource Strain: Not on file   Food Insecurity: No Food Insecurity (8/24/2023)    Hunger Vital Sign    • Worried About Running Out of Food in the Last Year: Never true    • Ran Out of Food in the Last Year: Never true   Transportation Needs: No Transportation Needs (8/24/2023)    PRAPARE - Transportation    • Lack of Transportation (Medical): No    • Lack of Transportation (Non-Medical):  No   Physical Activity: Not on file   Stress: Not on file   Social Connections: Not on file   Intimate Partner Violence: Not on file   Housing Stability: Unknown (8/24/2023)    Housing Stability Vital Sign    • Unable to Pay for Housing in the Last Year: No    • Number of Places Lived in the Last Year: Not on file    • Unstable Housing in the Last Year: No     Family History   Problem Relation Age of Onset   • Dementia Mother    • Heart disease Father    • Dementia Maternal Aunt    • Dementia Maternal Uncle    • Cancer Maternal Uncle        Vitals:   Blood pressure 102/70, pulse 70, height 5' 8" (1.727 m), weight 81.7 kg (180 lb 3.2 oz), SpO2 99 %. Wt Readings from Last 10 Encounters:   08/31/23 81.7 kg (180 lb 3.2 oz)   08/30/23 81.1 kg (178 lb 12.8 oz)   08/25/23 85 kg (187 lb 6.3 oz)   07/18/23 88.4 kg (194 lb 12.8 oz)   04/25/23 90.4 kg (199 lb 3.2 oz)   01/05/23 77.1 kg (170 lb)   11/02/22 80.7 kg (178 lb)   10/24/22 80.9 kg (178 lb 6.4 oz)   10/13/22 81.9 kg (180 lb 8.9 oz)   10/05/22 80.8 kg (178 lb 2.1 oz)     Vitals:    08/31/23 1419   BP: 102/70   BP Location: Right arm   Patient Position: Sitting   Cuff Size: Standard   Pulse: 70   SpO2: 99%   Weight: 81.7 kg (180 lb 3.2 oz)   Height: 5' 8" (1.727 m)       Physical Exam  Vitals reviewed. Constitutional:       General: He is awake. He is not in acute distress. Appearance: Normal appearance. He is well-developed and normal weight. He is not toxic-appearing or diaphoretic. HENT:      Head: Normocephalic. Nose: Nose normal.      Mouth/Throat:      Mouth: Mucous membranes are moist.   Eyes:      General: No scleral icterus. Conjunctiva/sclera: Conjunctivae normal.   Neck:      Vascular: No JVD. Trachea: No tracheal deviation. Cardiovascular:      Rate and Rhythm: Normal rate and regular rhythm. Heart sounds: Murmur heard. Pulmonary:      Effort: Pulmonary effort is normal. No tachypnea, bradypnea or respiratory distress. Breath sounds: Normal air entry. No decreased air movement. No decreased breath sounds, wheezing or rhonchi. Abdominal:      General: Bowel sounds are normal. There is no distension. Palpations: Abdomen is soft. Tenderness: There is no abdominal tenderness. Musculoskeletal:      Cervical back: Neck supple. Right lower leg: Edema present.       Left lower leg: Edema present. Skin:     General: Skin is warm and dry. Coloration: Skin is not jaundiced or pale. Neurological:      Mental Status: He is alert and oriented to person, place, and time. Psychiatric:         Attention and Perception: Attention normal.         Mood and Affect: Mood normal. Affect is flat. Behavior: Behavior normal. Behavior is cooperative. Thought Content:  Thought content normal.       Labs & Results:  Lab Results   Component Value Date    WBC 6.58 08/22/2023    HGB 13.1 08/22/2023    HCT 41.5 08/22/2023    MCV 98 08/22/2023     08/22/2023     Lab Results   Component Value Date    SODIUM 138 08/25/2023    K 4.1 08/25/2023     08/25/2023    CO2 27 08/25/2023    BUN 13 08/25/2023    CREATININE 0.84 08/25/2023    GLUC 135 08/25/2023    CALCIUM 9.0 08/25/2023     Lab Results   Component Value Date    INR 1.35 (H) 08/21/2023    INR 1.21 (H) 07/13/2022    PROTIME 17.4 (H) 08/21/2023    PROTIME 15.3 (H) 07/13/2022     Lab Results   Component Value Date    NTBNP 69 07/13/2022      No results found for: "BNP"     Mahesh Yost PA-C

## 2023-08-31 NOTE — PATIENT INSTRUCTIONS
Continue current medications. Referral placed to social work (to help with filing disability claim) and to neurology (for post stroke appointment +/- assistance with disability paperwork). Please weigh yourself every day (after emptying your bladder) and keep a detailed log of weights. Contact the Heart Failure program at [phone] if you gain 3+ lbs overnight or 5+ lbs in 5-7 days. Limit daily sodium/salt intake to 2000 mg daily to prevent fluid retention. Avoid canned foods, fast food/Chinese food, and processed meats (hot dogs, lunch meat, and sausage etc.). Caution with condiments. Limit fluid intake to 2000 mL or 2 liters (about 60-65 ounces) daily. Avoid electrolyte replacement drinks (such as Gatorade, Pedialyte, Propel, Liquid IV, etc.). Bring complete list of medications and log of daily weights to your follow-up appointment.

## 2023-09-07 ENCOUNTER — PATIENT OUTREACH (OUTPATIENT)
Dept: FAMILY MEDICINE CLINIC | Facility: CLINIC | Age: 63
End: 2023-09-07

## 2023-09-07 NOTE — PROGRESS NOTES
Attempted to contact patient twice, unable to leave message as voice mailbox not set up. Unable to reach letter sent.

## 2023-09-07 NOTE — LETTER
Date: 09/07/23    Dear Lucinda Schwartz,   My name is Gerard Knott; I am a registered nurse care manager working with   [address] [place] [address]   I have not been able to reach you and would like to set a time that I can talk with you over the phone. My work is to help patients that have complex medical conditions get the care they need. This includes patients who may have been in the hospital or emergency room. I have enclosed my contact  information below for you. Please call me with any questions you may have. I look forward to hearing from you.   Sincerely,  Hawa Platt RN  [phone]  Outpatient Care Manager

## 2023-09-08 ENCOUNTER — TELEPHONE (OUTPATIENT)
Dept: NEUROLOGY | Facility: CLINIC | Age: 63
End: 2023-09-08

## 2023-09-08 NOTE — TELEPHONE ENCOUNTER
Post CVA Discharge Follow Up  Hospitalization: 8/21/23-8/25/23    Called patient. Since discharge, he denies experiencing any new or worsening stroke-like symptoms. Patient reports he continues to have the following symptoms: expressive aphasia. He claims symptoms are improving since discharge. He is ambulating independently as well as preforming his own ADLs. Patient manages his own medications, appointments, and affairs. Reviewed appointments - patient successfully followed up with PCP. Offered to schedule stroke hospital follow up appointment, patient is agreeable to this. Scheduled appointment for next available. Mailed appointment reminder card. Reviewed medications with him. Reports he is taking as prescribed with no medication side effects or signs of bleeding. During this call, we reviewed stroke type, symptoms, personal risk factors and management, medications, and resources. As for risk factors, patient reports monitoring his BP at home. Reports it has been <130/80. He is a non smoker. Encouraged patient to follow a low salt / low cholesterol / diabetic friendly / diet. Patient reports frustration with aphasia and reports he would like to discuss resources for food/rent. Provided emotional support. [facility] him [facility]. He is requesting for a call from Sunday [facility] on Monday afternoon if possible. Advised how this RN will send her a message. He was appreciative. All of his questions were concerned. At the conclusion of the conversation, patient denies having any further questions or concerns.

## 2023-09-11 ENCOUNTER — PATIENT OUTREACH (OUTPATIENT)
Dept: FAMILY MEDICINE CLINIC | Facility: CLINIC | Age: 63
End: 2023-09-11

## 2023-09-11 DIAGNOSIS — I63.9 CEREBROVASCULAR ACCIDENT (CVA), UNSPECIFIED MECHANISM (HCC): Primary | Chronic | ICD-10-CM

## 2023-09-11 DIAGNOSIS — R47.01 EXPRESSIVE APHASIA: ICD-10-CM

## 2023-09-11 NOTE — PROGRESS NOTES
YOHANA BECK had received referral from cardiology regarding patient recently having recent stroke and needing assistance. YOHANA BECK noted in chart that RN KIRAN Holt was attempting to reach patient without success. PARISA Holt forwarded message for neuro RN that patient is needing assistance with food. YOHANA BECK placed call to the patient, Magalie Mendoza and discussed referral. He indicated that he will be meeting with a  tomorrow. YOHANA BECK does not see appt through epic. He was unable to elaborate further. YOHANA BECK and Moris discussed recent stroke. He indicated that he is doing the same as before hospitalization. He does not have an income. He was employed last month and is currently on medical leave. He is not yet receiving short term disability. Someone with insurance told him it will take about 15 days. He was unsure when the worker said this/when 15 days will be. YOHANA BECK inquired if it will be when paperwork is submitted or when payment will be received - he is unsure. Magalie Mendoza stated that he now has someone that helps him - the . He does know if she works for [employer] or [employer] or private agency. She is going to his home tomorrow around 10. He was agreeable for YOHANA BECK to call at that time to speak further with this worker to collaborate. YOHANA BECK and Moris discussed food security. He has food currently. He goes regularly to the food bank near him on [address] [place] [address]. He is interested in applying for food stamps. YOHANA BECK explained role and scope of CMOC and YOHANA BECK will refer to [facility] if worker tomorrow not already applying on his behalf. YOHANA BECK and Moris discussed ADLs. He stated he is independent and does not need assistance with any ADLs/personal care needs. YOHANA Jo discussed financial security. He reported he has not paid rent since last month. He is unsure how much owes. He was paying $250/week but has not paid it in 1.5 months.  He indicated that the landlord is aware and they have not pressured him yet. He has not received medical bill yet. Moris indicated that he wants speech therapy. YOHANA BECK will message provider. He uses the bus and has enough bus passes. He is receptive to RN [employer] regarding medical needs. Best time to contact him is afternoon. YOHANA BECK will continue to remain available for psychosocial support as needed.

## 2023-09-12 ENCOUNTER — PATIENT OUTREACH (OUTPATIENT)
Dept: FAMILY MEDICINE CLINIC | Facility: CLINIC | Age: 63
End: 2023-09-12

## 2023-09-12 DIAGNOSIS — Z59.9 FINANCIAL DIFFICULTIES: Primary | ICD-10-CM

## 2023-09-12 SDOH — ECONOMIC STABILITY - INCOME SECURITY: PROBLEM RELATED TO HOUSING AND ECONOMIC CIRCUMSTANCES, UNSPECIFIED: Z59.9

## 2023-09-12 NOTE — PROGRESS NOTES
YOHANA BECK had received response from provider that the referral for ST were placed as requested. YOHANA BECK placed follow up call to the patient, Silva Harper in attempt to speak with the  visiting him to collaborate. YOHANA BECK placed 2 consecutive calls and was unable to leave a message as voice box was full. Later, YOHANA BECK received call from [facility] with [employer] indicating that they called Allison with [employer] and left message to follow up and to check his status. Patient does have some aphasia from stroke. YOHANA BECK confirmed ST referral was sent. Patient thought YOHANA BECK was going to assist with SSDI. YOHANA BECK advised was going to assist with SNAP/food stamp application. YOHANA BECK advised can guide regarding SSDI process but cannot assist with that application. YOHANA BECK advised can assist when receives SSDI paperwork. Regarding mobility patient gets around shuffling. Declined need for PT referral. Word finding is his biggest struggle. YOHANA BECK was informed that he goes to [employer] everyday and that is best way to contact patient especially if needs to schedule an appointment as he eats there for Breakfast and lunch everyday. To contact patient can contact Maykel the Coordinator at [address] [place] [address]    YOHANA BECK will continue to remain available for psychosocial support as needed.

## 2023-09-14 ENCOUNTER — PATIENT OUTREACH (OUTPATIENT)
Dept: FAMILY MEDICINE CLINIC | Facility: CLINIC | Age: 63
End: 2023-09-14

## 2023-09-14 NOTE — PROGRESS NOTES
received referral from [employer], SW to assist pt with the [facility] application. Maykel who is the coordinator at [facility] is the pt contact because pt visits every day for breakfast and lunch. I explained to Maykel who I was and stated the reason for my call. ISELA and Maykel have scheduled a visit at [facility] to see pt to complete a SNAP application on Tuesday, 9/19.

## 2023-09-19 ENCOUNTER — PATIENT OUTREACH (OUTPATIENT)
Dept: FAMILY MEDICINE CLINIC | Facility: CLINIC | Age: 63
End: 2023-09-19

## 2023-09-19 ENCOUNTER — TELEPHONE (OUTPATIENT)
Dept: FAMILY MEDICINE CLINIC | Facility: CLINIC | Age: 63
End: 2023-09-19

## 2023-09-19 NOTE — TELEPHONE ENCOUNTER
Called and spoke to patient's HR department. Patient has already applied and been approved for short term disability up to 26 weeks with cardiologist and is awaiting return to work per cardiology, had appointment on 8/31/23 with cardiology, has appointment with heart failure cardiology on 10/5/23. Please advise if patient is able to return to work and if he requires any restrictions. He has not worked since hospitalization on 8/21/23.

## 2023-09-19 NOTE — PROGRESS NOTES
made outreach call to Gris from [facility] to confirm meeting pt there. Maykel informed OC that pt did not show up this morning at [facility] and stated that she reminded pt about this upcoming visit. OC stated to Sixto Harmon that she can give me a call if pt does show up later in the day. OC will keep in touch with Maykel to schedule a next visit. ADDENDUM:   received call from [address] me that pt did arrive and that I was able to go and see him to  documents to apply for SNAP benefits. OC arrived and reviewed the documents that the pt brought in. Pt also expressed that he is currently behind on his rent $1,500. Pt does rent a room at the [facility] the previous address on file was a friend that he use to work with. OC spoke with Sixto Harmon about the possibility of pt applying for social security disability due to his stroke. Sixto Harmon stated that they are currently working with the pt to get a copy of his birth certificate and obtain a PA identification. Pt current ID is from [place]. Pt currently has no income and stopped working in August of 2023. OC has submitted a SNAP application via the [employer] website Eform#[id number]. OC will also try to apply for the ERAP through the [employer] website for the back rent.

## 2023-09-19 NOTE — TELEPHONE ENCOUNTER
HR faxed Dr. Constanza Diggs (neuro) forms for disability - not completed yet  HR phone number is [phone]    Last time he worked was prior to hospitalization on 8/21/23 next appointment with neuro 10/20/23    I will call to determine as patient seems unaware of process and not sure of what was done. no

## 2023-09-20 ENCOUNTER — PATIENT OUTREACH (OUTPATIENT)
Dept: FAMILY MEDICINE CLINIC | Facility: CLINIC | Age: 63
End: 2023-09-20

## 2023-09-21 ENCOUNTER — PATIENT OUTREACH (OUTPATIENT)
Dept: FAMILY MEDICINE CLINIC | Facility: CLINIC | Age: 63
End: 2023-09-21

## 2023-09-21 DIAGNOSIS — I63.9 CVA (CEREBRAL VASCULAR ACCIDENT) (HCC): ICD-10-CM

## 2023-09-21 DIAGNOSIS — I50.22 CHRONIC SYSTOLIC HEART FAILURE (HCC): ICD-10-CM

## 2023-09-21 DIAGNOSIS — I21.3 ST ELEVATION MYOCARDIAL INFARCTION (STEMI), UNSPECIFIED ARTERY (HCC): ICD-10-CM

## 2023-09-21 DIAGNOSIS — I63.9 CEREBROVASCULAR ACCIDENT (CVA), UNSPECIFIED MECHANISM (HCC): ICD-10-CM

## 2023-09-21 RX ORDER — ATORVASTATIN CALCIUM 80 MG/1
80 TABLET, FILM COATED ORAL
Qty: 90 TABLET | Refills: 1 | Status: SHIPPED | OUTPATIENT
Start: 2023-09-21 | End: 2023-12-08 | Stop reason: SDUPTHER

## 2023-09-21 RX ORDER — ASPIRIN 81 MG/1
81 TABLET, CHEWABLE ORAL DAILY
Qty: 90 TABLET | Refills: 1 | Status: SHIPPED | OUTPATIENT
Start: 2023-09-21 | End: 2023-12-08 | Stop reason: SDUPTHER

## 2023-09-21 RX ORDER — METOPROLOL SUCCINATE 25 MG/1
25 TABLET, EXTENDED RELEASE ORAL DAILY
Qty: 90 TABLET | Refills: 1 | Status: SHIPPED | OUTPATIENT
Start: 2023-09-21 | End: 2023-10-26

## 2023-09-21 RX ORDER — SACUBITRIL AND VALSARTAN 24; 26 MG/1; MG/1
1 TABLET, FILM COATED ORAL 2 TIMES DAILY
Qty: 180 TABLET | Refills: 1 | Status: SHIPPED | OUTPATIENT
Start: 2023-09-21 | End: 2023-12-08 | Stop reason: SDUPTHER

## 2023-09-21 RX ORDER — FUROSEMIDE 20 MG/1
20 TABLET ORAL DAILY
Qty: 90 TABLET | Refills: 1 | Status: SHIPPED | OUTPATIENT
Start: 2023-09-21 | End: 2023-10-27 | Stop reason: SDUPTHER

## 2023-09-22 ENCOUNTER — PATIENT OUTREACH (OUTPATIENT)
Dept: FAMILY MEDICINE CLINIC | Facility: CLINIC | Age: 63
End: 2023-09-22

## 2023-09-22 NOTE — PROGRESS NOTES
YOHANA BECK received a returned call from [facility] with patient's HR department. She explained that patient should have a  working with him to work toward getting the payments:    Tiffani Fine  [phone] ext [phone]  [email or web address]    Claim #[id number]    YOHANA BECK was informed that there is no payment history records. His case is marked as undetermined. [facility] reviewed documentation history. They sent patient status update letter. They have not received attending physician statement yet as of 9/14. YOHANA BECK then did contact Tiffani Fine and left message for callback. YOHANA BECK also sent email. YOHANA BECK will continue to remain available for psychosocial support as needed.

## 2023-09-22 NOTE — PROGRESS NOTES
YOHANA BECK had consulted with provider, Jeana Plunkett PA-C and gathered more history on patient. Patient has declined Life Vest and permanent pace maker. It is not so much the stroke that is affecting his employment but his heart conditions (functioning at 20%). She believes he should be on permanent disability. YOHANA BECK explained he is in process of getting birth certificate [facility]) to get a PA ID to then apply. Provider had spoken with HR since she was not the one to complete the short term disability paperwork and they advised it was cardio but when she messaged that office they said it was not them. Provided refilled his medications. YOHANA BECK received call from [facility] with REYNA Douglas ([facility]). She does not have Allison's number. As far as the EIN number they got it yesterday so they should get a BC soon. Mariama explained that the [employer] department will try to keep him afloat with payments for back due rent to avoid homelessness - not yet approved. She spoke with kim who will start with eviction process soon since by the end of this week will be $1500 behind. YOHANA BECK will work on contacting his HR department to see where the delayed is being caused.

## 2023-09-22 NOTE — PROGRESS NOTES
Attempted to contact patient via Nathanael James, coordinator at [facility]. Received vm message. Will attempt to call again next week.

## 2023-09-22 NOTE — PROGRESS NOTES
Later, YOHANA BECK did contact HR department and left message for callback. YOHANA CM will continue to remain available for psychosocial support as needed.

## 2023-09-25 ENCOUNTER — PATIENT OUTREACH (OUTPATIENT)
Dept: FAMILY MEDICINE CLINIC | Facility: CLINIC | Age: 63
End: 2023-09-25

## 2023-09-25 NOTE — PROGRESS NOTES
YOHANA BECK was consulted by [facility] indicating that the patient went to [facility] [place] with an eviction notice due in 3 days. [facility] verified it was not 30 days and [facility] does not have a copy. YOHANA BECK then placed call to [facility] with Older Adult Protective Services (OAPS). She will reach back out to [facility] and they will cover for month missed and potentially another month if needed while the short term disability is being worked out. YOHANA BECK advised waiting to hear back from [address] will contact [facility] and then provide YOHANA BECK with an update. YOHANA BECK will continue to remain available for psychosocial support as needed.

## 2023-09-25 NOTE — PROGRESS NOTES
made follow up call to [employer]. OC stated to Maykel to let her know that I completed the [facility] application and I also applied on pt behalf for the ERAP through the community action [place]. OC did check the status via the compass website and it is in process no determination yet. OC to follow up in three weeks. ADDENDUM:   received called from Esther and informed OC that pt has an eviction notice and would need to vacate the premises in three days. OC sent Donna Moreno and in basket informing her of the above. She contacted protective services who will assist pt with the rental assistance. Esther has been informed as well.

## 2023-09-26 ENCOUNTER — PATIENT OUTREACH (OUTPATIENT)
Dept: FAMILY MEDICINE CLINIC | Facility: CLINIC | Age: 63
End: 2023-09-26

## 2023-09-26 NOTE — PROGRESS NOTES
YOHANA CM placed additional call to the patient's [employer] , Berna Mccabe and left message. YOHANA  will continue to remain available for psychosocial support as needed.

## 2023-09-27 ENCOUNTER — PATIENT OUTREACH (OUTPATIENT)
Dept: FAMILY MEDICINE CLINIC | Facility: CLINIC | Age: 63
End: 2023-09-27

## 2023-09-27 NOTE — PROGRESS NOTES
YOHANA BECK sent additional follow up email to Ivan Panchal with [employer] regarding status of claim in order to further assist patient. YOHANA BECK will continue to remain available for psychosocial support as needed.

## 2023-09-28 ENCOUNTER — TELEPHONE (OUTPATIENT)
Dept: FAMILY MEDICINE CLINIC | Facility: CLINIC | Age: 63
End: 2023-09-28

## 2023-09-28 ENCOUNTER — PATIENT OUTREACH (OUTPATIENT)
Dept: FAMILY MEDICINE CLINIC | Facility: CLINIC | Age: 63
End: 2023-09-28

## 2023-09-28 NOTE — TELEPHONE ENCOUNTER
Received message from  regarding disability claim. Completed attending physician statement and faxed. Called patient to discuss short-term disability, no answer, not able to leave voicemail, will discuss with patient upcoming appointment with cardiology and neurology to help determine a return to work date in this acute post stroke period. He no showed to speech therapy appointment yesterday. Will determine what happened. Currently target return to work date is 10/30/2023 after appointment with specialist and me on 10/26/2023. He works as a . Medical literacy and financial concerns. History of noncompliance. We will follow-up, called brother and left voicemail to reach patient.

## 2023-09-28 NOTE — PROGRESS NOTES
I tried calling him twice today, no answer, not able to leave voicemail, was trying to discuss with him so he is aware, both specialists responded to me stating they will discuss at the upcoming appointments for his return to work date, if you get a hold of him please do

## 2023-09-28 NOTE — PROGRESS NOTES
Thank you, I completed the form and sent a staff message to both cardiology and neurology to help me in evaluating him for a possible return to work date, I will have him for target return date 10/30/23 for now as he has appointment with cardiology 10/5/23, neurology 10/20/23, and with me on 10/26/23 to review it all

## 2023-09-28 NOTE — PROGRESS NOTES
YOHANA BECK received email response from Betsy Galaviz, the disability  with [employer] assigned to patient's claim. YOHANA BECK was informed that the Attending Physician's Statement needs to be completed. YOHANA BECK reviewed form and inquired what patient's control number is as it is a required field. YOHANA BECK [facility] and control # is [id number]    YOHANA BECK completed top portion and forwarded APS form to PCP to review. YOHANA BECK will continue to remain available for psychosocial support as needed.

## 2023-09-29 ENCOUNTER — PATIENT OUTREACH (OUTPATIENT)
Dept: FAMILY MEDICINE CLINIC | Facility: CLINIC | Age: 63
End: 2023-09-29

## 2023-09-29 NOTE — PROGRESS NOTES
YOHANA BECK received return call from [facility] who advised patient, Lawanda Zamorano is currently at [facility] and handed phone to him. YOHANA BECK explained to Lawanda Zamorano of current situation with the short term disability paperwork. Lawanda Zamorano is frustrated that he has to wait for cardio and neuro. YOHANA BECK and Moris discussed his upcoming appointments and he advised he does not have transportation. He is open to a [employer] application. YOHANA BECK will contact cardio to see if able to provide Lyft ride for 10/5 appt. YOHANA BECK requested Kailyn Hansen in setting up his voicemail. He does have phone but disconnected phone. YOHANA BECK will have [facility] assist with applying for a [employer] phone. YOHANA BECK will arrange pickup for cardio appt at [facility] and can use Slim's number to confirm pickup. Moris expressed he is fearful of dying. YOHANA BECK provided supportive counseling. YOHANA BECK advised the providers are making certain medical treatments that he is not following. YOHANA BECK advised of RN CM and he agreed to outreach. Lawanda Zamorano indicated that he had a heart attack at 16years old and his father's side of family there is a history of heart conditions. Lawanda Zamorano will go to PCP to sign the paperwork.

## 2023-09-29 NOTE — PROGRESS NOTES
YOHANA BECK received message from provider indicating that the [facility] form is completed and cardiology and neurology will give their respective return to work date feedback at the upcoming appointments on 10/5 and 10/20. YOHANA BECK noted in chart patient missed his ST evaluation. YOHANA BECK placed call to [facility] and left message advising needing to talk with patient. YOHANA BECK will continue to remain available for psychosocial support as needed.

## 2023-09-29 NOTE — PROGRESS NOTES
Contacted Maykel, coordinator at [facility] to speak with patient. Patient's phone is disconnected. Patient goes to [facility] daily for breakfast and lunch and Maykel sends him home with food for his evening meal.  He reports a good appetite but does not have food in the home. He is out of work and YOHANA BECK is working with patient to obtain disability. Patient reports having a scale at home and weighs daily. He reports no increase in weight, is actually losing weight. No c/o chest pain, sob or LE edema. He declined life vest while inpatient. Explained the importance of wearing a life vest.  He monitors BP daily and it's stable at 120/80. He monitors BS daily, results  . He denies hypoglycemic episodes. Reviewed medications and he explained he ran out of his 30 day supply. Informed him PCP refilled all his medications last week. He states he walks to pharmacy but  can't afford the co-pay. Will contact pharmacy to find out amount of co-pay and  discuss options with YOHANA BECK. Patient has aphasia with slow speech. He was scheduled to start ST this week but was a no show. He states it was too far for him to walk. Inquired if he has public transportation services and he uses the [employer] bus, but did not have money to pay for ticket. Will discuss with YOHANA BECK and [facility] to look into transportation options so patient can reschedule ST. He has f/u appointments scheduled with cardiology on 10/5 where a lyft ride was set up and neurology on 10/20. Will continue to follow.

## 2023-09-29 NOTE — PROGRESS NOTES
YOHANA BECK discussed case with RN CM follow her assessment with patient:    - he cannot afford co-pay for medications   - could not get to speech therapy   - YOHANA BECK will applyfor Bret Ortiz   - see if qualifies for PACE and apply for medical assistance     YOHANA BECK later received message that the patient does not have co-pay for medications per RN CM's conversation with pharmacy. YOHANA BECK will continue to remain available for psychosocial support as needed.

## 2023-09-29 NOTE — PROGRESS NOTES
Contacted [facility] pharmacy and spoke to pharmacist.  Patient has eliquis, entresto, jardiance and atorvastatin available for . There is not a co-pay for the medications. Pharmacist relayed lasix and metoprolol will need to be refilled in the next two weeks. Contacted Maykel at [address] [place] [address] Road to speak with patient. Left vm message to have her pass along to patient that he can  prescriptions since there is no co-pay.

## 2023-09-29 NOTE — PROGRESS NOTES
Following conversation YOHANA BECK placed call to [facility]. They scheduled a 3:00 pickup at [facility] with Slim's number to confirm the pickup. YOHANA BECK left message with Lodema Half advising of same. YOHANA BECK will continue to remain available for psychosocial support as needed.

## 2023-10-02 ENCOUNTER — PATIENT OUTREACH (OUTPATIENT)
Dept: FAMILY MEDICINE CLINIC | Facility: CLINIC | Age: 63
End: 2023-10-02

## 2023-10-02 ENCOUNTER — PATIENT OUTREACH (OUTPATIENT)
Dept: FAMILY MEDICINE CLINIC | Facility: HOSPITAL | Age: 63
End: 2023-10-02

## 2023-10-02 NOTE — PROGRESS NOTES
YOHANA BECK consulted with covering [facility] Andréstanna Hurst who will start application for medical assistance and a cell phone through [address] [place] [address]. YOHANA BECK provided Slim's contact information. YOHANA BECK placed call to [facility] and advised that [facility] will be contacting her. Patient just got to the senior center. She got the message on Friday from RN CM but he was gone. She will inform him shortly to pick the medications up at [facility] and that there is no co-pay for the ones ready now and the ones in 2 weeks. [facility] got a text from the [facility] regarding 10/5 appointment. She will remind him about the appointment. YOHANA BECK explained Pieter Salazar will be at a cost to him. YOHANA BECK will work on securing bus passes for other appointments. YOHANA BECK sent bus pass request to [facility]. YOHANA BECK will continue to remain available for psychosocial support as needed.

## 2023-10-02 NOTE — PROGRESS NOTES
[address] [place] [address] spoke with [facility]  Coordinator who put pt on the phone to complete an compass application online by phone with [address] [place] [address]. Pt application [id number]  Mother's Lakshmi Hernandez Name: Rox Otis name:ANI Rubin Born: [facility]  Pt application password:[email or web address]  Pt current [facility], [address] [place] [address] [address] [facility]. listed  Pt Senior Coordinator as pt application contact number [phone] and provided email:[email or web address]    Pt verification summary after completing compass application for [employer] states the required verification needed:  [address] information to verify disability and/or need for medication Identification Moris [address], Passport, Selective Service Card, State Identification Card, Voter Registration Card, Work or [address] of Individual Provide copy of one of the following Resources - [employer]  or [address] Records, Sales Agreement, Articles of Agreement, Notes, Financial Institution Statements, Insurance Appraisal, Bond Certificates, Securities, Check for [employer], Will, [employer], [employer] Tax Receipt, Life Estate Agreement, [place], Income Tax Record Address Information Please mail, fax, or hand-deliver the documents above as soon as possible, but no later than Nov- : [facility] [address] [facility] [place] [address] [address] Info Number: [phone] Fax Number: [phone] Toll Free Number: [phone] Email: [email or web address]    [address] [place] [address] will forward to [address] [place] [address] for F/U by 10/10

## 2023-10-04 ENCOUNTER — PATIENT OUTREACH (OUTPATIENT)
Dept: FAMILY MEDICINE CLINIC | Facility: CLINIC | Age: 63
End: 2023-10-04

## 2023-10-04 NOTE — PROGRESS NOTES
YOHANA BECK placed call to [address] at [facility] and left message. YOHANA BECK advised patient needs to reschedule ST and can do so by contacting [phone]. YOHANA BECK will provide bus passes for these appointments. YOHANA BECK requested callback to see if he got his medications. YOHANA BECK will continue to remain available for psychosocial support as needed.

## 2023-10-04 NOTE — PROGRESS NOTES
YOHANA BECK received call from [facility], the Older Adult Protective [employer] ([facility])  who advised that she is waiting on check to give to the landlord. The landlord is cooperative and was not actually going to do evict as soon as he did but they required it for the check. He then did the 10 day notice. OA is able to pay current, October, and November but unable to do beyond that. They were able to have the funding for it. YOHANA BECK provided update on status of form for short term disability. YOHANA BECK will see if can be submitted after 10/5 appointment. [facility] took check over for birth certificate to [place] and patient was very grateful for the assistance. YOHANA BECK can keep [facility] updated via email as well: [email or web address]

## 2023-10-05 ENCOUNTER — PATIENT OUTREACH (OUTPATIENT)
Dept: FAMILY MEDICINE CLINIC | Facility: CLINIC | Age: 63
End: 2023-10-05

## 2023-10-05 ENCOUNTER — OFFICE VISIT (OUTPATIENT)
Dept: CARDIOLOGY CLINIC | Facility: CLINIC | Age: 63
End: 2023-10-05

## 2023-10-05 VITALS
BODY MASS INDEX: 25.76 KG/M2 | SYSTOLIC BLOOD PRESSURE: 100 MMHG | OXYGEN SATURATION: 98 % | HEIGHT: 68 IN | DIASTOLIC BLOOD PRESSURE: 70 MMHG | HEART RATE: 63 BPM | WEIGHT: 170 LBS

## 2023-10-05 DIAGNOSIS — I25.10 CORONARY ARTERY DISEASE INVOLVING NATIVE CORONARY ARTERY OF NATIVE HEART WITHOUT ANGINA PECTORIS: ICD-10-CM

## 2023-10-05 DIAGNOSIS — I25.5 ISCHEMIC CARDIOMYOPATHY: Chronic | ICD-10-CM

## 2023-10-05 DIAGNOSIS — I63.9 CEREBROVASCULAR ACCIDENT (CVA), UNSPECIFIED MECHANISM (HCC): ICD-10-CM

## 2023-10-05 DIAGNOSIS — I50.22 CHRONIC HFREF (HEART FAILURE WITH REDUCED EJECTION FRACTION) (HCC): Primary | ICD-10-CM

## 2023-10-05 NOTE — PROGRESS NOTES
YOHANA BECK placed follow up call to [facility] who advised did not get message from YOHANA BECK yesterday. She stated that the [facility] called at 3 am. YOHANA BECK will make cardio aware. Celina Phillip is not here yet and will keep YOHANA BECK updated. YOHANA BECK explained incorrect  time. She verified 3 pm . YOHANA BECK provided [facility] address again and Slim's contact information. YOHANA BECK will continue to remain available for psychosocial support as needed.

## 2023-10-05 NOTE — PROGRESS NOTES
Advanced Heart Failure Outpatient Progress Note - Joel Shah 65 y.o. male MRN: [id number]    Encounter: [id number]      Assessment/Plan:    Patient Active Problem List    Diagnosis Date Noted   • Cerebrovascular accident (CVA) ([address]) 08/22/2023   • Elevated bilirubin 07/20/2023   • Elevated PSA 07/20/2023   • Ischemic cardiomyopathy 07/18/2023   • History of ST elevation myocardial infarction (STEMI) 10/24/2022   • Coronary artery disease 10/24/2022   • Hyperlipidemia 10/24/2022   • Hypertension 10/24/2022   • Type 2 diabetes mellitus ([address]) 10/24/2022   • Seizure ([address]) 10/24/2022   • Obesity (BMI 30.0-34.9) 10/24/2022   • Chronic systolic heart failure ([address]) 09/08/2022       # Chronic heart failure with reduced ejection fraction  Etiology: ischemic  Euvolemic, warm on exam     Weight of 187 lbs on 08/25 (day of discharge). 180 lbs 8/31/23; 170 lbs 10/5/23    Studies- personally reviewed by me     Echocardiogram 8/22/23  LVEF: 20%  LVIDd: 5.8cm, normal wall thicknessRV: dilated, mild to moderately reduced systolic function; moderate hypokinesis of the mid to apical free wall  MR: mild  PASP: 46mmHg, estimated RAP 8mmHg, mild to moderate TR  RVOT: shortened PAAT, some mid to late notching  Other: trivial pericardial effusion     TTE limited 11/2/22: LVEF 35%. TTE limited 8/26/22: LVEF34%. Grade 2 diastology. Mild AI, mild MR. Moderate TR. Estimated PASP 42mmHg  TTE 7/13/23: LVEF 35-40%. Normal RV size and function. Estimated PASP 47mmHg  LHC 7/13/22: LM with minimal luminal irregularities.  Mid % stenosis s/p thrombectomy and stent placement (ALEJANDRA 3.5 x 18mm); D1 50% stenosis.     Diet:  --2g sodium diet  --2000 ml fluid restriction     Neurohormonal Blockade:  --Beta-Blocker: Metoprolol succinate 25 mg daily  --ACEi, ARB or ARNi: Entresto 24-26 mg twice daily  --Aldosterone Receptor Blocker: No  --SGLT2 Inhibitor: Jardiance 10mg daily  --Diuretic: Furosemide 20 mg daily     Sudden Cardiac Death Risk Reduction:  --ICD: Was on LifeVest and returned by patient, declined ICD placement on office visit with Dr. Cher Peerz 1/5/2023 and on office visit 10/5/23     Electrical Resynchronization:  --Candidacy for BiV device: narrow QRSd     Advanced Therapies (if appropriate): --Inotrope:  --LVAD/Transplant Candidacy:     # Coronary artery disease, s/p ALEJANDRA x 1 to mid LAD 7/2022  Admitted for STEMI 7/2022  Rx: aspirin, atorvastatin  # Hypertension  # Hyperlipidemia, goal LDL < 70  8/22/23 TG 91 HDL 28   Rx: atorvastatin 40mg daily  # Prior Stroke  Acute CVA, p/w with aphasia 8/21/23, while on aspirin and brilinta, possible embolic, started on apixaban  CT head 8/21/23: acute/subacute stroke in the left MCA territory in the inferior parietal temporal region. No embolic source seen on transthoracic echo. No afib/flutter on telemetry    TODAY'S PLAN:  Continue current medications  Obtain labs as ordered  Limited echocardiogram to reassess heart function post stroke and further optimization of medical therapy  On maximally tolerated medical therapy, BP on the low side for addition of spironolactone  2g sodium diet  2L fluid restriction  Daily weights      HPI:   Sharon oNonan is a 58-year-old man with a PMH as above who presents to the office for hospital follow-up. Follows with Dr. Cher Perez.      Admitted to [facility] from 08/21 to 08/25/2023 after presenting with new onset aphasia. Stroke alert called, diagnosed with acute left MCA stroke. TTE with LVEF drop from 35% to 20%. Home BB and ARNi gradually added back (s/p permissive hypertension). Received one dose IV Lasix inpatient; outpatient dose restarted on day before discharge. Lost 5-10 lbs this admission (bed and standing scales used). Brilinta stopped; Eliquis and Jardiance started. Statin dose increased.      08/31/2023: Patient presents for hospital follow-up. Continues with some speech/word finding difficulty.  Planning to start speech therapy and apply for disability s/p stroke. Continues with mild LE swelling but feels this has been improving since returning home. Denies FAUST, PND, and orthopnea. Recent meals/foods include oatmeal, watermelon, applesauce, banana, and salmon/mashed potatoes/broccoli from [employer]. Drinking ~65-70 oz fluid daily. Does have scale at home; denies rapid gains since discharge.      Interval History:  10/5/23: Here for follow up. Reports overall doing ok from cardiac standpoint. No chest pain or shortness of breath. No  Leg swelling or abdominal distension. Home weights between 165-170 lbs. Taking meds as prescribed. Does not want an ICD. Review of Systems   Constitutional: Negative for chills and fever. HENT: Negative for ear pain and sore throat. Eyes: Negative for pain and visual disturbance. Respiratory: Negative for cough and shortness of breath. Cardiovascular: Negative for chest pain, palpitations and leg swelling. Gastrointestinal: Negative for abdominal distention, abdominal pain and vomiting. Genitourinary: Negative for dysuria and hematuria. Musculoskeletal: Negative for arthralgias and back pain. Skin: Negative for color change and rash. Neurological: Negative for seizures and syncope. All other systems reviewed and are negative. Past Medical History:   Diagnosis Date   • Coronary artery disease    • CVA (cerebral vascular accident) ([address])     R sided, dysarthria, March 2020 in [place]   • Heart failure [facility])    • Hyperlipidemia    • Hypertension    • Pre-diabetes    • STEMI (ST elevation myocardial infarction) ([address])          Allergies   Allergen Reactions   • Pollen Extract Allergic Rhinitis     .     Current Outpatient Medications:   •  apixaban (Eliquis) 5 mg, Take 1 tablet (5 mg total) by mouth 2 (two) times a day, Disp: 60 tablet, Rfl: 5  •  aspirin 81 mg chewable tablet, Chew 1 tablet (81 mg total) daily, Disp: 90 tablet, Rfl: 1  •  atorvastatin (LIPITOR) 80 mg tablet, Take 1 tablet (80 mg total) by mouth daily with dinner, Disp: 90 tablet, Rfl: 1  •  Empagliflozin (JARDIANCE) 10 MG TABS tablet, Take 1 tablet (10 mg total) by mouth every morning, Disp: 90 tablet, Rfl: 1  •  furosemide (LASIX) 20 mg tablet, Take 1 tablet (20 mg total) by mouth daily, Disp: 90 tablet, Rfl: 1  •  metoprolol succinate (TOPROL-XL) 25 mg 24 hr tablet, Take 1 tablet (25 mg total) by mouth daily, Disp: 90 tablet, Rfl: 1  •  sacubitril-valsartan (Entresto) 24-26 MG TABS, Take 1 tablet by mouth 2 (two) times a day, Disp: 180 tablet, Rfl: 1  •  nitroglycerin (NITROSTAT) 0.4 mg SL tablet, Place 1 tablet (0.4 mg total) under the tongue every 5 (five) minutes as needed for chest pain for up to 3 days (Patient not taking: Reported on 8/31/2023), Disp: 10 tablet, Rfl: 0    Social History     Socioeconomic History   • Marital status: Unknown     Spouse name: Not on file   • Number of children: Not on file   • Years of education: Not on file   • Highest education level: Not on file   Occupational History   • Not on file   Tobacco Use   • Smoking status: Never   • Smokeless tobacco: Never   Substance and Sexual Activity   • Alcohol use: Not on file   • Drug use: Not on file   • Sexual activity: Not on file   Other Topics Concern   • Not on file   Social History Narrative   • Not on file     Social Determinants of Health     Financial Resource Strain: Not on file   Food Insecurity: No Food Insecurity (8/24/2023)    Hunger Vital Sign    • Worried About Running Out of Food in the Last Year: Never true    • Ran Out of Food in the Last Year: Never true   Transportation Needs: No Transportation Needs (8/24/2023)    PRAPARE - Transportation    • Lack of Transportation (Medical): No    • Lack of Transportation (Non-Medical):  No   Physical Activity: Not on file   Stress: Not on file   Social Connections: Not on file   Intimate Partner Violence: Not on file   Housing Stability: Unknown (8/24/2023)    Housing Stability Vital Sign    • Unable to Pay for Housing in the Last Year: No    • Number of Places Lived in the Last Year: Not on file    • Unstable Housing in the Last Year: No       Family History   Problem Relation Age of Onset   • Dementia Mother    • Heart disease Father    • Dementia Maternal Aunt    • Dementia Maternal Uncle    • Cancer Maternal Uncle        Physical Exam:    Vitals:   Vitals:    10/05/23 1527   BP: 100/70   Pulse: 63   SpO2: 98%       Physical Exam  Constitutional:       General: He is not in acute distress. Appearance: Normal appearance. HENT:      Head: Normocephalic and atraumatic. Mouth/Throat:      Mouth: Mucous membranes are moist.   Eyes:      General: No scleral icterus. Extraocular Movements: Extraocular movements intact. Neck:      Vascular: No JVD. Cardiovascular:      Rate and Rhythm: Normal rate and regular rhythm. Pulses: Normal pulses. Heart sounds: S1 normal and S2 normal. No murmur heard. No friction rub. No gallop. Pulmonary:      Breath sounds: Normal breath sounds. Abdominal:      General: There is no distension. Palpations: Abdomen is soft. Tenderness: There is no abdominal tenderness. There is no guarding or rebound. Musculoskeletal:         General: Normal range of motion. Cervical back: Neck supple. Right lower leg: No edema. Left lower leg: No edema. Skin:     General: Skin is warm and dry. Capillary Refill: Capillary refill takes less than 2 seconds. Neurological:      General: No focal deficit present. Mental Status: He is alert and oriented to person, place, and time.    Psychiatric:         Mood and Affect: Mood normal.         Labs & Results:    Lab Results   Component Value Date    SODIUM 138 08/25/2023    K 4.1 08/25/2023     08/25/2023    CO2 27 08/25/2023    BUN 13 08/25/2023    CREATININE 0.84 08/25/2023    GLUC 135 08/25/2023    CALCIUM 9.0 08/25/2023     Lab Results   Component Value Date    WBC 6.58 08/22/2023 HGB 13.1 08/22/2023    HCT 41.5 08/22/2023    MCV 98 08/22/2023     08/22/2023     Lab Results   Component Value Date    NTBNP 69 07/13/2022      Lab Results   Component Value Date    CHOLESTEROL 155 08/22/2023    CHOLESTEROL 139 07/18/2023    CHOLESTEROL 164 07/15/2022     Lab Results   Component Value Date    HDL 28 (L) 08/22/2023    HDL 27 (L) 07/18/2023    HDL 43 07/15/2022     Lab Results   Component Value Date    TRIG 91 08/22/2023    TRIG 113 07/18/2023    TRIG 161 (H) 07/15/2022     Lab Results   Component Value Date    [address] 07/18/2023       EKG personally reviewed by Ashleigh Walker MD.     Counseling / Coordination of Care  Time spent today 25 minutes. Greater than 50% of total time was spent with the patient and / or family counseling and / or coordination of care. We went over current diagnosis, most recent studies and any changes in treatment. Thank you for the opportunity to participate in the care of this patient.     Ashleigh Walker MD  ADVANCED HEART FAILURE AND MECHANICAL CIRCULATORY SUPPORT  [address]

## 2023-10-05 NOTE — PATIENT INSTRUCTIONS
Continue current medications  Obtain labs as ordered  Limited echocardiogram to reassess heart function  2g sodium diet  2L fluid restriction  Daily weights

## 2023-10-05 NOTE — PROGRESS NOTES
YOHANA BECK received returned call from Dwayne stating patient did arrive at [place]. YOHANA BECK returned call and left message. YOHANA BECK will continue to remain available for psychosocial support as needed.

## 2023-10-06 ENCOUNTER — PATIENT OUTREACH (OUTPATIENT)
Dept: FAMILY MEDICINE CLINIC | Facility: CLINIC | Age: 63
End: 2023-10-06

## 2023-10-06 NOTE — PROGRESS NOTES
I completed the form last week, he does not need to wait for appointment with neuro because it is already completed but it was waiting for his signature, I tried contacting him and haven't gotten reply back to have him sign it

## 2023-10-06 NOTE — PROGRESS NOTES
YOHANA BECK updated [facility] ([facility]) , Mariama via email that patient went to cardiology appointment. YOHANA BECK sent message to provider regarding next steps with physician form. YOHANA BECK will continue to remain available for psychosocial support as needed.

## 2023-10-06 NOTE — PROGRESS NOTES
Contacted Maykel @ [facility] to talk with patient. Message left on  requesting return call.

## 2023-10-06 NOTE — PROGRESS NOTES
Patient returned my call. Asked if he was able to  his medications from the pharmacy and he did receive a 90 day supply. Informed him he has no co-pays on his medications. He is taking all as prescribed. We discussed setting up ST services as YOHANA BECK will obtain bus passes for him in the short term and work on a long term transportation plan. Provided him the contact information and encouraged him to schedule appointment. He was agreeable. Informed him after appointment is scheduled, will arrange to have [facility] deliver bus passes to him at [facility] next week. He denies any additional  needs at this time. Will continue to follow.

## 2023-10-09 ENCOUNTER — PATIENT OUTREACH (OUTPATIENT)
Dept: FAMILY MEDICINE CLINIC | Facility: CLINIC | Age: 63
End: 2023-10-09

## 2023-10-09 NOTE — PROGRESS NOTES
YOHANA BECK placed call to [address] [place] [address] at [place] [facility] and left message for patient, Dianne Heading to go to PCP office to sign paperwork to send back for short term disability. YOHANA BECK will continue to remain available for psychosocial support as needed.

## 2023-10-10 ENCOUNTER — TELEPHONE (OUTPATIENT)
Dept: NEUROLOGY | Facility: CLINIC | Age: 63
End: 2023-10-10

## 2023-10-10 ENCOUNTER — PATIENT OUTREACH (OUTPATIENT)
Dept: FAMILY MEDICINE CLINIC | Facility: CLINIC | Age: 63
End: 2023-10-10

## 2023-10-10 NOTE — TELEPHONE ENCOUNTER
Called and left a voicemail for patient - Please call back to confirm upcoming appointment with [facility]. Provided patient with apt date, time and location. Informed patient that check in is at least 15 minutes prior to apt time.

## 2023-10-10 NOTE — PROGRESS NOTES
placed call to [employer] for an update regarding resources. OC updated Maykel regarding a letter that needs to be signed by the pt. Pt PCP has completed it and it will need pt signature. Pt did  the letter and did take it along with him. Andrei Dorsey will speak to him regarding this letter to ensure that it will be signed. OC also stated to Maykel that the [facility] application that I completed is still processing and there is no update as of yet. OC also informed Maykel that the MA application has also been processed and there is no answer yet. OC cannot apply for a "free phone" from [facility] until he has been approved for MA and/or SNAP benefits. Dept of Aging has assisted with providing help for pt back rent for the months of September, October, and November. OC to outreach in two weeks.

## 2023-10-13 ENCOUNTER — PATIENT OUTREACH (OUTPATIENT)
Dept: FAMILY MEDICINE CLINIC | Facility: CLINIC | Age: 63
End: 2023-10-13

## 2023-10-13 NOTE — TELEPHONE ENCOUNTER
Berenice Zayas,    Our office utilize lyft as a very last resort. I read your note that states that he cannot afford [employer]. Do you know if he is active with them?  Thanks

## 2023-10-13 NOTE — TELEPHONE ENCOUNTER
MSW phoned [employer] at [phone] and rep informed that pt is not active with them. Pt will have to complete an application.

## 2023-10-13 NOTE — PROGRESS NOTES
YOHANA BECK placed call to [address] [place] [address] who handed phone to Jason. Moris advised that he never got the form and did not sign anything. YOHANA BECK will have [facility] get the form for him to sign. He was agreeable. Jason reported being confused about the form as never got to sign it. [facility]OC will review it with him. .     YOHANA BECK and Moris discussed upcoming appointments. YOHANA BECK provided number to schedule speech therapy. YOHANA BECK reminded him that bus passes would be provided to start getting him there. He is aware of neurology appointment on 10/20. He is agreeable for a Lyft ride to this appt in meantime as cannot afford [place] yet. YOHANA BECK consulted with both provider who confirmed unsigned form is in office and Justin Laresist who agreed to  the form and bring it to patient to sign. Later, YOHANA BECK contacted neurology and was informed that they need to first consult with their social work team.     YOHANA BECK will continue to remain available for psychosocial support as needed.

## 2023-10-13 NOTE — TELEPHONE ENCOUNTER
SW from Pts PCP called to see if a Lyft could be set up for pt for his HFU on 10/20.    Please call Tuyet back her direct line is [phone] I will STOP taking the medications listed below when I get home from the hospital:  None

## 2023-10-16 ENCOUNTER — PATIENT OUTREACH (OUTPATIENT)
Dept: FAMILY MEDICINE CLINIC | Facility: CLINIC | Age: 63
End: 2023-10-16

## 2023-10-16 NOTE — PROGRESS NOTES
had scheduled visit with pt to have his disability form signed. OC went to Dr. Bill Clifton to  the form, then met pt at [facility] to go over the form and what he was signing. Pt signed the form and returned to Dr. Bill Clifton office so that they can scan the form to his chart. Will follow up with pt in two weeks.

## 2023-10-17 ENCOUNTER — PATIENT OUTREACH (OUTPATIENT)
Dept: FAMILY MEDICINE CLINIC | Facility: CLINIC | Age: 63
End: 2023-10-17

## 2023-10-17 NOTE — PROGRESS NOTES
YOHANA BECK sent signed [employer] form back to Hadley Caceres with [employer] and inquired about length of remainder of process to obtain his short term disability. YOHANA BECK received update from neurology  that the [id number] ride was approved and scheduled for patient for upcoming neuro appt 10/20. YOHANA BECK provided updated phone number and address for pickup ([facility]). YOHANA BECK will continue to remain available for psychosocial support as needed.

## 2023-10-17 NOTE — TELEPHONE ENCOUNTER
MSW phoned pt 2x at [phone] vm box has not been set up yet, unable to lvm.       will be pick him up at 11am for Neuro appointment on 10/20/2023

## 2023-10-18 NOTE — TELEPHONE ENCOUNTER
MSW phoned [employer] transport and spoke with [employer] and provided the following details provided by Manohar Quiroz for appt 10/20/2023:      Susanne Funki up at [address] [phone]). [address] [facility], [address] [place] [address]       will be at [facility] updated with [facility] for neuro appt. Should I contact the pt directly to provide  time?  Thanks

## 2023-10-18 NOTE — TELEPHONE ENCOUNTER
MSW phoned [phone] and lvm informing of lyft ride will  pt at [facility] 11 am on 10/20.  Please call back to confirm that message was received

## 2023-10-19 ENCOUNTER — PATIENT OUTREACH (OUTPATIENT)
Dept: FAMILY MEDICINE CLINIC | Facility: CLINIC | Age: 63
End: 2023-10-19

## 2023-10-19 NOTE — PROGRESS NOTES
checked the compass website to check on the status of pt MA an SNAP application. Pt has been approved for SNAP benefits as well as MA.  OC placed call to Maykel from [facility] to inform her as well. OC also asked if she can inform the pt and that he should be receiving a letter from the [facility] [place] office. OC will now begin the process of applying for [employer] services. Next outreach in two weeks.

## 2023-10-19 NOTE — PROGRESS NOTES
YOHANA BECK placed call to [address] [place] [address] to speak with Moris. She advised that he did not show up today. She was wanting to remind him regarding the uber tomorrow for appointment. YOHANA BECK updated [facility] will update YOHANA BECK tomorrow regarding showing up/getting the JESSHEIM. YOHANA BECK will continue to remain available for psychosocial support as needed.

## 2023-10-19 NOTE — TELEPHONE ENCOUNTER
MSW phoned [phone] and spoke with [address] ([employer]) who informed that pt does not have a phone. She is aware that a  will  the patient at [address]. One appt is complete pt to be dropped off at his home because [facility] center closes at 12pm    MSW phoned Maykel and lvm informing that appt with Neuro is tomorrow.

## 2023-10-20 ENCOUNTER — OFFICE VISIT (OUTPATIENT)
Dept: NEUROLOGY | Facility: CLINIC | Age: 63
End: 2023-10-20

## 2023-10-20 ENCOUNTER — HOSPITAL ENCOUNTER (EMERGENCY)
Facility: HOSPITAL | Age: 63
End: 2023-10-21
Attending: EMERGENCY MEDICINE | Admitting: EMERGENCY MEDICINE
Payer: COMMERCIAL

## 2023-10-20 ENCOUNTER — PATIENT OUTREACH (OUTPATIENT)
Dept: FAMILY MEDICINE CLINIC | Facility: CLINIC | Age: 63
End: 2023-10-20

## 2023-10-20 ENCOUNTER — APPOINTMENT (EMERGENCY)
Dept: RADIOLOGY | Facility: HOSPITAL | Age: 63
End: 2023-10-20
Payer: COMMERCIAL

## 2023-10-20 VITALS
HEIGHT: 68 IN | RESPIRATION RATE: 16 BRPM | WEIGHT: 171 LBS | OXYGEN SATURATION: 100 % | DIASTOLIC BLOOD PRESSURE: 62 MMHG | BODY MASS INDEX: 25.91 KG/M2 | TEMPERATURE: 97.2 F | SYSTOLIC BLOOD PRESSURE: 94 MMHG | HEART RATE: 32 BPM

## 2023-10-20 DIAGNOSIS — I25.10 CORONARY ARTERY DISEASE INVOLVING NATIVE CORONARY ARTERY OF NATIVE HEART WITHOUT ANGINA PECTORIS: ICD-10-CM

## 2023-10-20 DIAGNOSIS — I25.5 ISCHEMIC CARDIOMYOPATHY: ICD-10-CM

## 2023-10-20 DIAGNOSIS — R00.1 BRADYCARDIA: Primary | ICD-10-CM

## 2023-10-20 DIAGNOSIS — E78.2 MIXED HYPERLIPIDEMIA: ICD-10-CM

## 2023-10-20 DIAGNOSIS — E11.9 TYPE 2 DIABETES MELLITUS WITHOUT COMPLICATION, WITHOUT LONG-TERM CURRENT USE OF INSULIN (HCC): ICD-10-CM

## 2023-10-20 DIAGNOSIS — Z86.73 HISTORY OF STROKE: ICD-10-CM

## 2023-10-20 DIAGNOSIS — I50.9 CHF (CONGESTIVE HEART FAILURE) (HCC): ICD-10-CM

## 2023-10-20 DIAGNOSIS — R56.9 SEIZURE (HCC): ICD-10-CM

## 2023-10-20 DIAGNOSIS — I63.9 CEREBROVASCULAR ACCIDENT (CVA), UNSPECIFIED MECHANISM (HCC): ICD-10-CM

## 2023-10-20 PROBLEM — E87.5 HYPERKALEMIA: Status: ACTIVE | Noted: 2023-10-20

## 2023-10-20 LAB
2HR DELTA HS TROPONIN: 1 NG/L
4HR DELTA HS TROPONIN: 6 NG/L
ALBUMIN SERPL BCP-MCNC: 4.1 G/DL (ref 3.5–5)
ALP SERPL-CCNC: 72 U/L (ref 34–104)
ALT SERPL W P-5'-P-CCNC: 13 U/L (ref 7–52)
ANION GAP SERPL CALCULATED.3IONS-SCNC: 8 MMOL/L
APTT PPP: 30 SECONDS (ref 23–37)
AST SERPL W P-5'-P-CCNC: 31 U/L (ref 13–39)
ATRIAL RATE: 57 BPM
ATRIAL RATE: 57 BPM
ATRIAL RATE: 82 BPM
BASOPHILS # BLD AUTO: 0.06 THOUSANDS/ÂΜL (ref 0–0.1)
BASOPHILS NFR BLD AUTO: 1 % (ref 0–1)
BILIRUB SERPL-MCNC: 2.1 MG/DL (ref 0.2–1)
BNP SERPL-MCNC: 627 PG/ML (ref 0–100)
BUN SERPL-MCNC: 11 MG/DL (ref 5–25)
CALCIUM SERPL-MCNC: 9.4 MG/DL (ref 8.4–10.2)
CARDIAC TROPONIN I PNL SERPL HS: 10 NG/L
CARDIAC TROPONIN I PNL SERPL HS: 11 NG/L
CARDIAC TROPONIN I PNL SERPL HS: 16 NG/L
CHLORIDE SERPL-SCNC: 99 MMOL/L (ref 96–108)
CO2 SERPL-SCNC: 27 MMOL/L (ref 21–32)
CREAT SERPL-MCNC: 0.93 MG/DL (ref 0.6–1.3)
EOSINOPHIL # BLD AUTO: 0.21 THOUSAND/ÂΜL (ref 0–0.61)
EOSINOPHIL NFR BLD AUTO: 3 % (ref 0–6)
ERYTHROCYTE [DISTWIDTH] IN BLOOD BY AUTOMATED COUNT: 13.2 % (ref 11.6–15.1)
GFR SERPL CREATININE-BSD FRML MDRD: 88 ML/MIN/1.73SQ M
GLUCOSE SERPL-MCNC: 102 MG/DL (ref 65–140)
HCT VFR BLD AUTO: 51.3 % (ref 36.5–49.3)
HGB BLD-MCNC: 16.5 G/DL (ref 12–17)
IMM GRANULOCYTES # BLD AUTO: 0.01 THOUSAND/UL (ref 0–0.2)
IMM GRANULOCYTES NFR BLD AUTO: 0 % (ref 0–2)
INR PPP: 1.44 (ref 0.84–1.19)
LYMPHOCYTES # BLD AUTO: 3.06 THOUSANDS/ÂΜL (ref 0.6–4.47)
LYMPHOCYTES NFR BLD AUTO: 43 % (ref 14–44)
MCH RBC QN AUTO: 31 PG (ref 26.8–34.3)
MCHC RBC AUTO-ENTMCNC: 32.2 G/DL (ref 31.4–37.4)
MCV RBC AUTO: 96 FL (ref 82–98)
MONOCYTES # BLD AUTO: 0.68 THOUSAND/ÂΜL (ref 0.17–1.22)
MONOCYTES NFR BLD AUTO: 10 % (ref 4–12)
NEUTROPHILS # BLD AUTO: 3.14 THOUSANDS/ÂΜL (ref 1.85–7.62)
NEUTS SEG NFR BLD AUTO: 43 % (ref 43–75)
NRBC BLD AUTO-RTO: 0 /100 WBCS
P AXIS: 61 DEGREES
P AXIS: 71 DEGREES
P AXIS: 72 DEGREES
PLATELET # BLD AUTO: 191 THOUSANDS/UL (ref 149–390)
PMV BLD AUTO: 10.1 FL (ref 8.9–12.7)
POTASSIUM SERPL-SCNC: 5.6 MMOL/L (ref 3.5–5.3)
PR INTERVAL: 300 MS
PR INTERVAL: 306 MS
PR INTERVAL: 312 MS
PROT SERPL-MCNC: 7.6 G/DL (ref 6.4–8.4)
PROTHROMBIN TIME: 17.5 SECONDS (ref 11.6–14.5)
QRS AXIS: 253 DEGREES
QRS AXIS: 256 DEGREES
QRS AXIS: 270 DEGREES
QRSD INTERVAL: 128 MS
QRSD INTERVAL: 138 MS
QRSD INTERVAL: 138 MS
QT INTERVAL: 418 MS
QT INTERVAL: 428 MS
QT INTERVAL: 428 MS
QTC INTERVAL: 416 MS
QTC INTERVAL: 416 MS
QTC INTERVAL: 488 MS
RBC # BLD AUTO: 5.33 MILLION/UL (ref 3.88–5.62)
SODIUM SERPL-SCNC: 134 MMOL/L (ref 135–147)
T WAVE AXIS: 54 DEGREES
T WAVE AXIS: 59 DEGREES
T WAVE AXIS: 61 DEGREES
VENTRICULAR RATE: 57 BPM
VENTRICULAR RATE: 57 BPM
VENTRICULAR RATE: 82 BPM
WBC # BLD AUTO: 7.16 THOUSAND/UL (ref 4.31–10.16)

## 2023-10-20 PROCEDURE — 93005 ELECTROCARDIOGRAM TRACING: CPT

## 2023-10-20 PROCEDURE — 85610 PROTHROMBIN TIME: CPT | Performed by: EMERGENCY MEDICINE

## 2023-10-20 PROCEDURE — 99244 OFF/OP CNSLTJ NEW/EST MOD 40: CPT | Performed by: INTERNAL MEDICINE

## 2023-10-20 PROCEDURE — 84484 ASSAY OF TROPONIN QUANT: CPT | Performed by: EMERGENCY MEDICINE

## 2023-10-20 PROCEDURE — 85025 COMPLETE CBC W/AUTO DIFF WBC: CPT | Performed by: EMERGENCY MEDICINE

## 2023-10-20 PROCEDURE — 93010 ELECTROCARDIOGRAM REPORT: CPT

## 2023-10-20 PROCEDURE — 80053 COMPREHEN METABOLIC PANEL: CPT | Performed by: EMERGENCY MEDICINE

## 2023-10-20 PROCEDURE — 71045 X-RAY EXAM CHEST 1 VIEW: CPT

## 2023-10-20 PROCEDURE — 83880 ASSAY OF NATRIURETIC PEPTIDE: CPT | Performed by: EMERGENCY MEDICINE

## 2023-10-20 PROCEDURE — 85730 THROMBOPLASTIN TIME PARTIAL: CPT | Performed by: EMERGENCY MEDICINE

## 2023-10-20 PROCEDURE — 99285 EMERGENCY DEPT VISIT HI MDM: CPT | Performed by: EMERGENCY MEDICINE

## 2023-10-20 PROCEDURE — 36415 COLL VENOUS BLD VENIPUNCTURE: CPT | Performed by: EMERGENCY MEDICINE

## 2023-10-20 PROCEDURE — 99245 OFF/OP CONSLTJ NEW/EST HI 55: CPT

## 2023-10-20 PROCEDURE — 99285 EMERGENCY DEPT VISIT HI MDM: CPT

## 2023-10-20 RX ORDER — ASPIRIN 81 MG/1
81 TABLET, CHEWABLE ORAL DAILY
Status: DISCONTINUED | OUTPATIENT
Start: 2023-10-21 | End: 2023-10-21 | Stop reason: HOSPADM

## 2023-10-20 RX ORDER — ATORVASTATIN CALCIUM 80 MG/1
80 TABLET, FILM COATED ORAL
Status: DISCONTINUED | OUTPATIENT
Start: 2023-10-20 | End: 2023-10-21 | Stop reason: HOSPADM

## 2023-10-20 RX ORDER — FUROSEMIDE 20 MG/1
20 TABLET ORAL DAILY
Status: DISCONTINUED | OUTPATIENT
Start: 2023-10-21 | End: 2023-10-21 | Stop reason: HOSPADM

## 2023-10-20 RX ADMIN — APIXABAN 5 MG: 5 TABLET, FILM COATED ORAL at 20:08

## 2023-10-20 RX ADMIN — SACUBITRIL AND VALSARTAN 1 TABLET: 24; 26 TABLET, FILM COATED ORAL at 18:58

## 2023-10-20 RX ADMIN — ATORVASTATIN CALCIUM 80 MG: 80 TABLET, FILM COATED ORAL at 18:17

## 2023-10-20 NOTE — CONSULTS
Cardiology Consultation  MD Era Auguste MD, Chery Murray DO, Jaja Rodriguez, MD Kuldip Patricia DO, Raymond Bergeron DO, [facility]  ----------------------------------------------------------------  [address] [facility] 65 y.o. 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 Was noted to have heart rates in the 30 bpm range at his neurology office today  His rhythm shows frequent nonconducted supraventricular beats  His ejection fraction has been persistently low since his MI last year and is actually worsened on more recent echo, decreased from around 35% last year now to 20%. He did wear a LifeVest but this was returned in January and declined ICD placement in January. Had long discussion with him regarding his bradycardia and heart failure and the need for pacemaker ICD at this point. I do feel that he did not understand what was being recommended to him in the past and after speaking with him at length today regarding pacemaker and ICD, he is now agreeable for placement. Spoke to the electrophysiology team at [facility] and Dr. Ben Hawley who are agreeable for placement and for transfer to [facility].   Would hold any AV guille blocking medications at this time  Continue with goal-directed medical therapy for heart failure reduced ejection fraction with Jardiance 10 mg, Lasix, Entresto, but hold his low-dose metoprolol succinate 25 mg. Continue with aspirin Eliquis  Plan for transfer to [facility]    Barbara Perkins DO, [facility]    ----------------------------------------------------    ======================================================    HPI:  I am seeing this patient in cardiology consultation for: Bradycardia    Dante Royal is a 65 y.o. male with:   Heart failure with reduced EF, last EF 20%, ischemic cardiomyopathy  CVA, left MCA CVA in August 2023  STEMI October 2022  Hyperlipidemia next hypertension  Type 2 diabetes  Seizure disorder  Obesity    He presents to the emergency department via ambulance from his neurology office where he is found to have heart rate in the 30 bpm range. In the ER his telemetry shows sinus rhythm with frequent nonconducted supraventricular impulses sometimes in a pattern of 3-1. He has heart failure with reduced EF and follows with advanced heart failure. Overall he states he has been feeling okay, but he did have a stroke back in August which is why he follows with neurology now. He does have ischemic cardiomyopathy and had an MI last year. EF has been persistently reduced since then and actually getting worse. Was 35%, now down to 20% on most recent echo in August        Past Medical History:   Diagnosis Date    Coronary artery disease     CVA (cerebral vascular accident) [facility])     R sided, dysarthria, March 2020 in [address] [place] [address] failure [facility])     Hyperlipidemia     Hypertension     Pre-diabetes     STEMI (ST elevation myocardial infarction) ([address])          Scheduled Meds:  Continuous Infusions:No current facility-administered medications for this encounter. PRN Meds:. Allergies   Allergen Reactions    Pollen Extract Allergic Rhinitis     I reviewed the Home Medication list in the chart.      Family History   Problem Relation Age of Onset    Dementia Mother     Heart disease Father     Dementia Maternal Aunt     Dementia Maternal Uncle     Cancer Maternal Uncle        Social History     Socioeconomic History    Marital status: Unknown     Spouse name: Not on file    Number of children: Not on file    Years of education: Not on file    Highest education level: Not on file   Occupational History    Not on file   Tobacco Use    Smoking status: Never    Smokeless tobacco: Never   Substance and Sexual Activity    Alcohol use: Not on file    Drug use: Not on file    Sexual activity: Not on file   Other Topics Concern    Not on file   Social History Narrative    Not on file     Social Determinants of Health     Financial Resource Strain: Not on file   Food Insecurity: No Food Insecurity (8/24/2023)    Hunger Vital Sign     Worried About Running Out of Food in the Last Year: Never true     Ran Out of Food in the Last Year: Never true   Transportation Needs: No Transportation Needs (8/24/2023)    PRAPARE - Transportation     Lack of Transportation (Medical): No     Lack of Transportation (Non-Medical): No   Physical Activity: Not on file   Stress: Not on file   Social Connections: Not on file   Intimate Partner Violence: Not on file   Housing Stability: Unknown (8/24/2023)    Housing Stability Vital Sign     Unable to Pay for Housing in the Last Year: No     Number of Places Lived in the Last Year: Not on file     Unstable Housing in the Last Year: No       Review of Systems   Review of Systems   Constitutional:  Negative for chills and fever. HENT:  Negative for facial swelling and sore throat. Eyes:  Negative for visual disturbance. Respiratory:  Negative for cough, chest tightness, shortness of breath and wheezing. Cardiovascular:  Negative for chest pain, palpitations and leg swelling. Gastrointestinal:  Negative for abdominal pain, blood in stool, constipation, diarrhea, nausea and vomiting. Endocrine: Negative for cold intolerance and heat intolerance.    Genitourinary:  Negative for decreased urine volume, difficulty urinating, dysuria and hematuria. Musculoskeletal:  Negative for arthralgias, back pain and myalgias. Skin:  Negative for rash. Neurological:  Negative for dizziness, syncope, weakness and numbness. Psychiatric/Behavioral:  Negative for agitation, behavioral problems and confusion. The patient is not nervous/anxious. Vitals:    10/20/23 1530   BP: 113/80   Pulse: 78   Resp: 19   Temp:    SpO2: 99%     I/O       None          Weight (last 2 days)       Date/Time Weight    10/20/23 1335 77.6 (171.08)            Physical Exam  Constitutional: awake, alert and oriented, in no acute distress, no obvious deformities  Head: Normocephalic, without obvious abnormality, atraumatic  Eyes: conjunctivae clear and moist. Sclera anicteric. No xanthelasmas. Pupils equal bilaterally. Extraocular motions are full. Ear nose mouth and throat: ears are symmetrical bilaterally, hearing appears to be equal bilaterally, no nasal discharge or epistaxis, oropharynx is clear with moist mucous membranes  Neck: Trachea is midline, neck is supple, no thyromegaly or significant lymphadenopathy, there is full range of motion. Lungs: clear to auscultation bilaterally, no wheezes, no rales, no rhonchi, no accessory muscle use, breathing is nonlabored  Heart: regular rate and rhythm, S1, S2 normal, he has a 2/6 systolic murmur left sternal border, no click, no rub and no gallop, no lower extremity edema  Abdomen: soft, non-tender; bowel sounds normal; no masses, no organomegaly  Psychiatric: Patient is oriented to time, place, person, mood/affect is negative for depression, anxiety, agitation, appears to have appropriate insight  Skin: Skin is warm, dry, intact. No obvious rashes or lesions on exposed extremities. Nail beds are pink with no cyanosis or clubbing.   Neuro: He does have some word finding difficulty    Results from last 7 days   Lab Units 10/20/23  1511   WBC Thousand/uL 7.16   HEMOGLOBIN g/dL 16.5 HEMATOCRIT % 51.3*   PLATELETS Thousands/uL 191   NEUTROS PCT % 43   MONOS PCT % 10   EOS PCT % 3     Results from last 7 days   Lab Units 10/20/23  1511   POTASSIUM mmol/L 5.6*   CHLORIDE mmol/L 99   CO2 mmol/L 27   BUN mg/dL 11   CREATININE mg/dL 0.93   CALCIUM mg/dL 9.4     Results from last 7 days   Lab Units 10/20/23  1511   POTASSIUM mmol/L 5.6*   CHLORIDE mmol/L 99   CO2 mmol/L 27   BUN mg/dL 11   CREATININE mg/dL 0.93   CALCIUM mg/dL 9.4   ALK PHOS U/L 72   ALT U/L 13   AST U/L 31     No results found for: "TROPONINT"              Results from last 7 days   Lab Units 10/20/23  1511   INR  1.44*               I have personally reviewed the EKG, CXR and Telemetry images directly. Patient Active Problem List    Diagnosis Date Noted    Cerebrovascular accident (CVA) ([address]) 08/22/2023    Elevated bilirubin 07/20/2023    Elevated PSA 07/20/2023    Ischemic cardiomyopathy 07/18/2023    History of ST elevation myocardial infarction (STEMI) 10/24/2022    Coronary artery disease 10/24/2022    Hyperlipidemia 10/24/2022    Hypertension 10/24/2022    Type 2 diabetes mellitus ([address]) 10/24/2022    Seizure ([address]) 10/24/2022    Obesity (BMI 30.0-34.9) 10/24/2022    Chronic systolic heart failure ([address]) 09/08/2022       Portions of the record may have been created with voice recognition software. Occasional wrong word or "sound a like" substitutions may have occurred due to the inherent limitations of voice recognition software. Read the chart carefully and recognize, using context, where substitutions have occurred.     Vish Lazo DO, [facility]  10/20/2023 3:55 PM

## 2023-10-20 NOTE — EMTALA/ACUTE CARE TRANSFER
[address] [place] [address] [address] [id number]  Dept: [phone]      EMTALA TRANSFER CONSENT    NAME Ronald CALDERON 1960                              MRN [id number]    I have been informed of my rights regarding examination, treatment, and transfer   by Dr. Sol Isabel MD    Benefits: Specialized equipment and/or services available at the receiving facility (Include comment)________________________, Continuity of care    Risks: Potential for delay in receiving treatment      Consent for Transfer:  I acknowledge that my medical condition has been evaluated and explained to me by the emergency department physician or other qualified medical person and/or my attending physician, who has recommended that I be transferred to the service of  Accepting Physician: Dr. Rene Wang at [address] [place] [address] [address] Name, [address] [place] [address] : [facility]. The above potential benefits of such transfer, the potential risks associated with such transfer, and the probable risks of not being transferred have been explained to me, and I fully understand them. The doctor has explained that, in my case, the benefits of transfer outweigh the risks. I agree to be transferred. I authorize the performance of emergency medical procedures and treatments upon me in both transit and upon arrival at the receiving facility. Additionally, I authorize the release of any and all medical records to the receiving facility and request they be transported with me, if possible. I understand that the safest mode of transportation during a medical emergency is an ambulance and that the Hospital advocates the use of this mode of transport. Risks of traveling to the receiving facility by car, including absence of medical control, life sustaining equipment, such as oxygen, and medical personnel has been explained to me and I fully understand them.     ([address])  [ ]  I consent to the stated transfer and to be transported by ambulance/helicopter. [  ]  I consent to the stated transfer, but refuse transportation by ambulance and accept full responsibility for my transportation by car. I understand the risks of non-ambulance transfers and I exonerate the Hospital and its staff from any deterioration in my condition that results from this refusal.    X___________________________________________    DATE  10/20/23  TIME________  Signature of patient or legally responsible individual signing on patient behalf           RELATIONSHIP TO PATIENT_________________________          Provider Certification    NAME Lul Schmitz                                         1960                              MRN [id number]    A medical screening exam was performed on the above named patient. Based on the examination:    Condition Necessitating Transfer The encounter diagnosis was Bradycardia. Patient Condition: The patient has been stabilized such that within reasonable medical probability, no material deterioration of the patient condition or the condition of the unborn child(bárbara) is likely to result from the transfer    Reason for Transfer: Level of Care needed not available at this facility    Transfer Requirements: Facility [employer] available and qualified personnel available for treatment as acknowledged by    Agreed to accept transfer and to provide appropriate medical treatment as acknowledged by       Dr. Maritza Hodge  Appropriate medical records of the examination and treatment of the patient are provided at the time of transfer   [address] _______  Transfer will be performed by qualified personnel from    and appropriate transfer equipment as required, including the use of necessary and appropriate life support measures.     Provider Certification: I have examined the patient and explained the following risks and benefits of being transferred/refusing transfer to the patient/family:  General risk, such as traffic hazards, adverse weather conditions, rough terrain or turbulence, possible failure of equipment (including vehicle or aircraft), or consequences of actions of persons outside the control of the transport personnel      Based on these reasonable risks and benefits to the patient and/or the unborn child(bábrara), and based upon the information available at the time of the patient’s examination, I certify that the medical benefits reasonably to be expected from the provision of appropriate medical treatments at another medical facility outweigh the increasing risks, if any, to the individual’s medical condition, and in the case of labor to the unborn child, from effecting the transfer.     X____________________________________________ DATE 10/20/23        TIME_______      ORIGINAL - SEND TO MEDICAL RECORDS   COPY - SEND WITH PATIENT DURING TRANSFER

## 2023-10-20 NOTE — ED NOTES
Patient refusing delta troponin at this time. Explained necessity of blood test to patient. Patient acknowledges but continues to refuse. ED provider made aware. Will attempt as patient is more agreeable.       Zee Caraballo RN  10/20/23 1518

## 2023-10-20 NOTE — ED PROVIDER NOTES
History  Chief Complaint   Patient presents with    Slow Heart Rate     Pt reports bradycardia in the 30s at appointment today. No complaints at this time. History provided by:  Patient and EMS personnel   used: No    Medical Problem  Quality:  Bradycardia at Dr's office  Severity:  Unable to specify  Onset quality:  Unable to specify  Duration: unable to specify. Timing:  Intermittent  Progression:  Waxing and waning  Chronicity:  New  Context:  Hx of CAD, CHF, EF 20%, Bardycardia at Dr's office today in 35s, sent to ER, as per Cardiology patient had been advise Pacemaker before but not agreed  Relieved by:  Nothing  Worsened by:  Nothing  Ineffective treatments:  None  Associated symptoms: no abdominal pain, no chest pain, no cough, no diarrhea, no fever, no headaches, no loss of consciousness, no nausea, no rash, no shortness of breath, no sore throat and no vomiting    Risk factors:  CAD, CHF, CVA      Prior to Admission Medications   Prescriptions Last Dose Informant Patient Reported? Taking?    Empagliflozin (JARDIANCE) 10 MG TABS tablet  Self No No   Sig: Take 1 tablet (10 mg total) by mouth every morning   apixaban (Eliquis) 5 mg  Self No No   Sig: Take 1 tablet (5 mg total) by mouth 2 (two) times a day   aspirin 81 mg chewable tablet  Self No No   Sig: Chew 1 tablet (81 mg total) daily   atorvastatin (LIPITOR) 80 mg tablet  Self No No   Sig: Take 1 tablet (80 mg total) by mouth daily with dinner   furosemide (LASIX) 20 mg tablet  Self No No   Sig: Take 1 tablet (20 mg total) by mouth daily   metoprolol succinate (TOPROL-XL) 25 mg 24 hr tablet  Self No No   Sig: Take 1 tablet (25 mg total) by mouth daily   nitroglycerin (NITROSTAT) 0.4 mg SL tablet   No No   Sig: Place 1 tablet (0.4 mg total) under the tongue every 5 (five) minutes as needed for chest pain for up to 3 days   Patient not taking: Reported on 8/31/2023   sacubitril-valsartan (Entresto) 24-26 MG TABS  Self No No   Sig: Take 1 tablet by mouth 2 (two) times a day      Facility-Administered Medications: None       Past Medical History:   Diagnosis Date    Coronary artery disease     CVA (cerebral vascular accident) ([address])     R sided, dysarthria, March 2020 in [facility])     Hyperlipidemia     Hypertension     Pre-diabetes     STEMI (ST elevation myocardial infarction) [facility])        Past Surgical History:   Procedure Laterality Date    CARDIAC CATHETERIZATION N/A 7/13/2022    Procedure: Cardiac pci;  Surgeon: Shawnie Mcardle, MD;  [facility]; Service: Cardiology    CARDIAC CATHETERIZATION N/A 7/13/2022    Procedure: Cardiac Coronary Angiogram;  Surgeon: Shawnie Mcardle, MD;  [facility]; Service: Cardiology    CARDIAC CATHETERIZATION Left 7/13/2022    Procedure: Cardiac Left Heart Cath;  Surgeon: Shawnie Mcardle, MD;  [facility]; Service: Cardiology       Family History   Problem Relation Age of Onset    Dementia Mother     Heart disease Father     Dementia Maternal Aunt     Dementia Maternal Uncle     Cancer Maternal Uncle      I have reviewed and agree with the history as documented. E-Cigarette/Vaping     E-Cigarette/Vaping Substances     Social History     Tobacco Use    Smoking status: Never    Smokeless tobacco: Never       Review of Systems   Constitutional:  Negative for chills and fever. HENT:  Negative for facial swelling, sore throat and trouble swallowing. Eyes:  Negative for pain and visual disturbance. Respiratory:  Negative for cough and shortness of breath. Cardiovascular:  Negative for chest pain and leg swelling. Bradycardia   Gastrointestinal:  Negative for abdominal pain, diarrhea, nausea and vomiting. Genitourinary:  Negative for dysuria and flank pain. Musculoskeletal:  Negative for back pain, neck pain and neck stiffness. Skin:  Negative for pallor and rash.    Allergic/Immunologic: Negative for environmental allergies and immunocompromised state. Neurological:  Negative for dizziness, loss of consciousness and headaches. Hematological:  Negative for adenopathy. Does not bruise/bleed easily. Psychiatric/Behavioral:  Negative for agitation and behavioral problems. All other systems reviewed and are negative. Physical Exam  Physical Exam  Vitals and nursing note reviewed. Constitutional:       General: He is not in acute distress. Appearance: He is well-developed. HENT:      Head: Normocephalic and atraumatic. Eyes:      Extraocular Movements: Extraocular movements intact. Cardiovascular:      Rate and Rhythm: Regular rhythm. Bradycardia present. Heart sounds: Normal heart sounds. Pulmonary:      Effort: Pulmonary effort is normal. No respiratory distress. Breath sounds: Normal breath sounds. Abdominal:      Palpations: Abdomen is soft. Tenderness: There is no abdominal tenderness. There is no guarding or rebound. Musculoskeletal:         General: Normal range of motion. Cervical back: Normal range of motion and neck supple. Skin:     General: Skin is warm and dry. Neurological:      General: No focal deficit present. Mental Status: He is alert and oriented to person, place, and time.    Psychiatric:         Mood and Affect: Mood normal.         Behavior: Behavior normal.         Vital Signs  ED Triage Vitals   Temperature Pulse Respirations Blood Pressure SpO2   10/20/23 1334 10/20/23 1334 10/20/23 1334 10/20/23 1335 10/20/23 1334   98.1 °F (36.7 °C) (!) 48 17 101/82 96 %      Temp Source Heart Rate Source Patient Position - Orthostatic VS BP Location FiO2 (%)   10/20/23 1334 10/20/23 1334 10/20/23 1334 10/20/23 1334 --   Oral Monitor Sitting Left arm       Pain Score       10/20/23 1334       No Pain           Vitals:    10/21/23 0800 10/21/23 0900 10/21/23 1000 10/21/23 1100   BP: 90/68 109/76 135/89 99/54   Pulse: 78 64 74 69   Patient Position - Orthostatic VS: Lying Lying Lying Lying         Visual Acuity      ED Medications  Medications   apixaban (ELIQUIS) tablet 5 mg (5 mg Oral Given 10/21/23 0854)   aspirin chewable tablet 81 mg (81 mg Oral Given 10/21/23 0853)   atorvastatin (LIPITOR) tablet 80 mg (80 mg Oral Given 10/20/23 1817)   Empagliflozin (JARDIANCE) tablet 10 mg (10 mg Oral Given 10/21/23 0938)   furosemide (LASIX) tablet 20 mg (0 mg Oral Hold 10/21/23 0853)   sacubitril-valsartan (ENTRESTO) 24-26 MG per tablet 1 tablet (0 tablets Oral Hold 10/21/23 0939)       Diagnostic Studies  Results Reviewed       Procedure Component Value Units Date/Time    HS Troponin I 4hr [[id number]]  (Normal) Collected: 10/20/23 1920    Lab Status: Final result Specimen: Blood from Arm, Right Updated: 10/20/23 1948     hs TnI 4hr 16 ng/L      Delta 4hr hsTnI 6 ng/L     HS Troponin I 2hr [[id number]]  (Normal) Collected: 10/20/23 1747    Lab Status: Final result Specimen: Blood from Arm, Right Updated: 10/20/23 1841     hs TnI 2hr 11 ng/L      Delta 2hr hsTnI 1 ng/L     B-Type Natriuretic Peptide(BNP) [[id number]]  (Abnormal) Collected: 10/20/23 1511    Lab Status: Final result Specimen: Blood from Arm, Right Updated: 10/20/23 1548      pg/mL     HS Troponin 0hr (reflex protocol) [[id number]]  (Normal) Collected: 10/20/23 1511    Lab Status: Final result Specimen: Blood from Arm, Right Updated: 10/20/23 1542     hs TnI 0hr 10 ng/L     Comprehensive metabolic panel [[id number]]  (Abnormal) Collected: 10/20/23 1511    Lab Status: Final result Specimen: Blood from Arm, Right Updated: 10/20/23 1539     Sodium 134 mmol/L      Potassium 5.6 mmol/L      Chloride 99 mmol/L      CO2 27 mmol/L      ANION GAP 8 mmol/L      BUN 11 mg/dL      Creatinine 0.93 mg/dL      Glucose 102 mg/dL      Calcium 9.4 mg/dL      AST 31 U/L      ALT 13 U/L      Alkaline Phosphatase 72 U/L      Total Protein 7.6 g/dL      Albumin 4.1 g/dL      Total Bilirubin 2.10 mg/dL      eGFR 88 ml/min/1.73sq m Narrative:      National Kid[employer] Disease Foundation guidelines for Chronic Kidney Disease (CKD):     Stage 1 with normal or high GFR (GFR > 90 mL/min/1.73 square meters)    Stage 2 Mild CKD (GFR = 60-89 mL/min/1.73 square meters)    Stage 3A Moderate CKD (GFR = 45-59 mL/min/1.73 square meters)    Stage 3B Moderate CKD (GFR = 30-44 mL/min/1.73 square meters)    Stage 4 Severe CKD (GFR = 15-29 mL/min/1.73 square meters)    Stage 5 End Stage CKD (GFR <15 mL/min/1.73 square meters)  Note: GFR calculation is accurate only with a steady state creatinine    Protime-INR [[id number]]  (Abnormal) Collected: 10/20/23 1511    Lab Status: Final result Specimen: Blood from Arm, Right Updated: 10/20/23 1533     Protime 17.5 seconds      INR 1.44    APTT [[id number]]  (Normal) Collected: 10/20/23 1511    Lab Status: Final result Specimen: Blood from Arm, Right Updated: 10/20/23 1533     PTT 30 seconds     CBC and differential [[id number]]  (Abnormal) Collected: 10/20/23 1511    Lab Status: Final result Specimen: Blood from Arm, Right Updated: 10/20/23 1519     WBC 7.16 Thousand/uL      RBC 5.33 Million/uL      Hemoglobin 16.5 g/dL      Hematocrit 51.3 %      MCV 96 fL      MCH 31.0 pg      MCHC 32.2 g/dL      RDW 13.2 %      MPV 10.1 fL      Platelets 915 Thousands/uL      nRBC 0 /100 WBCs      Neutrophils Relative 43 %      Immat GRANS % 0 %      Lymphocytes Relative 43 %      Monocytes Relative 10 %      Eosinophils Relative 3 %      Basophils Relative 1 %      Neutrophils Absolute 3.14 Thousands/µL      Immature Grans Absolute 0.01 Thousand/uL      Lymphocytes Absolute 3.06 Thousands/µL      Monocytes Absolute 0.68 Thousand/µL      Eosinophils Absolute 0.21 Thousand/µL      Basophils Absolute 0.06 Thousands/µL                    XR chest 1 view portable   Final Result by Garland Ayala MD (10/20 1459)      No acute cardiopulmonary disease.                Workstation performed: [id number]                    Procedures  ECG 12 Lead Documentation Only    Date/Time: 10/20/2023 4:42 PM    Performed by: Aracelis Gonzalez MD  Authorized by: Aracelis Gonzalez MD    Indications / Diagnosis:  Bradycardia  ECG reviewed by me, the ED Provider: yes    Patient location:  ED  Interpretation:     Interpretation: normal    Rate:     ECG rate:  48    ECG rate assessment: bradycardic    Rhythm:     Rhythm: sinus rhythm    Ectopy:     Ectopy: none    QRS:     QRS axis:  Normal  Conduction:     Conduction: normal    ST segments:     ST segments:  Normal  T waves:     T waves: normal             ED Course  ED Course as of 10/21/23 1246   Fri Oct 20, 2023   1600 WBC: 7.16   1600 Hemoglobin: 16.5   1600 Platelet Count: 752   1600 Sodium(!): 134   1600 Potassium(!): 5.6   1600 BUN: 11   1600 Creatinine: 0.93   1600 Glucose, Random: 102   1600 hs TnI 0hr: 10   1600 BNP(!): 627  Labs reviewed. 1600 Patient seen by Dr. Pia Rice, Cardiology, advised transfer to [facility] for EP, Pacemaker, patient agreeable. 1600 Chest x-ray independently reviewed, no significant acute abnormality was noted   1650 Case discussed with Dr. Benitez Carter, accepted for transfer. 1845: Transfer likely not until tomorrow morning as no bed currently available at [facility]; [facility] consult was requested. Patient signed out to Dr. Darleen Hunt, pending transfer to [facility]. SBIRT 22yo+      Flowsheet Row Most Recent Value   Initial Alcohol Screen: US AUDIT-C     1. How often do you have a drink containing alcohol? 0 Filed at: 10/20/2023 1503   2. How many drinks containing alcohol do you have on a typical day you are drinking? 0 Filed at: 10/20/2023 1503   3a. Male UNDER 65: How often do you have five or more drinks on one occasion? 0 Filed at: 10/20/2023 1503   3b. FEMALE Any Age, or MALE 65+: How often do you have 4 or more drinks on one occassion? 0 Filed at: 10/20/2023 1503   Audit-C Score 0 Filed at: 10/20/2023 1503   LEOBARDO: How many times in the past year have you. ..     Used an illegal drug or used a prescription medication for non-medical reasons? Never Filed at: 10/20/2023 3127                      Medical Decision Making  Is a 81-year-old male, history of diabetes, ischemic cardiomyopathy, coronary artery disease, EF 20%, hyperlipidemia, CVA, seizure disorder, sent from the doctor's office where he was found to be bradycardic in 30s, patient denies any symptoms, no chest pain, dyspnea, headache, abdominal pain, vomiting. Exam, patient is conscious, alert, vital signs are stable, heart rate noted to be between 40s and 50s, blood pressure stable, lungs clear, heart sounds regular. D/D: Ischemic cardiomyopathy, bradycardia, will check cardiac work-up and labs, EKG, consult cardiology. Case discussed with cardiology, patient has been advised to get a pacemaker in the past which he had refused, however cardiologist came down in the ER and spoke with the patient after which patient was agreeable for the procedure, patient will be transferred to [address] [place] [address] for pacemaker    Problems Addressed:  Bradycardia: acute illness or injury    Amount and/or Complexity of Data Reviewed  Labs: ordered. Decision-making details documented in ED Course. Radiology: ordered and independent interpretation performed. Decision-making details documented in ED Course. ECG/medicine tests: ordered and independent interpretation performed. Decision-making details documented in ED Course.   Discussion of management or test interpretation with external provider(s): Cardiology, Internal Medicine             Disposition  Final diagnoses:   Bradycardia   Type 2 diabetes mellitus without complication, without long-term current use of insulin (HCC)   Seizure ([address])   Ischemic cardiomyopathy   Coronary artery disease involving native coronary artery of native heart without angina pectoris   Cerebrovascular accident (CVA), unspecified mechanism ([address])   Mixed hyperlipidemia     Time reflects when diagnosis was documented in both MDM as applicable and the Disposition within this note       Time User Action Codes Description Comment    10/20/2023  3:50 PM Michael Oven Add [R00.1] Bradycardia     10/20/2023  5:36 PM Michael Oven Add [E11.9] Type 2 diabetes mellitus without complication, without long-term current use of insulin ([address])     10/20/2023  5:37 PM Michael Oven Add [R56.9] Seizure ([address])     10/20/2023  5:37 PM Michael Oven Add [I25.5] Ischemic cardiomyopathy     10/20/2023  5:37 PM Michael Oven Add [I25.10] Coronary artery disease involving native coronary artery of native heart without angina pectoris     10/20/2023  5:37 PM Michael Oven Add [I63.9] Cerebrovascular accident (CVA), unspecified mechanism ([address])     10/20/2023  5:37 PM Michael Oven Add [E78.2] Mixed hyperlipidemia           ED Disposition       ED Disposition   Transfer to Another Facility-In Network    Condition   --    Date/Time   Fri Oct 20, 2023 1648    [address] should be transferred out to [facility].                MD Documentation      Piero Ratliff Most Recent Value   Patient Condition The patient has been stabilized such that within reasonable medical probability, no material deterioration of the patient condition or the condition of the unborn child(bárbara) is likely to result from the transfer   Reason for Transfer Level of Care needed not available at this facility   Benefits of Transfer Specialized equipment and/or services available at the receiving facility (Include comment)________________________, Continuity of care   Risks of Transfer Potential for delay in receiving treatment   Accepting Physician Dr. Rm Quintero Name, Felix SORTO-TIFFANIE   Sending MD Dr. Cecil Gray   Provider Certification General risk, such as traffic hazards, adverse weather conditions, rough terrain or turbulence, possible failure of equipment (including vehicle or aircraft), or consequences of actions of persons outside the control of the transport personnel          RN Documentation      [address] Name, [address] [facility]  [facility]          Follow-up Information    None         Patient's Medications   Discharge Prescriptions    No medications on file       No discharge procedures on file.     PDMP Review       None            ED Provider  Electronically Signed by             Felicia Unger MD  10/21/23 7869

## 2023-10-20 NOTE — PROGRESS NOTES
Patient ID: Carolin Sharp is a 65 y.o. male. Assessment/Plan:  Suggest ED evaluation for asymptomatic bradycardia-ambulance was called. He is agreeable. Plan to continue eliquis, atorvastatin, aspirin for stroke prevention; thankfully he has had no repeat stroke like symptoms and he denies any significant cardiac symptoms; he appears euvolemic on exam.  Would not suggest loop recorder at this time as plans may change regarding ICD/PPM. He should follow up regularly with cardiology and continue the medications they prescribe. Plan to follow up in 4 months time. Diagnoses and all orders for this visit:    Bradycardia  -     Transfer to other facility    CHF (congestive heart failure) ([address])    History of stroke       Subjective:    HPI  Carolin Sharp is a 65year old male with past medical history of CHF (last EF 20%)/cardiomyopathy/CAD on entresto, CVA L MCA 8/21/2023 (presented with aphasia/blank stare) now on eliquis/asa (was found to be a nonresponder to brilinta and was embolic appearing stroke) who presents for stroke hospital follow up. He has been following regularly with cardiology and has tolerated addition of entresto. He has no complaints today (no stroke symptoms; no seizure like activity; no headaches; no cardiac complaints) but presents with very low heart rate. He mentions he did not eat breakfast and was not able to get lunch at the [facility] because he had to get picked up by lyft for this appointment. Normally he spends from 8am to noon at the [facility] daily where he gets two meals and then he usually goes out for evening meal- he mentions he frequently skips breakfast.He lives alone; he generally walks or gets a lyft where he needs to go. He has no home safety concerns. He was given some juice in the office today; initially we did not have a glucometer available but after he had drank we were able to check his sugar and it was 191.  Even after some time his heart rate did not improve; it only went up some after ambulation. Upon discussion he previously "didn't like" and had issues with cost with [employer] and he had previously refused ICD placement-states he is unsure how this would have been helpful in the past. He denies sleep complaints at home. He has been active/states has been walking regularly without problem; no recent falls. Current Outpatient Medications on File Prior to Visit   Medication Sig Dispense Refill    apixaban (Eliquis) 5 mg Take 1 tablet (5 mg total) by mouth 2 (two) times a day 60 tablet 5    aspirin 81 mg chewable tablet Chew 1 tablet (81 mg total) daily 90 tablet 1    atorvastatin (LIPITOR) 80 mg tablet Take 1 tablet (80 mg total) by mouth daily with dinner 90 tablet 1    Empagliflozin (JARDIANCE) 10 MG TABS tablet Take 1 tablet (10 mg total) by mouth every morning 90 tablet 1    furosemide (LASIX) 20 mg tablet Take 1 tablet (20 mg total) by mouth daily 90 tablet 1    metoprolol succinate (TOPROL-XL) 25 mg 24 hr tablet Take 1 tablet (25 mg total) by mouth daily 90 tablet 1    sacubitril-valsartan (Entresto) 24-26 MG TABS Take 1 tablet by mouth 2 (two) times a day 180 tablet 1    nitroglycerin (NITROSTAT) 0.4 mg SL tablet Place 1 tablet (0.4 mg total) under the tongue every 5 (five) minutes as needed for chest pain for up to 3 days (Patient not taking: Reported on 8/31/2023) 10 tablet 0     No current facility-administered medications on file prior to visit. MRI brain 8./24/2023:  IMPRESSION:  Subacute infarct within the left posterior lateral temporal lobe extending to the temporal occipital junction with mild cytotoxic edema. No hemorrhagic transformation. Old infarct within the left posterior lateral frontal lobe. Scattered white matter changes consistent with chronic microangiopathy. Left-sided sinus disease with extensive opacification of the frontal sinus, anterior ethmoid air cells and maxillary sinus similar to prior CT scan.     CTA 8/21/2023:  IMPRESSION:  No hemodynamically significant stenosis, dissection or occlusion of the carotid or vertebral arteries or major vessels of the [place]. EEG 8/23/2023:  EEG Interpretation: This Routine EEG recorded during wakefulness, drowsiness, and sleep is abnormal.  Frequent left temporal theta and occasional left temporal delta slowing suggest left temporal focal neuronal dysfunction. No epileptiform discharges are identified. Theta frequency background slowing suggests mild nonspecific diffuse cerebral dysfunction. Echocardiogram 8/22/2023:  Left Ventricle: Left ventricular cavity size is mildly dilated. Wall thickness is normal. The left ventricular ejection fraction is 20%. Systolic function is severely reduced. Wall motion cannot be accurately assessed. There is severe global hypokinesis with regional variations. The inferoseptal and anteroseptal walls appear akinetic. The apical segments were not adequately visualized. Mitral Valve: There is mild regurgitation. Tricuspid Valve: There is mild to moderate regurgitation. The estimated right ventricular systolic pressure is 34.79 mmHg. Right Ventricle: Right ventricular cavity size is dilated. Systolic function is mildly to moderately reduced. Wall motion is abnormal. There is moderate hypokinesis of the mid to apical free wall. Pericardium: There is a trivial pericardial effusion posterior to the heart. The following portions of the patient's history were reviewed and updated as appropriate: allergies, current medications, past family history, past medical history, past social history, past surgical history, and problem list.         Objective:    Blood pressure 94/62, pulse (!) 32, temperature (!) 97.2 °F (36.2 °C), temperature source Temporal, resp. rate 16, height 5' 8" (1.727 m), weight 77.6 kg (171 lb), SpO2 100 %.     Repeat Pulse was 34 (taken multiple different ways-apical/radial/with pulse ox device); when standing/walking it went up to 59 but then went back down  Irregular on auscultation; no EKG available in office. Physical Exam  Constitutional:       General: He is not in acute distress. Appearance: He is normal weight. HENT:      Head: Normocephalic and atraumatic. Right Ear: External ear normal.      Left Ear: External ear normal.      Nose: Nose normal.      Mouth/Throat:      Pharynx: Oropharynx is clear. Eyes:      General:         Right eye: No discharge. Left eye: No discharge. Extraocular Movements: Extraocular movements intact. Conjunctiva/sclera: Conjunctivae normal.      Pupils: Pupils are equal, round, and reactive to light. Cardiovascular:      Rate and Rhythm: Bradycardia present. Rhythm irregular. Pulses: Normal pulses. Pulmonary:      Effort: Pulmonary effort is normal.      Breath sounds: Normal breath sounds. Abdominal:      General: There is no distension. Musculoskeletal:      Cervical back: No tenderness. Right lower leg: No edema. Left lower leg: No edema. Skin:     Findings: No rash. Neurological:      Mental Status: He is alert. Cranial Nerves: No cranial nerve deficit. Motor: Motor strength is normal.     Coordination: Romberg sign negative. Deep Tendon Reflexes:      Reflex Scores:       Brachioradialis reflexes are 1+ on the right side and 1+ on the left side. Patellar reflexes are 1+ on the right side and 1+ on the left side. Psychiatric:         Attention and Perception: Attention normal.         Mood and Affect: Mood normal. Affect is flat. Speech: Speech normal.         Behavior: Behavior normal.         Cognition and Memory: Cognition normal. Memory is not impaired. Neurological Exam  Mental Status  Alert. Oriented to person, place and time. Speech is normal. Speech: Slower speech at times, no dysarthria. Language is fluent with no aphasia.     Cranial Nerves  CN II: Right visual acuity: Counts fingers. Left visual acuity: Counts fingers. CN III, IV, VI: Extraocular movements intact bilaterally. Pupils equal round and reactive to light bilaterally. CN V: Facial sensation is normal.  CN VII: Full and symmetric facial movement. CN VIII: Hearing is normal.  CN IX, X: Palate elevates symmetrically  CN XI: Shoulder shrug strength is normal.  CN XII: Tongue midline without atrophy or fasciculations. Motor   Strength is 5/5 throughout all four extremities. Sensory  Light touch is normal in upper and lower extremities. Reflexes                                            Right                      Left  Brachioradialis                    1+                         1+  Patellar                                1+                         1+    Coordination  Right: Finger-to-nose normal.Left: Finger-to-nose normal.    Gait  Casual gait is normal including stance, stride, and arm swing. Romberg is absent. Able to rise from chair without using arms. ROS:    Review of Systems   Constitutional:  Negative for appetite change, fatigue and fever. HENT: Negative. Negative for hearing loss, tinnitus, trouble swallowing and voice change. Eyes: Negative. Negative for photophobia, pain and visual disturbance. Respiratory: Negative. Negative for shortness of breath. Cardiovascular: Negative. Negative for palpitations. Gastrointestinal: Negative. Negative for nausea and vomiting. Endocrine: Negative. Negative for cold intolerance. Genitourinary: Negative. Negative for dysuria, frequency and urgency. Musculoskeletal:  Negative for back pain, gait problem, myalgias and neck pain. Skin: Negative. Negative for rash. Allergic/Immunologic: Negative. Neurological:  Positive for speech difficulty. Negative for dizziness, tremors, seizures, syncope, facial asymmetry, weakness, light-headedness, numbness and headaches. Hematological: Negative. Does not bruise/bleed easily. Psychiatric/Behavioral: Negative. Negative for confusion, hallucinations and sleep disturbance.     ROS was reviewed and updated as appropriate

## 2023-10-20 NOTE — CONSULTS
[facility]  Consult  Name: Jenny Quijano 65 y.o. male I MRN: [id number]  Unit/Bed#: ED-04 I Date of Admission: 10/20/2023   Date of Service: 10/20/2023 I Hospital Day: 0    Consult to internal medicine  Consult performed by: Susie Mayes MD  Consult ordered by: Dong Winston MD          Assessment/Plan   * Bradycardia  Assessment & Plan  This is a 70-year-old male with history of CHF with EF 20%, CAD, CVA, hypertension, hyperlipidemia sent to the ED after her neurology appointment for heart rate in the 35s. Patient found to have nonconducted supraventricular impulses was evaluated by cardiology who is recommending transfer to [address] [place] [address] for pacemaker placement. Internal medicine consulted for medical management while patient awaits a bed.     Continue telemetry monitoring  Avoid AV guille blocking medications  Continue his goal-directed medical therapy for CHF  Further management as per cardiology  Awaiting transfer to [address] [place] [address]    Hyperkalemia  Assessment & Plan  Hyperkalemia on BMP however moderately hemolyzed  Commend repeat BMP     Cerebrovascular accident (CVA) [facility])  Assessment & Plan  History of left MCA CVA 8/21/2023  Continue on aspirin, Eliquis and statin    Hypertension  Assessment & Plan  Continue furosemide, Entresto  Hold Metoprolol in setting of bradycardia    Hyperlipidemia  Assessment & Plan  Continue statin    Coronary artery disease  Assessment & Plan  History of CAD status post PCI 7/2022  Denies any chest pain  Hold beta-blocker in setting of bradycardia  Continue statin    Chronic systolic heart failure (HCC)  Assessment & Plan  Wt Readings from Last 3 Encounters:   10/20/23 77.6 kg (171 lb 1.2 oz)   10/20/23 77.6 kg (171 lb)   10/05/23 77.1 kg (170 lb)     Echocardiogram 8/22/2023 revealed ejection fraction 20%,  Appears euvolemic  X-ray negative for any acute cardiopulmonary disease  Continue Entresto, Lasix, statin, [employer] beta-blocker  Cardiology for further recommendations           Inpatient Medical Consultation - [facility] Internal Medicine    Patient Information: Enma Jordan 65 y.o. male MRN: [id number]  Unit/Bed#: ED-04 Encounter: [id number]  PCP: Juan Rodriguez PA-C  Date of Admission:  10/20/2023  Date of Consultation: 10/20/23  Requesting Physician: Kristan Cason DO    Counseling / Coordination of Care Time: 30 minutes. Greater than 50% of total time spent on patient counseling and coordination of care. Collaboration of Care: Were Recommendations Directly Discussed with Primary Treatment Team? - Yes     History of Present Illness:    Enma Jordan is a 65 y.o. male who is originally admitted to the emergency room service on 10/20/2023 due to bradycardia. We are consulted for medical management. Patient has history of CHF with EF 20%, CAD, CVA, hypertension, hyperlipidemia sent to the ED after her neurology appointment for heart rate in the 30s. Patient found to have nonconducted supraventricular impulses was evaluated by cardiology who is recommending transfer to [facility] for pacemaker placement. Internal medicine consulted for medical management while patient awaits a bed. Review of Systems:    Review of Systems   Constitutional: Negative. HENT: Negative. Eyes: Negative. Respiratory: Negative. Cardiovascular: Negative. Gastrointestinal: Negative. Endocrine: Negative. Genitourinary: Negative. Musculoskeletal: Negative. Skin: Negative. Allergic/Immunologic: Negative. Neurological: Negative. Hematological: Negative. Psychiatric/Behavioral: Negative.          Past Medical and Surgical History:     Past Medical History:   Diagnosis Date    Coronary artery disease     CVA (cerebral vascular accident) [facility])     R sided, dysarthria, March 2020 in [address] [place] [address])     Hyperlipidemia     Hypertension     Pre-diabetes     STEMI (ST elevation myocardial infarction) [facility])        Past Surgical History:   Procedure Laterality Date    CARDIAC CATHETERIZATION N/A 7/13/2022    Procedure: Cardiac pci;  Surgeon: Shima Rodas MD;  Location: [facility] CARDIAC CATH LAB; Service: Cardiology    CARDIAC CATHETERIZATION N/A 7/13/2022    Procedure: Cardiac Coronary Angiogram;  Surgeon: Shima Rodas MD;  [facility] CARDIAC CATH [facility]; Service: Cardiology    CARDIAC CATHETERIZATION Left 7/13/2022    Procedure: Cardiac Left Heart Cath;  Surgeon: Shima Rodas MD;  Location: [facility] CARDIAC CATH LAB; Service: Cardiology       Meds/Allergies:    all medications and allergies reviewed    Allergies: Allergies   Allergen Reactions    Pollen Extract Allergic Rhinitis       Social History:     Marital Status: Unknown    Substance Use History:   Social History     Substance and Sexual Activity   Alcohol Use None     Social History     Tobacco Use   Smoking Status Never   Smokeless Tobacco Never     Social History     Substance and Sexual Activity   Drug Use Not on file       Family History:    Family History   Problem Relation Age of Onset    Dementia Mother     Heart disease Father     Dementia Maternal Aunt     Dementia Maternal Uncle     Cancer Maternal Uncle        Physical Exam:     Vitals:   Blood Pressure: 108/81 (10/20/23 1900)  Pulse: 71 (10/20/23 1900)  Temperature: 98.1 °F (36.7 °C) (10/20/23 1334)  Temp Source: Oral (10/20/23 1334)  Respirations: 20 (10/20/23 1900)  Weight - Scale: 77.6 kg (171 lb 1.2 oz) (10/20/23 1335)  SpO2: 98 % (10/20/23 1900)    Constitutional: Patient is oriented to person, place and time, no acute distress  HEENT:  Normocephalic, atraumatic,   Cardiovascular: Normal S1S2, RRR, No murmurs/rubs/gallops appreciated. Pulmonary:  Bilateral air entry, No rhonchi/rales/wheezing appreciated  Abdominal: Soft, Bowel sounds present, Non-tender, Non-distended  Extremities:  No edema. Neurological: awake, alert        Additional Data:     Lab Results:  I have personally reviewed pertinent reports. Results from last 7 days   Lab Units 10/20/23  1511   WBC Thousand/uL 7.16   HEMOGLOBIN g/dL 16.5   HEMATOCRIT % 51.3*   PLATELETS Thousands/uL 191   NEUTROS PCT % 43   LYMPHS PCT % 43   MONOS PCT % 10   EOS PCT % 3     Results from last 7 days   Lab Units 10/20/23  1511   POTASSIUM mmol/L 5.6*   CHLORIDE mmol/L 99   CO2 mmol/L 27   BUN mg/dL 11   CREATININE mg/dL 0.93   CALCIUM mg/dL 9.4   ALK PHOS U/L 72   ALT U/L 13   AST U/L 31     Results from last 7 days   Lab Units 10/20/23  1511   INR  1.44*       Imaging: I have personally reviewed pertinent reports. XR chest 1 view portable    Result Date: 10/20/2023  Narrative: CHEST INDICATION:   Bradycardia. COMPARISON: Chest CT 7/13/2022. EXAM PERFORMED/VIEWS:  XR CHEST PORTABLE. FINDINGS: Cardiomediastinal silhouette normal. Lungs clear. No effusion or pneumothorax. Upper abdomen normal. Bones normal for age. Impression: No acute cardiopulmonary disease. Workstation performed: [id number]       ** Please Note: This note has been constructed using a voice recognition system.  **

## 2023-10-20 NOTE — ED NOTES
Pt is very upset that he had to come to the hospital, only one word answers, refusing to elaborate on what is going on and how he is feeling, refusing an IV and blood work at this time. States that he want something to eat.       Michael Najera RN  10/20/23 7623

## 2023-10-20 NOTE — TELEPHONE ENCOUNTER
Loy was not able to  pt as he was not in front of building [facility]. This writer spoke with [employer] who informed that  to come to side entrance inside the parking lot.     was able to  pt and she reminded him that pt has to go to Neurology [address]

## 2023-10-20 NOTE — ASSESSMENT & PLAN NOTE
This is a 28-year-old male with history of CHF with EF 20%, CAD, CVA, hypertension, hyperlipidemia sent to the ED after her neurology appointment for heart rate in the 30s. Patient found to have nonconducted supraventricular impulses was evaluated by cardiology who is recommending transfer to [facility] for pacemaker placement. Internal medicine consulted for medical management while patient awaits a bed.     Continue telemetry monitoring  Avoid AV guille blocking medications  Continue his goal-directed medical therapy for CHF  Further management as per cardiology  Awaiting transfer to [facility]

## 2023-10-20 NOTE — PROGRESS NOTES
received call from ITALIA RAMOS [facility] Coordinator who informed OC that Katheryn Mar was in their lobby waiting on the [phone] [address]  to take him to his Neurology appointment. Sixto Harmon stated that she was with another client from the center and missed a call and when she called the number back it was from [address] [place] [address] stating that they cancelled the trip. Sixto Harmon stated that the pt has been waiting by the double doors since 11am.  Ly[employer] services stated that they had already cancelled the  but she can call to reschedule for another day.

## 2023-10-20 NOTE — ASSESSMENT & PLAN NOTE
History of CAD status post PCI 7/2022  Denies any chest pain  Hold beta-blocker in setting of bradycardia  Continue statin

## 2023-10-20 NOTE — PROGRESS NOTES
YOHANA BECK sent update to [facility] with [facility] that he was approved MA and SNAP and that the next step is transportation a phone. YOHANA BECK placed call to [address] [place] [address] at [facility] indicating that the patient showed up yesterday with a letter from neurology about appointment. She informed him of  time today 10/20. [address] [place], [place] [address]. When she left he was not there yet. YOHANA BECK will continue to remain available for psychosocial support as needed.

## 2023-10-20 NOTE — ASSESSMENT & PLAN NOTE
Wt Readings from Last 3 Encounters:   10/20/23 77.6 kg (171 lb 1.2 oz)   10/20/23 77.6 kg (171 lb)   10/05/23 77.1 kg (170 lb)     Echocardiogram 8/22/2023 revealed ejection fraction 20%,  Appears euvolemic  X-ray negative for any acute cardiopulmonary disease  Continue Entresto, Lasix, statin, Jardiance  Hold beta-blocker  Cardiology for further recommendations

## 2023-10-20 NOTE — ED CARE HANDOFF
Emergency Department Sign Out Note        Sign out and transfer of care from Dr. Vivek Platt. See Separate Emergency Department note. The patient, Joel Shah, was evaluated by the previous provider for bradycardia, pending transfer to \[facility]\"". Workup Completed:  Labs Reviewed   CBC AND DIFFERENTIAL - Abnormal       Result Value Ref Range Status    WBC 7.16  4.31 - 10.16 Thousand/uL Final    RBC 5.33  3.88 - 5.62 Million/uL Final    Hemoglobin 16.5  12.0 - 17.0 g/dL Final    Hematocrit 51.3 (*) 36.5 - 49.3 % Final    MCV 96  82 - 98 fL Final    MCH 31.0  26.8 - 34.3 pg Final    MCHC 32.2  31.4 - 37.4 g/dL Final    RDW 13.2  11.6 - 15.1 % Final    MPV 10.1  8.9 - 12.7 fL Final    Platelets 333  646 - 390 Thousands/uL Final    nRBC 0  /100 WBCs Final    Neutrophils Relative 43  43 - 75 % Final    Immat GRANS % 0  0 - 2 % Final    Lymphocytes Relative 43  14 - 44 % Final    Monocytes Relative 10  4 - 12 % Final    Eosinophils Relative 3  0 - 6 % Final    Basophils Relative 1  0 - 1 % Final    Neutrophils Absolute 3.14  1.85 - 7.62 Thousands/µL Final    Immature Grans Absolute 0.01  0.00 - 0.20 Thousand/uL Final    Lymphocytes Absolute 3.06  0.60 - 4.47 Thousands/µL Final    Monocytes Absolute 0.68  0.17 - 1.22 Thousand/µL Final    Eosinophils Absolute 0.21  0.00 - 0.61 Thousand/µL Final    Basophils Absolute 0.06  0.00 - 0.10 Thousands/µL Final   PROTIME-INR - Abnormal    Protime 17.5 (*) 11.6 - 14.5 seconds Final    INR 1.44 (*) 0.84 - 1.19 Final   COMPREHENSIVE METABOLIC PANEL - Abnormal    Sodium 134 (*) 135 - 147 mmol/L Final    Potassium 5.6 (*) 3.5 - 5.3 mmol/L Final    Comment: Moderately Hemolyzed:Results may be affected.     Chloride 99  96 - 108 mmol/L Final    CO2 27  21 - 32 mmol/L Final    ANION GAP 8  mmol/L Final    BUN 11  5 - 25 mg/dL Final    Creatinine 0.93  0.60 - 1.30 mg/dL Final    Comment: Standardized to IDMS reference method    Glucose 102  65 - 140 mg/dL Final    Comment: If the patient is fasting, the ADA then defines impaired fasting glucose as > 100 mg/dL and diabetes as > or equal to 123 mg/dL. Calcium 9.4  8.4 - 10.2 mg/dL Final    AST 31  13 - 39 U/L Final    Comment: Moderately Hemolyzed:Results may be affected. ALT 13  7 - 52 U/L Final    Comment: Specimen collection should occur prior to Sulfasalazine administration due to the potential for falsely depressed results. Alkaline Phosphatase 72  34 - 104 U/L Final    Total Protein 7.6  6.4 - 8.4 g/dL Final    Comment: Moderately Hemolyzed:Results may be affected. Albumin 4.1  3.5 - 5.0 g/dL Final    Comment: Moderately Hemolyzed:Results may be affected. Total Bilirubin 2.10 (*) 0.20 - 1.00 mg/dL Final    Comment: Use of this assay is not recommended for patients undergoing treatment with eltrombopag due to the potential for falsely elevated results. N-acetyl-p-benzoquinone imine (metabolite of Acetaminophen) will generate erroneously low results in samples for patients that have taken an overdose of Acetaminophen.     eGFR 88  ml/min/1.73sq m Final    Narrative:     [facility] guidelines for Chronic Kid[facility] Disease (CKD):     Stage 1 with normal or high GFR (GFR > 90 mL/min/1.73 square meters)    Stage 2 Mild CKD (GFR = 60-89 mL/min/1.73 square meters)    Stage 3A Moderate CKD (GFR = 45-59 mL/min/1.73 square meters)    Stage 3B Moderate CKD (GFR = 30-44 mL/min/1.73 square meters)    Stage 4 Severe CKD (GFR = 15-29 mL/min/1.73 square meters)    Stage 5 End Stage CKD (GFR <15 mL/min/1.73 square meters)  Note: GFR calculation is accurate only with a steady state creatinine   B-TYPE NATRIURETIC PEPTIDE (BNP) - Abnormal     (*) 0 - 100 pg/mL Final   APTT - Normal    PTT 30  23 - 37 seconds Final    Comment: Therapeutic Heparin Range =  60-90 seconds   HS TROPONIN I 0HR - Normal    hs TnI 0hr 10  "Refer to ACS Flowchart"- see link ng/L Final    Comment:                                              Initial (time 0) result  If >=50 ng/L, Myocardial injury suggested ;  Type of myocardial injury and treatment strategy  to be determined. If 5-49 ng/L, a delta result at 2 hours and or 4 hours will be needed to further evaluate. If <4 ng/L, and chest pain has been >3 hours since onset, patient may qualify for discharge based on the HEART score in the ED. If <5 ng/L and <3hours since onset of chest pain, a delta result at 2 hours will be needed to further evaluate. HS Troponin 99th Percentile URL of a Health Population=12 ng/L with a 95% Confidence Interval of 8-18 ng/L. Second Troponin (time 2 hours)  If calculated delta >= 20 ng/L,  Myocardial injury suggested ; Type of myocardial injury and treatment strategy to be determined. If 5-49 ng/L and the calculated delta is 5-19 ng/L, consult medical service for evaluation. Continue evaluation for ischemia on ecg and other possible etiology and repeat hs troponin at 4 hours. If delta is <5 ng/L at 2 hours, consider discharge based on risk stratification via the HEART score (if in ED), or GEMMA risk score in IP/Observation. HS Troponin 99th Percentile URL of a Health Population=12 ng/L with a 95% Confidence Interval of 8-18 ng/L.   HS TROPONIN I 2HR - Normal    hs TnI 2hr 11  "Refer to ACS Flowchart"- see link ng/L Final    Comment:                                              Initial (time 0) result  If >=50 ng/L, Myocardial injury suggested ;  Type of myocardial injury and treatment strategy  to be determined. If 5-49 ng/L, a delta result at 2 hours and or 4 hours will be needed to further evaluate. If <4 ng/L, and chest pain has been >3 hours since onset, patient may qualify for discharge based on the HEART score in the ED. If <5 ng/L and <3hours since onset of chest pain, a delta result at 2 hours will be needed to further evaluate. HS Troponin 99th Percentile URL of a Health Population=12 ng/L with a 95% Confidence Interval of 8-18 ng/L.     Second Troponin (time 2 hours)  If calculated delta >= 20 ng/L,  Myocardial injury suggested ; Type of myocardial injury and treatment strategy to be determined. If 5-49 ng/L and the calculated delta is 5-19 ng/L, consult medical service for evaluation. Continue evaluation for ischemia on ecg and other possible etiology and repeat hs troponin at 4 hours. If delta is <5 ng/L at 2 hours, consider discharge based on risk stratification via the HEART score (if in ED), or GEMMA risk score in IP/Observation. HS Troponin 99th Percentile URL of a Health Population=12 ng/L with a 95% Confidence Interval of 8-18 ng/L. Delta 2hr hsTnI 1  <20 ng/L Final   HS TROPONIN I 4HR - Normal    hs TnI 4hr 16  "Refer to ACS Flowchart"- see link ng/L Final    Comment:                                              Initial (time 0) result  If >=50 ng/L, Myocardial injury suggested ;  Type of myocardial injury and treatment strategy  to be determined. If 5-49 ng/L, a delta result at 2 hours and or 4 hours will be needed to further evaluate. If <4 ng/L, and chest pain has been >3 hours since onset, patient may qualify for discharge based on the HEART score in the ED. If <5 ng/L and <3hours since onset of chest pain, a delta result at 2 hours will be needed to further evaluate. HS Troponin 99th Percentile URL of a Health Population=12 ng/L with a 95% Confidence Interval of 8-18 ng/L. Second Troponin (time 2 hours)  If calculated delta >= 20 ng/L,  Myocardial injury suggested ; Type of myocardial injury and treatment strategy to be determined. If 5-49 ng/L and the calculated delta is 5-19 ng/L, consult medical service for evaluation. Continue evaluation for ischemia on ecg and other possible etiology and repeat hs troponin at 4 hours. If delta is <5 ng/L at 2 hours, consider discharge based on risk stratification via the HEART score (if in ED), or GEMMA risk score in IP/Observation.     HS Troponin 99th Percentile URL of a Health Population=12 ng/L with a 95% Confidence Interval of 8-18 ng/L. Delta 4hr hsTnI 6  <20 ng/L Final       XR chest 1 view portable   Final Result      No acute cardiopulmonary disease. Workstation performed: [id number]               ED Course / Workup Pending (followup):                                    ED Course as of 10/20/23 2134   Fri Oct 20, 2023   1839 Assumed care from Dr. Suki Mei    Asymptomatic bradycardia, accepted for transfer to [facility] for cards/pacemaker. Transport not available until tomorrow    [facility] consulted for medical management while awaiting transfer. Procedures  Medical Decision Making  Amount and/or Complexity of Data Reviewed  Labs: ordered. Radiology: ordered.             Disposition  Final diagnoses:   Bradycardia   Type 2 diabetes mellitus without complication, without long-term current use of insulin (HCC)   Seizure ([address])   Ischemic cardiomyopathy   Coronary artery disease involving native coronary artery of native heart without angina pectoris   Cerebrovascular accident (CVA), unspecified mechanism ([address])   Mixed hyperlipidemia     Time reflects when diagnosis was documented in both MDM as applicable and the Disposition within this note       Time User Action Codes Description Comment    10/20/2023  3:50 PM Guerry Klippel Add [R00.1] Bradycardia     10/20/2023  5:36 PM Guerry Klippel Add [E11.9] Type 2 diabetes mellitus without complication, without long-term current use of insulin ([address])     10/20/2023  5:37 PM Guerry Klippel Add [R56.9] Seizure ([address])     10/20/2023  5:37 PM Guerry Klippel Add [I25.5] Ischemic cardiomyopathy     10/20/2023  5:37 PM Guerry Klippel Add [I25.10] Coronary artery disease involving native coronary artery of native heart without angina pectoris     10/20/2023  5:37 PM Guerry Klippel Add [I63.9] Cerebrovascular accident (CVA), unspecified mechanism ([address])     10/20/2023  5:37 PM Guerry Klippel Add [E78.2] Mixed hyperlipidemia           ED Disposition ED Disposition   Transfer to Another Facility-In Network    Condition   --    Date/Time   Fri Oct 20, 2023  4:48 PM    Comment   Loletha Brittle should be transferred out to [facility]. MD Documentation      Js [place]ro Most Recent Value   Patient Condition The patient has been stabilized such that within reasonable medical probability, no material deterioration of the patient condition or the condition of the unborn child(bárbara) is likely to result from the transfer   Reason for Transfer Level of Care needed not available at this facility   Benefits of Transfer Specialized equipment and/or services available at the receiving facility (Include comment)________________________, Continuity of care   Risks of Transfer Potential for delay in receiving treatment   Accepting Physician Dr. Sloane Esquivel Name, [address] [place] [address]   Sending MD Dr. Tonya Moran   Provider Certification General risk, such as traffic hazards, adverse weather conditions, rough terrain or turbulence, possible failure of equipment (including vehicle or aircraft), or consequences of actions of persons outside the control of the transport personnel          RN Documentation      [address] Name, [address] [place] [address]          Follow-up Information    None       Patient's Medications   Discharge Prescriptions    No medications on file     No discharge procedures on file.        ED Provider  Electronically Signed by     Lily Arroyo DO  10/20/23 5412

## 2023-10-21 ENCOUNTER — HOSPITAL ENCOUNTER (INPATIENT)
Facility: HOSPITAL | Age: 63
LOS: 5 days | Discharge: HOME | DRG: 277 | End: 2023-10-26
Attending: STUDENT IN AN ORGANIZED HEALTH CARE EDUCATION/TRAINING PROGRAM | Admitting: STUDENT IN AN ORGANIZED HEALTH CARE EDUCATION/TRAINING PROGRAM
Payer: COMMERCIAL

## 2023-10-21 VITALS
DIASTOLIC BLOOD PRESSURE: 71 MMHG | BODY MASS INDEX: 26.01 KG/M2 | TEMPERATURE: 98.4 F | OXYGEN SATURATION: 97 % | WEIGHT: 171.08 LBS | HEART RATE: 65 BPM | RESPIRATION RATE: 17 BRPM | SYSTOLIC BLOOD PRESSURE: 97 MMHG

## 2023-10-21 DIAGNOSIS — I25.5 ISCHEMIC CARDIOMYOPATHY: ICD-10-CM

## 2023-10-21 DIAGNOSIS — R00.1 BRADYCARDIA: Primary | ICD-10-CM

## 2023-10-21 DIAGNOSIS — I50.22 CHRONIC SYSTOLIC HEART FAILURE (HCC): ICD-10-CM

## 2023-10-21 LAB
ANION GAP SERPL CALCULATED.3IONS-SCNC: 10 MMOL/L
BUN SERPL-MCNC: 18 MG/DL (ref 5–25)
CALCIUM SERPL-MCNC: 9.8 MG/DL (ref 8.4–10.2)
CHLORIDE SERPL-SCNC: 99 MMOL/L (ref 96–108)
CO2 SERPL-SCNC: 29 MMOL/L (ref 21–32)
CREAT SERPL-MCNC: 0.85 MG/DL (ref 0.6–1.3)
GFR SERPL CREATININE-BSD FRML MDRD: 94 ML/MIN/1.73SQ M
GLUCOSE SERPL-MCNC: 105 MG/DL (ref 65–140)
GLUCOSE SERPL-MCNC: 106 MG/DL (ref 65–140)
GLUCOSE SERPL-MCNC: 99 MG/DL (ref 65–140)
POTASSIUM SERPL-SCNC: 3.5 MMOL/L (ref 3.5–5.3)
SODIUM SERPL-SCNC: 138 MMOL/L (ref 135–147)

## 2023-10-21 PROCEDURE — 82948 REAGENT STRIP/BLOOD GLUCOSE: CPT

## 2023-10-21 PROCEDURE — 80048 BASIC METABOLIC PNL TOTAL CA: CPT | Performed by: STUDENT IN AN ORGANIZED HEALTH CARE EDUCATION/TRAINING PROGRAM

## 2023-10-21 PROCEDURE — 99223 1ST HOSP IP/OBS HIGH 75: CPT | Performed by: STUDENT IN AN ORGANIZED HEALTH CARE EDUCATION/TRAINING PROGRAM

## 2023-10-21 PROCEDURE — 99214 OFFICE O/P EST MOD 30 MIN: CPT

## 2023-10-21 RX ORDER — ASPIRIN 81 MG/1
81 TABLET, CHEWABLE ORAL DAILY
Status: DISCONTINUED | OUTPATIENT
Start: 2023-10-21 | End: 2023-10-26 | Stop reason: HOSPADM

## 2023-10-21 RX ORDER — ATORVASTATIN CALCIUM 80 MG/1
80 TABLET, FILM COATED ORAL
Status: DISCONTINUED | OUTPATIENT
Start: 2023-10-21 | End: 2023-10-26 | Stop reason: HOSPADM

## 2023-10-21 RX ORDER — INSULIN LISPRO 100 [IU]/ML
1-5 INJECTION, SOLUTION INTRAVENOUS; SUBCUTANEOUS
Status: DISCONTINUED | OUTPATIENT
Start: 2023-10-21 | End: 2023-10-21

## 2023-10-21 RX ORDER — METOPROLOL SUCCINATE 25 MG/1
25 TABLET, EXTENDED RELEASE ORAL DAILY
Status: DISCONTINUED | OUTPATIENT
Start: 2023-10-21 | End: 2023-10-21

## 2023-10-21 RX ORDER — MAGNESIUM HYDROXIDE/ALUMINUM HYDROXICE/SIMETHICONE 120; 1200; 1200 MG/30ML; MG/30ML; MG/30ML
30 SUSPENSION ORAL EVERY 6 HOURS PRN
Status: DISCONTINUED | OUTPATIENT
Start: 2023-10-21 | End: 2023-10-26 | Stop reason: HOSPADM

## 2023-10-21 RX ORDER — ONDANSETRON 2 MG/ML
4 INJECTION INTRAMUSCULAR; INTRAVENOUS EVERY 6 HOURS PRN
Status: DISCONTINUED | OUTPATIENT
Start: 2023-10-21 | End: 2023-10-26 | Stop reason: HOSPADM

## 2023-10-21 RX ORDER — ACETAMINOPHEN 325 MG/1
650 TABLET ORAL EVERY 8 HOURS PRN
Status: DISCONTINUED | OUTPATIENT
Start: 2023-10-21 | End: 2023-10-26 | Stop reason: HOSPADM

## 2023-10-21 RX ORDER — SENNOSIDES 8.6 MG
1 TABLET ORAL
Status: DISCONTINUED | OUTPATIENT
Start: 2023-10-21 | End: 2023-10-26 | Stop reason: HOSPADM

## 2023-10-21 RX ORDER — FUROSEMIDE 20 MG/1
20 TABLET ORAL DAILY
Status: DISCONTINUED | OUTPATIENT
Start: 2023-10-21 | End: 2023-10-25

## 2023-10-21 RX ADMIN — ATORVASTATIN CALCIUM 80 MG: 80 TABLET, FILM COATED ORAL at 17:24

## 2023-10-21 RX ADMIN — ASPIRIN 81 MG CHEWABLE TABLET 81 MG: 81 TABLET CHEWABLE at 08:53

## 2023-10-21 RX ADMIN — APIXABAN 5 MG: 5 TABLET, FILM COATED ORAL at 08:54

## 2023-10-21 RX ADMIN — EMPAGLIFLOZIN 10 MG: 10 TABLET, FILM COATED ORAL at 09:38

## 2023-10-21 RX ADMIN — APIXABAN 5 MG: 5 TABLET, FILM COATED ORAL at 17:24

## 2023-10-21 NOTE — H&P
[facility]  H&P  Name: Lul Schmitz 65 y.o. male I MRN: [id number]  Unit/Bed#: [facility]-01 I Date of Admission: 10/21/2023   Date of Service: 10/21/2023 I Hospital Day: 0      Assessment/Plan   * Bradycardia  Assessment & Plan  Patient was seen in neurology's office yesterday when he was noted to have bradycardia and was referred to ED. Patient evaluated by cardiology, and metoprolol was held. On telemetry patient still with frequent dropped beats. In past patient has refused for ICD/PPM but agreeable at this time. Transferred to [facility] for EP eval and possible pacemaker placement. Consult EP. Monitor on telemetry. Chronic systolic heart failure [facility])  Assessment & Plan  Wt Readings from Last 3 Encounters:   10/20/23 77.6 kg (171 lb 1.2 oz)   10/20/23 77.6 kg (171 lb)   10/05/23 77.1 kg (170 lb)     Ischemic cardiomyopathy. Echocardiogram from 8/22/2023 with LVEF of 20%. Patient was on LifeVest but returned in January. BNP slightly elevated, chest x-ray unremarkable for any vascular congestion. Appears euvolemic on exam.  Continue Entresto, Lasix, Jardiance and statin. We will continue to hold beta-blocker until evaluated by EP. Monitor on telemetry. Hyperkalemia  Assessment & Plan  On presentation to [address] [place] [address] noted to have potassium of 5.6, however specimen was moderately hemolyzed. No repeat BMP from a.m., will obtain stat BMP. Cerebrovascular accident (CVA) [facility])  Assessment & Plan  History of CVA in 2020, was on aspirin, Brilinta and statin however despite being on above had left MCA stroke on 8/21/2023. Brilinta switched to Eliquis, continue same. Continue aspirin and statin. Type 2 diabetes mellitus ([address])  Assessment & Plan  Lab Results   Component Value Date    HGBA1C 6.3 (H) 08/22/2023       No results for input(s): "POCGLU" in the last 72 hours.     Blood Sugar Average: Last 72 hrs:      According to patient he was on was for heart failure but not for diabetes, his blood sugars are well controlled with diet regimen alone. Will start on sliding scale and hypoglycemia protocol. Carb counting diet. Hypertension  Assessment & Plan  Patient is on metoprolol, Lasix and Entresto. Continue Lasix and Entresto, will hold metoprolol in setting of bradycardia. Hyperlipidemia  Assessment & Plan  Continue atorvastatin at home dosage. Coronary artery disease  Assessment & Plan  History noted, s/p ALEJANDRA to mid LAD in July 2022. Continue aspirin, statin and Eliquis. Metoprolol hold in setting of bradycardia. VTE Pharmacologic Prophylaxis: VTE Score: 3 Moderate Risk (Score 3-4) - Pharmacological DVT Prophylaxis Ordered: apixaban (Eliquis). Code Status: Level 1 - Full Code per patient   Discussion with family: Patient declined call to . Anticipated Length of Stay: Patient will be admitted on an inpatient basis with an anticipated length of stay of greater than 2 midnights secondary to EP eval for PPM.    Total Time Spent on Date of Encounter in care of patient: 62 mins. This time was spent on one or more of the following: performing physical exam; counseling and coordination of care; obtaining or reviewing history; documenting in the medical record; reviewing/ordering tests, medications or procedures; communicating with other healthcare professionals and discussing with patient's family/caregivers. Chief Complaint: Bradycardia    History of Present Illness:  Wilder Coker is a 65 y.o. male with a PMH of heart failure with reduced EF of 20% in setting of ischemic cardiomyopathy, CVA, hypertension who presents with bradycardia. Patient was being evaluated in neurology's office yesterday when he was noted to be bradycardic although remained asymptomatic. He was sent to [facility] ED, where he was evaluated by cardiology.   His metoprolol was held and patient monitored on telemetry, despite discontinuation of metoprolol continued to have frequent dropped beats. Apparently patient was on LifeVest in the past that he has returned back in January, there have been discussions for ICD/PPM which she had refused in the past but now agreeable hence patient transferred to [facility] for EP eval and possible pacemaker placement. Of my evaluation patient sitting comfortably on chair. Being asked for LifeVest, he replied he did not think he needs to be on LifeVest especially with the cost hence returned it back last year. He knows he is transferred for bradycardia and is agreeable for pacemaker placement. Review of Systems:  Review of Systems   Constitutional:  Negative for activity change and appetite change. Respiratory:  Negative for cough and shortness of breath. Cardiovascular:  Negative for chest pain, palpitations and leg swelling. Neurological:  Negative for tremors. All other systems reviewed and are negative. Past Medical and Surgical History:   Past Medical History:   Diagnosis Date    Coronary artery disease     CVA (cerebral vascular accident) ([address])     R sided, dysarthria, March 2020 in [address] [place] [address])     Hyperlipidemia     Hypertension     Pre-diabetes     STEMI (ST elevation myocardial infarction) [facility])        Past Surgical History:   Procedure Laterality Date    CARDIAC CATHETERIZATION N/A 7/13/2022    Procedure: Cardiac pci;  Surgeon: Salima Rolon MD;  Location: [facility] CARDIAC [facility]ATH LAB; Service: Cardiology    CARDIAC CATHETERIZATION N/A 7/13/2022    Procedure: Cardiac Coronary Angiogram;  Surgeon: Salima Rolon MD;  [facility] CARD[facility]AC [facility]; Service: Cardiology    CARDIAC CATHETERIZATION Left 7/13/2022    Procedure: Cardiac Left Heart Cath;  Surgeon: Salima Rolon MD;  Location: [facility] CARDIAC [facility]; Service: Cardiology       Meds/Allergies:  Prior to Admission medications    Medication Sig Start Date End Date Taking?  Authorizing Provider   apixaban (Eliquis) 5 mg Take 1 tablet (5 mg total) by mouth 2 (two) times a day 9/21/23   Juliano Chavez PA-C   aspirin 81 mg chewable tablet Chew 1 tablet (81 mg total) daily 9/21/23 3/19/24  Juliano Chavez PA-C   atorvastatin (LIPITOR) 80 mg tablet Take 1 tablet (80 mg total) by mouth daily with dinner 9/21/23 3/19/24  Juliano Chavez PA-C   Empagliflozin (JARDIANCE) 10 MG TABS tablet Take 1 tablet (10 mg total) by mouth every morning 9/21/23 3/19/24  Juliano Chavez PA-C   furosemide (LASIX) 20 mg tablet Take 1 tablet (20 mg total) by mouth daily 9/21/23 3/19/24  Juliano Chavez PA-C   metoprolol succinate (TOPROL-XL) 25 mg 24 hr tablet Take 1 tablet (25 mg total) by mouth daily 9/21/23 3/19/24  Juliano Chavez PA-C   nitroglycerin (NITROSTAT) 0.4 mg SL tablet Place 1 tablet (0.4 mg total) under the tongue every 5 (five) minutes as needed for chest pain for up to 3 days  Patient not taking: Reported on 8/31/2023 7/16/22 1/5/23  May Manjitu Anuradha Lewis MD   sacubitril-valsartan Nora White) 24-26 MG TABS Take 1 tablet by mouth 2 (two) times a day 9/21/23   Juliano Chavez PA-C     I have reviewed home medications with patient personally. Allergies:    Allergies   Allergen Reactions    Pollen Extract Allergic Rhinitis       Social History:  Marital Status: Unknown   Occupation: Employed  Patient Pre-hospital Living Situation: Home  Patient Pre-hospital Level of Mobility: walks  Patient Pre-hospital Diet Restrictions: none  Substance Use History:   Social History     Substance and Sexual Activity   Alcohol Use None     Social History     Tobacco Use   Smoking Status Never   Smokeless Tobacco Never     Social History     Substance and Sexual Activity   Drug Use Not on file       Family History:  Family History   Problem Relation Age of Onset    Dementia Mother     Heart disease Father     Dementia Maternal Aunt     Dementia Maternal Uncle     Cancer Maternal Uncle        Physical Exam:     Vitals:   Blood Pressure: 104/69 (10/21/23 1504)  Pulse: 69 (10/21/23 1504)  Temperature: 98.4 °F (36.9 °C) (10/21/23 1504)  Respirations: 16 (10/21/23 1504)  SpO2: 94 % (10/21/23 1504)    Physical Exam  Constitutional:       Appearance: Normal appearance. HENT:      Head: Normocephalic and atraumatic. Mouth/Throat:      Mouth: Mucous membranes are dry. Pharynx: Oropharynx is clear. Eyes:      Extraocular Movements: Extraocular movements intact. Conjunctiva/sclera: Conjunctivae normal.   Cardiovascular:      Rate and Rhythm: Normal rate and regular rhythm. Pulses: Normal pulses. Heart sounds: Normal heart sounds. Pulmonary:      Effort: Pulmonary effort is normal.      Comments: Dec breath sounds b/l. Abdominal:      General: Bowel sounds are normal.      Palpations: Abdomen is soft. Musculoskeletal:         General: Normal range of motion. Cervical back: Normal range of motion. Skin:     General: Skin is warm and dry. Neurological:      Mental Status: He is alert and oriented to person, place, and time. Comments: Patient with blunt expressions at time of my evaluation, per chart review expressive aphasia from stroke, can speak in full sentences though.            Additional Data:     Lab Results:  Results from last 7 days   Lab Units 10/20/23  1511   WBC Thousand/uL 7.16   HEMOGLOBIN g/dL 16.5   HEMATOCRIT % 51.3*   PLATELETS Thousands/uL 191   NEUTROS PCT % 43   LYMPHS PCT % 43   MONOS PCT % 10   EOS PCT % 3     Results from last 7 days   Lab Units 10/20/23  1511   SODIUM mmol/L 134*   POTASSIUM mmol/L 5.6*   CHLORIDE mmol/L 99   CO2 mmol/L 27   BUN mg/dL 11   CREATININE mg/dL 0.93   ANION GAP mmol/L 8   CALCIUM mg/dL 9.4   ALBUMIN g/dL 4.1   TOTAL BILIRUBIN mg/dL 2.10*   ALK PHOS U/L 72   ALT U/L 13   AST U/L 31   GLUCOSE RANDOM mg/dL 102     Results from last 7 days   Lab Units 10/20/23  1511   INR  1.44*                   Lines/Drains:  Invasive Devices       Peripheral Intravenous Line  Duration             Peripheral IV 10/20/23 Dorsal (posterior); Right Forearm 1 day                        Imaging: Reviewed radiology reports from this admission including: chest xray  No orders to display       EKG and Other Studies Reviewed on Admission:   EKG: NSR. HR 82 with premature atrial complexes. ** Please Note: This note has been constructed using a voice recognition system.  **

## 2023-10-21 NOTE — PROGRESS NOTES
Progress Note - Cardiology   Carolin Sharp 65 y.o. male MRN: [id number]  Unit/Bed#: ED-04 Encounter: [id number]      Assessment/Recommendations/Discussion:   Bradycardia with intermittent nonconducted supraventricular beats, at times in a pattern of 3:1  Heart failure with reduced EF, last EF 20%, ischemic cardiomyopathy  CVA, left MCA CVA in August 2023  STEMI October 2022  Hyperlipidemia next hypertension  Type 2 diabetes  Seizure disorder  Obesity      PLAN  Still with frequent dropped beats on telemetry  Awaiting transfer to [address] [place] [address] for pacemaker ICD given reduced EF and bradycardia/significant underlying conduction system disease  Hold AV node blocking medications  Continue with goal-directed medical therapy for heart failure with Jardiance Lasix Entresto  Continue aspirin for stroke and CAD  Continue Eliquis for anticoagulation    Subjective:   HPI  Doing well, no acute events overnight. Telemetry with dropped beats still and occasional bradycardia      Review of Systems: As noted in HPI. Rest of ROS is negative. Vitals:   /89 (BP Location: Right arm)   Pulse 74   Temp 98.1 °F (36.7 °C) (Oral)   Resp 18   Wt 77.6 kg (171 lb 1.2 oz)   SpO2 99%   BMI 26.01 kg/m²   I/O       None          Weight (last 2 days)       Date/Time Weight    10/20/23 1335 77.6 (171.08)            Physical Exam   Constitutional: awake, alert and oriented, in no acute distress, no obvious deformities  Head: Normocephalic, without obvious abnormality, atraumatic  Eyes: conjunctivae clear and moist. Sclera anicteric. No xanthelasmas. Pupils equal bilaterally. Extraocular motions are full. Ear nose mouth and throat: ears are symmetrical bilaterally, hearing appears to be equal bilaterally, no nasal discharge or epistaxis, oropharynx is clear with moist mucous membranes  Neck: Trachea is midline, neck is supple, no thyromegaly or significant lymphadenopathy, there is full range of motion.   Lungs: clear to auscultation bilaterally, no wheezes, no rales, no rhonchi, no accessory muscle use, breathing is nonlabored  Heart: regular rate and rhythm, S1, S2 normal, no murmur, no click, no rub and no gallop, no lower extremity edema  Abdomen: soft, non-tender; bowel sounds normal; no masses, no organomegaly  Skin: Skin is warm, dry, intact. No obvious rashes or lesions on exposed extremities. Nail beds are pink with no cyanosis or clubbing. Neurologic.   His word finding difficulty after stroke    TELEMETRY: Sinus with occasional nonconducted supraventricular beats  Lab Results:  Results from last 7 days   Lab Units 10/20/23  1511   WBC Thousand/uL 7.16   HEMOGLOBIN g/dL 16.5   HEMATOCRIT % 51.3*   PLATELETS Thousands/uL 191     Results from last 7 days   Lab Units 10/20/23  1511   POTASSIUM mmol/L 5.6*   CHLORIDE mmol/L 99   CO2 mmol/L 27   BUN mg/dL 11   CREATININE mg/dL 0.93   CALCIUM mg/dL 9.4   ALK PHOS U/L 72   ALT U/L 13   AST U/L 31     Results from last 7 days   Lab Units 10/20/23  1511   POTASSIUM mmol/L 5.6*   CHLORIDE mmol/L 99   CO2 mmol/L 27   BUN mg/dL 11   CREATININE mg/dL 0.93   CALCIUM mg/dL 9.4           Medications:    Current Facility-Administered Medications:     apixaban (ELIQUIS) tablet 5 mg, 5 mg, Oral, BID, Johana Mora MD, 5 mg at 10/21/23 6012    aspirin chewable tablet 81 mg, 81 mg, Oral, Daily, Johana Mora MD, 81 mg at 10/21/23 0853    atorvastatin (LIPITOR) tablet 80 mg, 80 mg, Oral, Daily With Vamshi Gomez MD, 80 mg at 10/20/23 1817    Empagliflozin (JARDIANCE) tablet 10 mg, 10 mg, Oral, QAM, Johana Mora MD, 10 mg at 10/21/23 4266    furosemide (LASIX) tablet 20 mg, 20 mg, Oral, Daily, Johana Mora MD    sacubitril-valsartan (ENTRESTO) 24-26 MG per tablet 1 tablet, 1 tablet, Oral, BID, Johana Mora MD, 1 tablet at 10/20/23 6395    Current Outpatient Medications:     apixaban (Eliquis) 5 mg, Take 1 tablet (5 mg total) by mouth 2 (two) times a day, Disp: 60 tablet, Rfl: 5 aspirin 81 mg chewable tablet, Chew 1 tablet (81 mg total) daily, Disp: 90 tablet, Rfl: 1    atorvastatin (LIPITOR) 80 mg tablet, Take 1 tablet (80 mg total) by mouth daily with dinner, Disp: 90 tablet, Rfl: 1    Empagliflozin (JARDIANCE) 10 MG TABS tablet, Take 1 tablet (10 mg total) by mouth every morning, Disp: 90 tablet, Rfl: 1    furosemide (LASIX) 20 mg tablet, Take 1 tablet (20 mg total) by mouth daily, Disp: 90 tablet, Rfl: 1    metoprolol succinate (TOPROL-XL) 25 mg 24 hr tablet, Take 1 tablet (25 mg total) by mouth daily, Disp: 90 tablet, Rfl: 1    nitroglycerin (NITROSTAT) 0.4 mg SL tablet, Place 1 tablet (0.4 mg total) under the tongue every 5 (five) minutes as needed for chest pain for up to 3 days (Patient not taking: Reported on 8/31/2023), Disp: 10 tablet, Rfl: 0    sacubitril-valsartan (Entresto) 24-26 MG TABS, Take 1 tablet by mouth 2 (two) times a day, Disp: 180 tablet, Rfl: 1    Portions of the record may have been created with voice recognition software. Occasional wrong word or "sound a like" substitutions may have occurred due to the inherent limitations of voice recognition software. Read the chart carefully and recognize, using context, where substitutions have occurred.     Juana Mazariegos DO, [facility]  10/21/2023 10:59 AM

## 2023-10-21 NOTE — ED NOTES
Pt's AM meds requested from Pharmacy via [facility].      Lorena Wu RN  10/21/23 4143

## 2023-10-21 NOTE — ASSESSMENT & PLAN NOTE
On presentation to [facility] noted to have potassium of 5.6, however specimen was moderately hemolyzed. No repeat BMP from a.m., will obtain stat BMP.

## 2023-10-21 NOTE — Clinical Note
Anesthesia watching patient for a few moments as patient wakes up from medications given for procedure

## 2023-10-21 NOTE — ASSESSMENT & PLAN NOTE
History of CVA in 2020, was on aspirin, Brilinta and statin however despite being on above had left MCA stroke on 8/21/2023. Brilinta switched to Eliquis, continue same. Continue aspirin and statin.

## 2023-10-21 NOTE — ASSESSMENT & PLAN NOTE
Wt Readings from Last 3 Encounters:   10/20/23 77.6 kg (171 lb 1.2 oz)   10/20/23 77.6 kg (171 lb)   10/05/23 77.1 kg (170 lb)     Ischemic cardiomyopathy. Echocardiogram from 8/22/2023 with LVEF of 20%. Patient was on LifeVest but returned in January. BNP slightly elevated, chest x-ray unremarkable for any vascular congestion. Appears euvolemic on exam.  Continue Entresto, Lasix, Jardiance and statin. We will continue to hold beta-blocker until evaluated by EP. Monitor on telemetry.

## 2023-10-21 NOTE — Clinical Note
Site (pad location): flank. Laterality: right. Grounding pad site assessment: skin integrity intact.

## 2023-10-21 NOTE — ED NOTES
SLIM provider made aware of pt's BP. Per SLIM continue to monitor for now and update if MAP less than 65 or if pt becomes symptomatic.       Andreia Roman RN  10/20/23 4630

## 2023-10-21 NOTE — ASSESSMENT & PLAN NOTE
Patient was seen in neurology's office yesterday when he was noted to have bradycardia and was referred to ED. Patient evaluated by cardiology, and metoprolol was held. On telemetry patient still with frequent dropped beats. In past patient has refused for ICD/PPM but agreeable at this time. Transferred to [facility] for EP eval and possible pacemaker placement. Consult EP. Monitor on telemetry.

## 2023-10-21 NOTE — ASSESSMENT & PLAN NOTE
History noted, s/p ALEJANDRA to mid LAD in July 2022. Continue aspirin, statin and Eliquis. Metoprolol hold in setting of bradycardia.

## 2023-10-21 NOTE — ASSESSMENT & PLAN NOTE
Patient is on metoprolol, Lasix and Entresto. Continue Lasix and Entresto, will hold metoprolol in setting of bradycardia.

## 2023-10-21 NOTE — ED NOTES
[facility] multiple times to MACARIO , which was phone number provided by MACARIO M/S 5 charge RN, to give report as pt has likely already arrived at [facility]. Phone number as given by PACS was not answered, SL  was not able to get an answer, and phone number given by charge was not answered on multiple occasions. Keke Husbands them answered phone,  at [phone], states "I'm in the middle of something" and hangs up.       Berto Sierra RN  10/21/23 0249

## 2023-10-21 NOTE — Clinical Note
The ICD COBALT XT DR MRI IS1 DF4 - [id number] device was inserted. The leads were placed into the connector and visually verified to be in correct position. Injury current obtained.

## 2023-10-21 NOTE — ASSESSMENT & PLAN NOTE
Lab Results   Component Value Date    HGBA1C 6.3 (H) 08/22/2023       No results for input(s): "POCGLU" in the last 72 hours. Blood Sugar Average: Last 72 hrs:      According to patient he was on was for heart failure but not for diabetes, his blood sugars are well controlled with diet regimen alone. Will start on sliding scale and hypoglycemia protocol. Carb counting diet.

## 2023-10-21 NOTE — ED NOTES
1423 - TT sent to Shira Espitia RN to call this RN at [facility] and phone number provided. 1322 - No callback received.  RN called again to  for assistance who states that "no one is picking up."     Aron Jennings RN  10/21/23 2668

## 2023-10-21 NOTE — ED NOTES
TT sent to Dr. Aurelio Menchaca, regarding pt's daily meds and diet order status. Awaiting reply.      Aron Jennings RN  10/21/23 8113

## 2023-10-21 NOTE — QUICK NOTE
Patient refused for Accu-Cheks and sliding scale, stating that his diabetes is well controlled with diet at home  Will DC orders.

## 2023-10-22 LAB
ANION GAP SERPL CALCULATED.3IONS-SCNC: 6 MMOL/L
BUN SERPL-MCNC: 17 MG/DL (ref 5–25)
CALCIUM SERPL-MCNC: 9.1 MG/DL (ref 8.4–10.2)
CHLORIDE SERPL-SCNC: 101 MMOL/L (ref 96–108)
CO2 SERPL-SCNC: 32 MMOL/L (ref 21–32)
CREAT SERPL-MCNC: 0.92 MG/DL (ref 0.6–1.3)
ERYTHROCYTE [DISTWIDTH] IN BLOOD BY AUTOMATED COUNT: 13.4 % (ref 11.6–15.1)
GFR SERPL CREATININE-BSD FRML MDRD: 89 ML/MIN/1.73SQ M
GLUCOSE SERPL-MCNC: 86 MG/DL (ref 65–140)
HCT VFR BLD AUTO: 50.4 % (ref 36.5–49.3)
HGB BLD-MCNC: 16.1 G/DL (ref 12–17)
MAGNESIUM SERPL-MCNC: 2 MG/DL (ref 1.9–2.7)
MCH RBC QN AUTO: 30.6 PG (ref 26.8–34.3)
MCHC RBC AUTO-ENTMCNC: 31.9 G/DL (ref 31.4–37.4)
MCV RBC AUTO: 96 FL (ref 82–98)
PLATELET # BLD AUTO: 186 THOUSANDS/UL (ref 149–390)
PMV BLD AUTO: 10.3 FL (ref 8.9–12.7)
POTASSIUM SERPL-SCNC: 3.7 MMOL/L (ref 3.5–5.3)
RBC # BLD AUTO: 5.26 MILLION/UL (ref 3.88–5.62)
SODIUM SERPL-SCNC: 139 MMOL/L (ref 135–147)
WBC # BLD AUTO: 5.61 THOUSAND/UL (ref 4.31–10.16)

## 2023-10-22 PROCEDURE — 85027 COMPLETE CBC AUTOMATED: CPT | Performed by: STUDENT IN AN ORGANIZED HEALTH CARE EDUCATION/TRAINING PROGRAM

## 2023-10-22 PROCEDURE — 83735 ASSAY OF MAGNESIUM: CPT | Performed by: STUDENT IN AN ORGANIZED HEALTH CARE EDUCATION/TRAINING PROGRAM

## 2023-10-22 PROCEDURE — 99255 IP/OBS CONSLTJ NEW/EST HI 80: CPT | Performed by: STUDENT IN AN ORGANIZED HEALTH CARE EDUCATION/TRAINING PROGRAM

## 2023-10-22 PROCEDURE — 99232 SBSQ HOSP IP/OBS MODERATE 35: CPT | Performed by: INTERNAL MEDICINE

## 2023-10-22 PROCEDURE — 80048 BASIC METABOLIC PNL TOTAL CA: CPT | Performed by: STUDENT IN AN ORGANIZED HEALTH CARE EDUCATION/TRAINING PROGRAM

## 2023-10-22 RX ORDER — CEFAZOLIN SODIUM 1 G/50ML
1000 SOLUTION INTRAVENOUS ONCE
Status: DISCONTINUED | OUTPATIENT
Start: 2023-10-23 | End: 2023-10-23

## 2023-10-22 RX ADMIN — ATORVASTATIN CALCIUM 80 MG: 80 TABLET, FILM COATED ORAL at 18:39

## 2023-10-22 RX ADMIN — ASPIRIN 81 MG CHEWABLE TABLET 81 MG: 81 TABLET CHEWABLE at 09:29

## 2023-10-22 RX ADMIN — EMPAGLIFLOZIN 10 MG: 10 TABLET, FILM COATED ORAL at 09:30

## 2023-10-22 RX ADMIN — SACUBITRIL AND VALSARTAN 1 TABLET: 24; 26 TABLET, FILM COATED ORAL at 09:30

## 2023-10-22 RX ADMIN — FUROSEMIDE 20 MG: 20 TABLET ORAL at 09:29

## 2023-10-22 RX ADMIN — APIXABAN 5 MG: 5 TABLET, FILM COATED ORAL at 18:39

## 2023-10-22 NOTE — UTILIZATION REVIEW
Initial Clinical Review    TRANSFER FROM [facility]    Admission: Date/Time/Statement:   Admission Orders (From admission, onward)       Ordered        10/21/23 1506  Inpatient Admission  Once                          Orders Placed This Encounter   Procedures    Inpatient Admission     Standing Status:   Standing     Number of Occurrences:   1     Order Specific Question:   Level of Care     Answer:   Med Surg [16]     Order Specific Question:   Estimated length of stay     Answer:   More than 2 Midnights     Order Specific Question:   Certification     Answer:   I certify that inpatient services are medically necessary for this patient for a duration of greater than two midnights. See H&P and MD Progress Notes for additional information about the patient's course of treatment. Initial Presentation: 65 y.o. male initially presented to ED at  [facility]  from neurology office with bradycardia, asymptomatic. Seen in ED by cardiology, monitored on tele. Lopressor  d/c, but continued with frequent dropped beats. Has been on a  Life vest in the past,  has previously refused  ICD/PPM, nowe  agreeable. Transferred to [facility] for further care. PMH  is   heart failure, EF  20  %, ICM, CVA and HTN. Labs  reveal potassium  5.6. Admit  Ip with Bradycardia, Hyperkalemia and plan is  tele,  EP  consult,  monitor labs and continue home meds. Date: 10/22   Day 2:   EP consult  No significant  bradycardia   on tele, not currently  on a  BB. Has  occasional  PAC. EKG  shows  nonspecific  intraventricular  conduction delay and  1st degree AV block. Given EF, need to up titrate  BB,  ICD  may be  best benefit.   Tentatively  planned  mon  10/23    ED Triage Vitals   Temperature Pulse Respirations Blood Pressure SpO2   10/21/23 1504 10/21/23 1504 10/21/23 1504 10/21/23 1504 10/21/23 1504   98.4 °F (36.9 °C) 69 16 104/69 94 %      Temp Source Heart Rate Source Patient Position - Orthostatic VS BP Location FiO2 (%)   10/22/23 0615 -- 10/22/23 0615 10/22/23 0615 --   Oral  Lying Left arm       Pain Score       10/21/23 1609       No Pain          Wt Readings from Last 1 Encounters:   10/20/23 77.6 kg (171 lb 1.2 oz)     Additional Vital Signs:   98.3 °F (36.8 °C) 64 16 91/62 72 95 % -- --    10/22/23 0700 -- 51 Abnormal  -- 114/68 -- 94 % -- --   10/22/23 06:15:10 98.4 °F (36.9 °C) 51 Abnormal  17 92/64 73 97 % None (Room air) Lying   10/22/23 02:29:09 97.5 °F (36.4 °C) 64 -- 92/63 73 96 % -- --   10/21/23 2000 -- -- -- -- -- 96 % None (Room air) --   10/21/23 19:23:38 98 °F (36.7 °C) 72 16 97/66 76 96 % -- --   10/21/23 15:04:42 98.4 °F (36.9 °C) 69 16 104/69 81 94 % --      Pertinent Labs/Diagnostic Test Results:     EKG    NSR    HR  82   PAC's  Results from last 7 days   Lab Units 10/22/23  0622 10/20/23  1511   WBC Thousand/uL 5.61 7.16   HEMOGLOBIN g/dL 16.1 16.5   HEMATOCRIT % 50.4* 51.3*   PLATELETS Thousands/uL 186 191   NEUTROS ABS Thousands/µL  --  3.14         Results from last 7 days   Lab Units 10/22/23  0622 10/21/23  1644 10/20/23  1511   SODIUM mmol/L 139 138 134*   POTASSIUM mmol/L 3.7 3.5 5.6*   CHLORIDE mmol/L 101 99 99   CO2 mmol/L 32 29 27   ANION GAP mmol/L 6 10 8   BUN mg/dL 17 18 11   CREATININE mg/dL 0.92 0.85 0.93   EGFR ml/min/1.73sq m 89 94 88   CALCIUM mg/dL 9.1 9.8 9.4   MAGNESIUM mg/dL 2.0  --   --      Results from last 7 days   Lab Units 10/20/23  1511   AST U/L 31   ALT U/L 13   ALK PHOS U/L 72   TOTAL PROTEIN g/dL 7.6   ALBUMIN g/dL 4.1   TOTAL BILIRUBIN mg/dL 2.10*     Results from last 7 days   Lab Units 10/21/23  2035 10/21/23  1648   POC GLUCOSE mg/dl 105 106     Results from last 7 days   Lab Units 10/22/23  0622 10/21/23  1644 10/20/23  1511   GLUCOSE RANDOM mg/dL 86 99 102               Results from last 7 days   Lab Units 10/20/23  1920 10/20/23  1747 10/20/23  1511   HS TNI 0HR ng/L  --   --  10   HS TNI 2HR ng/L  --  11  --    HSTNI D2 ng/L  --  1  --    HS TNI 4HR ng/L 16  -- --    HSTNI D4 ng/L 6  --   --          Results from last 7 days   Lab Units 10/20/23  1511   PROTIME seconds 17.5*   INR  1.44*   PTT seconds 30                         Results from last 7 days   Lab Units 10/20/23  1511   BNP pg/mL 627*             Present on Admission:   Bradycardia   Chronic systolic heart failure (HCC)   Coronary artery disease   Hyperlipidemia   Hypertension   Type 2 diabetes mellitus (HCC)   Hyperkalemia   Cerebrovascular accident (CVA) ([address] )      Admitting Diagnosis: Bradycardia [R00.1]  Age/Sex: 65 y.o. male  Admission Orders:  Scheduled Medications:  apixaban, 5 mg, Oral, BID  aspirin, 81 mg, Oral, Daily  atorvastatin, 80 mg, Oral, Daily With Dinner  [START ON 10/23/2023] cefazolin, 1,000 mg, Intravenous, Once  Empagliflozin, 10 mg, Oral, QAM  furosemide, 20 mg, Oral, Daily  sacubitril-valsartan, 1 tablet, Oral, BID      Continuous IV Infusions:     PRN Meds:  acetaminophen, 650 mg, Oral, Q8H PRN  aluminum-magnesium hydroxide-simethicone, 30 mL, Oral, Q6H PRN  ondansetron, 4 mg, Intravenous, Q6H PRN  senna, 1 tablet, Oral, HS PRN        IP CONSULT TO ELECTROPHYSIOLOGY    Network Utilization Review Department  ATTENTION: Please call with any questions or concerns to [phone] and carefully listen to the prompts so that you are directed to the right person. All voicemails are confidential.   For Discharge needs, contact Care Management DC Support Team at [phone] opt. 2  Send all requests for admission clinical reviews, approved or denied determinations and any other requests to dedicated fax number below belonging to the campus where the patient is receiving treatment. List of dedicated fax numbers for the Facilities:  [place] DENIALS (Administrative/Medical Necessity) [phone]   DISCHARGE SUPPORT TEAM (NETWORK) [id number] [address] (Maternity/NICU/Pediatrics) [phone]   [address] [phone]   [facility] [address] [facility] [phone] [address] [address] [facility] [address] [place] [address] [facility] [phone]

## 2023-10-22 NOTE — ASSESSMENT & PLAN NOTE
Patient was seen in neurology's office,when he was noted to have bradycardia and was referred to ED. Patient evaluated by cardiology, and metoprolol was held. On telemetry patient still with frequent dropped beats. In past patient has refused for ICD/PPM but agreeable at this time. Transferred to \[facility]\"" for EP eval and possible pacemaker placement. EP consulted  Monitor on telemetry.

## 2023-10-22 NOTE — PROGRESS NOTES
[facility]  Progress Note  Name: Ravi Ferguson  MRN: [id number]  Unit/Bed#: -01 I Date of Admission: 10/21/2023   Date of Service: 10/22/2023 I Hospital Day: 1    Assessment/Plan   * Bradycardia  Assessment & Plan  Patient was seen in neurology's office,when he was noted to have bradycardia and was referred to ED. Patient evaluated by cardiology, and metoprolol was held. On telemetry patient still with frequent dropped beats. In past patient has refused for ICD/PPM but agreeable at this time. Transferred to [facility] for EP eval and possible pacemaker placement. EP consulted  Monitor on telemetry. Chronic systolic heart failure [facility])  Assessment & Plan  Wt Readings from Last 3 Encounters:   10/20/23 77.6 kg (171 lb 1.2 oz)   10/20/23 77.6 kg (171 lb)   10/05/23 77.1 kg (170 lb)     Ischemic cardiomyopathy. Echocardiogram from 8/22/2023 with LVEF of 20%. Patient was on LifeVest but returned in January. BNP slightly elevated, chest x-ray unremarkable for any vascular congestion. Appears euvolemic on exam.  Continue Entresto, Lasix, Jardiance and statin. We will continue to hold beta-blocker until evaluated by EP. Monitor on telemetry. Coronary artery disease  Assessment & Plan  History noted, s/p ALEJANDRA to mid LAD in July 2022. Continue aspirin, statin and Eliquis. Metoprolol hold in setting of bradycardia. Cerebrovascular accident (CVA) [facility])  Assessment & Plan  History of CVA in 2020, was on aspirin, Brilinta and statin however despite being on above had left MCA stroke on 8/21/2023. Brilinta switched to Eliquis, continue same. Continue aspirin and statin.     Type 2 diabetes mellitus [facility])  Assessment & Plan  Lab Results   Component Value Date    HGBA1C 6.3 (H) 08/22/2023       Recent Labs     10/21/23  1648 10/21/23  2035   POCGLU 106 105       Blood Sugar Average: Last 72 hrs:  (P) 105.5    According to patient he was on was for heart failure but not for diabetes, his blood sugars are well controlled with diet regimen alone. Monitor blood sugar  Carb counting diet. Hypertension  Assessment & Plan  Patient is on metoprolol, Lasix and Entresto. Continue Lasix and Entresto, will hold metoprolol in setting of bradycardia. Hyperlipidemia  Assessment & Plan  Continue atorvastatin at home dosage. VTE Pharmacologic Prophylaxis: VTE Score: 3 Moderate Risk (Score 3-4) - Pharmacological DVT Prophylaxis Ordered: apixaban (Eliquis). Patient Centered Rounds: I performed bedside rounds with nursing staff today. Discussions with Specialists or Other Care Team Provider:     Education and Discussions with Family / Patient: Patient declined call to . Total Time Spent on Date of Encounter in care of patient: 35 mins. This time was spent on one or more of the following: performing physical exam; counseling and coordination of care; obtaining or reviewing history; documenting in the medical record; reviewing/ordering tests, medications or procedures; communicating with other healthcare professionals and discussing with patient's family/caregivers. Current Length of Stay: 1 day(s)  Current Patient Status: Inpatient   Certification Statement: The patient will continue to require additional inpatient hospital stay due to pacemaker placement  Discharge Plan: Anticipate discharge in 48 hrs to home. Code Status: Level 1 - Full Code    Subjective:   Patient seen and examined  Comfortable sitting in chair  No chest pain or shortness of breath  No event overnight    Objective:     Vitals:   Temp (24hrs), Av.1 °F (36.7 °C), Min:97.5 °F (36.4 °C), Max:98.4 °F (36.9 °C)    Temp:  [97.5 °F (36.4 °C)-98.4 °F (36.9 °C)] 98.4 °F (36.9 °C)  HR:  [51-74] 51  Resp:  [15-19] 17  BP: ()/(54-89) 92/64  SpO2:  [94 %-99 %] 97 %  There is no height or weight on file to calculate BMI.      Input and Output Summary (last 24 hours): Intake/Output Summary (Last 24 hours) at 10/22/2023 0819  Last data filed at 10/21/2023 1923  Gross per 24 hour   Intake 240 ml   Output 250 ml   Net -10 ml       Physical Exam:   Physical Exam   Patient is awake alert oriented no acute distress   Comfortable sitting in chair  Lungs clear to auscultation bilateral  Heart positive S1-S2 no murmur  Abdomen soft nontender  Lower extremities no edema    Additional Data:     Labs:  Results from last 7 days   Lab Units 10/22/23  0622 10/20/23  1511   WBC Thousand/uL 5.61 7.16   HEMOGLOBIN g/dL 16.1 16.5   HEMATOCRIT % 50.4* 51.3*   PLATELETS Thousands/uL 186 191   NEUTROS PCT %  --  43   LYMPHS PCT %  --  43   MONOS PCT %  --  10   EOS PCT %  --  3     Results from last 7 days   Lab Units 10/22/23  0622 10/21/23  1644 10/20/23  1511   SODIUM mmol/L 139   < > 134*   POTASSIUM mmol/L 3.7   < > 5.6*   CHLORIDE mmol/L 101   < > 99   CO2 mmol/L 32   < > 27   BUN mg/dL 17   < > 11   CREATININE mg/dL 0.92   < > 0.93   ANION GAP mmol/L 6   < > 8   CALCIUM mg/dL 9.1   < > 9.4   ALBUMIN g/dL  --   --  4.1   TOTAL BILIRUBIN mg/dL  --   --  2.10*   ALK PHOS U/L  --   --  72   ALT U/L  --   --  13   AST U/L  --   --  31   GLUCOSE RANDOM mg/dL 86   < > 102    < > = values in this interval not displayed. Results from last 7 days   Lab Units 10/20/23  1511   INR  1.44*     Results from last 7 days   Lab Units 10/21/23  2035 10/21/23  1648   POC GLUCOSE mg/dl 105 106               Lines/Drains:  Invasive Devices       Peripheral Intravenous Line  Duration             Peripheral IV 10/20/23 Dorsal (posterior); Right Forearm 1 day                      Telemetry:  Telemetry Orders (From admission, onward)               24 Hour Telemetry Monitoring  Continuous x 24 Hours (Telem)        Question:  Reason for 24 Hour Telemetry  Answer:  Arrhythmias requiring acute medical intervention / PPM or ICD malfunction                     Telemetry Reviewed:  yes  Indication for Continued Telemetry Use: Arrthymias requiring medical therapy             Imaging: Chest x-ray reviewed    Recent Cultures (last 7 days):         Last 24 Hours Medication List:   Current Facility-Administered Medications   Medication Dose Route Frequency Provider Last Rate    acetaminophen  650 mg Oral Q8H PRN Willadean Prior, MD      aluminum-magnesium hydroxide-simethicone  30 mL Oral Q6H PRN Willadean Prior, MD      apixaban  5 mg Oral BID Willadeanalia Prior, MD      aspirin  81 mg Oral Daily Willadeanalia Prior, MD      atorvastatin  80 mg Oral Daily With Andrea Edmondson, MD      Empagliflozin  10 mg Oral QAM Willadeanalia Prior, MD      furosemide  20 mg Oral Daily Willadeanalia Prior, MD      ondansetron  4 mg Intravenous Q6H PRN Frank Prior, MD      sacubitril-valsartan  1 tablet Oral BID Willadeanalia Prior, MD      senna  1 tablet Oral HS PRN Frank Cui, MD          Today, Patient Was Seen By: Rossana Enamorado DO    **Please Note: This note may have been constructed using a voice recognition system. **

## 2023-10-22 NOTE — CONSULTS
Electrophysiology-Cardiology (EP)   Gaston Gale 65 y.o. male MRN: [id number]  Unit/Bed#: -01 Encounter: [id number]        IMPRESSION:    Mr. Gaston Gale is a 41-year-old male with a past medical history of ischemic cardiomyopathy ejection fraction 20% on echocardiogram performed in October 2023, CVA (left MCA CVA August 2023), STEMI October 2022, hyperlipidemia, hypertension, type 2 diabetes mellitus, seizure disorder, obesity who is referred to the emergency room after being found to be bradycardic at his neurologist outpatient office visit. At that time he was found to have heart rates in the 30s. Given this he was referred for evaluation for pacemaker/ICD. This morning we discussed ICD placement, and patient is in agreement. Patient's heart rate is intrinsically slow in the 60s, therefore, plan will be for dual-chamber ICD placement likely on Tuesday, October 24,2023. PLAN:    Sinus bradycardia/nonconducted PACs  -No significant bradycardia on telemetry although important to note he is currently not on a beta-blocker  -Occasional nonconducted PAC    Ischemic cardiomyopathy EF 30%  -Patient has some slow heart rates in the low 60s while not on beta-blocker  -EKG reveals nonspecific intraventricular conduction delay and first-degree AV block  -Given EF 20%, and need for ability to uptitrate beta-blocker therapy, dual-chamber ICD may provide most benefit  -We will consult heart failure on 10/23/2023 to assist with management of GDMT  -We will make n.p.o. this evening, however, his device will likely not be placed until Tuesday. We will reverse n.p.o. tomorrow morning when schedule is confirmed    Hypotension  -Patient notes that his blood pressures are often low when his medications were uptitrated  -On October 20 his blood pressure was noted to be 84/58.   More recently his blood pressures have been low 97J systolic low 79P diastolic  -Hypotension could make up titration of GDMT challenging    Hyperlipidemia  -Continue atorvastatin    CAD  -Continue atorvastatin    Referring Physian: Albino Ervin    Chief Complain/Reason for Referal: Sinus bradycardia, ischemic cardiomyopathy    HPI:Moris Peterson is a 65 y.o. male with a history of ischemic heart myopathy with an EF 20%, prior discussion of ICD placement with refusal, CVA in August 2023, STEMI October 2022, hyperlipidemia, hypertension, type 2 diabetes mellitus, seizure disorder, and obesity was referred to emergency room after being found to be bradycardic by his neurologist.  His heart rates were noted to be in the 30s, and he was noted to have "frequent dropped beats on telemetry". In addition, prior EKGs have revealed nonspecific intraventricular conduction delay, as well as first-degree AV block with a MO interval of 300. Given his bradycardia, baseline EKG abnormalities, and ischemic cardiomyopathy EF 20%, he was transferred to [facility] for evaluation for and placement of ICD. We discussed initiation of GDMT to which the patient noted his blood pressures have been low in the past and his neurologist stated that he did not want the patient's blood pressure to be extremely low. We discussed placement of an ICD including its indications risk and benefits. After discussions about GDMT as well as the risk and benefits of ICD, the patient noted he would like to proceed with implant. He understands that implant will not likely happen until Tuesday. On exam he appears euvolemic.     Patient Active Problem List    Diagnosis Date Noted    Bradycardia 10/20/2023    Hyperkalemia 10/20/2023    Cerebrovascular accident (CVA) ([address]) 08/22/2023    Elevated bilirubin 07/20/2023    Elevated PSA 07/20/2023    Ischemic cardiomyopathy 07/18/2023    History of ST elevation myocardial infarction (STEMI) 10/24/2022    Coronary artery disease 10/24/2022    Hyperlipidemia 10/24/2022    Hypertension 10/24/2022    Type 2 diabetes mellitus ([address]) 10/24/2022    Seizure ([address]) 10/24/2022    Obesity (BMI 30.0-34.9) 10/24/2022    Chronic systolic heart failure ([address]) 09/08/2022             Past Medical History:   Diagnosis Date    Coronary artery disease     CVA (cerebral vascular accident) ([facility])     R sided, dysarthria, March 2020 in [address] [place] [address])     Hyperlipidemia     Hypertension     Pre-diabetes     STEMI (ST elevation myocardial infarction) ([facility])        Medications Prior to Admission   Medication    apixaban (Eliquis) 5 mg    aspirin 81 mg chewable tablet    atorvastatin (LIPITOR) 80 mg tablet    Empagliflozin (JARDIANCE) 10 MG TABS tablet    furosemide (LASIX) 20 mg tablet    metoprolol succinate (TOPROL-XL) 25 mg 24 hr tablet    nitroglycerin (NITROSTAT) 0.4 mg SL tablet    sacubitril-valsartan (Entresto) 24-26 MG TABS       Scheduled Meds:  Current Facility-Administered Medications   Medication Dose Route Frequency Provider Last Rate    acetaminophen  650 mg Oral Q8H PRN David Lindsey MD      aluminum-magnesium hydroxide-simethicone  30 mL Oral Q6H PRN David Lindsey MD      apixaban  5 mg Oral BID David Lindsey MD      aspirin  81 mg Oral Daily David Lindsey MD      atorvastatin  80 mg Oral Daily With Malathi Pabon MD      Empagliflozin  10 mg Oral QAM David Lindsey MD      furosemide  20 mg Oral Daily David Lindsey MD      ondansetron  4 mg Intravenous Q6H PRN David Lindsey MD      sacubitril-valsartan  1 tablet Oral BID David Lindsey MD      senna  1 tablet Oral HS PRN David Lindsey MD       Continuous Infusions:   PRN Meds:.  acetaminophen    aluminum-magnesium hydroxide-simethicone    ondansetron    senna  Allergies   Allergen Reactions    Pollen Extract Allergic Rhinitis     I reviewed the Home Medication list in the chart.      Family History   Problem Relation Age of Onset    Dementia Mother     Heart disease Father     Dementia Maternal Aunt     Dementia Maternal Uncle     Cancer Maternal Uncle        Social History Socioeconomic History    Marital status: Unknown     Spouse name: Not on file    Number of children: Not on file    Years of education: Not on file    Highest education level: Not on file   Occupational History    Not on file   Tobacco Use    Smoking status: Never    Smokeless tobacco: Never   Substance and Sexual Activity    Alcohol use: Not on file    Drug use: Not on file    Sexual activity: Not on file   Other Topics Concern    Not on file   Social History Narrative    Not on file     Social Determinants of Health     Financial Resource Strain: Not on file   Food Insecurity: No Food Insecurity (8/24/2023)    Hunger Vital Sign     Worried About Running Out of Food in the Last Year: Never true     Ran Out of Food in the Last Year: Never true   Transportation Needs: No Transportation Needs (8/24/2023)    PRAPARE - Transportation     Lack of Transportation (Medical): No     Lack of Transportation (Non-Medical): No   Physical Activity: Not on file   Stress: Not on file   Social Connections: Not on file   Intimate Partner Violence: Not on file   Housing Stability: Unknown (8/24/2023)    Housing Stability Vital Sign     Unable to Pay for Housing in the Last Year: No     Number of Places Lived in the Last Year: Not on file     Unstable Housing in the Last Year: No       Review of Systems -12 Point ROS reviewed and are negative or noted in chart except for Pertinent Positives Pertaining to Cardiovascular and Respiratory in HPI above. Vitals:    10/22/23 0229   BP: 92/63   Pulse: 64   Resp:    Temp: 97.5 °F (36.4 °C)   SpO2: 96%   There were no vitals filed for this visit. Intake/Output Summary (Last 24 hours) at 10/22/2023 0549  Last data filed at 10/21/2023 1923  Gross per 24 hour   Intake 240 ml   Output 250 ml   Net -10 ml         GEN: Now acute distress, Alert and oriented, well appearing  HEENT:Head, neck, ears, oral pharynx: Mucus membranes moist, oral pharynx clear, nares clear.  External ears normal  EYES: Pupils equal, sclera anicteric, midline, normal conjuctiva  NECK: No JVD, supple, no obvious masses or thryomegaly or goiter  CARDIOVASCULAR: RRR, No murmur, rub, gallops S1,S2  LUNGS: Clear To auscultation bilaterally, normal effort, no rales, rhonchi, crackles  ABDOMEN: Soft, nondistended, nontender, without obvious organomegaly or ascites  EXTREMITIES/VASCULAR: No edema. warm an well perfused. PSYCH: Normal Affect, no overt suicidal ideation, linear speech pattern without evidence of psychosis. NEURO: Grossly intact, moving all extremiteis equal, face symmetric, alert and responsive,  HEME: No bleeding, bruising, petechia, purpura  SKIN: No significant rashes, warm, no diaphoresis or pallor.      Lab Results:     CBC with diff:   Results from last 7 days   Lab Units 10/20/23  1511   WBC Thousand/uL 7.16   HEMOGLOBIN g/dL 16.5   HEMATOCRIT % 51.3*   MCV fL 96   PLATELETS Thousands/uL 191   RBC Million/uL 5.33   MCH pg 31.0   MCHC g/dL 32.2   RDW % 13.2   MPV fL 10.1   NRBC AUTO /100 WBCs 0         CMP:  Results from last 7 days   Lab Units 10/21/23  1644 10/20/23  1511   POTASSIUM mmol/L 3.5 5.6*   CHLORIDE mmol/L 99 99   CO2 mmol/L 29 27   BUN mg/dL 18 11   CREATININE mg/dL 0.85 0.93   CALCIUM mg/dL 9.8 9.4   AST U/L  --  31   ALT U/L  --  13   ALK PHOS U/L  --  72   EGFR ml/min/1.73sq m 94 88         BMP:  Results from last 7 days   Lab Units 10/21/23  1644 10/20/23  1511   POTASSIUM mmol/L 3.5 5.6*   CHLORIDE mmol/L 99 99   CO2 mmol/L 29 27   BUN mg/dL 18 11   CREATININE mg/dL 0.85 0.93   CALCIUM mg/dL 9.8 9.4       BNP:   Results Reviewed       None          Recent Labs     10/20/23  1511   *                Magnesium:       Coags:   Results from last 7 days   Lab Units 10/20/23  1511   PTT seconds 30   INR  1.44*         EKG/TELE: Sinus rhythm on telemetry

## 2023-10-22 NOTE — PLAN OF CARE
Problem: CARDIOVASCULAR - ADULT  Goal: Maintains optimal cardiac output and hemodynamic stability  Description: INTERVENTIONS:  - Monitor I/O, vital signs and rhythm  - Monitor for S/S and trends of decreased cardiac output  - Administer and titrate ordered vasoactive medications to optimize hemodynamic stability  - Assess quality of pulses, skin color and temperature  - Assess for signs of decreased coronary artery perfusion  - Instruct patient to report change in severity of symptoms  Outcome: Progressing  Goal: Absence of cardiac dysrhythmias or at baseline rhythm  Description: INTERVENTIONS:  - Continuous cardiac monitoring, vital signs, obtain 12 lead EKG if ordered  - Administer antiarrhythmic and heart rate control medications as ordered  - Monitor electrolytes and administer replacement therapy as ordered  Outcome: Progressing     Problem: DISCHARGE PLANNING  Goal: Discharge to home or other facility with appropriate resources  Description: INTERVENTIONS:  - Identify barriers to discharge w/patient and caregiver  - Arrange for needed discharge resources and transportation as appropriate  - Identify discharge learning needs (meds, wound care, etc.)  - Arrange for interpretive services to assist at discharge as needed  - Refer to Case Management Department for coordinating discharge planning if the patient needs post-hospital services based on physician/advanced practitioner order or complex needs related to functional status, cognitive ability, or social support system  Outcome: Progressing     Problem: Knowledge Deficit  Goal: Patient/family/caregiver demonstrates understanding of disease process, treatment plan, medications, and discharge instructions  Description: Complete learning assessment and assess knowledge base.   Interventions:  - Provide teaching at level of understanding  - Provide teaching via preferred learning methods  Outcome: Progressing     Problem: RESPIRATORY - ADULT  Goal: Achieves optimal ventilation and oxygenation  Description: INTERVENTIONS:  - Assess for changes in respiratory status  - Assess for changes in mentation and behavior  - Position to facilitate oxygenation and minimize respiratory effort  - Oxygen administered by appropriate delivery if ordered  - Initiate smoking cessation education as indicated  - Encourage broncho-pulmonary hygiene including cough, deep breathe, Incentive Spirometry  - Assess the need for suctioning and aspirate as needed  - Assess and instruct to report SOB or any respiratory difficulty  - Respiratory Therapy support as indicated  Outcome: Progressing

## 2023-10-22 NOTE — PLAN OF CARE
Problem: CARDIOVASCULAR - ADULT  Goal: Maintains optimal cardiac output and hemodynamic stability  Description: INTERVENTIONS:  - Monitor I/O, vital signs and rhythm  - Monitor for S/S and trends of decreased cardiac output  - Administer and titrate ordered vasoactive medications to optimize hemodynamic stability  - Assess quality of pulses, skin color and temperature  - Assess for signs of decreased coronary artery perfusion  - Instruct patient to report change in severity of symptoms  Outcome: Progressing     Problem: DISCHARGE PLANNING  Goal: Discharge to home or other facility with appropriate resources  Description: INTERVENTIONS:  - Identify barriers to discharge w/patient and caregiver  - Arrange for needed discharge resources and transportation as appropriate  - Identify discharge learning needs (meds, wound care, etc.)  - Arrange for interpretive services to assist at discharge as needed  - Refer to Case Management Department for coordinating discharge planning if the patient needs post-hospital services based on physician/advanced practitioner order or complex needs related to functional status, cognitive ability, or social support system  Outcome: Progressing

## 2023-10-22 NOTE — CASE MANAGEMENT
Case Management Assessment & Discharge Planning Note    Patient name Shiloh Gray  Location /-58 MRN [id number]  : 1960 Date 10/22/2023       Current Admission Date: 10/21/2023  Current Admission Diagnosis:Bradycardia   Patient Active Problem List    Diagnosis Date Noted    Bradycardia 10/20/2023    Hyperkalemia 10/20/2023    Cerebrovascular accident (CVA) ([address]) 2023    Elevated bilirubin 2023    Elevated PSA 2023    Ischemic cardiomyopathy 2023    History of ST elevation myocardial infarction (STEMI) 10/24/2022    Coronary artery disease 10/24/2022    Hyperlipidemia 10/24/2022    Hypertension 10/24/2022    Type 2 diabetes mellitus ([address]) 10/24/2022    Seizure ([address]) 10/24/2022    Obesity (BMI 30.0-34.9) 10/24/2022    Chronic systolic heart failure ([address]) 2022      LOS (days): 1  Geometric Mean LOS (GMLOS) (days):   Days to GMLOS:     OBJECTIVE:    Risk of Unplanned Readmission Score: 13.33         Current admission status: Inpatient       Preferred Pharmacy:   [address] [place] [address],[place] [address]  Phone: [phone] Fax: [phone]    Primary Care Provider: Silvana Dillon PA-C    Primary Insurance: BLUE CROSS  Secondary Insurance: [place] [employer]    ASSESSMENT:  [place] Proxies    There are no active Health Care Proxies on file. Readmission Root Cause  30 Day Readmission: No    Patient Information  Admitted from[de-identified] Other (comment) (admitted from neurologist office)  Mental Status: Alert  During Assessment patient was accompanied by: Not accompanied during assessment  Assessment information provided by[de-identified] Patient  Primary Caregiver: Self  Support Systems: Family members  What city do you live in?: [facility] entry access options.  Select all that apply.: Stairs  Number of steps to enter home.: One Flight  Do the steps have railings?: Yes  Type of Current Residence: Other (Comment) (rents a room)  In the last 12 months, was there a time when you were not able to pay the mortgage or rent on time?: No  In the last 12 months, how many places have you lived?: 1  In the last 12 months, was there a time when you did not have a steady place to sleep or slept in a shelter (including now)?: No  Homeless/housing insecurity resource given?: N/A  Living Arrangements: Lives Alone    Activities of Daily Living Prior to Admission  Functional Status: Independent  Completes ADLs independently?: Yes  Ambulates independently?: Yes  Does patient use assisted devices?: No  Does patient currently own DME?: No  Does patient have a history of Outpatient Therapy (PT/OT)?: No  Does the patient have a history of Short-Term Rehab?: No  Does patient have a history of HHC?: No  Does patient currently have [facility]?: No         Patient Information Continued  Income Source: SSI/SSD  Does patient have prescription coverage?: Yes  Within the past 12 months, you worried that your food would run out before you got the money to buy more.: Never true  Within the past 12 months, the food you bought just didn't last and you didn't have money to get more.: Never true  Food insecurity resource given?: N/A  Does patient receive dialysis treatments?: No  Does patient have a history of substance abuse?: No  Does patient have a history of Mental Health Diagnosis?: No         Means of Transportation  In the past 12 months, has lack of transportation kept you from medical appointments or from getting medications?: No  In the past 12 months, has lack of transportation kept you from meetings, work, or from getting things needed for daily living?: No        DISCHARGE DETAILS:    Discharge planning discussed with[de-identified] spoke with pt at bedside              Additional Comments: SW resident spoke with pt at bedside regarding discharge planning once medically stable and after pacemaker is inserted.  Pt arrived from neurologist office due to bradycardia. Pt expressed concern with caring for himself once home after surgery. SW resident asked if pt has local support and he said no other than one neighbor but he does not see the neighbor often.

## 2023-10-23 ENCOUNTER — PATIENT OUTREACH (OUTPATIENT)
Dept: FAMILY MEDICINE CLINIC | Facility: CLINIC | Age: 63
End: 2023-10-23

## 2023-10-23 ENCOUNTER — ANESTHESIA EVENT (INPATIENT)
Dept: NON INVASIVE DIAGNOSTICS | Facility: HOSPITAL | Age: 63
DRG: 277 | End: 2023-10-23
Payer: COMMERCIAL

## 2023-10-23 LAB
ANION GAP SERPL CALCULATED.3IONS-SCNC: 6 MMOL/L
BASOPHILS # BLD AUTO: 0.07 THOUSANDS/ÂΜL (ref 0–0.1)
BASOPHILS NFR BLD AUTO: 1 % (ref 0–1)
BUN SERPL-MCNC: 17 MG/DL (ref 5–25)
CALCIUM SERPL-MCNC: 9 MG/DL (ref 8.4–10.2)
CHLORIDE SERPL-SCNC: 102 MMOL/L (ref 96–108)
CO2 SERPL-SCNC: 29 MMOL/L (ref 21–32)
CREAT SERPL-MCNC: 0.85 MG/DL (ref 0.6–1.3)
EOSINOPHIL # BLD AUTO: 0.26 THOUSAND/ÂΜL (ref 0–0.61)
EOSINOPHIL NFR BLD AUTO: 4 % (ref 0–6)
ERYTHROCYTE [DISTWIDTH] IN BLOOD BY AUTOMATED COUNT: 13.3 % (ref 11.6–15.1)
GFR SERPL CREATININE-BSD FRML MDRD: 94 ML/MIN/1.73SQ M
GLUCOSE SERPL-MCNC: 92 MG/DL (ref 65–140)
HCT VFR BLD AUTO: 47.5 % (ref 36.5–49.3)
HGB BLD-MCNC: 15.1 G/DL (ref 12–17)
IMM GRANULOCYTES # BLD AUTO: 0.01 THOUSAND/UL (ref 0–0.2)
IMM GRANULOCYTES NFR BLD AUTO: 0 % (ref 0–2)
LYMPHOCYTES # BLD AUTO: 2.85 THOUSANDS/ÂΜL (ref 0.6–4.47)
LYMPHOCYTES NFR BLD AUTO: 42 % (ref 14–44)
MCH RBC QN AUTO: 31 PG (ref 26.8–34.3)
MCHC RBC AUTO-ENTMCNC: 31.8 G/DL (ref 31.4–37.4)
MCV RBC AUTO: 98 FL (ref 82–98)
MONOCYTES # BLD AUTO: 0.71 THOUSAND/ÂΜL (ref 0.17–1.22)
MONOCYTES NFR BLD AUTO: 10 % (ref 4–12)
NEUTROPHILS # BLD AUTO: 2.91 THOUSANDS/ÂΜL (ref 1.85–7.62)
NEUTS SEG NFR BLD AUTO: 43 % (ref 43–75)
NRBC BLD AUTO-RTO: 0 /100 WBCS
PLATELET # BLD AUTO: 175 THOUSANDS/UL (ref 149–390)
PMV BLD AUTO: 10.3 FL (ref 8.9–12.7)
POTASSIUM SERPL-SCNC: 3.8 MMOL/L (ref 3.5–5.3)
RBC # BLD AUTO: 4.87 MILLION/UL (ref 3.88–5.62)
SODIUM SERPL-SCNC: 137 MMOL/L (ref 135–147)
WBC # BLD AUTO: 6.81 THOUSAND/UL (ref 4.31–10.16)

## 2023-10-23 PROCEDURE — 85025 COMPLETE CBC W/AUTO DIFF WBC: CPT | Performed by: PHYSICIAN ASSISTANT

## 2023-10-23 PROCEDURE — 93005 ELECTROCARDIOGRAM TRACING: CPT

## 2023-10-23 PROCEDURE — 99232 SBSQ HOSP IP/OBS MODERATE 35: CPT | Performed by: INTERNAL MEDICINE

## 2023-10-23 PROCEDURE — 80048 BASIC METABOLIC PNL TOTAL CA: CPT | Performed by: PHYSICIAN ASSISTANT

## 2023-10-23 RX ORDER — CEFAZOLIN SODIUM 2 G/50ML
2000 SOLUTION INTRAVENOUS ONCE
Status: COMPLETED | OUTPATIENT
Start: 2023-10-24 | End: 2023-10-24

## 2023-10-23 RX ADMIN — SACUBITRIL AND VALSARTAN 1 TABLET: 24; 26 TABLET, FILM COATED ORAL at 12:39

## 2023-10-23 RX ADMIN — APIXABAN 5 MG: 5 TABLET, FILM COATED ORAL at 12:34

## 2023-10-23 RX ADMIN — ASPIRIN 81 MG CHEWABLE TABLET 81 MG: 81 TABLET CHEWABLE at 09:39

## 2023-10-23 RX ADMIN — FUROSEMIDE 20 MG: 20 TABLET ORAL at 12:39

## 2023-10-23 RX ADMIN — ATORVASTATIN CALCIUM 80 MG: 80 TABLET, FILM COATED ORAL at 17:11

## 2023-10-23 NOTE — PROGRESS NOTES
[address] [facility]  Progress Note  Name: Sabrina Bai  MRN: [id number]  Unit/Bed#: -01 I Date of Admission: 10/21/2023   Date of Service: 10/23/2023 I Hospital Day: 2    Assessment/Plan   * Bradycardia  Assessment & Plan  Patient was seen in neurology's office,when he was noted to have bradycardia and was referred to ED. Patient evaluated by cardiology, and metoprolol was held. On telemetry patient still with frequent dropped beats. In past patient has refused for ICD/PPM but agreeable at this time. Transferred to [facility] for EP eval and possible pacemaker placement. Patient was evaluated patient was evaluated by EP, with a plan for dual-chamber ICD placement today or tomorrow  Monitor on telemetry. Chronic systolic heart failure [facility])  Assessment & Plan  Wt Readings from Last 3 Encounters:   10/22/23 77.6 kg (171 lb)   10/20/23 77.6 kg (171 lb 1.2 oz)   10/20/23 77.6 kg (171 lb)     Ischemic cardiomyopathy. Echocardiogram from 8/22/2023 with LVEF of 20%. Patient was on LifeVest but returned in January. BNP slightly elevated, chest x-ray unremarkable for any vascular congestion. Appears euvolemic on exam.  Continue Entresto, Lasix, Jardiance and statin. Beta-blocker on hold  Monitor on telemetry. Coronary artery disease  Assessment & Plan  History noted, s/p ALEJANDRA to mid LAD in July 2022. Continue aspirin, statin   Metoprolol hold in setting of bradycardia. Cerebrovascular accident (CVA) [facility])  Assessment & Plan  History of CVA in 2020, was on aspirin, Brilinta and statin however despite being on above had left MCA stroke on 8/21/2023. Brilinta switched to Eliquis, continue same. Currently Eliquis on hold for ICD placement  Continue aspirin and statin.     Type 2 diabetes mellitus [facility])  Assessment & Plan  Lab Results   Component Value Date    HGBA1C 6.3 (H) 08/22/2023       Recent Labs     10/21/23  1648 10/21/23  2035   POCGLU 106 105         Blood Sugar Average: Last 72 hrs:  (P) 105.5    According to patient he was on was for heart failure but not for diabetes, his blood sugars are well controlled with diet regimen alone. Monitor blood sugar  Carb counting diet. Hypertension  Assessment & Plan  Patient is on metoprolol, Lasix and Entresto. Continue Lasix and Entresto, will hold metoprolol in setting of bradycardia. Hyperlipidemia  Assessment & Plan  Continue atorvastatin at home dosage. VTE Pharmacologic Prophylaxis: VTE Score: 3 Moderate Risk (Score 3-4) - Pharmacological DVT Prophylaxis Ordered: apixaban (Eliquis). Patient Centered Rounds: I performed bedside rounds with nursing staff today. Discussions with Specialists or Other Care Team Provider:     Education and Discussions with Family / Patient: Patient declined call to . Total Time Spent on Date of Encounter in care of patient: 35 mins. This time was spent on one or more of the following: performing physical exam; counseling and coordination of care; obtaining or reviewing history; documenting in the medical record; reviewing/ordering tests, medications or procedures; communicating with other healthcare professionals and discussing with patient's family/caregivers. Current Length of Stay: 2 day(s)  Current Patient Status: Inpatient   Certification Statement: The patient will continue to require additional inpatient hospital stay due to ICD placement  Discharge Plan: Anticipate discharge in 48 hrs to home with home services.     Code Status: Level 1 - Full Code    Subjective:   Patient is comfortable in bed   No event overnight  Bradycardic on the monitor  Currently n.p.o. for possible ICD placement today    Objective:     Vitals:   Temp (24hrs), Av.1 °F (36.7 °C), Min:97.9 °F (36.6 °C), Max:98.3 °F (36.8 °C)    Temp:  [97.9 °F (36.6 °C)-98.3 °F (36.8 °C)] 97.9 °F (36.6 °C)  HR:  [48-69] 48  Resp:  [16-18] 18  BP: ()/(61-73) 101/64  SpO2:  [92 %-99 %] 95 %  Body mass index is 26 kg/m². Input and Output Summary (last 24 hours): Intake/Output Summary (Last 24 hours) at 10/23/2023 1008  Last data filed at 10/23/2023 0911  Gross per 24 hour   Intake 720 ml   Output 650 ml   Net 70 ml       Physical Exam:   Physical Exam   Patient is awake alert oriented no acute distress   Comfortable in bed  Lungs clear to auscultation bilateral  Heart positive S1-S2 no murmur  Abdomen soft nontender  Lower extremities no edema    Additional Data:     Labs:  Results from last 7 days   Lab Units 10/23/23  0632   WBC Thousand/uL 6.81   HEMOGLOBIN g/dL 15.1   HEMATOCRIT % 47.5   PLATELETS Thousands/uL 175   NEUTROS PCT % 43   LYMPHS PCT % 42   MONOS PCT % 10   EOS PCT % 4     Results from last 7 days   Lab Units 10/23/23  0632 10/21/23  1644 10/20/23  1511   SODIUM mmol/L 137   < > 134*   POTASSIUM mmol/L 3.8   < > 5.6*   CHLORIDE mmol/L 102   < > 99   CO2 mmol/L 29   < > 27   BUN mg/dL 17   < > 11   CREATININE mg/dL 0.85   < > 0.93   ANION GAP mmol/L 6   < > 8   CALCIUM mg/dL 9.0   < > 9.4   ALBUMIN g/dL  --   --  4.1   TOTAL BILIRUBIN mg/dL  --   --  2.10*   ALK PHOS U/L  --   --  72   ALT U/L  --   --  13   AST U/L  --   --  31   GLUCOSE RANDOM mg/dL 92   < > 102    < > = values in this interval not displayed. Results from last 7 days   Lab Units 10/20/23  1511   INR  1.44*     Results from last 7 days   Lab Units 10/21/23  2035 10/21/23  1648   POC GLUCOSE mg/dl 105 106               Lines/Drains:  Invasive Devices       Peripheral Intravenous Line  Duration             Peripheral IV 10/20/23 Dorsal (posterior); Right Forearm 2 days                      Telemetry:  Telemetry Orders (From admission, onward)               24 Hour Telemetry Monitoring  Continuous x 24 Hours (Telem)        Question:  Reason for 24 Hour Telemetry  Answer:  Arrhythmias requiring acute medical intervention / PPM or ICD malfunction                     Telemetry Reviewed: yes   Indication for Continued Telemetry Use: Arrthymias requiring medical therapy             Imaging: No pertinent imaging reviewed. Recent Cultures (last 7 days):         Last 24 Hours Medication List:   Current Facility-Administered Medications   Medication Dose Route Frequency Provider Last Rate    acetaminophen  650 mg Oral Q8H PRN Isidro Rojas MD      aluminum-magnesium hydroxide-simethicone  30 mL Oral Q6H PRN Isidro Rojas MD      aspirin  81 mg Oral Daily Isidro Rojas MD      atorvastatin  80 mg Oral Daily With Brice Casper MD      cefazolin  1,000 mg Intravenous Once Shazia Grove PA-C      furosemide  20 mg Oral Daily Isidro Rojas MD      ondansetron  4 mg Intravenous Q6H PRN Isidro Rojas MD      sacubitril-valsartan  1 tablet Oral BID Isidro Rojas MD      senna  1 tablet Oral HS PRN Isidro Rojas MD          Today, Patient Was Seen By: Trent Bryant DO    **Please Note: This note may have been constructed using a voice recognition system. **

## 2023-10-23 NOTE — ASSESSMENT & PLAN NOTE
Patient was seen in neurology's office,when he was noted to have bradycardia and was referred to ED. Patient evaluated by cardiology, and metoprolol was held. On telemetry patient still with frequent dropped beats. In past patient has refused for ICD/PPM but agreeable at this time. Transferred to [facility] for EP eval and possible pacemaker placement. Patient was evaluated patient was evaluated by EP, with a plan for dual-chamber ICD placement today or tomorrow  Monitor on telemetry.

## 2023-10-23 NOTE — ASSESSMENT & PLAN NOTE
Wt Readings from Last 3 Encounters:   10/22/23 77.6 kg (171 lb)   10/20/23 77.6 kg (171 lb 1.2 oz)   10/20/23 77.6 kg (171 lb)     Ischemic cardiomyopathy. Echocardiogram from 8/22/2023 with LVEF of 20%. Patient was on LifeVest but returned in January. BNP slightly elevated, chest x-ray unremarkable for any vascular congestion. Appears euvolemic on exam.  Continue Entresto, Lasix, Jardiance and statin. Beta-blocker on hold  Monitor on telemetry.

## 2023-10-23 NOTE — QUICK NOTE
EP Service    Zaida Roberts is a 66 y/o male with a history of ICM EF 20%, left MCA CVA August 2023, CAD s/p STEMI October 2022, HLD, HTN, DM2, and seizure disorder who presented to the ED due to bradycardia during a neurology office visit. His heart rate was in the 30s. Given his persistently low EF despite optimal medical therapy for more than 3 months and sinus bradycardia with need for beta blocker therapy, implantation of a dual chamber ICD was recommended. Due to lab availability, this procedure will not be performed today. HE will be NPO after midnight for implantation of a dual chamber ICD tomorrow. I discussed this with the patient today and he agrees.      Plan:  -- NPO after midnight for implantation of a primary prevention dual chamber ICD tomorrow 10/24/2023  -- start beta blocker therapy after implantation of pacemaker if tolerated by BP  -- preop orders placed

## 2023-10-23 NOTE — PROGRESS NOTES
Received ADT alert patient was admitted to [facility] with bradycardia. Will monitor via chart review during hospitalization and contact patient upon discharge.

## 2023-10-23 NOTE — ASSESSMENT & PLAN NOTE
History noted, s/p ALEJANDRA to mid LAD in July 2022. Continue aspirin, statin   Metoprolol hold in setting of bradycardia.

## 2023-10-23 NOTE — ASSESSMENT & PLAN NOTE
Lab Results   Component Value Date    HGBA1C 6.3 (H) 08/22/2023       Recent Labs     10/21/23  1648 10/21/23  2035   POCGLU 106 105         Blood Sugar Average: Last 72 hrs:  (P) 105.5    According to patient he was on was for heart failure but not for diabetes, his blood sugars are well controlled with diet regimen alone. Monitor blood sugar  Carb counting diet.

## 2023-10-23 NOTE — UTILIZATION REVIEW
NOTIFICATION OF INPATIENT ADMISSION   AUTHORIZATION REQUEST   SERVICING FACILITY:   [address] [place] [address]  Address: [address] [place] [address]  Tax ID: [id number]  NPI: [id number] ATTENDING PROVIDER:  Attending Name and NPI#: Michaela Walter [[id number]]  Address: [address] [place] [address] [address] [address]  Phone: [phone]   ADMISSION INFORMATION:  [facility]   Place of Service Code: 21  Inpatient Admission Date/Time: 10/21/23  2:53 PM  Discharge Date/Time: No discharge date for patient encounter. Admitting Diagnosis Code/Description:  Bradycardia [R00.1]     UTILIZATION REVIEW CONTACT:  Stefanie Hill Utilization   Network Utilization Review Department  Phone: [phone]  Fax: [phone]  Email: [email or web address]  Contact for approvals/pending authorizations, clinical reviews, and discharge. PHYSICIAN ADVISORY SERVICES:  Medical Necessity Denial & [employer]Wl[employer] Review  Phone: [phone]  Fax: [phone]  Email: [email or web address]     DISCHARGE SUPPORT TEAM:  For Patients Discharge Needs & Updates  Phone: [phone] opt. 2 Fax: [phone]  Email: [email or web address]

## 2023-10-23 NOTE — ASSESSMENT & PLAN NOTE
History of CVA in 2020, was on aspirin, Brilinta and statin however despite being on above had left MCA stroke on 8/21/2023. Brilinta switched to Eliquis, continue same. Currently Eliquis on hold for ICD placement  Continue aspirin and statin.

## 2023-10-24 ENCOUNTER — APPOINTMENT (INPATIENT)
Dept: RADIOLOGY | Facility: HOSPITAL | Age: 63
DRG: 277 | End: 2023-10-24
Payer: COMMERCIAL

## 2023-10-24 ENCOUNTER — ANESTHESIA (INPATIENT)
Dept: NON INVASIVE DIAGNOSTICS | Facility: HOSPITAL | Age: 63
DRG: 277 | End: 2023-10-24
Payer: COMMERCIAL

## 2023-10-24 LAB
ANION GAP SERPL CALCULATED.3IONS-SCNC: 5 MMOL/L
ATRIAL RATE: 64 BPM
ATRIAL RATE: 73 BPM
BUN SERPL-MCNC: 17 MG/DL (ref 5–25)
CALCIUM SERPL-MCNC: 8.9 MG/DL (ref 8.4–10.2)
CHLORIDE SERPL-SCNC: 102 MMOL/L (ref 96–108)
CO2 SERPL-SCNC: 30 MMOL/L (ref 21–32)
CREAT SERPL-MCNC: 0.87 MG/DL (ref 0.6–1.3)
ERYTHROCYTE [DISTWIDTH] IN BLOOD BY AUTOMATED COUNT: 13.3 % (ref 11.6–15.1)
GFR SERPL CREATININE-BSD FRML MDRD: 93 ML/MIN/1.73SQ M
GLUCOSE SERPL-MCNC: 96 MG/DL (ref 65–140)
HCT VFR BLD AUTO: 47 % (ref 36.5–49.3)
HGB BLD-MCNC: 15.7 G/DL (ref 12–17)
INR PPP: 1.36 (ref 0.84–1.19)
MCH RBC QN AUTO: 32 PG (ref 26.8–34.3)
MCHC RBC AUTO-ENTMCNC: 33.4 G/DL (ref 31.4–37.4)
MCV RBC AUTO: 96 FL (ref 82–98)
P AXIS: 75 DEGREES
P AXIS: 89 DEGREES
PLATELET # BLD AUTO: 178 THOUSANDS/UL (ref 149–390)
PMV BLD AUTO: 10 FL (ref 8.9–12.7)
POTASSIUM SERPL-SCNC: 4.2 MMOL/L (ref 3.5–5.3)
PR INTERVAL: 294 MS
PR INTERVAL: 314 MS
PROTHROMBIN TIME: 16.6 SECONDS (ref 11.6–14.5)
QRS AXIS: -86 DEGREES
QRS AXIS: -89 DEGREES
QRSD INTERVAL: 136 MS
QRSD INTERVAL: 142 MS
QT INTERVAL: 410 MS
QT INTERVAL: 456 MS
QTC INTERVAL: 451 MS
QTC INTERVAL: 470 MS
RBC # BLD AUTO: 4.9 MILLION/UL (ref 3.88–5.62)
SODIUM SERPL-SCNC: 137 MMOL/L (ref 135–147)
T WAVE AXIS: 4 DEGREES
T WAVE AXIS: 55 DEGREES
VENTRICULAR RATE: 64 BPM
VENTRICULAR RATE: 73 BPM
WBC # BLD AUTO: 5.27 THOUSAND/UL (ref 4.31–10.16)

## 2023-10-24 PROCEDURE — 0JH608Z INSERTION OF DEFIBRILLATOR GENERATOR INTO CHEST SUBCUTANEOUS TISSUE AND FASCIA, OPEN APPROACH: ICD-10-PCS | Performed by: FAMILY MEDICINE

## 2023-10-24 PROCEDURE — 33249 INSJ/RPLCMT DEFIB W/LEAD(S): CPT | Performed by: STUDENT IN AN ORGANIZED HEALTH CARE EDUCATION/TRAINING PROGRAM

## 2023-10-24 PROCEDURE — 93010 ELECTROCARDIOGRAM REPORT: CPT | Performed by: INTERNAL MEDICINE

## 2023-10-24 PROCEDURE — 33217 INSERT 2 ELECTRODE PM-DEFIB: CPT | Performed by: STUDENT IN AN ORGANIZED HEALTH CARE EDUCATION/TRAINING PROGRAM

## 2023-10-24 PROCEDURE — 85027 COMPLETE CBC AUTOMATED: CPT | Performed by: PHYSICIAN ASSISTANT

## 2023-10-24 PROCEDURE — 02HK3KZ INSERTION OF DEFIBRILLATOR LEAD INTO RIGHT VENTRICLE, PERCUTANEOUS APPROACH: ICD-10-PCS | Performed by: FAMILY MEDICINE

## 2023-10-24 PROCEDURE — C1721 AICD, DUAL CHAMBER: HCPCS | Performed by: STUDENT IN AN ORGANIZED HEALTH CARE EDUCATION/TRAINING PROGRAM

## 2023-10-24 PROCEDURE — 02H63KZ INSERTION OF DEFIBRILLATOR LEAD INTO RIGHT ATRIUM, PERCUTANEOUS APPROACH: ICD-10-PCS | Performed by: FAMILY MEDICINE

## 2023-10-24 PROCEDURE — 93005 ELECTROCARDIOGRAM TRACING: CPT

## 2023-10-24 PROCEDURE — C1898 LEAD, PMKR, OTHER THAN TRANS: HCPCS | Performed by: STUDENT IN AN ORGANIZED HEALTH CARE EDUCATION/TRAINING PROGRAM

## 2023-10-24 PROCEDURE — C1892 INTRO/SHEATH,FIXED,PEEL-AWAY: HCPCS | Performed by: STUDENT IN AN ORGANIZED HEALTH CARE EDUCATION/TRAINING PROGRAM

## 2023-10-24 PROCEDURE — 80048 BASIC METABOLIC PNL TOTAL CA: CPT | Performed by: PHYSICIAN ASSISTANT

## 2023-10-24 PROCEDURE — 85610 PROTHROMBIN TIME: CPT | Performed by: PHYSICIAN ASSISTANT

## 2023-10-24 PROCEDURE — C1777 LEAD, AICD, ENDO SINGLE COIL: HCPCS | Performed by: STUDENT IN AN ORGANIZED HEALTH CARE EDUCATION/TRAINING PROGRAM

## 2023-10-24 PROCEDURE — 71045 X-RAY EXAM CHEST 1 VIEW: CPT

## 2023-10-24 PROCEDURE — C1894 INTRO/SHEATH, NON-LASER: HCPCS | Performed by: STUDENT IN AN ORGANIZED HEALTH CARE EDUCATION/TRAINING PROGRAM

## 2023-10-24 PROCEDURE — 76937 US GUIDE VASCULAR ACCESS: CPT | Performed by: STUDENT IN AN ORGANIZED HEALTH CARE EDUCATION/TRAINING PROGRAM

## 2023-10-24 DEVICE — LEAD 6935M62 QUATTRO SECURE S MRI US
Type: IMPLANTABLE DEVICE | Site: HEART | Status: FUNCTIONAL
Brand: SPRINT QUATTRO SECURE S MRI™ SURESCAN™

## 2023-10-24 DEVICE — ICD DDPA2D4 COBALT XT DR MRI DF4 USA
Type: IMPLANTABLE DEVICE | Site: CHEST  WALL | Status: FUNCTIONAL
Brand: COBALT™ XT DR MRI SURESCAN™

## 2023-10-24 DEVICE — ENVELOPE CMRM6133 ABSORB LRG MR
Type: IMPLANTABLE DEVICE | Site: CHEST  WALL | Status: FUNCTIONAL
Brand: TYRX™

## 2023-10-24 DEVICE — LEAD 5076-52 MRI US RCMCRD
Type: IMPLANTABLE DEVICE | Site: HEART | Status: FUNCTIONAL
Brand: CAPSUREFIX NOVUS MRI™ SURESCAN®

## 2023-10-24 RX ORDER — FENTANYL CITRATE 50 UG/ML
INJECTION, SOLUTION INTRAMUSCULAR; INTRAVENOUS AS NEEDED
Status: DISCONTINUED | OUTPATIENT
Start: 2023-10-24 | End: 2023-10-24

## 2023-10-24 RX ORDER — LIDOCAINE HYDROCHLORIDE 10 MG/ML
INJECTION, SOLUTION EPIDURAL; INFILTRATION; INTRACAUDAL; PERINEURAL AS NEEDED
Status: DISCONTINUED | OUTPATIENT
Start: 2023-10-24 | End: 2023-10-24

## 2023-10-24 RX ORDER — MIDAZOLAM HYDROCHLORIDE 2 MG/2ML
INJECTION, SOLUTION INTRAMUSCULAR; INTRAVENOUS AS NEEDED
Status: DISCONTINUED | OUTPATIENT
Start: 2023-10-24 | End: 2023-10-24

## 2023-10-24 RX ORDER — LIDOCAINE HYDROCHLORIDE 10 MG/ML
INJECTION, SOLUTION EPIDURAL; INFILTRATION; INTRACAUDAL; PERINEURAL CODE/TRAUMA/SEDATION MEDICATION
Status: DISCONTINUED | OUTPATIENT
Start: 2023-10-24 | End: 2023-10-24 | Stop reason: HOSPADM

## 2023-10-24 RX ORDER — PROPOFOL 10 MG/ML
INJECTION, EMULSION INTRAVENOUS CONTINUOUS PRN
Status: DISCONTINUED | OUTPATIENT
Start: 2023-10-24 | End: 2023-10-24

## 2023-10-24 RX ORDER — GENTAMICIN SULFATE 40 MG/ML
INJECTION, SOLUTION INTRAMUSCULAR; INTRAVENOUS CODE/TRAUMA/SEDATION MEDICATION
Status: DISCONTINUED | OUTPATIENT
Start: 2023-10-24 | End: 2023-10-24 | Stop reason: HOSPADM

## 2023-10-24 RX ORDER — SODIUM CHLORIDE 9 MG/ML
INJECTION, SOLUTION INTRAVENOUS CONTINUOUS PRN
Status: DISCONTINUED | OUTPATIENT
Start: 2023-10-24 | End: 2023-10-24

## 2023-10-24 RX ADMIN — ASPIRIN 81 MG CHEWABLE TABLET 81 MG: 81 TABLET CHEWABLE at 08:52

## 2023-10-24 RX ADMIN — FENTANYL CITRATE 50 MCG: 50 INJECTION INTRAMUSCULAR; INTRAVENOUS at 13:29

## 2023-10-24 RX ADMIN — FENTANYL CITRATE 25 MCG: 50 INJECTION INTRAMUSCULAR; INTRAVENOUS at 14:50

## 2023-10-24 RX ADMIN — PROPOFOL 20 MG: 10 INJECTION, EMULSION INTRAVENOUS at 16:10

## 2023-10-24 RX ADMIN — MIDAZOLAM 1 MG: 1 INJECTION INTRAMUSCULAR; INTRAVENOUS at 15:34

## 2023-10-24 RX ADMIN — LIDOCAINE HYDROCHLORIDE 30 MG: 10 INJECTION, SOLUTION EPIDURAL; INFILTRATION; INTRACAUDAL; PERINEURAL at 13:37

## 2023-10-24 RX ADMIN — MIDAZOLAM 1 MG: 1 INJECTION INTRAMUSCULAR; INTRAVENOUS at 13:29

## 2023-10-24 RX ADMIN — FENTANYL CITRATE 25 MCG: 50 INJECTION INTRAMUSCULAR; INTRAVENOUS at 14:03

## 2023-10-24 RX ADMIN — PROPOFOL 20 MG: 10 INJECTION, EMULSION INTRAVENOUS at 16:07

## 2023-10-24 RX ADMIN — SACUBITRIL AND VALSARTAN 1 TABLET: 24; 26 TABLET, FILM COATED ORAL at 17:41

## 2023-10-24 RX ADMIN — PROPOFOL 50 MCG/KG/MIN: 10 INJECTION, EMULSION INTRAVENOUS at 13:37

## 2023-10-24 RX ADMIN — SACUBITRIL AND VALSARTAN 1 TABLET: 24; 26 TABLET, FILM COATED ORAL at 08:55

## 2023-10-24 RX ADMIN — ATORVASTATIN CALCIUM 80 MG: 80 TABLET, FILM COATED ORAL at 17:42

## 2023-10-24 RX ADMIN — CEFAZOLIN SODIUM 2000 MG: 2 SOLUTION INTRAVENOUS at 13:32

## 2023-10-24 RX ADMIN — ACETAMINOPHEN 650 MG: 325 TABLET, FILM COATED ORAL at 17:37

## 2023-10-24 RX ADMIN — PROPOFOL 20 MG: 10 INJECTION, EMULSION INTRAVENOUS at 16:04

## 2023-10-24 RX ADMIN — FUROSEMIDE 20 MG: 20 TABLET ORAL at 08:51

## 2023-10-24 RX ADMIN — SODIUM CHLORIDE: 0.9 INJECTION, SOLUTION INTRAVENOUS at 13:29

## 2023-10-24 RX ADMIN — PHENYLEPHRINE HYDROCHLORIDE 20 MCG/MIN: 10 INJECTION INTRAVENOUS at 13:37

## 2023-10-24 NOTE — PLAN OF CARE
Problem: CARDIOVASCULAR - ADULT  Goal: Maintains optimal cardiac output and hemodynamic stability  Description: INTERVENTIONS:  - Monitor I/O, vital signs and rhythm  - Monitor for S/S and trends of decreased cardiac output  - Administer and titrate ordered vasoactive medications to optimize hemodynamic stability  - Assess quality of pulses, skin color and temperature  - Assess for signs of decreased coronary artery perfusion  - Instruct patient to report change in severity of symptoms  Outcome: Progressing  Goal: Absence of cardiac dysrhythmias or at baseline rhythm  Description: INTERVENTIONS:  - Continuous cardiac monitoring, vital signs, obtain 12 lead EKG if ordered  - Administer antiarrhythmic and heart rate control medications as ordered  - Monitor electrolytes and administer replacement therapy as ordered  Outcome: Progressing     Problem: Knowledge Deficit  Goal: Patient/family/caregiver demonstrates understanding of disease process, treatment plan, medications, and discharge instructions  Description: Complete learning assessment and assess knowledge base.   Interventions:  - Provide teaching at level of understanding  - Provide teaching via preferred learning methods  Outcome: Progressing     Problem: DISCHARGE PLANNING  Goal: Discharge to home or other facility with appropriate resources  Description: INTERVENTIONS:  - Identify barriers to discharge w/patient and caregiver  - Arrange for needed discharge resources and transportation as appropriate  - Identify discharge learning needs (meds, wound care, etc.)  - Arrange for interpretive services to assist at discharge as needed  - Refer to Case Management Department for coordinating discharge planning if the patient needs post-hospital services based on physician/advanced practitioner order or complex needs related to functional status, cognitive ability, or social support system  Outcome: Progressing

## 2023-10-24 NOTE — PROGRESS NOTES
Pt noncompliant with having bp cuff on for intermittent vital interval . Explained to pt the length of bed rest and pt noncompliant with his bedrest order.

## 2023-10-24 NOTE — ANESTHESIA POSTPROCEDURE EVALUATION
Post-Op Assessment Note    CV Status:  Stable    Pain management: adequate     Mental Status:  Alert and awake   Hydration Status:  Euvolemic   PONV Controlled:  Controlled   Airway Patency:  Patent      Post Op Vitals Reviewed: Yes and Patient disoriented at first. Patient kept in room for 10 minutes til more oriented. Patient then transferred to room, AAO x3, room air. Patient states "he's ok but sore". Staff: CRNA     No notable events documented.     BP   93/71   Temp      Pulse  60   Resp   18   SpO2   95

## 2023-10-24 NOTE — ANESTHESIA PREPROCEDURE EVALUATION
Procedure:  Cardiac icd implant (Chest)  66 y/o male with a history of ICM EF 20%, left MCA CVA August 2023, CAD s/p STEMI October 2022, HLD, HTN, DM2, and seizure disorder who presented to the ED due to bradycardia during a neurology office visit. His heart rate was in the 30s. Given his persistently low EF despite optimal medical therapy for more than 3 months and sinus bradycardia with need for beta blocker therapy, implantation of a dual chamber ICD was recommended     Relevant Problems   CARDIO   (+) Coronary artery disease   (+) Hyperlipidemia   (+) Hypertension      ENDO   (+) Type 2 diabetes mellitus (HCC)      NEURO/PSYCH   (+) Cerebrovascular accident (CVA) ([address])   (+) Seizure ([address])      History    CVA, HF, CAD, HLD, HTN, Hx of STEMI, Type 2 DM, Ischemic cardiomyopathy     Interpretation Summary         Left Ventricle: Left ventricular cavity size is mildly dilated. Wall thickness is normal. The left ventricular ejection fraction is 20%. Systolic function is severely reduced. Wall motion cannot be accurately assessed. There is severe global hypokinesis with regional variations. The inferoseptal and anteroseptal walls appear akinetic. The apical segments were not adequately visualized. Mitral Valve: There is mild regurgitation. Tricuspid Valve: There is mild to moderate regurgitation. The estimated right ventricular systolic pressure is 07.01 mmHg. Right Ventricle: Right ventricular cavity size is dilated. Systolic function is mildly to moderately reduced. Wall motion is abnormal. There is moderate hypokinesis of the mid to apical free wall. Pericardium: There is a trivial pericardial effusion posterior to the heart. Physical Exam    Airway    Mallampati score: II  TM Distance: >3 FB  Neck ROM: full     Dental       Cardiovascular      Pulmonary      Other Findings        Anesthesia Plan  ASA Score- 4     Anesthesia Type- IV sedation with anesthesia with ASA Monitors.          Additional Monitors:     Airway Plan:     Comment: Supplemental O2, etco2 monitoring  . Plan Factors-Exercise tolerance (METS): <4 METS. Chart reviewed. EKG reviewed. Imaging results reviewed. Existing labs reviewed. Patient summary reviewed. Patient is not a current smoker. Induction- intravenous. Postoperative Plan-     Informed Consent- Anesthetic plan and risks discussed with patient. I personally reviewed this patient with the CRNA. Discussed and agreed on the Anesthesia Plan with the CRNA. Jessica Chen

## 2023-10-25 ENCOUNTER — APPOINTMENT (INPATIENT)
Dept: RADIOLOGY | Facility: HOSPITAL | Age: 63
DRG: 277 | End: 2023-10-25
Payer: COMMERCIAL

## 2023-10-25 PROBLEM — E87.5 HYPERKALEMIA: Status: RESOLVED | Noted: 2023-10-20 | Resolved: 2023-10-25

## 2023-10-25 PROBLEM — Z95.810 ICD (IMPLANTABLE CARDIOVERTER-DEFIBRILLATOR) IN PLACE: Status: ACTIVE | Noted: 2023-10-25

## 2023-10-25 LAB
ANION GAP SERPL CALCULATED.3IONS-SCNC: 9 MMOL/L
BUN SERPL-MCNC: 16 MG/DL (ref 5–25)
CALCIUM SERPL-MCNC: 9.1 MG/DL (ref 8.4–10.2)
CHLORIDE SERPL-SCNC: 101 MMOL/L (ref 96–108)
CO2 SERPL-SCNC: 27 MMOL/L (ref 21–32)
CREAT SERPL-MCNC: 0.67 MG/DL (ref 0.6–1.3)
ERYTHROCYTE [DISTWIDTH] IN BLOOD BY AUTOMATED COUNT: 13.2 % (ref 11.6–15.1)
GFR SERPL CREATININE-BSD FRML MDRD: 103 ML/MIN/1.73SQ M
GLUCOSE SERPL-MCNC: 83 MG/DL (ref 65–140)
HCT VFR BLD AUTO: 47.6 % (ref 36.5–49.3)
HGB BLD-MCNC: 15.9 G/DL (ref 12–17)
MCH RBC QN AUTO: 31.6 PG (ref 26.8–34.3)
MCHC RBC AUTO-ENTMCNC: 33.4 G/DL (ref 31.4–37.4)
MCV RBC AUTO: 95 FL (ref 82–98)
PLATELET # BLD AUTO: 167 THOUSANDS/UL (ref 149–390)
PMV BLD AUTO: 10.3 FL (ref 8.9–12.7)
POTASSIUM SERPL-SCNC: 4 MMOL/L (ref 3.5–5.3)
RBC # BLD AUTO: 5.03 MILLION/UL (ref 3.88–5.62)
SODIUM SERPL-SCNC: 137 MMOL/L (ref 135–147)
WBC # BLD AUTO: 7.21 THOUSAND/UL (ref 4.31–10.16)

## 2023-10-25 PROCEDURE — 97162 PT EVAL MOD COMPLEX 30 MIN: CPT

## 2023-10-25 PROCEDURE — 99024 POSTOP FOLLOW-UP VISIT: CPT | Performed by: PHYSICIAN ASSISTANT

## 2023-10-25 PROCEDURE — 97166 OT EVAL MOD COMPLEX 45 MIN: CPT

## 2023-10-25 PROCEDURE — 99233 SBSQ HOSP IP/OBS HIGH 50: CPT | Performed by: STUDENT IN AN ORGANIZED HEALTH CARE EDUCATION/TRAINING PROGRAM

## 2023-10-25 PROCEDURE — 71046 X-RAY EXAM CHEST 2 VIEWS: CPT

## 2023-10-25 PROCEDURE — 80048 BASIC METABOLIC PNL TOTAL CA: CPT | Performed by: INTERNAL MEDICINE

## 2023-10-25 PROCEDURE — 85027 COMPLETE CBC AUTOMATED: CPT | Performed by: INTERNAL MEDICINE

## 2023-10-25 RX ORDER — FUROSEMIDE 20 MG/1
20 TABLET ORAL DAILY
Status: DISCONTINUED | OUTPATIENT
Start: 2023-10-26 | End: 2023-10-26 | Stop reason: HOSPADM

## 2023-10-25 RX ORDER — METOPROLOL SUCCINATE 25 MG/1
12.5 TABLET, EXTENDED RELEASE ORAL DAILY
Status: DISCONTINUED | OUTPATIENT
Start: 2023-10-26 | End: 2023-10-26

## 2023-10-25 RX ORDER — METOPROLOL SUCCINATE 25 MG/1
12.5 TABLET, EXTENDED RELEASE ORAL DAILY
Status: DISCONTINUED | OUTPATIENT
Start: 2023-10-25 | End: 2023-10-25

## 2023-10-25 RX ORDER — METHOCARBAMOL 750 MG/1
750 TABLET, FILM COATED ORAL EVERY 8 HOURS SCHEDULED
Status: DISCONTINUED | OUTPATIENT
Start: 2023-10-25 | End: 2023-10-26

## 2023-10-25 RX ADMIN — FUROSEMIDE 20 MG: 20 TABLET ORAL at 09:59

## 2023-10-25 RX ADMIN — ACETAMINOPHEN 650 MG: 325 TABLET, FILM COATED ORAL at 16:00

## 2023-10-25 RX ADMIN — ACETAMINOPHEN 650 MG: 325 TABLET, FILM COATED ORAL at 06:18

## 2023-10-25 RX ADMIN — ATORVASTATIN CALCIUM 80 MG: 80 TABLET, FILM COATED ORAL at 16:00

## 2023-10-25 RX ADMIN — METHOCARBAMOL 750 MG: 750 TABLET ORAL at 22:17

## 2023-10-25 RX ADMIN — ASPIRIN 81 MG CHEWABLE TABLET 81 MG: 81 TABLET CHEWABLE at 09:59

## 2023-10-25 RX ADMIN — METHOCARBAMOL 750 MG: 750 TABLET ORAL at 16:00

## 2023-10-25 RX ADMIN — APIXABAN 5 MG: 5 TABLET, FILM COATED ORAL at 09:59

## 2023-10-25 RX ADMIN — APIXABAN 5 MG: 5 TABLET, FILM COATED ORAL at 18:06

## 2023-10-25 RX ADMIN — SACUBITRIL AND VALSARTAN 1 TABLET: 24; 26 TABLET, FILM COATED ORAL at 09:59

## 2023-10-25 NOTE — ASSESSMENT & PLAN NOTE
History of CVA in 2020, was on aspirin, Brilinta and statin however despite being on above had left MCA stroke on 8/21/2023. Brilinta switched to Eliquis, continue same. Eliquis resumed  Continue aspirin and statin.

## 2023-10-25 NOTE — PROGRESS NOTES
Progress Note - Electrophysiology  Mallorie Brody 65 y.o. male MRN: [id number]  Unit/Bed#: -[place] Encounter: [id number]    Assessment/Plan:  Symptomatic bradycardia, ICM EF 20% s/p implantation of primary prevention Medtronic dual chamber ICD 10/24/2023 by Dr. Garo Powers   -- tele: sinus rhythm with ventricular pacing  -- device interrogation showed normal device function with stable lead parameters  -- post op chest x-ray showed proper lead placement, no pneumothorax  -- left chest implant site healing well  -- pressure dressing in place, can remove tomorrow  -- keep Aquacell dressing in place x 1 week  -- ok to get incision wet in shower after 1 week  -- arm restrictions x 6 weeks  -- remote monitor paired with device  -- pain control  -- start Toprol XL 12.5 mg once daily today  -- discharge instructions reviewed with patient in detail  -- F/U in 2 weeks for wound check  -- stable for D/C from EP standpoint    2. ICM EF 20%, HFrEF  -- resume low dose Toprol XL 12.5 mg once daily, monitor BP (BP today 111/75, hypotension in past)  -- continue Entresto 24/26 mg bid  -- continue furosemide  -- daily weights, low sodium diet, I/Os   -- outpatient heart failure follow up for titration of medications    3. L MCA CVA August 2023  -- continue Eliquis, aspirin, statin  -- outpatient neurology follow up as scheduled    Subjective/Objective   Mallorie Brody is a 65year old male with a history of  ICM EF 20%, left MCA CVA August 2023, CAD s/p STEMI October 2022, HLD, HTN, DM2, and seizure disorder who presented to the ED due to bradycardia during a neurology office visit. His heart rate was in the 30s. Given his persistently low EF despite optimal medical therapy for more than 3 months and sinus bradycardia with need for beta blocker therapy, implantation of a dual chamber ICD was recommended. He underwent implantation of a Medtronic dual chamber ICD on 10/24/2023 by Dr. Garo Powers. Pressure dressing was applied post procedure. Eliquis was resumed given h/o recent stroke. Today, he is feeling well. The incision site pain is controlled. He denies chest pain, SOB, lightheadedness, dizziness, orthopnea, or PND. Telemetry shows sinus rhythm with V pacing    EKG 10/24/2023: sinus rhythm with 1st degree AV block HR 73 bpm    Chest x-ray 10/24/2023: left sided device with proper lead placement, no pneumothorax     Chest x-ray 10/25/2023: pending    Objective:  Vitals: /75 (BP Location: Left arm)   Pulse 76   Temp 97.9 °F (36.6 °C) (Oral)   Resp 18   Ht 5' 8" (1.727 m)   Wt 77.6 kg (171 lb)   SpO2 94%   BMI 26.00 kg/m²     Vitals:    10/22/23 1127   Weight: 77.6 kg (171 lb)     Orthostatic Blood Pressures      Flowsheet Row Most Recent Value   Blood Pressure 111/75 filed at 10/25/2023 9593   Patient Position - Orthostatic VS Lying filed at 10/25/2023 0649            Intake/Output Summary (Last 24 hours) at 10/25/2023 1002  Last data filed at 10/25/2023 0601  Gross per 24 hour   Intake 620 ml   Output 500 ml   Net 120 ml       Invasive Devices       Peripheral Intravenous Line  Duration             Peripheral IV 10/20/23 Dorsal (posterior); Right Forearm 4 days    Peripheral IV 10/24/23 Dorsal (posterior); Proximal;Right Forearm 1 day    Peripheral IV 10/24/23 Distal;Left;Upper;Ventral (anterior) Arm <1 day                   Scheduled Meds:  Current Facility-Administered Medications   Medication Dose Route Frequency Provider Last Rate    acetaminophen  650 mg Oral Q8H PRN Brant Goetz MD      aluminum-magnesium hydroxide-simethicone  30 mL Oral Q6H PRN Brant Goetz MD      apixaban  5 mg Oral BID Brant Goetz MD      aspirin  81 mg Oral Daily Brant Goetz MD      atorvastatin  80 mg Oral Daily With Teresa Olivares MD      furosemide  20 mg Oral Daily Brant Goetz MD      ondansetron  4 mg Intravenous Q6H PRN Brant Goetz MD      sacubitril-valsartan  1 tablet Oral BID Jacqueline Alvarenga MD      senna  1 tablet Oral HS PRN Jacqueline Alvarenga MD       Continuous Infusions:   PRN Meds:.  acetaminophen    aluminum-magnesium hydroxide-simethicone    ondansetron    senna    Review of Systems:  ROS as noted above, otherwise 12 point review of systems was performed and is negative. Physical Exam:  GEN: NAD  SKIN: warm, dry without significant lesions or rashes  HEENT: NCAT, PERRL, EOMs intact  NECK: supple, no JVD appreciated  CARDIOVASCULAR: RRR, normal S1, S2 without murmurs, rubs, or gallops   LUNGS: CTA bilaterally without wheezes, rhonchi, or rales  ABDOMEN: Soft, nontender, nondistended. EXTREMITIES/VASCULAR: perfused without clubbing, cyanosis, or LE edema b/l  PSYCH: Normal mood and affect  NEURO: CN ll-Xll grossly intact       Lab Results: I have personally reviewed pertinent lab results. Results from last 7 days   Lab Units 10/25/23  0647 10/24/23  0609 10/23/23  0632   WBC Thousand/uL 7.21 5.27 6.81   HEMOGLOBIN g/dL 15.9 15.7 15.1   HEMATOCRIT % 47.6 47.0 47.5   PLATELETS Thousands/uL 167 178 175     Results from last 7 days   Lab Units 10/25/23  0647 10/24/23  0609 10/23/23  0632   POTASSIUM mmol/L 4.0 4.2 3.8   CHLORIDE mmol/L 101 102 102   CO2 mmol/L 27 30 29   BUN mg/dL 16 17 17   CREATININE mg/dL 0.67 0.87 0.85   CALCIUM mg/dL 9.1 8.9 9.0     Results from last 7 days   Lab Units 10/24/23  0609 10/20/23  1511   INR  1.36* 1.44*   PTT seconds  --  30     Results from last 7 days   Lab Units 10/22/23  0622   MAGNESIUM mg/dL 2.0       Imaging: I have personally reviewed pertinent reports.       VTE Pharmacologic Prophylaxis: Reason for no pharmacologic prophylaxis therapeutic anticoagulation   VTE Mechanical Prophylaxis: sequential compression device

## 2023-10-25 NOTE — PHYSICAL THERAPY NOTE
PHYSICAL THERAPY EVALUATION  NAME:  Dante Royal  DATE: 10/25/23    AGE:   65 y.o. Mrn:   [id number]  ADMIT DX:  Bradycardia [R00.1]  Problem List:   Patient Active Problem List   Diagnosis    Chronic systolic heart failure (HCC)    History of ST elevation myocardial infarction (STEMI)    Coronary artery disease    Hyperlipidemia    Hypertension    Type 2 diabetes mellitus (HCC)    Seizure ([address])    Obesity (BMI 30.0-34.9)    Ischemic cardiomyopathy    Elevated bilirubin    Elevated PSA    Cerebrovascular accident (CVA) ([address])    Bradycardia    Hyperkalemia    s/p primary prevnetion Medtronic dual chamber ICD 10/24/2023       Past Medical History  Past Medical History:   Diagnosis Date    Coronary artery disease     CVA (cerebral vascular accident) ([address])     R sided, dysarthria, March 2020 in [address] [place] [address])     Hyperlipidemia     Hypertension     Pre-diabetes     s/p primary prevnetion Medtronic dual chamber ICD 10/24/2023 10/25/2023    STEMI (ST elevation myocardial infarction) [facility])        Past Surgical History  Past Surgical History:   Procedure Laterality Date    CARDIAC CATHETERIZATION N/A 7/13/2022    Procedure: Cardiac pci;  Surgeon: Drew López MD;  Location: [facility] CARDIAC [facility]ATH LAB; Service: Cardiology    CARDIAC CATHETERIZATION N/A 7/13/2022    Procedure: Cardiac Coronary Angiogram;  Surgeon: Drew López MD;  [facility]: [facility] CARD[facility] CATH [facility]; Service: Cardiology    CARDIAC CATHETERIZATION Left 7/13/2022    Procedure: Cardiac Left Heart Cath;  Surgeon: Drew López MD;  [facility]: [facility] CARD[facility]; Service: Cardiology    CARDIAC ELECTROPHYSIOLOGY PROCEDURE N/A 10/24/2023    Procedure: Cardiac icd implant;  Surgeon: Tori Saldana MD;  Location: [facility] CARDIAC CATH LAB;   Service: Cardiology       Length Of Stay: 4  Performed at least 2 patient identifiers during session: Name and ID bracelet       10/25/23 1126   PT Last Visit   PT Visit Date 10/25/23   Note Type   Note type Evaluation   Pain Assessment   Pain Assessment Tool 0-10   Pain Score 5   Pain Location/Orientation Orientation: Left; Location: Chest   Restrictions/Precautions   Weight Bearing Precautions Per Order No   Other Precautions Pain;Telemetry   Home Living   Type of Home Apartment   Home Layout One level;Elevator   Bathroom Shower/Tub Walk-in shower   Bathroom Toilet Standard   Bathroom Equipment Grab bars in [address]   (no AD used at baseline)   Prior Function   Level of May Independent with ADLs; Independent with functional mobility; Independent with IADLS   Lives With Alone   IADLs Independent with meal prep; Independent with medication management; Family/Friend/Other provides transportation   Falls in the last 6 months 0   Vocational Full time employment  ([employer] )   General   Family/Caregiver Present No   Cognition   Overall Cognitive Status WFL   Arousal/Participation Alert   Orientation Level Oriented X4   Memory Within functional limits   Following Commands Follows all commands and directions without difficulty   Subjective   Subjective reviewed pacemaker precautions with pts   LUE Assessment   LUE Assessment   (limited due to recent ICD insertion)   RLE Assessment   RLE Assessment WFL   LLE Assessment   LLE Assessment WFL   Transfers   Sit to Stand 6  Modified independent   Stand to Sit 6  Modified independent   Ambulation/Elevation   Gait pattern WNL   Gait Assistance 6  Modified independent   Assistive Device None   Distance 180 ft   Balance   Static Sitting Normal   Dynamic Sitting Normal   Static Standing Normal   Dynamic Standing Good   Ambulatory Good   Endurance Deficit   Endurance Deficit No   Activity Tolerance   Activity Tolerance Patient limited by pain   Assessment   Prognosis Good   Assessment Pt is 65 y.o. male seen for PT evaluation s/p admit to [facility] on [facility]23 w/ Bradycardia. PT consulted to assess pt's functional mobility and d/c needs.  Order placed for PT eval and tx. Pt agreeable to PT  session upon arrival, pt found seated OOB in recliner. The following objective measures performed on IE also reveal limitations: AM-PAC 6 Clicks 63/67. Patient was independent w/ all functional mobility w/ no AD. Pt presents at [facility] with transfers and ambulation. From PT/mobility standpoint, recommendation at time of d/c would be anticipate no needs. Upon conclusion pt seated in recliner. D/c PT services at this time. Complexity: Comorbidities affecting pt's physical performance at time of assessment include: DM, htn, and CAD. Personal factors affecting pt at time of IE include: living alone and steps to enter home. Please find objective findings from PT assessment regarding body systems outlined above with impairments and limitations including pain, limited UE movement and decreased knowledge of pacemaker precautions. Pt's clinical presentation is currently evolving seen in pt's presentation of pain and recent surgery. The patient's AM-PAC Basic Mobility Inpatient Short Form Raw Score is 24. A Raw score of  greater than 16 suggests the patient may benefit from discharge to home. Please also refer to the recommendation of the Physical Therapist for safe discharge planning. RN verbalized pt appropriate for PT session.    Barriers to Discharge None   Goals   Patient Goals to sleep   Discharge Recommendation   PT Discharge Recommendation No rehabilitation needs   Equipment Recommended   (none)   AM-PAC Basic Mobility Inpatient   Turning in Flat Bed Without Bedrails 4   Lying on Back to Sitting on Edge of Flat Bed Without Bedrails 4   Moving Bed to Chair 4   Standing Up From Chair Using Arms 4   Walk in Room 4   Climb 3-5 Stairs With Railing 4   Basic Mobility Inpatient Raw Score 24   Basic Mobility Standardized Score 57.68   Highest Level Of Mobility   JH-HLM Goal 8: Walk 250 feet or more   JH-HLM Achieved 7: Walk 25 feet or more     Pt seen as a co-eval with OT due to the patient's co-morbidities, clinically evolving presentation, and recent surgery which are a regression from the patient's baseline.        Time In: 1118  Time Out: 1126  Total Evaluation Minutes: 55 Gerson Macias PT

## 2023-10-25 NOTE — PLAN OF CARE
Problem: CARDIOVASCULAR - ADULT  Goal: Maintains optimal cardiac output and hemodynamic stability  Description: INTERVENTIONS:  - Monitor I/O, vital signs and rhythm  - Monitor for S/S and trends of decreased cardiac output  - Administer and titrate ordered vasoactive medications to optimize hemodynamic stability  - Assess quality of pulses, skin color and temperature  - Assess for signs of decreased coronary artery perfusion  - Instruct patient to report change in severity of symptoms  Outcome: Progressing  Goal: Absence of cardiac dysrhythmias or at baseline rhythm  Description: INTERVENTIONS:  - Continuous cardiac monitoring, vital signs, obtain 12 lead EKG if ordered  - Administer antiarrhythmic and heart rate control medications as ordered  - Monitor electrolytes and administer replacement therapy as ordered  Outcome: Progressing     Problem: DISCHARGE PLANNING  Goal: Discharge to home or other facility with appropriate resources  Description: INTERVENTIONS:  - Identify barriers to discharge w/patient and caregiver  - Arrange for needed discharge resources and transportation as appropriate  - Identify discharge learning needs (meds, wound care, etc.)  - Arrange for interpretive services to assist at discharge as needed  - Refer to Case Management Department for coordinating discharge planning if the patient needs post-hospital services based on physician/advanced practitioner order or complex needs related to functional status, cognitive ability, or social support system  Outcome: Progressing     Problem: Knowledge Deficit  Goal: Patient/family/caregiver demonstrates understanding of disease process, treatment plan, medications, and discharge instructions  Description: Complete learning assessment and assess knowledge base.   Interventions:  - Provide teaching at level of understanding  - Provide teaching via preferred learning methods  Outcome: Progressing     Problem: RESPIRATORY - ADULT  Goal: Achieves optimal ventilation and oxygenation  Description: INTERVENTIONS:  - Assess for changes in respiratory status  - Assess for changes in mentation and behavior  - Position to facilitate oxygenation and minimize respiratory effort  - Oxygen administered by appropriate delivery if ordered  - Initiate smoking cessation education as indicated  - Encourage broncho-pulmonary hygiene including cough, deep breathe, Incentive Spirometry  - Assess the need for suctioning and aspirate as needed  - Assess and instruct to report SOB or any respiratory difficulty  - Respiratory Therapy support as indicated  Outcome: Progressing     Problem: Prexisting or High Potential for Compromised Skin Integrity  Goal: Skin integrity is maintained or improved  Description: INTERVENTIONS:  - Identify patients at risk for skin breakdown  - Assess and monitor skin integrity  - Assess and monitor nutrition and hydration status  - Monitor labs   - Assess for incontinence   - Turn and reposition patient  - Assist with mobility/ambulation  - Relieve pressure over bony prominences  - Avoid friction and shearing  - Provide appropriate hygiene as needed including keeping skin clean and dry  - Evaluate need for skin moisturizer/barrier cream  - Collaborate with interdisciplinary team   - Patient/family teaching  - Consider wound care consult   Outcome: Progressing

## 2023-10-25 NOTE — PROGRESS NOTES
[facility]  Progress Note  Name: Tabitha Rivers  MRN: [id number]  Unit/Bed#: -01 I Date of Admission: 10/21/2023   Date of Service: 10/25/2023 I Hospital Day: 4    Assessment/Plan   * Bradycardia  Assessment & Plan  Patient was seen in neurology's office,when he was noted to have bradycardia and was referred to ED. Patient evaluated by cardiology, and metoprolol was held. On telemetry patient still with frequent dropped beats. In past patient has refused for ICD/PPM but agreeable at this time. Transfer[facility] to [facility] [place] for EP eval and possible pacemaker placement. Patient was evaluated patient was evaluated by EP, with a plan for dual-chamber ICD placement today  Patient having pain at incision site (started standing tylenol and robaxin tid), cardiology okay with keeping for pain control and monitoring for any bleeding. Dressing to come off tomorrow prior to discharge, monitor for signs of bleeding with resumption of eliquis  Toprol xl 12.5 mg bid resumed    Cerebrovascular accident (CVA) [facility])  Assessment & Plan  History of CVA in 2020, was on aspirin, Brilinta and statin however despite being on above had left MCA stroke on 8/21/2023. Brilinta switched to Eliquis, continue same. Eliquis resumed  Continue aspirin and statin. Type 2 diabetes mellitus ([address])  Assessment & Plan  Lab Results   Component Value Date    HGBA1C 6.3 (H) 08/22/2023       No results for input(s): "POCGLU" in the last 72 hours. Blood Sugar Average: Last 72 hrs:      According to patient he was on was for heart failure but not for diabetes, his blood sugars are well controlled with diet regimen alone. Monitor blood sugar  Carb counting diet. Hypertension  Assessment & Plan  Patient is on metoprolol, Lasix and Entresto. Continue Lasix and Entresto, and metoprolol     Coronary artery disease  Assessment & Plan  History noted, s/p ALEJANDRA to mid LAD in July 2022.   Continue aspirin, statin and metoprolol    Chronic systolic heart failure [facility])  Assessment & Plan  Wt Readings from Last 3 Encounters:   10/22/23 77.6 kg (171 lb)   10/20/23 77.6 kg (171 lb 1.2 oz)   10/20/23 77.6 kg (171 lb)     Ischemic cardiomyopathy. Echocardiogram from 8/22/2023 with LVEF of 20%. Patient was on LifeVest but returned in January. BNP slightly elevated, chest x-ray unremarkable for any vascular congestion. Appears euvolemic on exam.  Continue Entresto, Lasix, Jardiance and statin. Metoprolol resumed  Monitor on telemetry. Hyperkalemia-resolved as of 10/25/2023  Assessment & Plan  On presentation to [address] [place] [address] noted to have potassium of 5.6, however specimen was moderately hemolyzed. No repeat BMP from a.m., will obtain stat BMP. VTE Pharmacologic Prophylaxis: VTE Score: 3 Moderate Risk (Score 3-4) - Pharmacological DVT Prophylaxis Ordered: apixaban (Eliquis). Patient Centered Rounds: I performed bedside rounds with nursing staff today. Discussions with Specialists or Other Care Team Provider: cardio    Education and Discussions with Family / Patient: Patient declined call to . Total Time Spent on Date of Encounter in care of patient: 35 mins. This time was spent on one or more of the following: performing physical exam; counseling and coordination of care; obtaining or reviewing history; documenting in the medical record; reviewing/ordering tests, medications or procedures; communicating with other healthcare professionals and discussing with patient's family/caregivers. Current Length of Stay: 4 day(s)  Current Patient Status: Inpatient   Certification Statement: The patient will continue to require additional inpatient hospital stay due to pain at incision site, monitoring for bleeding  Discharge Plan: Anticipate discharge tomorrow to discharge location to be determined pending rehab evaluations.     Code Status: Level 1 - Full Code    Subjective:   Patient seen examined at bedside. Admits to having pain at the incision site for where the pacemaker was placed. Also stating since having had a pacemaker placed when he lays down he can hear his heart beating. Advised patient that this should resolve and will keep patient overnight for monitoring along for  pain control    Objective:     Vitals:   Temp (24hrs), Av.2 °F (36.8 °C), Min:97.5 °F (36.4 °C), Max:99 °F (37.2 °C)    Temp:  [97.5 °F (36.4 °C)-99 °F (37.2 °C)] 99 °F (37.2 °C)  HR:  [48-76] 48  Resp:  [16-30] 17  BP: ()/(49-75) 102/70  SpO2:  [93 %-97 %] 97 %  Body mass index is 26 kg/m². Input and Output Summary (last 24 hours): Intake/Output Summary (Last 24 hours) at 10/25/2023 1544  Last data filed at 10/25/2023 1300  Gross per 24 hour   Intake 860 ml   Output 500 ml   Net 360 ml       Physical Exam:   Physical Exam  Vitals and nursing note reviewed. Constitutional:       General: He is not in acute distress. Appearance: He is well-developed. HENT:      Head: Normocephalic and atraumatic. Eyes:      Conjunctiva/sclera: Conjunctivae normal.   Cardiovascular:      Rate and Rhythm: Normal rate and regular rhythm. Heart sounds: No murmur heard. Comments: Dressing on left side of chest clean dry and intact  Pulmonary:      Effort: Pulmonary effort is normal. No respiratory distress. Breath sounds: Normal breath sounds. Abdominal:      Palpations: Abdomen is soft. Tenderness: There is no abdominal tenderness. Musculoskeletal:         General: No swelling. Cervical back: Neck supple. Skin:     General: Skin is warm and dry. Capillary Refill: Capillary refill takes less than 2 seconds. Neurological:      Mental Status: He is alert.    Psychiatric:         Mood and Affect: Mood normal.          Additional Data:     Labs:  Results from last 7 days   Lab Units 10/25/23  0647 10/24/23  0609 10/23/23  0632   WBC Thousand/uL 7.21   < > 6.81   HEMOGLOBIN g/dL 15.9 < > 15.1   HEMATOCRIT % 47.6   < > 47.5   PLATELETS Thousands/uL 167   < > 175   NEUTROS PCT %  --   --  43   LYMPHS PCT %  --   --  42   MONOS PCT %  --   --  10   EOS PCT %  --   --  4    < > = values in this interval not displayed. Results from last 7 days   Lab Units 10/25/23  0647 10/21/23  1644 10/20/23  1511   SODIUM mmol/L 137   < > 134*   POTASSIUM mmol/L 4.0   < > 5.6*   CHLORIDE mmol/L 101   < > 99   CO2 mmol/L 27   < > 27   BUN mg/dL 16   < > 11   CREATININE mg/dL 0.67   < > 0.93   ANION GAP mmol/L 9   < > 8   CALCIUM mg/dL 9.1   < > 9.4   ALBUMIN g/dL  --   --  4.1   TOTAL BILIRUBIN mg/dL  --   --  2.10*   ALK PHOS U/L  --   --  72   ALT U/L  --   --  13   AST U/L  --   --  31   GLUCOSE RANDOM mg/dL 83   < > 102    < > = values in this interval not displayed. Results from last 7 days   Lab Units 10/24/23  0609   INR  1.36*     Results from last 7 days   Lab Units 10/21/23  2035 10/21/23  1648   POC GLUCOSE mg/dl 105 106               Lines/Drains:  Invasive Devices       Peripheral Intravenous Line  Duration             Peripheral IV 10/20/23 Dorsal (posterior); Right Forearm 5 days    Peripheral IV 10/24/23 Distal;Left;Upper;Ventral (anterior) Arm 1 day    Peripheral IV 10/24/23 Dorsal (posterior); Proximal;Right Forearm 1 day                      Telemetry:  Telemetry Orders (From admission, onward)               24 Hour Telemetry Monitoring  Continuous x 24 Hours (Telem)        Question:  Reason for 24 Hour Telemetry  Answer:  PCI/EP study (including pacer and ICD implementation), Cardiac surgery, MI, abnormal cardiac cath, and chest pain- rule out MI                     Telemetry Reviewed:  Sinus ventricular paced rhythm  Indication for Continued Telemetry Use: Arrthymias requiring medical therapy             Imaging: Reviewed radiology reports from this admission including: procedure reports    Recent Cultures (last 7 days):         Last 24 Hours Medication List:   Current Facility-Administered Medications   Medication Dose Route Frequency Provider Last Rate    acetaminophen  650 mg Oral Q8H PRN Yovanny Lowe MD      aluminum-magnesium hydroxide-simethicone  30 mL Oral Q6H PRN Yovanny Lowe MD      apixaban  5 mg Oral BID Yovanny Lowe MD      aspirin  81 mg Oral Daily Yovanny Lowe MD      atorvastatin  80 mg Oral Daily With Prem Chawla MD      [START ON 10/26/2023] furosemide  20 mg Oral Daily Maribell Caldwell PA-C      methocarbamol  750 mg Oral Q8H [address] Kika Salinas DO      [START ON 10/26/2023] metoprolol succinate  12.5 mg Oral Daily Yasmeen Vital PA-C      ondansetron  4 mg Intravenous Q6H PRN Yovanny Lowe MD      sacubitril-valsartan  1 tablet Oral BID Maribell Caldwell PA-C      senna  1 tablet Oral HS PRN Yovanny Lowe MD          Today, Patient Was Seen By: Georgia Garza DO    **Please Note: This note may have been constructed using a voice recognition system. **

## 2023-10-25 NOTE — DISCHARGE INSTR - AVS FIRST PAGE
Please refer to post ICD implantation discharge instructions and restrictions below and your ICD booklet/temporary card. Keep incision dry for one week. Do not use lotions/powders/creams on incision. Leave outer bandage in place for 1 week - it is water proof, and as long as it is fully adhered to your skin you may shower with it. If it appears as though the bandage is coming off and/or there is any communication to the area of device incision, please then keep the whole area dry for the remaining week. After 1 week, please remove by pulling all edges away from the center of the bandage. No overhead reaching/pushing/pulling/lifting greater than 5-10lbs with left arm for six weeks. Please call the office if you notice redness, swelling, bleeding, or drainage from incision or if you develop fevers    Implantable Cardioverter Defibrillator      If you have any questions, please call [phone] to speak with a nurse (8:30am-4pm, or [phone] after hours). For appointments, please call [phone]. WHAT YOU SHOULD KNOW:    An implantable cardioverter defibrillator (ICD) is a small device that monitors your heart rate and rhythm. It is commonly placed inside your upper chest region. It may be used if you have a ventricular arrhythmia, which is an irregular, dangerous rhythm from the bottom chamber of your heart. Some arrhythmias may cause your heart to suddenly stop beating. An ICD can give a shock to your heart to make it start beating again, or it can give pacing therapy (also known as pain-free therapy) to return your heart to normal rhythm. It is also a pacemaker, so it will pace your heart if needed to prevent it from beating too slowly. AFTER YOU LEAVE:      Follow up with your primary healthcare provider or cardiologist as directed: You will need to follow-up to have your ICD checked and make sure you are not having problems.  Write down your questions so you remember to ask them during your visits. Driving: you are ok to drive 48 hours after device is implanted     Self-care:   Ask about activity: Ask if you need to avoid moving your shoulder or arm, and for how long. Ask if you should avoid lifting heavy objects. Do not play any contact sports, such as football or wrestling, until your primary healthcare provider [facility] or cardiologist tells you it is okay. You may only be able to drive for a certain amount of time per day, or during certain hours. Ask when you can return to work. Care for your skin over the ICD: see instructions above     When you get a shock from your ICD: A shock may feel like someone has hit you, or you may feel a thump in the chest. If someone is touching you when you get a shock, they may feel a tingling feeling. The first time you feel a shock, it may scare you. Sit or lie down and stay calm. Ask someone to stay with you if possible. Please either call your cardiologist or report to an emergency room. Safety instructions when you have an ICD:   Carry an ID card for the ICD: This card has important information about your ICD. Stay away from magnets or machines with electric fields: This includes MRI machines. Avoid leaning into a car engine or doing welding. These things can interfere with how your ICD works. Tell airport security you have an ICD: You may need to be searched by hand when you go through a security gate. The security gate or handheld wand could harm your ICD. Keep an ICD diary: Record when you get a shock and what you were doing before you got the shock. Keep track of how you felt before and after the shock, as well as how many shocks you received. Write down the day and time of each shock. Bring the diary with you when you see your PHP or cardiologist.   Landon Malloy medical alert identification: Wear medical alert jewelry or carry a card that says you have an ICD. Ask your PHP or cardiologist where to get these items.     Contact your primary healthcare provider or cardiologist if:   You have a fever. You feel 1 or more shocks from your ICD and feel fine afterwards. Your feet or ankles swell. The area around your ICD is painful or tender after surgery. The skin around your stitches or staples is red, swollen, or draining pus or fluid. You have chills, a cough, and feel weak or achy. You are sad or anxious and find it hard to do your usual activities. You have questions or concerns about your condition or care. Seek care immediately or call 911 if:   Your stitches or staples come apart. Blood soaks through your bandage. You feel more than 3 shocks in a row from your ICD. You become weak, dizzy, or faint. You feel your heart skip beats or beat very fast or slow, but you do not feel a shock from your ICD. You have chest pain that does not go away with rest or medicine. © 2014 [facility] is for End User's use only and may not be sold, redistributed or otherwise used for commercial purposes. All illustrations and images included in CareNotes® are the copyrighted property of [employer] or [employer]. The above information is an  only. It is not intended as medical advice for individual conditions or treatments. Talk to your doctor, nurse or pharmacist before following any medical regimen to see if it is safe and effective for you.

## 2023-10-25 NOTE — ASSESSMENT & PLAN NOTE
Lab Results   Component Value Date    HGBA1C 6.3 (H) 08/22/2023       No results for input(s): "POCGLU" in the last 72 hours. Blood Sugar Average: Last 72 hrs:  ?    According to patient he was on was for heart failure but not for diabetes, his blood sugars are well controlled with diet regimen alone. Monitor blood sugar  Carb counting diet.

## 2023-10-25 NOTE — ASSESSMENT & PLAN NOTE
On presentation to [address] [place] [address] noted to have potassium of 5.6, however specimen was moderately hemolyzed. No repeat BMP from a.m., will obtain stat BMP.

## 2023-10-25 NOTE — ASSESSMENT & PLAN NOTE
Patient was seen in neurology's office,when he was noted to have bradycardia and was referred to ED. Patient evaluated by cardiology, and metoprolol was held. On telemetry patient still with frequent dropped beats. In past patient has refused for ICD/PPM but agreeable at this time. Transferred to [facility] for EP eval and possible pacemaker placement. Patient was evaluated patient was evaluated by EP, with a plan for dual-chamber ICD placement today  Patient having pain at incision site (started standing tylenol and robaxin tid), cardiology okay with keeping for pain control and monitoring for any bleeding.   Dressing to come off tomorrow prior to discharge, monitor for signs of bleeding with resumption of eliquis  Toprol xl 12.5 mg bid resumed

## 2023-10-25 NOTE — OCCUPATIONAL THERAPY NOTE
Occupational Therapy Evaluation     Patient Name: Precilla Gowers  [id number] Date: 10/25/2023  Problem List  Principal Problem:    Bradycardia  Active Problems:    Chronic systolic heart failure ([address])    Coronary artery disease    Hyperlipidemia    Hypertension    Type 2 diabetes mellitus ([address])    Cerebrovascular accident (CVA) ([address])    Hyperkalemia    s/p primary prevnetion Medtronic dual chamber ICD 10/24/2023    Past Medical History  Past Medical History:   Diagnosis Date    Coronary artery disease     CVA (cerebral vascular accident) ([address])     R sided, dysarthria, March 2020 in [address] [place] [address])     Hyperlipidemia     Hypertension     Pre-diabetes     s/p primary prevnetion Medtronic dual chamber ICD 10/24/2023 10/25/2023    STEMI (ST elevation myocardial infarction) [facility])      Past Surgical History  Past Surgical History:   Procedure Laterality Date    CARDIAC CATHETERIZATION N/A 7/13/2022    Procedure: Cardiac pci;  Surgeon: Laura Paz MD;  [facility]; Service: Cardiology    CARDIAC CATHETERIZATION N/A 7/13/2022    Procedure: Cardiac Coronary Angiogram;  Surgeon: Laura Paz MD;  [facility]; Service: Cardiology    CARDIAC CATHETERIZATION Left 7/13/2022    Procedure: Cardiac Left Heart Cath;  Surgeon: Laura Paz MD;  [facility]; Service: Cardiology    CARDIAC ELECTROPHYSIOLOGY PROCEDURE N/A 10/24/2023    Procedure: Cardiac icd implant;  Surgeon: Shantell Leiva MD;  Location: [facility] CARD[facility]; Service: Cardiology             10/25/23 1120   OT Last Visit   OT Visit Date 10/25/23   Note Type   Note type Evaluation   Pain Assessment   Pain Score 5   Pain Location/Orientation Orientation: Left; Location: Arm   Restrictions/Precautions   Weight Bearing Precautions Per Order No   Other Precautions Pain  (Pacer precautions)   Home Living   Type of [address] [place] [address] One level;Elevator   Bathroom Shower/Tub Walk-in shower   Bathroom Toilet Standard   Bathroom Equipment Grab bars in shower   Bathroom Accessibility Accessible   Additional Comments Pt lives in a one level apartment with elevator access, denies DME PTA   Prior Function   Level of [place] Independent with ADLs; Independent with functional mobility; Independent with IADLS   Lives With Alone   Receives Help From   (denies social supports)   IADLs Independent with meal prep; Independent with medication management; Family/Friend/Other provides transportation   Falls in the last 6 months 0   Vocational Full time employment   Lifestyle   Autonomy I with ADLs and IADLS does not drive, uses public transportation   Reciprocal Relationships reports he has no social supports   Service to Others works FT as a    Intrinsic Gratification sleeping   Subjective   Subjective "Okay"   ADL   Where Assessed Chair   Eating Assistance 7  [place] upon presentation and at end of session   Transfers   Sit to [address] to Sit 7  Independent   Stand pivot 7  Independent   Additional Comments no AD   Functional Mobility   Functional Mobility 7  Independent   Additional Comments greater than house hold distances no AD   Balance   Static Sitting Normal   Dynamic Sitting Normal   Static Standing Good   Dynamic Standing Good   Ambulatory Good   Activity Tolerance   Activity Tolerance Patient tolerated treatment well   Medical Staff Made Aware DPT, NSG Aware   Nurse Made Aware yes, cleared for OTIE   RUE Assessment   RUE Assessment WFL   LUE Assessment   LUE Assessment X  (pacer precautions)   Psychosocial   Psychosocial (WDL) X   Patient Behaviors/Mood [place] affect   Cognition   Overall Cognitive Status WFL   Arousal/Participation Alert; Responsive; Cooperative   Attention Within functional limits   Orientation Level Oriented X4   Memory Within functional limits   Following Commands Follows all commands and directions without difficulty   Comments Overall cooperative   Assessment   Assessment Patient is a 65 y.o. male admitted to [address] [place] [address] on 10/21/2023 due to Bradycardia. Pt is now s/p dual-chamber ICD. Pt performed at (I) level with no OT needs identified at this time. Comorbidities affecting pt's physical performance at time of assessment include  has a past medical history of Coronary artery disease, CVA (cerebral vascular accident) ([address]), Heart failure ([address]), Hyperlipidemia, Hypertension, Pre-diabetes, s/p primary prevnetion Medtronic dual chamber ICD 10/24/2023, and STEMI (ST elevation myocardial infarction) ([address]). The patient's raw score on the AM-PAC Daily Activity inpatient short form is 24  , standardized score is 57.54  , greater than 39.4. Patients at this level are likely to benefit from DC to home. From OT standpoint recommend anticipate no needs upon D/C. No further acute OT needs identified at this time. Recommend continued mobilization with hospital staff and restorative services while in the hospital to increase pt’s endurance and strength upon D/C. From OT standpoint, recommend D/C to home with family support when medically cleared. D/C pt from OT caseload at this time.    Goals   Patient Goals to sleep   Plan   OT Frequency Eval only   Discharge Recommendation   OT Discharge Recommendation No rehabilitation needs   AM-PAC Daily Activity Inpatient   Lower Body Dressing 4   Bathing 4   Toileting 4   Upper Body Dressing 4   Grooming 4   Eating 4   Daily Activity Raw Score 24   Daily Activity Standardized Score (Calc for Raw Score >=11) 57.54   AM-PAC Applied Cognition Inpatient   Following a Speech/Presentation 4   Understanding Ordinary Conversation 4   Taking Medications 4   Remembering Where Things Are Placed or Put Away 4   Remembering List of 4-5 Errands 4   Taking Care of Complicated Tasks 4   Applied Cognition Raw Score 24   Applied Cognition Standardized Score 62.21

## 2023-10-25 NOTE — ASSESSMENT & PLAN NOTE
Wt Readings from Last 3 Encounters:   10/22/23 77.6 kg (171 lb)   10/20/23 77.6 kg (171 lb 1.2 oz)   10/20/23 77.6 kg (171 lb)     Ischemic cardiomyopathy. Echocardiogram from 8/22/2023 with LVEF of 20%. Patient was on LifeVest but returned in January. BNP slightly elevated, chest x-ray unremarkable for any vascular congestion. Appears euvolemic on exam.  Continue Entresto, Lasix, Jardiance and statin. Metoprolol resumed  Monitor on telemetry.

## 2023-10-26 ENCOUNTER — TELEPHONE (OUTPATIENT)
Dept: FAMILY MEDICINE CLINIC | Facility: CLINIC | Age: 63
End: 2023-10-26

## 2023-10-26 VITALS
BODY MASS INDEX: 25.55 KG/M2 | TEMPERATURE: 98.9 F | RESPIRATION RATE: 18 BRPM | WEIGHT: 168.6 LBS | DIASTOLIC BLOOD PRESSURE: 57 MMHG | HEIGHT: 68 IN | SYSTOLIC BLOOD PRESSURE: 104 MMHG | HEART RATE: 47 BPM | OXYGEN SATURATION: 94 %

## 2023-10-26 LAB
ALBUMIN SERPL BCP-MCNC: 4 G/DL (ref 3.5–5)
ANION GAP SERPL CALCULATED.3IONS-SCNC: 8 MMOL/L
BUN SERPL-MCNC: 16 MG/DL (ref 5–25)
CALCIUM SERPL-MCNC: 9.2 MG/DL (ref 8.4–10.2)
CHLORIDE SERPL-SCNC: 102 MMOL/L (ref 96–108)
CO2 SERPL-SCNC: 27 MMOL/L (ref 21–32)
CREAT SERPL-MCNC: 0.67 MG/DL (ref 0.6–1.3)
ERYTHROCYTE [DISTWIDTH] IN BLOOD BY AUTOMATED COUNT: 13.2 % (ref 11.6–15.1)
GFR SERPL CREATININE-BSD FRML MDRD: 103 ML/MIN/1.73SQ M
GLUCOSE SERPL-MCNC: 110 MG/DL (ref 65–140)
HCT VFR BLD AUTO: 48.9 % (ref 36.5–49.3)
HGB BLD-MCNC: 16 G/DL (ref 12–17)
MAGNESIUM SERPL-MCNC: 2 MG/DL (ref 1.9–2.7)
MCH RBC QN AUTO: 31.6 PG (ref 26.8–34.3)
MCHC RBC AUTO-ENTMCNC: 32.7 G/DL (ref 31.4–37.4)
MCV RBC AUTO: 97 FL (ref 82–98)
PHOSPHATE SERPL-MCNC: 3.7 MG/DL (ref 2.3–4.1)
PLATELET # BLD AUTO: 164 THOUSANDS/UL (ref 149–390)
PMV BLD AUTO: 10.8 FL (ref 8.9–12.7)
POTASSIUM SERPL-SCNC: 4.2 MMOL/L (ref 3.5–5.3)
RBC # BLD AUTO: 5.06 MILLION/UL (ref 3.88–5.62)
SODIUM SERPL-SCNC: 137 MMOL/L (ref 135–147)
WBC # BLD AUTO: 9.04 THOUSAND/UL (ref 4.31–10.16)

## 2023-10-26 PROCEDURE — 80069 RENAL FUNCTION PANEL: CPT | Performed by: STUDENT IN AN ORGANIZED HEALTH CARE EDUCATION/TRAINING PROGRAM

## 2023-10-26 PROCEDURE — 99239 HOSP IP/OBS DSCHRG MGMT >30: CPT | Performed by: PHYSICIAN ASSISTANT

## 2023-10-26 PROCEDURE — 83735 ASSAY OF MAGNESIUM: CPT | Performed by: STUDENT IN AN ORGANIZED HEALTH CARE EDUCATION/TRAINING PROGRAM

## 2023-10-26 PROCEDURE — 85027 COMPLETE CBC AUTOMATED: CPT | Performed by: STUDENT IN AN ORGANIZED HEALTH CARE EDUCATION/TRAINING PROGRAM

## 2023-10-26 RX ADMIN — METHOCARBAMOL 750 MG: 750 TABLET ORAL at 05:58

## 2023-10-26 RX ADMIN — ASPIRIN 81 MG CHEWABLE TABLET 81 MG: 81 TABLET CHEWABLE at 08:45

## 2023-10-26 RX ADMIN — FUROSEMIDE 20 MG: 20 TABLET ORAL at 08:45

## 2023-10-26 RX ADMIN — METOPROLOL SUCCINATE 12.5 MG: 25 TABLET, EXTENDED RELEASE ORAL at 08:43

## 2023-10-26 RX ADMIN — SACUBITRIL AND VALSARTAN 1 TABLET: 24; 26 TABLET, FILM COATED ORAL at 08:43

## 2023-10-26 RX ADMIN — ACETAMINOPHEN 650 MG: 325 TABLET, FILM COATED ORAL at 08:45

## 2023-10-26 RX ADMIN — APIXABAN 5 MG: 5 TABLET, FILM COATED ORAL at 08:44

## 2023-10-26 NOTE — ASSESSMENT & PLAN NOTE
Patient was on metoprolol, Lasix and Entresto.   Continue Lasix and Entresto  BP did not tolerate metoprolol and this was discontinued after discussion with EP as above

## 2023-10-26 NOTE — ASSESSMENT & PLAN NOTE
Wt Readings from Last 3 Encounters:   10/26/23 76.5 kg (168 lb 9.6 oz)   10/20/23 77.6 kg (171 lb 1.2 oz)   10/20/23 77.6 kg (171 lb)     Ischemic cardiomyopathy. Echocardiogram from 8/22/2023 with LVEF of 20%. Patient was on LifeVest but returned in January. BNP slightly elevated, chest x-ray unremarkable for any vascular congestion. Appears euvolemic on exam.  Continue Entresto, Lasix, Jardiance and statin.   Metoprolol resumed at lower dose but BP didn't tolerate and BB was discontinued after discussion with EP as above

## 2023-10-26 NOTE — ASSESSMENT & PLAN NOTE
History noted, s/p ALEJANDRA to mid LAD in July 2022.   Continue aspirin, statin  No longer on BB as above

## 2023-10-26 NOTE — ASSESSMENT & PLAN NOTE
Lab Results   Component Value Date    HGBA1C 6.3 (H) 08/22/2023       No results for input(s): "POCGLU" in the last 72 hours.       Blood Sugar Average: Last 72 hrs:      Continue PTA regimen

## 2023-10-26 NOTE — DISCHARGE SUMMARY
[address] [facility]  Discharge- Stella Rosen 1960, 65 y.o. male MRN: [id number]  Unit/Bed#: -01 Encounter: [id number]  Primary Care Provider: Lucila Blount PA-C   Date and time admitted to hospital: 10/21/2023  2:53 PM    * Bradycardia  Assessment & Plan  Patient was seen in Neurology's office as outpatient and was noted to have bradycardia and was referred to ED. Patient evaluated by cardiology, and metoprolol was held. On telemetry patient still with frequent dropped beats. In past patient has refused for ICD/PPM but was agreeable now. Transferred to [facility] for EP eval and possible pacemaker placement. Patient was evaluated by EP, and underwent Medtronic dual-chamber ICD placement 10/24/23  EP resumed Toprol XL at lower dose(12.5mg daily) but BP not tolerating. Discontinue BB per EP recs  EP cleared the patient from their standpoint for discharge today    Hypertension  Assessment & Plan  Patient was on metoprolol, Lasix and Entresto. Continue Lasix and Entresto  BP did not tolerate metoprolol and this was discontinued after discussion with EP as above    Chronic systolic heart failure [facility])  Assessment & Plan  Wt Readings from Last 3 Encounters:   10/26/23 76.5 kg (168 lb 9.6 oz)   10/20/23 77.6 kg (171 lb 1.2 oz)   10/20/23 77.6 kg (171 lb)     Ischemic cardiomyopathy. Echocardiogram from 8/22/2023 with LVEF of 20%. Patient was on LifeVest but returned in January. BNP slightly elevated, chest x-ray unremarkable for any vascular congestion. Appears euvolemic on exam.  Continue Entresto, Lasix, Jardiance and statin. Metoprolol resumed at lower dose but BP didn't tolerate and BB was discontinued after discussion with EP as above          Coronary artery disease  Assessment & Plan  History noted, s/p ALEJANDRA to mid LAD in July 2022.   Continue aspirin, statin  No longer on BB as above    Cerebrovascular accident (CVA) [facility])  Assessment & Plan  History of CVA in 2020, was on aspirin, Brilinta and statin however despite being on above had left MCA stroke on 8/21/2023. Brilinta switched to Eliquis, continue same. Continue aspirin and statin. Type 2 diabetes mellitus ([address])  Assessment & Plan  Lab Results   Component Value Date    HGBA1C 6.3 (H) 08/22/2023       No results for input(s): "POCGLU" in the last 72 hours. Blood Sugar Average: Last 72 hrs:      Continue PTA regimen    s/p primary prevnetion Medtronic dual chamber ICD 10/24/2023  Assessment & Plan  As above      Medical Problems       Resolved Problems  Date Reviewed: 10/26/2023            Resolved    Hyperkalemia 10/25/2023     Resolved by  Thomas Levine DO        Discharging Physician / Practitioner: Mika Gao PA-C  PCP: Juliano Chavez PA-C  Admission Date:   Admission Orders (From admission, onward)       Ordered        10/21/23 1506  Inpatient Admission  Once                          Discharge Date: 10/26/23    Consultations During Hospital Stay:  Cardiology  EP  PT  OT    Procedures Performed:   ICD implant  CXR  CBC  CMP/BMP  HS troponin  BNP    Significant Findings / Test Results:   CXR: "Interval placement of left-sided cardiac pacer without complication. No acute cardiopulmonary disease"  BNP: 627    Incidental Findings:   None     Test Results Pending at Discharge (will require follow up): None     Outpatient Tests Requested: Follow up with EP and PCP    Complications:  None    Reason for Admission: bradycardia    Hospital Course:   Wilber Lr is a 65 y.o. male with chronic systolic CHF, hx CVA, DM2, HTN, HLD, CAD who originally presented to [facility] on 10/21/2023 as a transfer from [facility]. He was being evaluated in the Neurology office as outpatient and was noted to be bradycardic and was sent to the [facility] ED where he was evaluated by Cardiology. His BB was held. He continued to have dropped beats and was transferred to [facility] for EP eval. He underwent Medtronic dual chamber ICD placement on 10/24/23. His Toprol XL was resumed at lower dose but BP did not tolerate and BB was discontinued after discussion with EP. EP cleared the patient from their standpoint for discharge today. PT/OT recommended no rehab needs. Please see above list of diagnoses and related plan for additional information. Condition at Discharge: stable    Discharge Day Visit / Exam:   Subjective:    Mr. Ashley Barreto reports right posterior shoulder soreness. Otherwise denies complaint. He states he wants to leave today    Vitals: Blood Pressure: (!) 82/57 (10/26/23 1106)  Pulse: 66 (10/26/23 1106)  Temperature: 98.9 °F (37.2 °C) (10/26/23 1106)  Temp Source: Oral (10/26/23 1106)  Respirations: 18 (10/26/23 1106)  Height: 5' 8" (172.7 cm) (10/22/23 1127)  Weight - Scale: 76.5 kg (168 lb 9.6 oz) (10/26/23 0600)  SpO2: 94 % (10/26/23 1106)  Exam:   Physical Exam  Vitals reviewed. Constitutional:       Comments: Patient seen sitting in bed, NAD   Cardiovascular:      Rate and Rhythm: Normal rate and regular rhythm. Pulmonary:      Effort: Pulmonary effort is normal. No respiratory distress. Breath sounds: Normal breath sounds. Abdominal:      General: Bowel sounds are normal.      Palpations: Abdomen is soft. Tenderness: There is no abdominal tenderness. Musculoskeletal:      Right lower leg: No edema. Left lower leg: No edema. Skin:     General: Skin is warm. Neurological:      Mental Status: He is alert and oriented to person, place, and time. Psychiatric:         Mood and Affect: Mood normal.         Behavior: Behavior normal.          Discussion with Family: Patient declined call to . Discharge instructions/Information to patient and family:   See after visit summary for information provided to patient and family. Provisions for Follow-Up Care:  See after visit summary for information related to follow-up care and any pertinent home health orders.        Disposition:   Home    Planned Readmission: None     Discharge Statement:  I spent 39 minutes discharging the patient. This time was spent on the day of discharge. I had direct contact with the patient on the day of discharge. Greater than 50% of the total time was spent examining patient, answering all patient questions, arranging and discussing plan of care with patient as well as directly providing post-discharge instructions. Additional time then spent on discharge activities. Discharge Medications:  See after visit summary for reconciled discharge medications provided to patient and/or family.       **Please Note: This note may have been constructed using a voice recognition system**

## 2023-10-26 NOTE — TELEPHONE ENCOUNTER
----- Message from Olga Saucedo PA-C sent at 10/26/2023 12:37 PM EDT -----  Thank you for allowing us to participate in the care of your patient, Enma Jordan, who was hospitalized from 10/21/2023 through 10/26/2023 with the admitting diagnosis of bradycardia. He was being evaluated in the Neurology office as outpatient and was noted to be bradycardic and was sent to the [facility] ED where he was evaluated by Cardiology. His BB was held. He continued to have dropped beats and was transferred to [facility] for EP eval. He underwent Medtronic dual chamber ICD placement on 10/24/23. His Toprol XL was resumed at lower dose but BP did not tolerate and BB was discontinued after discussion with EP. EP cleared the patient from their standpoint for discharge today. If you have any additional questions or would like to discuss further, please feel free to contact me.     Olga Saucedo PA-C  [facility], Hospitalist  [phone]

## 2023-10-26 NOTE — QUICK NOTE
EP Service     Stephan Mullins is a 65year old male with symptomatic bradycardia, ICM Ef 20%, and recent left MCA CVA in August 2023 who underwent implantation of a primary prevention Medtronic dual chamber ICD. Please see note from 10/25/2023 for full impression/plan. Patient was monitored overnight. Pressure dressing removed today and left chest site C/D/I with no active bleeding. Aquacell remains in place. Trialed on Toprol XL 12.5 mg once daily. /60 this am, then 82/57 after receiving morning medications. Discontinue Toprol and continue Entresto. Patient has wound check and device interrogation with HF follow up the same day to titrate HF meds. Stable for D/C from EP standpoint.  Discussed with primary team.

## 2023-10-26 NOTE — ASSESSMENT & PLAN NOTE
Patient was seen in Neurology's office as outpatient and was noted to have bradycardia and was referred to ED. Patient evaluated by cardiology, and metoprolol was held. On telemetry patient still with frequent dropped beats. In past patient has refused for ICD/PPM but was agreeable now. Transferred to [facility] for EP eval and possible pacemaker placement. Patient was evaluated by EP, and underwent Medtronic dual-chamber ICD placement 10/24/23  EP resumed Toprol XL at lower dose(12.5mg daily) but BP not tolerating.  Discontinue BB per EP recs  EP cleared the patient from their standpoint for discharge today

## 2023-10-27 ENCOUNTER — PATIENT OUTREACH (OUTPATIENT)
Dept: FAMILY MEDICINE CLINIC | Facility: CLINIC | Age: 63
End: 2023-10-27

## 2023-10-27 ENCOUNTER — OFFICE VISIT (OUTPATIENT)
Dept: FAMILY MEDICINE CLINIC | Facility: CLINIC | Age: 63
End: 2023-10-27
Payer: COMMERCIAL

## 2023-10-27 VITALS
BODY MASS INDEX: 26.13 KG/M2 | SYSTOLIC BLOOD PRESSURE: 108 MMHG | OXYGEN SATURATION: 96 % | TEMPERATURE: 99.6 F | DIASTOLIC BLOOD PRESSURE: 70 MMHG | WEIGHT: 172.4 LBS | HEIGHT: 68 IN | HEART RATE: 99 BPM | RESPIRATION RATE: 17 BRPM

## 2023-10-27 DIAGNOSIS — I25.5 ISCHEMIC CARDIOMYOPATHY: Chronic | ICD-10-CM

## 2023-10-27 DIAGNOSIS — Z95.810 ICD (IMPLANTABLE CARDIOVERTER-DEFIBRILLATOR) IN PLACE: Primary | ICD-10-CM

## 2023-10-27 DIAGNOSIS — I50.22 CHRONIC SYSTOLIC HEART FAILURE (HCC): ICD-10-CM

## 2023-10-27 DIAGNOSIS — I63.9 CEREBROVASCULAR ACCIDENT (CVA), UNSPECIFIED MECHANISM (HCC): Chronic | ICD-10-CM

## 2023-10-27 DIAGNOSIS — E11.9 TYPE 2 DIABETES MELLITUS WITHOUT COMPLICATION, WITHOUT LONG-TERM CURRENT USE OF INSULIN (HCC): Chronic | ICD-10-CM

## 2023-10-27 PROBLEM — E66.9 OBESITY (BMI 30.0-34.9): Status: RESOLVED | Noted: 2022-10-24 | Resolved: 2023-10-27

## 2023-10-27 PROBLEM — E66.811 OBESITY (BMI 30.0-34.9): Status: RESOLVED | Noted: 2022-10-24 | Resolved: 2023-10-27

## 2023-10-27 PROCEDURE — 99496 TRANSJ CARE MGMT HIGH F2F 7D: CPT | Performed by: PHYSICIAN ASSISTANT

## 2023-10-27 RX ORDER — FUROSEMIDE 20 MG/1
20 TABLET ORAL DAILY
Qty: 90 TABLET | Refills: 1 | Status: SHIPPED | OUTPATIENT
Start: 2023-10-27 | End: 2024-04-24

## 2023-10-27 NOTE — ASSESSMENT & PLAN NOTE
ICD placed on 10/24/2023 during most recent admission 10/21/2023 to 10/26/2023. Follow-up cardiology as scheduled.

## 2023-10-27 NOTE — UTILIZATION REVIEW
NOTIFICATION OF ADMISSION DISCHARGE   This is a Notification of Discharge from [address] [facility]. Please be advised that this patient has been discharge from our facility. Below you will find the admission and discharge date and time including the patient’s disposition. UTILIZATION REVIEW CONTACT:  Анна Salas  Utilization   Network Utilization Review Department  Phone: [phone] x carefully listen to the prompts. All voicemails are confidential.  Email: [email or web address]     ADMISSION INFORMATION  PRESENTATION DATE: 10/21/2023  2:53 PM  OBERVATION ADMISSION DATE:   INPATIENT ADMISSION DATE: 10/21/23  2:53 PM   DISCHARGE DATE: 10/26/2023  6:54 PM   DISPOSITION:Home/Self Care    Network Utilization Review Department  ATTENTION: Please call with any questions or concerns to [phone] and carefully listen to the prompts so that you are directed to the right person. All voicemails are confidential.   For Discharge needs, contact Care Management DC Support Team at [phone] opt. 2  Send all requests for admission clinical reviews, approved or denied determinations and any other requests to dedicated fax number below belonging to the campus where the patient is receiving treatment.  List of dedicated fax numbers for the Facilities:  [place] DENIALS (Administrative/Medical Necessity) [phone]   DISCHARGE SUPPORT TEAM (Network) [phone] [facility] [phone] [address] [facility] [address] [place] [address] [address] [place] [address] [phone]   [facility] [address] [facility] [phone]   [address] [place] [address]  [facility] [phone]

## 2023-10-27 NOTE — ASSESSMENT & PLAN NOTE
Wt Readings from Last 3 Encounters:   10/27/23 78.2 kg (172 lb 6.4 oz)   10/26/23 76.5 kg (168 lb 9.6 oz)   10/20/23 77.6 kg (171 lb 1.2 oz)     Continue Lasix 20 mg. Reviewed last EF 20%. No signs of acute fluid overload on exam today.

## 2023-10-27 NOTE — ASSESSMENT & PLAN NOTE
History of stroke in 2020 and left MCA stroke in 8/2023. Switch Brilinta at that time to Eliquis. Continue Eliquis 5 mg twice daily, aspirin, statin. Nutrition Care Plan    Nutrition Diagnosis:   Inadequate intake related to abdominal pain, nausea, vomiting as evidenced by patient reports eating less than normal amounts prior to admission, 7% wt loss over 4 months.    Intervention:  General/healthful diet:   currently on transition diet, encourage intakes, eating fair   Commercial beverage:   patient started on standard oral supplements once daily for an additional 350 calories, 20 grams protein    Monitoring and Evaluation:  Amount of food:   Goal is for patient to tolerate diet/supplements, consume >/=75%, current intake 50% one meal

## 2023-10-27 NOTE — ASSESSMENT & PLAN NOTE
20 pound weight loss in the past 2 months, due for colonoscopy, on hold due to recent admission. Eats at [facility] twice daily. Recent short-term disability checks not being received due to sent to wrong address. Patient aware to call  to have address change. Maintain close follow-up to monitor weight. BMI Counseling: Body mass index is 26.21 kg/m². The BMI is above normal. Nutrition recommendations include reducing portion sizes, decreasing overall calorie intake, reducing fast food intake, and consuming healthier snacks. Exercise recommendations include exercising 3-5 times per week and strength training exercises.

## 2023-10-27 NOTE — PROGRESS NOTES
Assessment & Plan     1. Medtronic dual chamber ICD 10/2023  Assessment & Plan:  Wearing left arm sling, slight tenderness around incision, healing as expected, follow-up as scheduled with cardiology for device site check. 2. Ischemic cardiomyopathy  Assessment & Plan:  ICD placed on 10/24/2023 during most recent admission 10/21/2023 to 10/26/2023. Follow-up cardiology as scheduled. 3. Chronic systolic heart failure [facility])  Assessment & Plan:  Wt Readings from Last 3 Encounters:   10/27/23 78.2 kg (172 lb 6.4 oz)   10/26/23 76.5 kg (168 lb 9.6 oz)   10/20/23 77.6 kg (171 lb 1.2 oz)     Continue Lasix 20 mg. Reviewed last EF 20%. No signs of acute fluid overload on exam today. Orders:  -     furosemide (LASIX) 20 mg tablet; Take 1 tablet (20 mg total) by mouth daily    4. Cerebrovascular accident (CVA), unspecified mechanism ([address])  Assessment & Plan:  History of stroke in 2020 and left MCA stroke in 8/2023. Switch Brilinta at that time to Eliquis. Continue Eliquis 5 mg twice daily, aspirin, statin. 5. Type 2 diabetes mellitus without complication, without long-term current use of insulin ([address])  Assessment & Plan:  Well-controlled previously without medication. On Jardiance for heart failure. Continue same. Diabetic foot exam performed today. Due for diabetic eye exam.  Lab Results   Component Value Date    HGBA1C 6.3 (H) 08/22/2023         6. BMI 26.0-26.9,adult  Assessment & Plan:  20 pound weight loss in the past 2 months, due for colonoscopy, on hold due to recent admission. Eats at [facility] twice daily. Recent short-term disability checks not being received due to sent to wrong address. Patient aware to call  to have address change. Maintain close follow-up to monitor weight. BMI Counseling: Body mass index is 26.21 kg/m².  The BMI is above normal. Nutrition recommendations include reducing portion sizes, decreasing overall calorie intake, reducing fast food intake, and consuming healthier snacks. Exercise recommendations include exercising 3-5 times per week and strength training exercises. Subjective     Transitional Care Management Review:   Lul Schmitz is a 65 y.o. male here for TCM follow up. During the TCM phone call patient stated:  TCM Call     Date and time call was made  8/28/2023  1:39 PM    Patient was hospitialized at  [address] [place] [address]    Date of Admission  08/21/23    Date of discharge  08/25/23    Disposition  Home    Current Symptoms  None      TCM Call     Post hospital issues  None    Did you obtain your prescribed medications  Yes    Do you need help managing your prescriptions or medications  No    Is transportation to your appointment needed  No        Silva Harper is a 65 y.o. male with a h/o controlled diabetes, CVA in 2020 and 8/2023, CAD with stent in 7/2022, HFrEF and cardiomyopathy with recent dual-chamber ICD placement who presents after recent hospitalization from 10/21/2023 to 10/26/2023. He was sent to ER by neurology due to bradycardia 30 where he was being seen for routine follow-up after CVA. Metoprolol was discontinued upon discharge. He brought a bag with him today with all his medications. He was scared to get surgery. Doing well now, painful around site. His disability checks were determined to be sent to previous friend's home in [facility]. No longer living with her. She recently had a baby. Review of Systems   Constitutional:  Positive for unexpected weight change (20 pound weight loss in 3 months). Negative for chills and fever. Respiratory:  Negative for cough and shortness of breath. Cardiovascular:  Positive for chest pain (Site of ICD placement). Negative for palpitations and leg swelling. Neurological:  Negative for tremors, syncope, weakness and light-headedness.        Objective     /70 (BP Location: Left arm, Patient Position: Sitting, Cuff Size: Standard)   Pulse 99   Temp 99.6 °F (37.6 °C) (Tympanic)   Resp 17   Ht 5' 8" (1.727 m)   Wt 78.2 kg (172 lb 6.4 oz)   SpO2 96%   BMI 26.21 kg/m²      Physical Exam  Vitals reviewed. Constitutional:       Appearance: Normal appearance. HENT:      Head: Normocephalic and atraumatic. Cardiovascular:      Rate and Rhythm: Normal rate and regular rhythm. Pulses: no weak pulses          Dorsalis pedis pulses are 2+ on the right side and 2+ on the left side. Posterior tibial pulses are 2+ on the right side and 2+ on the left side. Pulmonary:      Effort: Pulmonary effort is normal.      Breath sounds: Normal breath sounds. Chest:      Chest wall: Tenderness present. No crepitus or edema. There is no dullness to percussion. Breasts:     Left: No swelling or bleeding. Feet:      Right foot:      Skin integrity: No ulcer, skin breakdown, erythema, warmth, callus or dry skin. Left foot:      Skin integrity: No ulcer, skin breakdown, erythema, warmth, callus or dry skin. Neurological:      Mental Status: He is alert and oriented to person, place, and time. Mental status is at baseline. Medications have been reviewed by provider in current encounter    Billie Mosqueda PA-C     Diabetic Foot Exam    Patient's shoes and socks removed. Right Foot/Ankle   Right Foot Inspection  Skin Exam: skin normal and skin intact. No dry skin, no warmth, no callus, no erythema, no maceration, no abnormal color, no pre-ulcer, no ulcer and no callus. Toe Exam: ROM and strength within normal limits. Sensory   Monofilament testing: intact    Vascular  Capillary refills: < 3 seconds  The right DP pulse is 2+. The right PT pulse is 2+. Left Foot/Ankle  Left Foot Inspection  Skin Exam: skin normal and skin intact. No dry skin, no warmth, no erythema, no maceration, normal color, no pre-ulcer, no ulcer and no callus. Toe Exam: ROM and strength within normal limits.      Sensory   Monofilament testing: intact    Vascular  Capillary refills: < 3 seconds  The left DP pulse is 2+. The left PT pulse is 2+.      Assign Risk Category  No deformity present  No loss of protective sensation  No weak pulses  Risk: 0

## 2023-10-27 NOTE — PROGRESS NOTES
YOHANA BECK received discharge notification for patient who discharged home. YOHANA BECK placed call to the patient's HR department for status. YOHANA BECK was informed they have him returning to work as of Monday 10/30. It is an undetermined return to work date but they need more information to extend past 10/29. YOHANA BECK inquired about back payment for short term disability for the months he was not working. YOHANA BECK was informed he has been receiving payments since 10/3 in the form of checks. They were being sent to [facility] in [facility]. He can speak with Madhu Ramon his  at [facility] regarding needing them to go to his new [facility] address. YOHANA BECK spoke with RN KIRAN Holt who is planning to try and contact patient regarding post hospitalization needs. She will attempt to gain permission to send proof of address to [facility] on his behalf in an effort to get his checks resent.  YOHANA BECK will await response prior to contacting [facility].

## 2023-10-27 NOTE — PROGRESS NOTES
Received ADT alert patient was discharged home follow hospitalization for ICD placement. Placed call to [address] [place] [address], coordinator at  [facility] and left vm message requesting return call.

## 2023-10-27 NOTE — ASSESSMENT & PLAN NOTE
Well-controlled previously without medication. On Jardiance for heart failure. Continue same. Diabetic foot exam performed today.   Due for diabetic eye exam.  Lab Results   Component Value Date    HGBA1C 6.3 (H) 08/22/2023

## 2023-10-27 NOTE — ASSESSMENT & PLAN NOTE
Wearing left arm sling, slight tenderness around incision, healing as expected, follow-up as scheduled with cardiology for device site check.

## 2023-10-30 ENCOUNTER — PATIENT OUTREACH (OUTPATIENT)
Dept: FAMILY MEDICINE CLINIC | Facility: CLINIC | Age: 63
End: 2023-10-30

## 2023-10-30 NOTE — PROGRESS NOTES
placed call to [facility] from [facility] to ask if pt has been to the [facility] center. Arvind Crews stated that he did arrive this morning and OC asked if the pt has received any notices from the [facility] office regarding the approval for [place] and [place] benefits, and pt stated that he has not received any notices. OC discussed that these documents are important so that I can apply for a government phone and need to provide the proof of approval.  OC was informed by Renan Carmona at [facility] that they returned the application because pt needs a phone number so that [facility] can contact pt. Maykel informed OC that pt does have his phone connected and it is working. OC informed Maykel that I have resubmitted the application with pt phone number and that they will be contacting him to scheduled an in person assessment due to being under the age of 73. OC will continue with the application for the government phone and provide pt last four of social security number to see if they can approve it without the letter from [address] [place] [address]. OC will follow up with pt in two weeks.

## 2023-10-31 ENCOUNTER — PATIENT OUTREACH (OUTPATIENT)
Dept: FAMILY MEDICINE CLINIC | Facility: CLINIC | Age: 63
End: 2023-10-31

## 2023-10-31 NOTE — PROGRESS NOTES
YOHANA BECK received message from [facility] indicating that [place] application was submitted. CM received update from [address] [place] [address] that patient paid for phone this month and had communicated to her via text. [address] working on getting [employer] phone through [employer]. YOHANA BECK placed call to the patient, Baljinder Olguin to review next steps regarding short term disability. YOHANA BECK placed 2 consecutive calls and was unable to leave message as voice box not set up.

## 2023-11-01 ENCOUNTER — PATIENT OUTREACH (OUTPATIENT)
Dept: FAMILY MEDICINE CLINIC | Facility: CLINIC | Age: 63
End: 2023-11-01

## 2023-11-01 NOTE — PROGRESS NOTES
placed call to pt to inquire about his phone services. OC attempted to ask pt if the current phone he has is a government issued phone or does he pay monthly for it. OC asked "do you pay monthly for your phone"? Pt stated "yes". OC asked pt "do you receive monthly income to pay for it" pt stated "yes". OC asked "does your money go into your checking account? Pt stated he does not know. OC asked if his phone minutes or "free"like the government phone, pt stated no. OC is trying to find out if has a government issued phone so that I can apply for one. OC made an attempt with [employer] and it states that pt has the affordable connectivity program. OC has tried to contact Lanta but no answer and left a message. Will update once I know what is the application status.

## 2023-11-03 ENCOUNTER — PATIENT OUTREACH (OUTPATIENT)
Dept: FAMILY MEDICINE CLINIC | Facility: CLINIC | Age: 63
End: 2023-11-03

## 2023-11-03 NOTE — PROGRESS NOTES
YOHANA BECK placed follow up call to the patient, Lawanda Zamorano and discussed short term disability. He stated he stopped getting checks. Checks going to PO box. He would like them to continue to go to PO Box. Someone from [facility] taking him to cardiology on 11/8. Set up appointment with Orlin Filter to check his condition to use their services but does not remember when the appointment is scheduled. YOHANA BECK encouraged him to call Orlin Filter. Lawanda Zamorano has not received EBT or [place] paperwork. YOHANA BECK arranged to meet with Thuan Caceres 11/9 at 1:00 at PCP office with YOHANA BECK and PARISA BECK. YOHANA BECK messaged provider regarding completing additional APS form for [employer].     YOHANA BECK will continue to remain available for psychosocial support as needed.

## 2023-11-07 ENCOUNTER — PATIENT OUTREACH (OUTPATIENT)
Dept: FAMILY MEDICINE CLINIC | Facility: CLINIC | Age: 63
End: 2023-11-07

## 2023-11-07 NOTE — PROGRESS NOTES
General Cardiology Outpatient Progress Note    Baldev Parra 65 y.o. male   MRN: [id number]  Encounter: [id number]    Assessment:  Patient Active Problem List    Diagnosis Date Noted    BMI 26.0-26.9,adult 10/27/2023    Medtronic dual chamber ICD 10/2023 10/25/2023    Bradycardia 10/20/2023    Cerebrovascular accident (CVA) ([address]) 08/22/2023    Elevated bilirubin 07/20/2023    Elevated PSA 07/20/2023    Ischemic cardiomyopathy 07/18/2023    History of ST elevation myocardial infarction (STEMI) 10/24/2022    Coronary artery disease 10/24/2022    Hyperlipidemia 10/24/2022    Hypertension 10/24/2022    Type 2 diabetes mellitus ([address]) 10/24/2022    Seizure ([address]) 10/24/2022    Chronic systolic heart failure ([address]) 09/08/2022       Today's Plan:  Discussed restarting low dose BB for HFrEF and CAD. Patient prefers to hold off for now. Repeat limited TTE scheduled for 12/04/2023. Provided work note for patient (has LUE limitations for 6 weeks s/p ICD implant). Plan:  Chronic biventricular HFrEF; LVEF 20%; LVIDd 5.8 cm; NYHA II; ACC/AHA Stage C   Etiology: ischemic; diagnosed at time of STEMI in 07/2022. [facility] 07/13/2022: 100% stenosis mid LAD (underwent thrombectomy + ALEJANDRA x1). 50% stenosis of D1. Mild diffuse disease of LCx. TTE 07/13/2022: LVEF 35-40%. LVIDd 4 cm. Grade 1 DD. RWMA noted. Normal RV. Trace MR. Moderate TR. Normal IVC. TTE (limited) 11/02/2022: LVEF 35%. LVIDd 4.8 cm. RWMA noted. Normal RV. Mild FREDA. Mild MR. TTE 08/22/2023: LVEF 20%. Severe global hypokinesis with RWMA. Dilated RV with mildly to moderately reduced RVSF. FREDA. Mild MR. Mild to moderate TR. Normal IVC. Weight of 187 lbs on 08/25 (day of discharge). Today, weighs 171 lbs. Most recent BMP from 10/26/2023: sodium 137; potassium 4.2; BUN 16; creatinine 0.67; eGFR 103. Pharmacotherapies / Neurohormonal Blockade:  --Beta Blocker: No.   --ARNi / ACEi / ARB: Entresto 24-26 mg BID.    --Aldosterone Antagonist: No.   --SGLT2 Inhibitor: empagliflozin 10 mg daily. --Diuretic: Lasix 20 mg daily. Sudden Cardiac Death Risk Reduction:  --Medtronic dual chamber ICD in situ since 10/2023. --No outpatient interrogations since implant. Electrical Resynchronization:  --Candidacy for BiV device: narrow QRS. Advanced Therapies: Will continue to monitor. Coronary artery disease   Without active chest pain. S/p anterolateral STEMI in 07/2022: underwent thrombectomy + ALEJANDRA to mid LAD. Continue on aspirin, statin, and BB as above. PRN SL nitro prescribed. Hypertension   BP of 104/64 mmHg in office today. Continue on medications as above. History of stroke(s): most recently with with left MCA stroke with aphasia in 08/2023. AC on Eliquis. Continues on ASA and statin. Hyperlipidemia   Diabetes mellitus, type II    HPI:   Lul Schmitz is a 36-year-old man with a PMH as above who presents to the office for hospital follow-up. Follows with Toshia Miller and Rishabh. Presented to [facility] on 10/20/2023 at direction of his outpatient neurologist due to bradycardia (32 bpm in office). Noted to be in sinus bradycardia with frequent non-conducted/beats. BB discontinued. Transferred to [address] [place] [address] for EP evaluation and underwent ICD implantation on 10/24. BB was restarted on day of discharge. However, due to hypotension s/p dose; BB was discontinued. 11/08/2023: Patient presents for follow-up. No current cardiac complaints other than being able to  "feel" his ICD. Describes sensation as steady palpitations with no associated symptoms. Expressed his concern about his BP running low. Provided education on BP goals/expectations for patient with BiV HFrEF. Denies LH, dizziness, near syncope, or syncope (even when SBP was in 80-90s during recent admission).      Past Medical History:   Diagnosis Date    Coronary artery disease     CVA (cerebral vascular accident) [facility])     R sided, dysarthria, March 2020 in [place] Heart failure ([address])     Hyperlipidemia     Hypertension     Obesity (BMI 30.0-34.9) 10/24/2022    BMI Counseling: Body mass index is 27.13 kg/m². The BMI is above normal. Nutrition recommendations include consuming healthier snacks. Exercise recommendations include exercising 3-5 times per week. Pre-diabetes     s/p primary prevnetion Medtronic dual chamber ICD 10/24/2023 10/25/2023    STEMI (ST elevation myocardial infarction) ([address])        Review of Systems   Constitutional:  Negative for activity change, appetite change, fatigue and unexpected weight change. Respiratory:  Negative for cough, chest tightness and shortness of breath. Cardiovascular:  Positive for palpitations. Negative for chest pain and leg swelling. Gastrointestinal:  Negative for abdominal pain, diarrhea and nausea. Genitourinary:  Negative for decreased urine volume, dysuria and urgency. Musculoskeletal: Negative. Skin: Negative. Neurological:  Positive for speech difficulty. Negative for dizziness, syncope, weakness and light-headedness. Psychiatric/Behavioral:  Negative for confusion and sleep disturbance. The patient is not nervous/anxious.       Allergies   Allergen Reactions    Pollen Extract Allergic Rhinitis         Current Outpatient Medications:     apixaban (Eliquis) 5 mg, Take 1 tablet (5 mg total) by mouth 2 (two) times a day, Disp: 60 tablet, Rfl: 5    aspirin 81 mg chewable tablet, Chew 1 tablet (81 mg total) daily, Disp: 90 tablet, Rfl: 1    atorvastatin (LIPITOR) 80 mg tablet, Take 1 tablet (80 mg total) by mouth daily with dinner, Disp: 90 tablet, Rfl: 1    Empagliflozin (JARDIANCE) 10 MG TABS tablet, Take 1 tablet (10 mg total) by mouth every morning, Disp: 90 tablet, Rfl: 1    furosemide (LASIX) 20 mg tablet, Take 1 tablet (20 mg total) by mouth daily, Disp: 90 tablet, Rfl: 1    nitroglycerin (NITROSTAT) 0.4 mg SL tablet, Place 1 tablet (0.4 mg total) under the tongue every 5 (five) minutes as needed for chest pain for up to 3 days, Disp: 10 tablet, Rfl: 0    sacubitril-valsartan (Entresto) 24-26 MG TABS, Take 1 tablet by mouth 2 (two) times a day, Disp: 180 tablet, Rfl: 1    Social History     Socioeconomic History    Marital status: Unknown     Spouse name: Not on file    Number of children: Not on file    Years of education: Not on file    Highest education level: Not on file   Occupational History    Not on file   Tobacco Use    Smoking status: Never    Smokeless tobacco: Never   Vaping Use    Vaping Use: Never used   Substance and Sexual Activity    Alcohol use: Not Currently    Drug use: Not on file    Sexual activity: Not on file   Other Topics Concern    Not on file   Social History Narrative    Not on file     Social Determinants of Health     Financial Resource Strain: Not on file   Food Insecurity: No Food Insecurity (10/22/2023)    Hunger Vital Sign     Worried About Running Out of Food in the Last Year: Never true     Ran Out of Food in the Last Year: Never true   Transportation Needs: No Transportation Needs (10/22/2023)    PRAPARE - Transportation     Lack of Transportation (Medical): No     Lack of Transportation (Non-Medical): No   Physical Activity: Not on file   Stress: Not on file   Social Connections: Not on file   Intimate Partner Violence: Not on file   Housing Stability: Low Risk  (10/22/2023)    Housing Stability Vital Sign     Unable to Pay for Housing in the Last Year: No     Number of Places Lived in the Last Year: 1     Unstable Housing in the Last Year: No     Family History   Problem Relation Age of Onset    Dementia Mother     Heart disease Father     Dementia Maternal Aunt     Dementia Maternal Uncle     Cancer Maternal Uncle        Vitals:   Blood pressure 104/64, pulse 66, height 5' 8" (1.727 m), weight 77.9 kg (171 lb 12.8 oz), SpO2 96 %.     Wt Readings from Last 10 Encounters:   11/08/23 77.9 kg (171 lb 12.8 oz)   10/27/23 78.2 kg (172 lb 6.4 oz)   10/26/23 76.5 kg (168 lb 9.6 oz)   10/20/23 77.6 kg (171 lb 1.2 oz)   10/20/23 77.6 kg (171 lb)   10/05/23 77.1 kg (170 lb)   08/31/23 81.7 kg (180 lb 3.2 oz)   08/30/23 81.1 kg (178 lb 12.8 oz)   08/25/23 85 kg (187 lb 6.3 oz)   07/18/23 88.4 kg (194 lb 12.8 oz)     Vitals:    11/08/23 0930   BP: 104/64   BP Location: Right arm   Patient Position: Sitting   Cuff Size: Standard   Pulse: 66   SpO2: 96%   Weight: 77.9 kg (171 lb 12.8 oz)   Height: 5' 8" (1.727 m)     Physical Exam  Vitals reviewed. Constitutional:       General: He is awake. He is not in acute distress. Appearance: Normal appearance. He is well-developed. He is not toxic-appearing or diaphoretic. HENT:      Head: Normocephalic. Nose: Nose normal.      Mouth/Throat:      Mouth: Mucous membranes are moist.   Eyes:      General: No scleral icterus. Conjunctiva/sclera: Conjunctivae normal.   Neck:      Vascular: No JVD. Trachea: No tracheal deviation. Cardiovascular:      Rate and Rhythm: Normal rate and regular rhythm. Heart sounds: Murmur heard. Pulmonary:      Effort: Pulmonary effort is normal. No tachypnea, bradypnea or respiratory distress. Breath sounds: Normal air entry. No wheezing or rhonchi. Abdominal:      General: There is no distension. Palpations: Abdomen is soft. Tenderness: There is no abdominal tenderness. Musculoskeletal:      Cervical back: Neck supple. Right lower leg: No edema. Left lower leg: No edema. Skin:     General: Skin is warm and dry. Coloration: Skin is not jaundiced or pale. Neurological:      General: No focal deficit present. Mental Status: He is alert and oriented to person, place, and time. Psychiatric:         Attention and Perception: Attention normal.         Mood and Affect: Mood normal. Affect is blunt. Behavior: Behavior normal. Behavior is cooperative. Thought Content:  Thought content normal.     Labs & Results:  Lab Results   Component Value Date WBC 9.04 10/26/2023    HGB 16.0 10/26/2023    HCT 48.9 10/26/2023    MCV 97 10/26/2023     10/26/2023     Lab Results   Component Value Date    SODIUM 137 10/26/2023    K 4.2 10/26/2023     10/26/2023    CO2 27 10/26/2023    BUN 16 10/26/2023    CREATININE 0.67 10/26/2023    GLUC 110 10/26/2023    CALCIUM 9.2 10/26/2023     Lab Results   Component Value Date    INR 1.36 (H) 10/24/2023    INR 1.44 (H) 10/20/2023    INR 1.35 (H) 08/21/2023    PROTIME 16.6 (H) 10/24/2023    PROTIME 17.5 (H) 10/20/2023    PROTIME 17.4 (H) 08/21/2023     Lab Results   Component Value Date    NTBNP 69 07/13/2022      Lab Results   Component Value Date     (H) 10/20/2023        Eamon Bolton PA-C

## 2023-11-07 NOTE — PROGRESS NOTES
placed call to [employer] services to check on the status of the pt application. OC spoke with [employer] who has informed me that they have tried to contact pt and was not successful in reaching pt. [employer] stated that the pt would have to call Charmayne Balloon and speak with Juany Chaney [phone] at extension [phone] to schedule the in person assessment.

## 2023-11-08 ENCOUNTER — PATIENT OUTREACH (OUTPATIENT)
Dept: FAMILY MEDICINE CLINIC | Facility: CLINIC | Age: 63
End: 2023-11-08

## 2023-11-08 ENCOUNTER — IN-CLINIC DEVICE VISIT (OUTPATIENT)
Dept: CARDIOLOGY CLINIC | Facility: CLINIC | Age: 63
End: 2023-11-08

## 2023-11-08 ENCOUNTER — OFFICE VISIT (OUTPATIENT)
Dept: CARDIOLOGY CLINIC | Facility: CLINIC | Age: 63
End: 2023-11-08

## 2023-11-08 VITALS
DIASTOLIC BLOOD PRESSURE: 64 MMHG | SYSTOLIC BLOOD PRESSURE: 104 MMHG | OXYGEN SATURATION: 96 % | WEIGHT: 171.8 LBS | HEART RATE: 66 BPM | BODY MASS INDEX: 26.04 KG/M2 | HEIGHT: 68 IN

## 2023-11-08 DIAGNOSIS — I25.10 CORONARY ARTERY DISEASE INVOLVING NATIVE CORONARY ARTERY OF NATIVE HEART WITHOUT ANGINA PECTORIS: Chronic | ICD-10-CM

## 2023-11-08 DIAGNOSIS — I50.814 BIVENTRICULAR HEART FAILURE WITH REDUCED LEFT VENTRICULAR FUNCTION (HCC): Primary | ICD-10-CM

## 2023-11-08 DIAGNOSIS — I10 PRIMARY HYPERTENSION: Chronic | ICD-10-CM

## 2023-11-08 DIAGNOSIS — Z95.810 PRESENCE OF BIVENTRICULAR IMPLANTABLE CARDIOVERTER-DEFIBRILLATOR (ICD): Primary | ICD-10-CM

## 2023-11-08 PROCEDURE — 99024 POSTOP FOLLOW-UP VISIT: CPT | Performed by: INTERNAL MEDICINE

## 2023-11-08 PROCEDURE — 99024 POSTOP FOLLOW-UP VISIT: CPT | Performed by: PHYSICIAN ASSISTANT

## 2023-11-08 RX ORDER — METOPROLOL SUCCINATE 25 MG/1
12.5 TABLET, EXTENDED RELEASE ORAL DAILY
Qty: 45 TABLET | Refills: 1 | Status: CANCELLED | OUTPATIENT
Start: 2023-11-08

## 2023-11-08 NOTE — PATIENT INSTRUCTIONS
Continue current medications. Complete echocardiogram (ultrasound of heart) on 12/04/2023. Please weigh yourself every day (after emptying your bladder) and keep a detailed log of weights. Contact the Heart Failure program at [phone] if you gain 3+ lbs overnight or 5+ lbs in 5-7 days. Limit daily sodium/salt intake to 2000 mg daily to prevent fluid retention. Avoid canned foods, fast food/Chinese food, and processed meats (hot dogs, lunch meat, and sausage etc.). Caution with condiments. Limit fluid intake to 2000 mL or 2 liters (about 60-65 ounces) daily. Avoid electrolyte replacement drinks (such as Gatorade, Pedialyte, Propel, Liquid IV, etc.). Bring complete list of medications and log of daily weights to your follow-up appointment.

## 2023-11-08 NOTE — PROGRESS NOTES
Results for orders placed or performed in visit on 11/08/23   Cardiac EP device report    Narrative    MDT DUAL ICD/ [employer] INTERROGATED IN THE [facility] OFFICE. BATTERY VOLTAGE ADEQUATE(11.8 YRS). AP 6.1%  36.5% ALL LEAD PARAMETERS WITHIN NORMAL LIMITS. NO SIGNIFICANT HIGH RATE EPISODES. NO PROGRAMMING CHANGES MADE TO DEVICE PARAMETERS. WOUND CHECK: INCISION CLEAN AND DRY WITH EDGES APPROXIMATED; AQUA CELL REMOVED; WOUND CARE AND RESTRICTIONS REVIEWED WITH PATIENT. NORMAL DEVICE FUNCTION.  AM/DEL VALLE

## 2023-11-08 NOTE — PROGRESS NOTES
received called from Esther at [facility] to inform me that pt brought in lots of mail that he had received. Maykel went through pt mail and stated that pt did receive his EBT card and as well as the letters from the [facility] stating that will need to choose the insurance and give them his providers information. OC also stated to Maykel that pt will need to call Michael to reschedule his in person assessment and she stated that she will make sure he will call and will help guide him. OC to follow up in three weeks.

## 2023-11-08 NOTE — LETTER
November 8, 2023     Patient: Vinny Hector  YOB: 1960  Date of Visit: 11/8/2023      To Whom it May Concern:    Vinny Hector is under my professional care. Josue Chiu was seen in my office on 11/8/2023. Patient underwent procedure on 10/24/2023 that requires him to avoid overhead reaching/pushing/pulling/lifting greater than 5-10lbs with left arm for total of six weeks. Therefore, Josue Chiu may return to work on 12/05/2023 . If you have any questions or concerns, please don't hesitate to call.        Sincerely,          Pily Rojo PA-C      CC: Vinny Mccartynorah

## 2023-11-08 NOTE — PROGRESS NOTES
YOHANA BECK placed call to the patient, Silva Harper to remind of appointment tomorrow with YOHANA BECK. 2 consecutive calls placed but no answer and YOHANA BECK unable to leave message as voice box is not set up yet. YOHANA BECK consulted with RN KIRAN regarding meeting tomorrow with Silva Harper and his care planning. YOHANA BECK will continue to remain available for psychosocial support as needed.

## 2023-11-09 ENCOUNTER — PATIENT OUTREACH (OUTPATIENT)
Dept: FAMILY MEDICINE CLINIC | Facility: CLINIC | Age: 63
End: 2023-11-09

## 2023-11-09 NOTE — PROGRESS NOTES
YOHANA BECK received voice message from Jason saying "they" said cannot use PO Box as does not have ID for it. He has had it for 2 months. He can use his [facility] address on [facility]. He said Cammie Arriola is "for the checks". YOHANA BECK called him back to verify if proof of residency is needed for [facility] address with [employer]. He indicated yes. The post office told him he cannot use the PO Box since license is for [employer]. He expressed frustration because he paid for it already. Following conversation YOHANA Beck eamiled APS form and proof of residency to [employer]. YOHANA BECK also provided his phone number in the event they needed to verify with him. YOHANA BECK will continue to remain available for psychosocial support as needed.

## 2023-11-09 NOTE — PROGRESS NOTES
Later, YOHANA BECK returned missed call from the patient, Mariam Lindsey and there was a miscommunication with meeting time and place. Moris agreed to meet YOHANA BECK at PCP office to sign APS form and review goals. Mariam Lindsey did then arrive to office. YOHANA BECK explained APS form will get him his short term disability checks again. He does have some money left. He used his EBT card which helps with finances. He signed the APS form and YOHANA BECK returned it to staff for scanning. He indicated provided wrong zip code of PO box. YOHANA BECK will address this concern. Mariam Lindsey stated he tried calling Geena with Maykel at [facility] but could not get through to anyone. YOHANA BECK called with Moris during this visit and spoke with Em and [place]. He is scheduled for an assessment on Monday 11/13 at 11:00 The [employer] will pick him up. YOHANA BECK wrote this down for him. Mariam César wants to wait until secures [facility] until he schedules speech therapy. Mariam Lindsey interested in having RN CM call him. Frequency does not matter. Prefers calls between 11 and 12. Regarding his license, his birth certificate came in yesterday. He is working with Cristal Giraldo on getting the license. YOHANA BECK and Moris discussed his phone. He brought it to the store as could not set up voice mail and was told it was too outdated. YOHANA BECK wrote down address for speaking with [employer] worker at [facility] on Wednesdays regarding government phone. He was adamant never applied in the past.     YOHANA BECK wrote down roles and numbers for YOHANA BECK, [address] [facility] [address] and PARISA CM as Mariam Lindsey was confused/overwhelmed.

## 2023-11-09 NOTE — PROGRESS NOTES
YOHANA BECK placed call to the patient, Cee Bailey as he did not show up at scheduled time. 2 consecutive calls made to the patient with no answer. YOHANA BECK unable to leave message as voice box not set up yet.

## 2023-11-13 ENCOUNTER — PATIENT OUTREACH (OUTPATIENT)
Dept: FAMILY MEDICINE CLINIC | Facility: CLINIC | Age: 63
End: 2023-11-13

## 2023-11-13 NOTE — PROGRESS NOTES
YOHANA BECK had received call from [place] [employer] [place] with [facility] who advised they could not get ahold of patient when arrived today to pick him up for his assessment. They can reschedule if he calls. Shortly after this call YOHANA BECK received call from patient, Christine Best who advised that [place] never showed up. He was waiting outside at 10:45. He confirmed having their contact information on the sheet YOHANA BECK provided. YOHANA BECK encouraged him to call them back and reschedule the assessment appointment. Christine Best agreed to do so. YOHANA BECK will continue to remain available for psychosocial support as needed.

## 2023-11-14 ENCOUNTER — PATIENT OUTREACH (OUTPATIENT)
Dept: FAMILY MEDICINE CLINIC | Facility: CLINIC | Age: 63
End: 2023-11-14

## 2023-11-15 ENCOUNTER — TELEPHONE (OUTPATIENT)
Dept: FAMILY MEDICINE CLINIC | Facility: CLINIC | Age: 63
End: 2023-11-15

## 2023-11-15 NOTE — TELEPHONE ENCOUNTER
Called patient regarding return to work, voicemail box not set up, patient to return to work 12/5/23 per cardiology, forms are ready completed, follow-up with patient on 12/8/2023 if no concerns

## 2023-11-16 ENCOUNTER — PATIENT OUTREACH (OUTPATIENT)
Dept: FAMILY MEDICINE CLINIC | Facility: CLINIC | Age: 63
End: 2023-11-16

## 2023-11-16 NOTE — PROGRESS NOTES
YOHANA BECK received voice message inquiring about return to work date on 12/5. YOHANA BECK returned call to the patient and was unable to leave message. YOHANA BECK noted provider made attempt to speak with him about this as well. YOHANA BECK will continue to remain available for psychosocial support as needed.

## 2023-11-17 ENCOUNTER — PATIENT OUTREACH (OUTPATIENT)
Dept: FAMILY MEDICINE CLINIC | Facility: CLINIC | Age: 63
End: 2023-11-17

## 2023-11-17 NOTE — PROGRESS NOTES
YOHANA BECK had received call from [facility] [place] indicating that patient had rescheduled his assessment but she cannot get through to him for reminder call. He had given her a second number as well to try. The assessment is Monday (11/20) at 11:00. The Zoey Kirkpatrick is picking up 10:00. YOHANA BECK placed 2 consecutive calls to the patient Augusto Panchal and there was no answer. Unable to leave voice message. YOHANA BECK will continue to remain available for psychosocial support as needed.

## 2023-11-20 ENCOUNTER — PATIENT OUTREACH (OUTPATIENT)
Dept: FAMILY MEDICINE CLINIC | Facility: CLINIC | Age: 63
End: 2023-11-20

## 2023-11-20 NOTE — PROGRESS NOTES
YOHANA BECK placed follow up call to the patient, Dianne Boo who advised made the appointment for Lenny Rae this morning. He does not think he will be put on the [employer] because they were stating he is healthy enough to not need it. He did explain had a heart attack and a stroke. YOHANA BECK and Dianne Rajeev discussed his return to work date of 12/5. He stated office left him text messages and he tried to respond. 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 YOHANA BECK encouraged him to call the office about it and that this date was per cardiology. Dianne Boo has not received any more checks. YOHANA BECK encouraged him to follow up with [employer] to verify information and see why there is a delay as YOHANA BECK did send the paperwork. YOHANA BECK encouraged him to follow up with RN KIRAN Holt. He has her contact information on sheet that YOHANA BECK made him. YOHANA BECK reviewed upcoming appointments with him. YOHANA BECK will continue to remain available for psychosocial support as needed.

## 2023-11-21 ENCOUNTER — PATIENT OUTREACH (OUTPATIENT)
Dept: FAMILY MEDICINE CLINIC | Facility: CLINIC | Age: 63
End: 2023-11-21

## 2023-11-21 NOTE — LETTER
Date: 11/21/23    Dear Frances Gates,   My name is Adilia Mack ; I am a registered nurse care manager working with  [address] [facility]   I have not been able to reach you and would like to set a time that I can talk with you over the phone. My work is to help patients that have complex medical conditions get the care they need. This includes patients who may have been in the hospital or emergency room. I have enclosed my contact information below for you. Please call me with any questions you may have. I look forward to hearing from you.   Sincerely,  Poncho Craig RN  [phone]  Outpatient Care Manager

## 2023-11-22 ENCOUNTER — PATIENT OUTREACH (OUTPATIENT)
Dept: FAMILY MEDICINE CLINIC | Facility: CLINIC | Age: 63
End: 2023-11-22

## 2023-11-22 NOTE — PROGRESS NOTES
placed call to [address] to check on the status of the [employer] application. Ann Marie Roman was not available left message with contact information for a call back.

## 2023-11-24 ENCOUNTER — PATIENT OUTREACH (OUTPATIENT)
Dept: FAMILY MEDICINE CLINIC | Facility: CLINIC | Age: 63
End: 2023-11-24

## 2023-11-24 NOTE — PROGRESS NOTES
YOHANA BECK placed follow up call to the Older Adult [employer] Services workerMariama and left message. She will be out of office until 11/28. YOHANA BECK will continue to remain available for psychosocial support as needed.

## 2023-11-27 ENCOUNTER — PATIENT OUTREACH (OUTPATIENT)
Dept: FAMILY MEDICINE CLINIC | Facility: CLINIC | Age: 63
End: 2023-11-27

## 2023-11-27 NOTE — PROGRESS NOTES
YOHANA BECK placed follow up call to the patient Jeff Groves and was unable to leave message. YOHANA BECK will continue to remain available for psychosocial support as needed.

## 2023-11-28 ENCOUNTER — VBI (OUTPATIENT)
Dept: ADMINISTRATIVE | Facility: OTHER | Age: 63
End: 2023-11-28

## 2023-11-28 ENCOUNTER — PATIENT OUTREACH (OUTPATIENT)
Dept: FAMILY MEDICINE CLINIC | Facility: CLINIC | Age: 63
End: 2023-11-28

## 2023-11-28 NOTE — PROGRESS NOTES
YOHANA BECK received returned call from the [employer] on Aging (AAA) , Jessica. YOHANA BECK provided update. Patient will owe December's rent as their assistance covered him up until November's rent. YOHANA BECK will continue to remain available for psychosocial support as needed.

## 2023-12-01 ENCOUNTER — PATIENT OUTREACH (OUTPATIENT)
Dept: FAMILY MEDICINE CLINIC | Facility: CLINIC | Age: 63
End: 2023-12-01

## 2023-12-01 NOTE — PROGRESS NOTES
YOHANA BECK received voice message from patient, Stacielsie Esteves indicating still not receiving his short term disability checks and was going to contact his employer. YOHANA BECK placed follow up call to Timmy Esteves and was unable to leave voice message. YOHANA BECK will continue to remain available for psychosocial support as needed.

## 2023-12-04 ENCOUNTER — HOSPITAL ENCOUNTER (OUTPATIENT)
Dept: NON INVASIVE DIAGNOSTICS | Facility: HOSPITAL | Age: 63
Discharge: HOME | End: 2023-12-04
Attending: INTERNAL MEDICINE
Payer: COMMERCIAL

## 2023-12-04 VITALS
WEIGHT: 171 LBS | HEIGHT: 68 IN | DIASTOLIC BLOOD PRESSURE: 64 MMHG | BODY MASS INDEX: 25.91 KG/M2 | HEART RATE: 66 BPM | SYSTOLIC BLOOD PRESSURE: 104 MMHG

## 2023-12-04 DIAGNOSIS — I50.22 CHRONIC HFREF (HEART FAILURE WITH REDUCED EJECTION FRACTION) (HCC): ICD-10-CM

## 2023-12-04 PROCEDURE — 93308 TTE F-UP OR LMTD: CPT

## 2023-12-04 PROCEDURE — 93321 DOPPLER ECHO F-UP/LMTD STD: CPT

## 2023-12-04 PROCEDURE — 93325 DOPPLER ECHO COLOR FLOW MAPG: CPT

## 2023-12-05 ENCOUNTER — PATIENT OUTREACH (OUTPATIENT)
Dept: FAMILY MEDICINE CLINIC | Facility: CLINIC | Age: 63
End: 2023-12-05

## 2023-12-05 LAB
APICAL FOUR CHAMBER EJECTION FRACTION: 25 %
E WAVE DECELERATION TIME: 189 MS
E/A RATIO: 0.44
MV E'TISSUE VEL-SEP: 5 CM/S
MV PEAK A VEL: 0.91 M/S
MV PEAK E VEL: 40 CM/S
MV STENOSIS PRESSURE HALF TIME: 55 MS
MV VALVE AREA P 1/2 METHOD: 4
SL CV LV EF: 20
TR MAX PG: 49 MMHG
TR PEAK VELOCITY: 3.5 M/S
TRICUSPID ANNULAR PLANE SYSTOLIC EXCURSION: 1.8 CM
TRICUSPID VALVE PEAK REGURGITATION VELOCITY: 3.48 M/S

## 2023-12-05 PROCEDURE — 93325 DOPPLER ECHO COLOR FLOW MAPG: CPT | Performed by: INTERNAL MEDICINE

## 2023-12-05 PROCEDURE — 93321 DOPPLER ECHO F-UP/LMTD STD: CPT | Performed by: INTERNAL MEDICINE

## 2023-12-05 PROCEDURE — 93308 TTE F-UP OR LMTD: CPT | Performed by: INTERNAL MEDICINE

## 2023-12-05 NOTE — PROGRESS NOTES
No response to calls or letter. Will close to complex care management at this time, but available if future medical needs arise.

## 2023-12-06 ENCOUNTER — PATIENT OUTREACH (OUTPATIENT)
Dept: FAMILY MEDICINE CLINIC | Facility: CLINIC | Age: 63
End: 2023-12-06

## 2023-12-08 ENCOUNTER — OFFICE VISIT (OUTPATIENT)
Dept: FAMILY MEDICINE CLINIC | Facility: CLINIC | Age: 63
End: 2023-12-08
Payer: COMMERCIAL

## 2023-12-08 VITALS
WEIGHT: 184.8 LBS | RESPIRATION RATE: 17 BRPM | BODY MASS INDEX: 28.01 KG/M2 | TEMPERATURE: 98 F | HEART RATE: 32 BPM | HEIGHT: 68 IN | SYSTOLIC BLOOD PRESSURE: 98 MMHG | DIASTOLIC BLOOD PRESSURE: 60 MMHG | OXYGEN SATURATION: 98 %

## 2023-12-08 DIAGNOSIS — I25.5 ISCHEMIC CARDIOMYOPATHY: Chronic | ICD-10-CM

## 2023-12-08 DIAGNOSIS — H43.392 FLOATERS IN VISUAL FIELD, LEFT: ICD-10-CM

## 2023-12-08 DIAGNOSIS — I50.22 CHRONIC SYSTOLIC HEART FAILURE (HCC): ICD-10-CM

## 2023-12-08 DIAGNOSIS — Z00.00 ANNUAL PHYSICAL EXAM: Primary | ICD-10-CM

## 2023-12-08 DIAGNOSIS — I63.9 CEREBROVASCULAR ACCIDENT (CVA), UNSPECIFIED MECHANISM (HCC): ICD-10-CM

## 2023-12-08 DIAGNOSIS — E11.9 TYPE 2 DIABETES MELLITUS WITHOUT COMPLICATION, WITHOUT LONG-TERM CURRENT USE OF INSULIN (HCC): Chronic | ICD-10-CM

## 2023-12-08 DIAGNOSIS — R97.20 ELEVATED PSA: ICD-10-CM

## 2023-12-08 DIAGNOSIS — Z12.11 SCREENING FOR COLON CANCER: ICD-10-CM

## 2023-12-08 DIAGNOSIS — Z95.810 ICD (IMPLANTABLE CARDIOVERTER-DEFIBRILLATOR) IN PLACE: ICD-10-CM

## 2023-12-08 PROCEDURE — 93000 ELECTROCARDIOGRAM COMPLETE: CPT | Performed by: PHYSICIAN ASSISTANT

## 2023-12-08 PROCEDURE — 99396 PREV VISIT EST AGE 40-64: CPT | Performed by: PHYSICIAN ASSISTANT

## 2023-12-08 PROCEDURE — 99214 OFFICE O/P EST MOD 30 MIN: CPT | Performed by: PHYSICIAN ASSISTANT

## 2023-12-08 RX ORDER — FUROSEMIDE 20 MG/1
20 TABLET ORAL DAILY
Qty: 90 TABLET | Refills: 1 | Status: SHIPPED | OUTPATIENT
Start: 2023-12-08 | End: 2024-06-05

## 2023-12-08 RX ORDER — ASPIRIN 81 MG/1
81 TABLET, CHEWABLE ORAL DAILY
Qty: 90 TABLET | Refills: 1 | Status: SHIPPED | OUTPATIENT
Start: 2023-12-08 | End: 2024-06-05

## 2023-12-08 RX ORDER — ATORVASTATIN CALCIUM 80 MG/1
80 TABLET, FILM COATED ORAL
Qty: 90 TABLET | Refills: 1 | Status: SHIPPED | OUTPATIENT
Start: 2023-12-08 | End: 2024-06-05

## 2023-12-08 RX ORDER — SACUBITRIL AND VALSARTAN 24; 26 MG/1; MG/1
1 TABLET, FILM COATED ORAL 2 TIMES DAILY
Qty: 180 TABLET | Refills: 1 | Status: SHIPPED | OUTPATIENT
Start: 2023-12-08

## 2023-12-08 NOTE — ASSESSMENT & PLAN NOTE
Lab Results   Component Value Date    PSA 3.75 08/03/2023    PSA 4.31 (H) 07/18/2023     Improved with repeat, will check again, not sexually active, no urinary symptoms.

## 2023-12-08 NOTE — ASSESSMENT & PLAN NOTE
Wt Readings from Last 3 Encounters:   12/08/23 83.8 kg (184 lb 12.8 oz)   12/04/23 77.6 kg (171 lb)   11/08/23 77.9 kg (171 lb 12.8 oz)     No signs of acute fluid overload, no SOB or leg swelling, continue same dose furosemide 20mg and follow-up with cardiology as scheduled in 1/2024. Able to walk 4 blocks without SOB, has not tried to walk more. Encouraged activities as tolerated.

## 2023-12-08 NOTE — ASSESSMENT & PLAN NOTE
ICD placed in 10/2023, functioning per recent device check, reviewed ECG in office today, follow-up with cardiology. EF 20% per echo in 12/2023, will continue with short term disability until next appointment and considering social security disability after.

## 2023-12-08 NOTE — PROGRESS NOTES
ADULT ANNUAL [facility] PRIMARY CARE [facility]    NAME: Hunter Valencia  AGE: 65 y.o. SEX: male  : 1960     DATE: 2023     Assessment and Plan:     Problem List Items Addressed This Visit        Endocrine    Type 2 diabetes mellitus ([address]) (Chronic)     Controlled on Jardiance 10mg. Referred to ophthalm for diabetic eye exam.   Lab Results   Component Value Date    HGBA1C 6.3 (H) 2023            Relevant Medications    Empagliflozin (JARDIANCE) 10 MG TABS tablet    Other Relevant Orders    IRIS Diabetic eye exam    Comprehensive metabolic panel    HEMOGLOBIN A1C W/ EAG ESTIMATION       Cardiovascular and Mediastinum    Cerebrovascular accident (CVA) ([address]) (Chronic)     Follow-up with neurology, last MCA stroke in 2023 with aphasia, gradually improving, continue aspirin, Eliquis and high dose statin. Relevant Medications    atorvastatin (LIPITOR) 80 mg tablet    aspirin 81 mg chewable tablet    apixaban (Eliquis) 5 mg    Chronic systolic heart failure (HCC) (Chronic)     Wt Readings from Last 3 Encounters:   23 83.8 kg (184 lb 12.8 oz)   23 77.6 kg (171 lb)   23 77.9 kg (171 lb 12.8 oz)     No signs of acute fluid overload, no SOB or leg swelling, continue same dose furosemide 20mg and follow-up with cardiology as scheduled in 2024. Able to walk 4 blocks without SOB, has not tried to walk more. Encouraged activities as tolerated. Relevant Medications    furosemide (LASIX) 20 mg tablet    sacubitril-valsartan (Entresto) 24-26 MG TABS    Empagliflozin (JARDIANCE) 10 MG TABS tablet    Ischemic cardiomyopathy (Chronic)     ICD placed in 10/2023, functioning per recent device check, reviewed ECG in office today, follow-up with cardiology. EF 20% per echo in 2023, will continue with short term disability until next appointment and considering social security disability after.           Relevant Medications    sacubitril-valsartan (Entresto) 24-26 MG TABS       Other    Elevated PSA     Lab Results   Component Value Date    PSA 3.75 08/03/2023    PSA 4.31 (H) 07/18/2023     Improved with repeat, will check again, not sexually active, no urinary symptoms. Relevant Orders    PSA, total and free    Medtronic dual chamber ICD 10/2023     Discomfort at site of ICD, well-healed, no sign of infection or inflammation. Relevant Orders    POCT ECG (Completed)   Other Visit Diagnoses     Annual physical exam    -  Primary    Floaters in visual field, left        Relevant Orders    Ambulatory Referral to Ophthalmology    Screening for colon cancer        Relevant Orders    Cologuard          Immunizations and preventive care screenings were discussed with patient today. Appropriate education was printed on patient's after visit summary. Discussed risks and benefits of prostate cancer screening. We discussed the controversial history of PSA screening for prostate cancer in the [facility] as well as the risk of over detection and over treatment of prostate cancer by way of PSA screening. The patient understands that PSA blood testing is an imperfect way to screen for prostate cancer and that elevated PSA levels in the blood may also be caused by infection, inflammation, prostatic trauma or manipulation, urological procedures, or by benign prostatic enlargement. The role of the digital rectal examination in prostate cancer screening was also discussed and I discussed with him that there is large interobserver variability in the findings of digital rectal examination. Counseling:  Alcohol/drug use: discussed moderation in alcohol intake, the recommendations for healthy alcohol use, and avoidance of illicit drug use. Dental Health: discussed importance of regular tooth brushing, flossing, and dental visits.   Injury prevention: discussed safety/seat belts, safety helmets, smoke detectors, carbon dioxide detectors, and smoking near bedding or upholstery. Sexual health: discussed sexually transmitted diseases, partner selection, use of condoms, avoidance of unintended pregnancy, and contraceptive alternatives. Exercise: the importance of regular exercise/physical activity was discussed. Recommend exercise 3-5 times per week for at least 30 minutes. Return in about 5 weeks (around 1/12/2024). Chief Complaint:     Chief Complaint   Patient presents with   • Follow-up     6 weeks       History of Present Illness:     Adult Annual Physical   Patient here for a comprehensive physical exam. The patient reports problems - return to work per cardiology 12/5/23 . Concerned due to heavy lifting at work, has not tried to do much activity since surgery. Was told no lifting >15 lb for 6 weeks which would have been this week. No restrictions allowed at work, might quit working after this due to concern for heart function. Walking 4 blocks without SOB. No dizziness or chest pain. No leg swelling. Thinks weight is from shoes, did not take off. No smoking or ETOH use. Willing to do cologuard, lives alone. Diet and Physical Activity  Diet/Nutrition: well balanced diet. Exercise: no formal exercise. Depression Screening  PHQ-2/9 Depression Screening    Little interest or pleasure in doing things: 0 - not at all  Feeling down, depressed, or hopeless: 0 - not at all  PHQ-2 Score: 0  PHQ-2 Interpretation: Negative depression screen       General Health  Sleep: sleeps poorly. Hearing: normal - bilateral.  Vision: no vision problems. L eye   Dental: brushes teeth twice daily.  Health  Symptoms include: none    Advanced Care Planning  Do you have an advanced directive? no  Do you have a durable medical power of ? no     Review of Systems:     Review of Systems   Constitutional:  Negative for chills and fever. HENT:  Negative for ear pain and sore throat.     Eyes:  Negative for pain and visual disturbance. Respiratory:  Negative for cough and shortness of breath. Cardiovascular:  Negative for chest pain and palpitations. Gastrointestinal:  Negative for abdominal pain and vomiting. Genitourinary:  Negative for dysuria and hematuria. Musculoskeletal:  Negative for arthralgias and back pain. Skin:  Negative for color change and rash. Neurological:  Negative for seizures and syncope. Psychiatric/Behavioral:  Positive for sleep disturbance. All other systems reviewed and are negative. Past Medical History:     Past Medical History:   Diagnosis Date   • Coronary artery disease    • CVA (cerebral vascular accident) ([address])     R sided, dysarthria, March 2020 in [place]   • Heart failure [facility])    • Hyperlipidemia    • Hypertension    • Obesity (BMI 30.0-34.9) 10/24/2022    BMI Counseling: Body mass index is 27.13 kg/m². The BMI is above normal. Nutrition recommendations include consuming healthier snacks. Exercise recommendations include exercising 3-5 times per week. • Pre-diabetes    • s/p primary prevnetion Medtronic dual chamber ICD 10/24/2023 10/25/2023   • STEMI (ST elevation myocardial infarction) [facility])       Past Surgical History:     Past Surgical History:   Procedure Laterality Date   • CARDIAC CATHETERIZATION N/A 7/13/2022    Procedure: Cardiac pci;  Surgeon: Carmela Gutierrez MD;  Location: [facility] CARDIAC CATH LAB; Service: Cardiology   • CARDIAC CATHETERIZATION N/A 7/13/2022    Procedure: Cardiac Coronary Angiogram;  Surgeon: Carmela Gutierrez MD;  Location: [facility] CARDIAC CATH LAB; Service: Cardiology   • CARDIAC CATHETERIZATION Left 7/13/2022    Procedure: Cardiac Left Heart Cath;  Surgeon: Carmela Gutierrez MD;  Location: [facility] CARDIAC CATH LAB; Service: Cardiology   • CARDIAC ELECTROPHYSIOLOGY PROCEDURE N/A 10/24/2023    Procedure: Cardiac icd implant;  Surgeon: Urszula Siu MD;  Location: [facility] CARDIAC CATH LAB;   Service: Cardiology      Family History:     Family History   Problem Relation Age of Onset   • Dementia Mother    • Heart disease Father    • Dementia Maternal Aunt    • Dementia Maternal Uncle    • Cancer Maternal Uncle       Social History:     Social History     Socioeconomic History   • Marital status: Unknown     Spouse name: None   • Number of children: None   • Years of education: None   • Highest education level: None   Occupational History   • None   Tobacco Use   • Smoking status: Never   • Smokeless tobacco: Never   Vaping Use   • Vaping Use: Never used   Substance and Sexual Activity   • Alcohol use: Not Currently   • Drug use: None   • Sexual activity: None   Other Topics Concern   • None   Social History Narrative   • None     Social Determinants of Health     Financial Resource Strain: Not on file   Food Insecurity: No Food Insecurity (10/22/2023)    Hunger Vital Sign    • Worried About Running Out of Food in the Last Year: Never true    • Ran Out of Food in the Last Year: Never true   Transportation Needs: No Transportation Needs (10/22/2023)    PRAPARE - Transportation    • Lack of Transportation (Medical): No    • Lack of Transportation (Non-Medical):  No   Physical Activity: Not on file   Stress: Not on file   Social Connections: Not on file   Intimate Partner Violence: Not on file   Housing Stability: Low Risk  (10/22/2023)    Housing Stability Vital Sign    • Unable to Pay for Housing in the Last Year: No    • Number of Places Lived in the Last Year: 1    • Unstable Housing in the Last Year: No      Current Medications:     Current Outpatient Medications   Medication Sig Dispense Refill   • apixaban (Eliquis) 5 mg Take 1 tablet (5 mg total) by mouth 2 (two) times a day 180 tablet 1   • aspirin 81 mg chewable tablet Chew 1 tablet (81 mg total) daily 90 tablet 1   • atorvastatin (LIPITOR) 80 mg tablet Take 1 tablet (80 mg total) by mouth daily with dinner 90 tablet 1   • Empagliflozin (JARDIANCE) 10 MG TABS tablet Take 1 tablet (10 mg total) by mouth every morning 90 tablet 1   • furosemide (LASIX) 20 mg tablet Take 1 tablet (20 mg total) by mouth daily 90 tablet 1   • sacubitril-valsartan (Entresto) 24-26 MG TABS Take 1 tablet by mouth 2 (two) times a day 180 tablet 1   • nitroglycerin (NITROSTAT) 0.4 mg SL tablet Place 1 tablet (0.4 mg total) under the tongue every 5 (five) minutes as needed for chest pain for up to 3 days 10 tablet 0     No current facility-administered medications for this visit. Allergies: Allergies   Allergen Reactions   • Pollen Extract Allergic Rhinitis      Physical Exam:     BP 98/60 (BP Location: Left arm, Patient Position: Sitting, Cuff Size: Standard)   Pulse (!) 32   Temp 98 °F (36.7 °C) (Tympanic)   Resp 17   Ht 5' 8" (1.727 m)   Wt 83.8 kg (184 lb 12.8 oz)   SpO2 98%   BMI 28.10 kg/m²     Physical Exam  Vitals and nursing note reviewed. Constitutional:       General: He is not in acute distress. Appearance: He is well-developed. HENT:      Head: Normocephalic and atraumatic. Eyes:      Conjunctiva/sclera: Conjunctivae normal.   Cardiovascular:      Rate and Rhythm: Normal rate and regular rhythm. Heart sounds: No murmur heard. Pulmonary:      Effort: Pulmonary effort is normal. No respiratory distress. Breath sounds: Normal breath sounds. Chest:       Abdominal:      Palpations: Abdomen is soft. Tenderness: There is no abdominal tenderness. Musculoskeletal:         General: No swelling. Cervical back: Neck supple. Skin:     General: Skin is warm and dry. Capillary Refill: Capillary refill takes less than 2 seconds. Neurological:      Mental Status: He is alert.    Psychiatric:         Mood and Affect: Mood normal.          Lorie Chanel PA-C  [address]

## 2023-12-08 NOTE — ASSESSMENT & PLAN NOTE
Controlled on Jardiance 10mg.  Referred to ophthalm for diabetic eye exam.   Lab Results   Component Value Date    HGBA1C 6.3 (H) 08/22/2023

## 2023-12-08 NOTE — ASSESSMENT & PLAN NOTE
Follow-up with neurology, last MCA stroke in 8/2023 with aphasia, gradually improving, continue aspirin, Eliquis and high dose statin.

## 2023-12-08 NOTE — LETTER
December 8, 2023     Patient: Carolin Sharp  YOB: 1960  Date of Visit: 12/8/2023      To Whom it May Concern:    Carolin Sharp is under my professional care. Mundo Glynn was seen in my office on 12/8/2023. Mundo Glynn may return to work with limitations no heavy lifting more than 15 lb starting 1/15/24 . If you have any questions or concerns, please don't hesitate to call.          Sincerely,          Kai Walker, YRIS        CC: No Recipients

## 2023-12-12 ENCOUNTER — PATIENT OUTREACH (OUTPATIENT)
Dept: FAMILY MEDICINE CLINIC | Facility: CLINIC | Age: 63
End: 2023-12-12

## 2023-12-12 NOTE — PROGRESS NOTES
placed call to Michael to check on pt status of his application. OC left message for [employer].   Per Jono Enriquez she is out for today and will return tomorrow 12/13

## 2023-12-13 ENCOUNTER — TELEPHONE (OUTPATIENT)
Dept: CARDIOLOGY CLINIC | Facility: CLINIC | Age: 63
End: 2023-12-13

## 2023-12-13 NOTE — TELEPHONE ENCOUNTER
----- Message from Keisha Gonzalez MD sent at 12/6/2023  8:45 AM EST -----  Please let him know his heart function remains low at 20%. We can discuss further on next visit and assess for further optimization of medications.

## 2023-12-14 ENCOUNTER — PATIENT OUTREACH (OUTPATIENT)
Dept: FAMILY MEDICINE CLINIC | Facility: CLINIC | Age: 63
End: 2023-12-14

## 2023-12-14 NOTE — LETTER
12/14/23    Dear Melody Acevedo,    I am a [facility] with [facility] AT [address] PRIMARY CARE [place] [address] [address] [facility] [address] [phone]. I have made several attempts to call you by phone. It is important that you contact me back at [phone] so that I can assist with your care needs.      Sincerely,         Verenice Hogan  AVIS

## 2023-12-14 NOTE — PROGRESS NOTES
placed call to Jason for follow up regarding the status of pt application. OC spoke with Derick Melendrez who informed me that pt has been approved for services and a welcome packet has been mailed to pt. MA will be paying for medical trips. OC called pt and left message regarding the above information. OC will send unable to reach letter. Will close in one week if I do not hear back from pt. Detail Level: Detailed Depth Of Biopsy: dermis Was A Bandage Applied: Yes Size Of Lesion In Cm: 0.8 X Size Of Lesion In Cm: 0 Biopsy Type: H and E Biopsy Method: Dermablade Anesthesia Type: 1% lidocaine with epinephrine 1:100,000 buffered with 8.4% sodium bicarbonate (1:9 ratio) Hemostasis: Drysol Wound Care: Petrolatum Dressing: bandage Destruction After The Procedure: No Type Of Destruction Used: Curettage Curettage Text: The wound bed was treated with curettage after the biopsy was performed. Cryotherapy Text: The wound bed was treated with cryotherapy after the biopsy was performed. Electrodesiccation Text: The wound bed was treated with electrodesiccation after the biopsy was performed. Electrodesiccation And Curettage Text: The wound bed was treated with electrodesiccation and curettage after the biopsy was performed. Silver Nitrate Text: The wound bed was treated with silver nitrate after the biopsy was performed. Lab: -[id number] Lab Facility: [id number] Consent: Verbal consent was obtained and risks were reviewed including but not limited to scarring, infection, bleeding, scabbing, incomplete removal, nerve damage and allergy to anesthesia. Post-Care Instructions: I reviewed with the patient in detail post-care instructions. Patient is to keep the biopsy site dry overnight, and then apply vaseline twice daily until healed. Notification Instructions: Patient will be notified of biopsy results. However, patient instructed to call the office if not contacted within 2 weeks. Billing Type: Third-Party Bill Information: Selecting Yes will display possible errors in your note based on the variables you have selected. This validation is only offered as a suggestion for you. PLEASE NOTE THAT THE VALIDATION TEXT WILL BE REMOVED WHEN YOU FINALIZE YOUR NOTE. IF YOU WANT TO FAX A PRELIMINARY NOTE YOU WILL NEED TO TOGGLE THIS TO 'NO' IF YOU DO NOT WANT IT IN YOUR FAXED NOTE.

## 2023-12-15 ENCOUNTER — PATIENT OUTREACH (OUTPATIENT)
Dept: FAMILY MEDICINE CLINIC | Facility: CLINIC | Age: 63
End: 2023-12-15

## 2023-12-15 NOTE — PROGRESS NOTES
YOHANA BECK received message from [facility] indicating that the patient was approved for [facility]ae and cost is covered under MA. YOHANA BECK noted in chart patient had PCP appointment and expressed concerns regarding return to work and may quit. YOHANA BECK placed follow up call to the patient, Dianne Boo and was unable to leave a message. YOHANA BECK placed call to the [employer] on Aging (AAA) , Mariama and left message advising of same. YOHANA BECK will continue to remain available for psychosocial support as needed.

## 2023-12-19 ENCOUNTER — PATIENT OUTREACH (OUTPATIENT)
Dept: FAMILY MEDICINE CLINIC | Facility: CLINIC | Age: 63
End: 2023-12-19

## 2023-12-19 NOTE — PROGRESS NOTES
YOHANA BECK received message from patient, Moris that he has new number [phone] and that he has until 26th to move out.     YOHANA BECK also received message from  indicating that Moris provided YOHANA BECK contact information stating YOHANA BECK handles his checks.     YOHANA KIRAN did return call to [facility] who advised that they have been sending balance reminders and that patient is saying cannot make a payment. YOHANA BECK did clarify that YOHANA BECK  is not a rep payee and does not manage patient's checks. However, YOHANA BECK will look into the seeing if the issue can be resolved. YOHANA KIRAN was informed that no eviction is pending. However, on the 26th they will be filing with the 's office.     YOHANA BECK then placed call to Moris who advised that [employer] closed his account out. He feels he got wrong information to apply for SSDI as he went to [employer] office but they did not ask for documents. He expressed frustration because he could have started it months ago without the birth certificate.     Moris confirmed that he did get short term disability checks prior to account closing. He is unable to pay anything toward rent as no money is left. He did not return to work, At first he stated willing to find less demanding job but does not know what to do but scared due to medical history. He did not pay into unemployment benefits. For SSDI waiting for application to process and then will talk with [facility] on 1/5.     Patient was referred on [facility] to [facility] for housing. He will go in person to complete an application.     YOHANA BECK and Moris discussed his new phone number. He paid for new phone. He had government phone in past but did not work so he got rid of it. The number above is the number to contact him.

## 2023-12-26 ENCOUNTER — PATIENT OUTREACH (OUTPATIENT)
Dept: FAMILY MEDICINE CLINIC | Facility: CLINIC | Age: 63
End: 2023-12-26

## 2023-12-26 NOTE — PROGRESS NOTES
mailed out unable to reach letter to pt, OC has not received any calls from pt.    OC will be closing case due to unable to reach.  If a need arises in the near future case can be reopened by the care team.    No further outreach.

## 2023-12-28 ENCOUNTER — PATIENT OUTREACH (OUTPATIENT)
Dept: FAMILY MEDICINE CLINIC | Facility: CLINIC | Age: 63
End: 2023-12-28

## 2023-12-28 DIAGNOSIS — Z59.819 HOUSING INSECURITY: Primary | ICD-10-CM

## 2023-12-28 SDOH — ECONOMIC STABILITY - HOUSING INSECURITY: HOUSING INSTABILITY UNSPECIFIED: Z59.819

## 2023-12-28 NOTE — PROGRESS NOTES
YOHANA BECK received call from Aging  Yoana. She will able to stay the eviction until the end of January. Patient has a 610 number. YOHANA BECK advised of the 484 number patient contacted YOHANA BECK on. She will get the number over to YOHANA BECK. Patient did not receive the information on rental assistance. Yoana inquired about progress on housing applications. YOHANA BECK advised this was not previously a goal and there must have been miscommunication. Patient is still going to [facility]. YOHANA BECK advised [facility] no longer involved due to lack of response from patient. Patient is receiving $150 in long term disability from work. He has an appointment with SSDI on 1/4.    YOHANA BECK placed call to the patient Moris and discussed his housing options. YOHANA BECK provided information verbally for [facility] of [facility]. YOHANA BECK will placed additional referral to CMOC to assist with housing applications and program.     Moris advised at [facility] Juan did not get in touch with him about long term disability and he spoke with them 12/5. He then stated he spoke to them just before for an update - waiting on his job to release information. He will keep YOHANA BECK updated.     YOHANA BECK will continue to remain available for psychosocial support as needed.

## 2024-01-02 ENCOUNTER — APPOINTMENT (OUTPATIENT)
Dept: LAB | Facility: HOSPITAL | Age: 64
End: 2024-01-02
Payer: COMMERCIAL

## 2024-01-02 ENCOUNTER — TELEPHONE (OUTPATIENT)
Dept: FAMILY MEDICINE CLINIC | Facility: CLINIC | Age: 64
End: 2024-01-02

## 2024-01-02 DIAGNOSIS — E11.9 TYPE 2 DIABETES MELLITUS WITHOUT COMPLICATION, WITHOUT LONG-TERM CURRENT USE OF INSULIN (HCC): Chronic | ICD-10-CM

## 2024-01-02 DIAGNOSIS — H43.399 VITREOUS FLOATERS, UNSPECIFIED LATERALITY: Primary | ICD-10-CM

## 2024-01-02 DIAGNOSIS — R97.20 ELEVATED PSA: ICD-10-CM

## 2024-01-02 LAB
ALBUMIN SERPL BCP-MCNC: 4.5 G/DL (ref 3.5–5)
ALP SERPL-CCNC: 82 U/L (ref 34–104)
ALT SERPL W P-5'-P-CCNC: 15 U/L (ref 7–52)
ANION GAP SERPL CALCULATED.3IONS-SCNC: 14 MMOL/L
AST SERPL W P-5'-P-CCNC: 22 U/L (ref 13–39)
BILIRUB SERPL-MCNC: 2.15 MG/DL (ref 0.2–1)
BUN SERPL-MCNC: 16 MG/DL (ref 5–25)
CALCIUM SERPL-MCNC: 10.1 MG/DL (ref 8.4–10.2)
CHLORIDE SERPL-SCNC: 101 MMOL/L (ref 96–108)
CHOLEST SERPL-MCNC: 123 MG/DL
CO2 SERPL-SCNC: 24 MMOL/L (ref 21–32)
CREAT SERPL-MCNC: 1.07 MG/DL (ref 0.6–1.3)
EST. AVERAGE GLUCOSE BLD GHB EST-MCNC: 137 MG/DL
GFR SERPL CREATININE-BSD FRML MDRD: 73 ML/MIN/1.73SQ M
GLUCOSE P FAST SERPL-MCNC: 79 MG/DL (ref 65–99)
HBA1C MFR BLD: 6.4 %
HDLC SERPL-MCNC: 38 MG/DL
LDLC SERPL CALC-MCNC: 67 MG/DL (ref 0–100)
POTASSIUM SERPL-SCNC: 4 MMOL/L (ref 3.5–5.3)
PROT SERPL-MCNC: 8.1 G/DL (ref 6.4–8.4)
SODIUM SERPL-SCNC: 139 MMOL/L (ref 135–147)
TRIGL SERPL-MCNC: 91 MG/DL

## 2024-01-02 PROCEDURE — 84154 ASSAY OF PSA FREE: CPT

## 2024-01-02 PROCEDURE — 84153 ASSAY OF PSA TOTAL: CPT

## 2024-01-02 PROCEDURE — 80053 COMPREHEN METABOLIC PANEL: CPT

## 2024-01-02 PROCEDURE — 83036 HEMOGLOBIN GLYCOSYLATED A1C: CPT

## 2024-01-02 PROCEDURE — 36415 COLL VENOUS BLD VENIPUNCTURE: CPT

## 2024-01-03 ENCOUNTER — PATIENT OUTREACH (OUTPATIENT)
Dept: FAMILY MEDICINE CLINIC | Facility: CLINIC | Age: 64
End: 2024-01-03

## 2024-01-03 LAB
PSA FREE MFR SERPL: 30.9 %
PSA FREE SERPL-MCNC: 1.05 NG/ML
PSA SERPL-MCNC: 3.4 NG/ML (ref 0–4)

## 2024-01-03 NOTE — PROGRESS NOTES
received referral from YOHANA Hubbard to assist pt with housing application.    OC made an attempt to outreach pt and pt did not answer and there was no voicemail to leave message.    OC will make another outreach in one week.

## 2024-01-09 ENCOUNTER — PATIENT OUTREACH (OUTPATIENT)
Dept: FAMILY MEDICINE CLINIC | Facility: CLINIC | Age: 64
End: 2024-01-09

## 2024-01-09 NOTE — PROGRESS NOTES
YOHANA BECK received email from [facility] care manager with [facility] on Aging. She can assist with some housing applications once patient receives an income and inquired what was completed thus far. YOHANA BECK explained no applications placed at this time as patient has not responded to [facility] regarding same. Patient is still not reeving any payment.     YOHANA BECK placed call to the patient Moris and received automated message that call could not be completed at this time.     YOHANA BECK will continue to remain available for psychosocial support as needed.

## 2024-01-10 ENCOUNTER — PATIENT OUTREACH (OUTPATIENT)
Dept: FAMILY MEDICINE CLINIC | Facility: CLINIC | Age: 64
End: 2024-01-10

## 2024-01-10 NOTE — PROGRESS NOTES
YOHANA Rand received email response from [place] that his phone was reported to be shut off. She has been calling [employer] at [place] to make contact with patient.

## 2024-01-10 NOTE — PROGRESS NOTES
YOHANA BECK received message from Yoana with the [facility] on Aging stating that patient has stopped in twice to see her and keeps reporting he does not have disability payments.     YOHANA BECK responded advising that YOHANA CM could not get ahold of patient but will try to meet with patient at upcoming PCP appointment on 1/15.     YOHANA CM will continue to remain available for psychosocial support as needed.

## 2024-01-11 ENCOUNTER — PATIENT OUTREACH (OUTPATIENT)
Dept: FAMILY MEDICINE CLINIC | Facility: CLINIC | Age: 64
End: 2024-01-11

## 2024-01-11 NOTE — PROGRESS NOTES
Outreach Coordinatoir received in basket from YOHANA Hubbard stating that pt phone is currently not in service.    OC has made a call to [facility] to inquire about the program.  OC left message for a call back.    OC will make another attempt in two weeks.

## 2024-01-12 ENCOUNTER — RA CDI HCC (OUTPATIENT)
Dept: OTHER | Facility: HOSPITAL | Age: 64
End: 2024-01-12

## 2024-01-15 ENCOUNTER — PATIENT OUTREACH (OUTPATIENT)
Dept: FAMILY MEDICINE CLINIC | Facility: CLINIC | Age: 64
End: 2024-01-15

## 2024-01-15 ENCOUNTER — OFFICE VISIT (OUTPATIENT)
Dept: FAMILY MEDICINE CLINIC | Facility: CLINIC | Age: 64
End: 2024-01-15
Payer: COMMERCIAL

## 2024-01-15 VITALS
BODY MASS INDEX: 28.4 KG/M2 | RESPIRATION RATE: 17 BRPM | HEART RATE: 88 BPM | DIASTOLIC BLOOD PRESSURE: 75 MMHG | WEIGHT: 187.4 LBS | OXYGEN SATURATION: 98 % | SYSTOLIC BLOOD PRESSURE: 113 MMHG | TEMPERATURE: 97.1 F | HEIGHT: 68 IN

## 2024-01-15 DIAGNOSIS — I50.22 CHRONIC SYSTOLIC HEART FAILURE (HCC): ICD-10-CM

## 2024-01-15 DIAGNOSIS — R97.20 ELEVATED PSA: ICD-10-CM

## 2024-01-15 DIAGNOSIS — I63.9 CEREBROVASCULAR ACCIDENT (CVA), UNSPECIFIED MECHANISM (HCC): Chronic | ICD-10-CM

## 2024-01-15 DIAGNOSIS — R56.9 SEIZURE (HCC): ICD-10-CM

## 2024-01-15 DIAGNOSIS — E11.9 TYPE 2 DIABETES MELLITUS WITHOUT COMPLICATION, WITHOUT LONG-TERM CURRENT USE OF INSULIN (HCC): Primary | Chronic | ICD-10-CM

## 2024-01-15 DIAGNOSIS — I25.5 ISCHEMIC CARDIOMYOPATHY: Chronic | ICD-10-CM

## 2024-01-15 PROCEDURE — 99214 OFFICE O/P EST MOD 30 MIN: CPT | Performed by: PHYSICIAN ASSISTANT

## 2024-01-15 RX ORDER — FUROSEMIDE 20 MG/1
20 TABLET ORAL DAILY
Qty: 90 TABLET | Refills: 1 | Status: SHIPPED | OUTPATIENT
Start: 2024-01-15 | End: 2024-07-13

## 2024-01-15 NOTE — PROGRESS NOTES
Name: Moris Mahajan      : 1960      MRN: [id number]  Encounter Provider: Nieves Dunn PA-C  Encounter Date: 1/15/2024   Encounter department: [facility]    Assessment & Plan     1. Type 2 diabetes mellitus without complication, without long-term current use of insulin (HCC)  Assessment & Plan:  Recent worsening with sugars, still well-controlled, will increase Jardiance to 20mg due to CHF. Plan to increase to 25mg when complete supply.   Lab Results   Component Value Date    HGBA1C 6.4 (H) 2024     Orders:  -     IRIS Diabetic eye exam  -     Ambulatory Referral to Ophthalmology; Future  -     Ambulatory Referral to Podiatry; Future    2. Ischemic cardiomyopathy  Assessment & Plan:  ICD in place. Missed last cardiology consult, rescheduled for tomorrow, last EF 20% in 2023. Already applied for social security disability, extend short term disability and consider long term disability.       3. Chronic systolic heart failure (HCC)  Assessment & Plan:  Wt Readings from Last 3 Encounters:   01/15/24 85 kg (187 lb 6.4 oz)   23 83.8 kg (184 lb 12.8 oz)   23 77.6 kg (171 lb)     3 lb weight gain since last visit, no signs of fluid overload. Continue same dose Lasix 20mg, refilled today. Missed cardiology appointment this month, was not aware due to phone issue which has since resolved, called to reschedule while in office for tomorrow, appointment made and patient was aware. Able to walk up to 4-5 blocks until has to sit down due to SOB.         Orders:  -     furosemide (LASIX) 20 mg tablet; Take 1 tablet (20 mg total) by mouth daily  -     Empagliflozin (JARDIANCE) 10 MG TABS tablet; Take 2 tablets (20 mg total) by mouth every morning    4. Cerebrovascular accident (CVA), unspecified mechanism (HCC)  Assessment & Plan:  Left MCA stroke in 2023. At baseline with speech, continue aspirin, Eliquis, atorvastatin 80mg. Ambulating normally.       5.  Seizure (HCC)  Assessment & Plan:  No longer on Lamictal, no recent seizures. Follow-up neurology.       6. Elevated PSA  Assessment & Plan:  Lab Results   Component Value Date    PSA 3.4 01/02/2024    PSA 3.75 08/03/2023    PSA 4.31 (H) 07/18/2023     Improved with recheck, continue to monitor, no urinary symptoms.              Sony Subramanian is a 62 y.o. male with a h/o controlled diabetes, CVA in 2020 and 8/2023, CAD with stent in 7/2022, HFrEF and cardiomyopathy with recent dual-chamber ICD placement who presents for routine follow-up.  He can walk 5 blocks before he gets very short of breath, dizzy, has to sit down. Cold weather affecting as well. Works as a  and work conditions are either really cold or hot. Feels that it would be unsafe to work, not ready to go back and applied for social security disability. Missed cardiology appointment, had issue with phone payment but just got his short term disability check last week. No smoking or ETOH use. Taking medications as prescribed. Meeting with  here in office after today's appointment.       Review of Systems   Constitutional:  Negative for fever and unexpected weight change.   Respiratory:  Positive for shortness of breath (with exertion).    Cardiovascular:  Negative for chest pain, palpitations and leg swelling.   Gastrointestinal:  Negative for abdominal pain, diarrhea and vomiting.   Psychiatric/Behavioral:  Positive for sleep disturbance.        Current Outpatient Medications on File Prior to Visit   Medication Sig   • apixaban (Eliquis) 5 mg Take 1 tablet (5 mg total) by mouth 2 (two) times a day   • aspirin 81 mg chewable tablet Chew 1 tablet (81 mg total) daily   • atorvastatin (LIPITOR) 80 mg tablet Take 1 tablet (80 mg total) by mouth daily with dinner   • sacubitril-valsartan (Entresto) 24-26 MG TABS Take 1 tablet by mouth 2 (two) times a day   • nitroglycerin (NITROSTAT) 0.4 mg SL tablet Place 1 tablet (0.4 mg  "total) under the tongue every 5 (five) minutes as needed for chest pain for up to 3 days       Objective     /75 (BP Location: Left arm, Patient Position: Sitting, Cuff Size: Standard)   Pulse 88   Temp (!) 97.1 °F (36.2 °C) (Tympanic)   Resp 17   Ht 5' 8\" (1.727 m)   Wt 85 kg (187 lb 6.4 oz)   SpO2 98%   BMI 28.49 kg/m²     Physical Exam  Vitals and nursing note reviewed.   Constitutional:       Appearance: Normal appearance.   HENT:      Head: Normocephalic and atraumatic.   Cardiovascular:      Rate and Rhythm: Normal rate and regular rhythm.      Pulses: Normal pulses.      Heart sounds: Murmur heard.   Pulmonary:      Effort: Pulmonary effort is normal.      Breath sounds: Normal breath sounds.   Chest:       Neurological:      Mental Status: He is alert and oriented to person, place, and time. Mental status is at baseline.       Nieves Dunn PA-C    "

## 2024-01-15 NOTE — PROGRESS NOTES
YOHANA BECK met with patient, Moris following his office visit. YOHANA BECK and Moris discussed income. He did have his appointment with the Social Security Administration ([facility]) on 1/4. He was told they are going to talk to the doctors and make a decision. He is worried they are considering him to be too healthy. They will access [facility] records. The process will take 3-6 months. He is continuing to get short term disability payments through employer. He was awared $575 making up from missed payments since $12/5. He will received $185/week until 2/12.     YOHANA BECK and Moris discussed housing. He cannot afford rent with this income. He is working with the Aging Dept to find affordable apartment and would consider moving farther away if needed.     Moris indicated missed cardiology appointment because he did not know about it. He has it rescheduled tomorrow. YOHANA BECK provided him number to call and schedule a [employer] ride ([phone]) but did advise they need notice in scheduling rides. He expressed understanding.     Moris reported his phone is on at [phone]    YOHANA BECK emailed update to Yoana at the Aging Dept.    YOHANA BECK will continue to remain available for psychosocial support as needed.

## 2024-01-15 NOTE — PATIENT INSTRUCTIONS
[facility] 1:20pm 1/16/24   [address] [place] [address]   [phone]

## 2024-01-16 ENCOUNTER — OFFICE VISIT (OUTPATIENT)
Dept: CARDIOLOGY CLINIC | Facility: CLINIC | Age: 64
End: 2024-01-16

## 2024-01-16 VITALS
WEIGHT: 190.2 LBS | BODY MASS INDEX: 28.82 KG/M2 | DIASTOLIC BLOOD PRESSURE: 62 MMHG | OXYGEN SATURATION: 96 % | HEIGHT: 68 IN | HEART RATE: 87 BPM | SYSTOLIC BLOOD PRESSURE: 96 MMHG

## 2024-01-16 DIAGNOSIS — I50.22 CHRONIC HFREF (HEART FAILURE WITH REDUCED EJECTION FRACTION) (HCC): Primary | ICD-10-CM

## 2024-01-16 DIAGNOSIS — I25.10 CORONARY ARTERY DISEASE INVOLVING NATIVE CORONARY ARTERY OF NATIVE HEART WITHOUT ANGINA PECTORIS: ICD-10-CM

## 2024-01-16 DIAGNOSIS — I10 PRIMARY HYPERTENSION: ICD-10-CM

## 2024-01-16 DIAGNOSIS — I63.9 CEREBROVASCULAR ACCIDENT (CVA), UNSPECIFIED MECHANISM (HCC): ICD-10-CM

## 2024-01-16 DIAGNOSIS — Z95.810 ICD (IMPLANTABLE CARDIOVERTER-DEFIBRILLATOR) IN PLACE: ICD-10-CM

## 2024-01-16 PROCEDURE — 99024 POSTOP FOLLOW-UP VISIT: CPT | Performed by: INTERNAL MEDICINE

## 2024-01-16 NOTE — ASSESSMENT & PLAN NOTE
Attending Physician Attestation Note:    Resident Physician: Niyah Lam MD    Pt is a 32 year old female here for the following concerns:  Chief Complaint   Patient presents with   • Prenatal Care     Visit Vitals  /58 (BP Location: LUE - Left upper extremity, Patient Position: Sitting, Cuff Size: Regular)   Pulse 81   Temp 98.7 °F (37.1 °C) (Tympanic)   Resp 20   Ht 5' 2\" (1.575 m)   Wt 101.1 kg   LMP 2020   SpO2 99%   BMI 40.79 kg/m²       Impression and Plan:   female at 29w3d; overall doing well  28 week labs done  Elevated GTT-3 hours test ordered  [id number]  Follows with MFM d/t obesity history  Tdap today      The patient was seen by me as well and a procedure was not performed today. I agree with the history, exam and plan as noted by the resident physician.   Please see resident note for details.      Kate Chase MD  6/15/2021             ICD in place. Missed last cardiology consult, rescheduled for tomorrow, last EF 20% in 12/2023. Already applied for social security disability, extend short term disability and consider long term disability.

## 2024-01-16 NOTE — PROGRESS NOTES
Advanced Heart Failure Outpatient Progress Note - Moris Mahajan 63 y.o. male MRN: [id number]    Encounter: [id number]      Assessment/Plan:    Patient Active Problem List    Diagnosis Date Noted    BMI 26.0-26.9,adult 10/27/2023    Medtronic dual chamber ICD 10/2023 10/25/2023    Bradycardia 10/20/2023    Cerebrovascular accident (CVA) ([facility]) 08/22/2023    Elevated bilirubin 07/20/2023    Elevated PSA 07/20/2023    Ischemic cardiomyopathy 07/18/2023    History of ST elevation myocardial infarction (STEMI) 10/24/2022    Coronary artery disease 10/24/2022    Hyperlipidemia 10/24/2022    Hypertension 10/24/2022    Type 2 diabetes mellitus ([facility]) 10/24/2022    Seizure ([facility]) 10/24/2022    Chronic systolic heart failure ([facility]) 09/08/2022       # Chronic heart failure with reduced ejection fraction, Stage C, NYHA II/III  Etiology: ischemic  Euvolemic, warm on exam     Weight of 187 lbs on 08/25 (day of discharge).  180 lbs 8/31/23; 170 lbs 10/5/23; 190 lbs 1/16/24    Studies- personally reviewed by me     Echo 12/4/23:  LVEF 20%  Normal RV sie, low normal systolic function  Aneurysmal interatrial septum, no shunt by color Doppler  Trace AI/MR  Mild TR  RVSP 52mmHg    Echocardiogram 8/22/23  LVEF: 20%  LVIDd: 5.8cm, normal wall thicknessRV: dilated, mild to moderately reduced systolic function; moderate hypokinesis of the mid to apical free wall  MR: mild  PASP: 46mmHg, estimated RAP 8mmHg, mild to moderate TR  RVOT: shortened PAAT, some mid to late notching  Other: trivial pericardial effusion     TTE limited 11/2/22: LVEF 35%.   TTE limited 8/26/22: LVEF34%. Grade 2 diastology. Mild AI, mild MR. Moderate TR. Estimated PASP 42mmHg  TTE 7/13/23: LVEF 35-40%. Normal RV size and function. Estimated PASP 47mmHg  LHC 7/13/22:  LM with minimal luminal irregularities. Mid % stenosis s/p thrombectomy and stent placement (ALEJANDRA 3.5 x 18mm); D1 50% stenosis.     Diet:  --2g sodium diet  --2000 ml fluid restriction      Neurohormonal Blockade:  --Beta-Blocker: Metoprolol succinate 25 mg daily  --ACEi, ARB or ARNi: Entresto 24-26 mg twice daily  --Aldosterone Receptor Blocker: No  --SGLT2 Inhibitor: Jardiance 10mg daily  --Diuretic: Furosemide 20 mg daily     Sudden Cardiac Death Risk Reduction:  --ICD: ICD implant 10/24/23, MDT DC ICD  Interrogation 11/8/23: AP 6.1%  36.5%. No significant high rate episodes     Electrical Resynchronization:  --Candidacy for BiV device: narrow QRSd     Advanced Therapies (if appropriate):  --We will continue to monitor     # Coronary artery disease, s/p ALEJANDRA x 1 to mid LAD 7/2022  Admitted for STEMI 7/2022  Rx: aspirin, atorvastatin  # Hypertension, controlled  # Hyperlipidemia, goal LDL < 70  8/22/23 TG 91 HDL 28   Rx: atorvastatin 40mg daily  # Prior Stroke  # h/o CVA, p/w with aphasia 8/21/23, while on aspirin and brilinta, possible embolic, started on apixaban  CT head 8/21/23: acute/subacute stroke in the left MCA territory in the inferior parietal temporal region. No embolic source seen on transthoracic echo. No afib/flutter on telemetry    TODAY'S PLAN:  Euvolemic on exam  Continue current GDMT, BP limiting for further optimization  2g sodium diet  2L fluid restriction  Daily weights  Cardiac rehab      HPI:   Moris Mahajan is a 62-year-old man with a PMH as above who presents to the office for hospital follow-up. Follows with Dr. Miller.      Admitted to [facility] from 08/21 to 08/25/2023 after presenting with new onset aphasia. Stroke alert called, diagnosed with acute left MCA stroke. TTE with LVEF drop from 35% to 20%. Home BB and ARNi gradually added back (s/p permissive hypertension). Received one dose IV Lasix inpatient; outpatient dose restarted on day before discharge. Lost 5-10 lbs this admission (bed and standing scales used). Brilinta stopped; Eliquis and Jardiance started. Statin dose increased.      08/31/2023: Patient presents for hospital follow-up. Continues with  some speech/word finding difficulty. Planning to start speech therapy and apply for disability s/p stroke. Continues with mild LE swelling but feels this has been improving since returning home. Denies FAUST, PND, and orthopnea. Recent meals/foods include oatmeal, watermelon, applesauce, banana, and salmon/mashed potatoes/broccoli from [employer]. Drinking ~65-70 oz fluid daily. Does have scale at home; denies rapid gains since discharge.      10/5/23: Here for follow up. Reports overall doing ok from cardiac standpoint. No chest pain or shortness of breath. No  Leg swelling or abdominal distension. Home weights between 165-170 lbs. Taking meds as prescribed. Does not want an ICD.    Interval History:  1/16/24: here for follow up. S/p DC ICD 10/24/23. Repeat echo 12/2023 still with reduced EF at 20%. 10/26/23 Na 137 K 4.2 and creatinine 0.67. Patient reports shortness of breath when walking long distances but  has not done in a while. No leg swelling. No chest pain or palpitations. Still getting used to the pacemaker. Has intermittent dizziness or lightheadedness, currently none.     Review of Systems   Constitutional:  Negative for chills and fever.   HENT:  Negative for ear pain and sore throat.    Eyes:  Negative for pain and visual disturbance.   Respiratory:  Positive for shortness of breath. Negative for cough.    Cardiovascular:  Negative for chest pain, palpitations and leg swelling.   Gastrointestinal:  Negative for abdominal distention, abdominal pain and vomiting.   Genitourinary:  Negative for dysuria and hematuria.   Musculoskeletal:  Negative for arthralgias and back pain.   Skin:  Negative for color change and rash.   Neurological:  Negative for seizures and syncope.   All other systems reviewed and are negative.      Past Medical History:   Diagnosis Date    Coronary artery disease     CVA (cerebral vascular accident) (HCC)     R sided, dysarthria, March 2020 in [place]    Heart failure (HCC)      Hyperlipidemia     Hypertension     Obesity (BMI 30.0-34.9) 10/24/2022    BMI Counseling: Body mass index is 27.13 kg/m². The BMI is above normal. Nutrition recommendations include consuming healthier snacks. Exercise recommendations include exercising 3-5 times per week.    Pre-diabetes     s/p primary prevnetion Medtronic dual chamber ICD 10/24/2023 10/25/2023    STEMI (ST elevation myocardial infarction) (HCC)          Allergies   Allergen Reactions    Pollen Extract Allergic Rhinitis     .    Current Outpatient Medications:     apixaban (Eliquis) 5 mg, Take 1 tablet (5 mg total) by mouth 2 (two) times a day, Disp: 180 tablet, Rfl: 1    aspirin 81 mg chewable tablet, Chew 1 tablet (81 mg total) daily, Disp: 90 tablet, Rfl: 1    atorvastatin (LIPITOR) 80 mg tablet, Take 1 tablet (80 mg total) by mouth daily with dinner, Disp: 90 tablet, Rfl: 1    Empagliflozin (JARDIANCE) 10 MG TABS tablet, Take 2 tablets (20 mg total) by mouth every morning, Disp: 90 tablet, Rfl: 1    furosemide (LASIX) 20 mg tablet, Take 1 tablet (20 mg total) by mouth daily, Disp: 90 tablet, Rfl: 1    nitroglycerin (NITROSTAT) 0.4 mg SL tablet, Place 1 tablet (0.4 mg total) under the tongue every 5 (five) minutes as needed for chest pain for up to 3 days, Disp: 10 tablet, Rfl: 0    sacubitril-valsartan (Entresto) 24-26 MG TABS, Take 1 tablet by mouth 2 (two) times a day, Disp: 180 tablet, Rfl: 1    Social History     Socioeconomic History    Marital status: Unknown     Spouse name: Not on file    Number of children: Not on file    Years of education: Not on file    Highest education level: Not on file   Occupational History    Not on file   Tobacco Use    Smoking status: Never    Smokeless tobacco: Never   Vaping Use    Vaping status: Never Used   Substance and Sexual Activity    Alcohol use: Not Currently    Drug use: Not on file    Sexual activity: Not on file   Other Topics Concern    Not on file   Social History Narrative    Not on file      Social Determinants of Health     Financial Resource Strain: Not on file   Food Insecurity: No Food Insecurity (10/22/2023)    Hunger Vital Sign     Worried About Running Out of Food in the Last Year: Never true     Ran Out of Food in the Last Year: Never true   Transportation Needs: No Transportation Needs (10/22/2023)    PRAPARE - Transportation     Lack of Transportation (Medical): No     Lack of Transportation (Non-Medical): No   Physical Activity: Not on file   Stress: Not on file   Social Connections: Not on file   Intimate Partner Violence: Not on file   Housing Stability: Low Risk  (10/22/2023)    Housing Stability Vital Sign     Unable to Pay for Housing in the Last Year: No     Number of Places Lived in the Last Year: 1     Unstable Housing in the Last Year: No       Family History   Problem Relation Age of Onset    Dementia Mother     Heart disease Father     Dementia Maternal Aunt     Dementia Maternal Uncle     Cancer Maternal Uncle        Physical Exam:    Vitals:   Vitals:    01/16/24 1321   BP: 96/62   Pulse: 87   SpO2: 96%         Physical Exam  Constitutional:       General: He is not in acute distress.     Appearance: Normal appearance.   HENT:      Head: Normocephalic and atraumatic.      Mouth/Throat:      Mouth: Mucous membranes are moist.   Eyes:      General: No scleral icterus.     Extraocular Movements: Extraocular movements intact.   Neck:      Vascular: No JVD.   Cardiovascular:      Rate and Rhythm: Normal rate and regular rhythm.      Pulses: Normal pulses.      Heart sounds: S1 normal and S2 normal. No murmur heard.     No friction rub. No gallop.   Pulmonary:      Breath sounds: Normal breath sounds.   Abdominal:      General: There is no distension.      Palpations: Abdomen is soft.      Tenderness: There is no abdominal tenderness. There is no guarding or rebound.   Musculoskeletal:         General: Normal range of motion.      Cervical back: Neck supple.      Right lower leg:  No edema.      Left lower leg: No edema.   Skin:     General: Skin is warm and dry.      Capillary Refill: Capillary refill takes less than 2 seconds.   Neurological:      General: No focal deficit present.      Mental Status: He is alert and oriented to person, place, and time.   Psychiatric:         Mood and Affect: Mood normal.         Labs & Results:    Lab Results   Component Value Date    SODIUM 139 01/02/2024    K 4.0 01/02/2024     01/02/2024    CO2 24 01/02/2024    BUN 16 01/02/2024    CREATININE 1.07 01/02/2024    GLUC 110 10/26/2023    CALCIUM 10.1 01/02/2024     Lab Results   Component Value Date    WBC 9.04 10/26/2023    HGB 16.0 10/26/2023    HCT 48.9 10/26/2023    MCV 97 10/26/2023     10/26/2023     Lab Results   Component Value Date    NTBNP 69 07/13/2022      Lab Results   Component Value Date    CHOLESTEROL 123 01/02/2024    CHOLESTEROL 155 08/22/2023    CHOLESTEROL 139 07/18/2023     Lab Results   Component Value Date    HDL 38 (L) 01/02/2024    HDL 28 (L) 08/22/2023    HDL 27 (L) 07/18/2023     Lab Results   Component Value Date    TRIG 91 01/02/2024    TRIG 91 08/22/2023    TRIG 113 07/18/2023     Lab Results   Component Value Date    NONHDLC 112 07/18/2023       EKG personally reviewed by Adolph Keating MD.     Counseling / Coordination of Care  Time spent today 25 minutes.  Greater than 50% of total time was spent with the patient and / or family counseling and / or coordination of care. We went over current diagnosis, most recent studies and any changes in treatment.    Thank you for the opportunity to participate in the care of this patient.    ADOLPH KEATING MD  ADVANCED HEART FAILURE AND MECHANICAL CIRCULATORY SUPPORT  [facility]

## 2024-01-16 NOTE — ASSESSMENT & PLAN NOTE
Wt Readings from Last 3 Encounters:   01/15/24 85 kg (187 lb 6.4 oz)   12/08/23 83.8 kg (184 lb 12.8 oz)   12/04/23 77.6 kg (171 lb)     3 lb weight gain since last visit, no signs of fluid overload. Continue same dose Lasix 20mg, refilled today. Missed cardiology appointment this month, was not aware due to phone issue which has since resolved, called to reschedule while in office for tomorrow, appointment made and patient was aware. Able to walk up to 4-5 blocks until has to sit down due to SOB.

## 2024-01-16 NOTE — ASSESSMENT & PLAN NOTE
Lab Results   Component Value Date    PSA 3.4 01/02/2024    PSA 3.75 08/03/2023    PSA 4.31 (H) 07/18/2023     Improved with recheck, continue to monitor, no urinary symptoms.

## 2024-01-16 NOTE — ASSESSMENT & PLAN NOTE
Left MCA stroke in 8/2023. At baseline with speech, continue aspirin, Eliquis, atorvastatin 80mg. Ambulating normally.

## 2024-01-16 NOTE — PATIENT INSTRUCTIONS
Continue current cardiac medications  Cardiac rehab  2g sodium diet  2L fluid restriction  Daily weights

## 2024-01-16 NOTE — ASSESSMENT & PLAN NOTE
Recent worsening with sugars, still well-controlled, will increase Jardiance to 20mg due to CHF. Plan to increase to 25mg when complete supply.   Lab Results   Component Value Date    HGBA1C 6.4 (H) 01/02/2024

## 2024-01-17 ENCOUNTER — PATIENT OUTREACH (OUTPATIENT)
Dept: FAMILY MEDICINE CLINIC | Facility: CLINIC | Age: 64
End: 2024-01-17

## 2024-01-17 NOTE — PROGRESS NOTES
YOHANA BECK received email from aging [facility] [employer] indicating that Slim from [employer] is helping with housing applications. YOHANA BECK will continue to remain available for psychosocial support as needed.

## 2024-01-25 ENCOUNTER — PATIENT OUTREACH (OUTPATIENT)
Dept: FAMILY MEDICINE CLINIC | Facility: CLINIC | Age: 64
End: 2024-01-25

## 2024-01-25 NOTE — PROGRESS NOTES
placed outreach to pt to discuss his disability paper work.    Pt stated that he would like assistance in completing the paper work.  OC stated that I can meet him at [facility] to assist him.    Home visit scheduled for Tuesday, 1/30.

## 2024-01-26 ENCOUNTER — PATIENT OUTREACH (OUTPATIENT)
Dept: FAMILY MEDICINE CLINIC | Facility: CLINIC | Age: 64
End: 2024-01-26

## 2024-01-26 NOTE — PROGRESS NOTES
YOHANA BECK had received message from Slim at [facility] indicating that patient received paperwork from social security office and is confused about it. YOHANA BECK had informed CMOC Nate about this and she scheduled a home visit with patient.     YOHANA BECK returned call to Slim advising of same. She indicated that Yoana from [employer] came and helped him with it.    YOHANA BECK sent email to Yoana to verify if paperwork was completed/if CMOC still needs to complete home visit.     YOHANA BECK will continue to remain available for psychosocial support as needed.

## 2024-01-30 ENCOUNTER — PATIENT OUTREACH (OUTPATIENT)
Dept: FAMILY MEDICINE CLINIC | Facility: CLINIC | Age: 64
End: 2024-01-30

## 2024-01-30 NOTE — PROGRESS NOTES
rescheduled home visit with pt for Thursday, 2/1 to complete housing applications.    The home visit for today 1/30 was to complete social security paper work but pt had help from Yoana   from the [facility].    OC did call pt to reschedule.

## 2024-01-30 NOTE — PROGRESS NOTES
YOHANA BECK did not receive response from [facility] . YOHANA BECK placed call to the , Yoana and left message.     YOHANA BECK then contacted Slim at [facility] who advised unsure of what housing assistance has been completed as it was her assistant that did some paperwork with him. She feels patient still needs help with additional housing paperwork. Slim will find out which paperwork was completed when she gets to the senior center and contact YOHANA BECK with the information.     YOHANA BECK will continue to remain available for psychosocial support as needed.

## 2024-01-30 NOTE — PROGRESS NOTES
YOHANA BECK received voice message from [employer] that patient is at [facility] and will be all week. She indicated that there were no housing applications completed. YOHANA BECK will continue to remain available for psychosocial support as needed.

## 2024-02-01 ENCOUNTER — PATIENT OUTREACH (OUTPATIENT)
Dept: FAMILY MEDICINE CLINIC | Facility: CLINIC | Age: 64
End: 2024-02-01

## 2024-02-01 LAB — COLOGUARD RESULT REPORTABLE: NORMAL

## 2024-02-01 NOTE — PROGRESS NOTES
completed a scheduled home visit with pt to complete housing applications.    OC met with pt at [facility] and explained the housing applications and also explained that he will be put on the wait list and they will send him a letter explaining it.    OC and pt completed the applications for [facility] and [facility].  OC obtained signatures and will be mailing them out and will outreach pt in two weeks.

## 2024-02-01 NOTE — PROGRESS NOTES
YOHANA BECK received email from Yoana at [facility] that she helped with Social Security application and dropped it off personally on 1/25/24.    YOHANA BECK will continue to remain available for psychosocial support as needed.

## 2024-02-12 ENCOUNTER — PATIENT OUTREACH (OUTPATIENT)
Dept: FAMILY MEDICINE CLINIC | Facility: CLINIC | Age: 64
End: 2024-02-12

## 2024-02-12 NOTE — PROGRESS NOTES
YOHANA BECK placed follow up call to aging Yoana. The aging department paid last month's rent for patient. He needs to use his money toward this month's rent. She has not spoken with him since completing the social security application.      YOHANA BECK updated that housing applications were sent.     YOHANA BECK placed call then to patient, Moris and there was no answer. YOHANA BECK was unable to leave a voice message as voice box not set up yet.     YOHANA BECK will continue to remain available for psychosocial support as needed.

## 2024-02-15 ENCOUNTER — PATIENT OUTREACH (OUTPATIENT)
Dept: FAMILY MEDICINE CLINIC | Facility: CLINIC | Age: 64
End: 2024-02-15

## 2024-02-15 NOTE — PROGRESS NOTES
made an attempt to make last outreach to pt.  OC did  mail out housing applications and will receive notice regarding wait list.    OC will close case but can be reopened by the care team.  No further outreach.

## 2024-02-16 ENCOUNTER — IN-CLINIC DEVICE VISIT (OUTPATIENT)
Dept: CARDIOLOGY CLINIC | Facility: CLINIC | Age: 64
End: 2024-02-16
Payer: COMMERCIAL

## 2024-02-16 ENCOUNTER — TELEPHONE (OUTPATIENT)
Dept: CARDIOLOGY CLINIC | Facility: CLINIC | Age: 64
End: 2024-02-16

## 2024-02-16 DIAGNOSIS — Z95.810 PRESENCE OF IMPLANTABLE CARDIOVERTER-DEFIBRILLATOR (ICD): Primary | ICD-10-CM

## 2024-02-16 PROCEDURE — 93283 PRGRMG EVAL IMPLANTABLE DFB: CPT | Performed by: STUDENT IN AN ORGANIZED HEALTH CARE EDUCATION/TRAINING PROGRAM

## 2024-02-16 NOTE — TELEPHONE ENCOUNTER
----- Message from Valeria Mercedes Jr., MA sent at 2/16/2024  9:05 AM EST -----  Results for orders placed or performed in visit on 02/16/24  -Cardiac EP device report:                                                                 Narrative    MDT DUAL ICD/ ACTIVE SYSTEM IS MRI CONDITIONAL    DEVICE INTERROGATED IN THE [place] OFFICE. BATTERY VOLTAGE ADEQUATE (11.7 YRS). AP 16.2%  23.7% ALL LEAD PARAMETERS WITHIN NORMAL LIMITS. NO SIGNIFICANT HIGH RATE EPISODES. DECREASE MADE TO RA & RV AMPLITUDES TO PROMOTE DEVICE LONGEVITY WHILE MAINTAINING AN APPROPRIATE SAFETY MARGIN. OPTI-VOL FLUID THRESHOLD CROSSED SINCE 1/11/24. TASKED TO RT-RN. TAKES FUROSEMIDE. WILL SCHEDULE ONE MONTH RECHECK. NORMAL DEVICE FUNCTION. AM

## 2024-02-16 NOTE — PROGRESS NOTES
Results for orders placed or performed in visit on 02/16/24   Cardiac EP device report    Narrative    MDT DUAL ICD/ ACTIVE SYSTEM IS MRI CONDITIONAL  DEVICE INTERROGATED IN THE [place] OFFICE. BATTERY VOLTAGE ADEQUATE (11.7 YRS). AP 16.2%  23.7% ALL LEAD PARAMETERS WITHIN NORMAL LIMITS. NO SIGNIFICANT HIGH RATE EPISODES. DECREASE MADE TO RA & RV AMPLITUDES TO PROMOTE DEVICE LONGEVITY WHILE MAINTAINING AN APPROPRIATE SAFETY MARGIN. OPTI-VOL FLUID THRESHOLD CROSSED SINCE 1/11/24. TASKED TO RT-RN. TAKES FUROSEMIDE. WILL SCHEDULE ONE MONTH RECHECK. NORMAL DEVICE FUNCTION. AM

## 2024-02-26 ENCOUNTER — PATIENT OUTREACH (OUTPATIENT)
Dept: FAMILY MEDICINE CLINIC | Facility: CLINIC | Age: 64
End: 2024-02-26

## 2024-02-26 NOTE — PROGRESS NOTES
YOHANA BECK placed follow up call to the patient, Moris and advised that housing situation is the same. He is on waiting list for room for rent. He does not have income coming in. He continued that he stopped receiving short term disability. He has to talk to the doctors. He said had an appointment this Thursday but it is not in the chart. He stated that rent was due but was not able to pay the rent. [facility]. This could take 3-6 months.     YOHANA BECK will continue to remain available for psychosocial support as needed.   No

## 2024-03-22 ENCOUNTER — PATIENT OUTREACH (OUTPATIENT)
Dept: FAMILY MEDICINE CLINIC | Facility: CLINIC | Age: 64
End: 2024-03-22

## 2024-03-22 NOTE — PROGRESS NOTES
YOHANA BECK placed call to Aging , [employer] but voice message indicated that she is no longer working there and can contact supervisor Spencer Monsalve at [phone] . YOHANA BECK did speak with Spencer who advised a  attempted to see him at [facility]. They will try him at [facility]. They will help him but need signatures first. YOHANA BECK will continue to remain available for psychosocial support as needed.

## 2024-03-27 ENCOUNTER — PATIENT OUTREACH (OUTPATIENT)
Dept: FAMILY MEDICINE CLINIC | Facility: CLINIC | Age: 64
End: 2024-03-27

## 2024-03-27 NOTE — PROGRESS NOTES
YOHANA BECK placed additional call to the patient, Moris and was unable to leave a voice message as voicemail was full.     YOHANA BECK left message with Slim at [facility]. YOHANA BECK will continue to remain available for psychosocial support as needed.

## 2024-03-28 ENCOUNTER — TELEPHONE (OUTPATIENT)
Dept: FAMILY MEDICINE CLINIC | Facility: CLINIC | Age: 64
End: 2024-03-28

## 2024-03-28 NOTE — TELEPHONE ENCOUNTER
Patient called refill line stating that he needs refills for all of his medications. Informed him that I would need to know the medication names because it looks like some still have refills. Patient proceeded to list all of his medications and informed him he would need to call his pharmacy because he has a refill for all medications.

## 2024-04-05 ENCOUNTER — PATIENT OUTREACH (OUTPATIENT)
Dept: FAMILY MEDICINE CLINIC | Facility: CLINIC | Age: 64
End: 2024-04-05

## 2024-04-05 NOTE — PROGRESS NOTES
YOHANA BECK returned missed call from the patient, Moris and was unable to leave message as voice box not set up. YOHANA BECK will continue to remain available for psychosocial support as needed.

## 2024-04-11 ENCOUNTER — PATIENT OUTREACH (OUTPATIENT)
Dept: FAMILY MEDICINE CLINIC | Facility: CLINIC | Age: 64
End: 2024-04-11

## 2024-04-11 NOTE — PROGRESS NOTES
YOHANA BECK placed additional follow up call to the patient, Moris and was unable to leave message as voice box not set up yet.     YOHANA BECK mailed Unable to Reach letter and will remain available for psychosocial support as needed.

## 2024-04-11 NOTE — LETTER
04/11/24    Dear Moris Mahajan,    I am a Care Manager with [facility] [address] [address] [phone].  We have made several attempts to call you by phone.  It is important that you contact us back at [phone] so that we can assist with your care needs.     Sincerely,         Joanna Aguilar  Social Work Care Manager

## 2024-04-17 ENCOUNTER — OFFICE VISIT (OUTPATIENT)
Dept: FAMILY MEDICINE CLINIC | Facility: CLINIC | Age: 64
End: 2024-04-17
Payer: COMMERCIAL

## 2024-04-17 VITALS
DIASTOLIC BLOOD PRESSURE: 60 MMHG | WEIGHT: 187.2 LBS | HEIGHT: 68 IN | SYSTOLIC BLOOD PRESSURE: 120 MMHG | HEART RATE: 55 BPM | RESPIRATION RATE: 17 BRPM | OXYGEN SATURATION: 96 % | TEMPERATURE: 97.8 F | BODY MASS INDEX: 28.37 KG/M2

## 2024-04-17 DIAGNOSIS — Z12.11 SCREENING FOR COLON CANCER: ICD-10-CM

## 2024-04-17 DIAGNOSIS — R35.0 BENIGN PROSTATIC HYPERPLASIA WITH URINARY FREQUENCY: ICD-10-CM

## 2024-04-17 DIAGNOSIS — I21.3 ST ELEVATION MYOCARDIAL INFARCTION (STEMI), UNSPECIFIED ARTERY (HCC): ICD-10-CM

## 2024-04-17 DIAGNOSIS — I63.9 CEREBROVASCULAR ACCIDENT (CVA), UNSPECIFIED MECHANISM (HCC): ICD-10-CM

## 2024-04-17 DIAGNOSIS — I25.5 ISCHEMIC CARDIOMYOPATHY: Chronic | ICD-10-CM

## 2024-04-17 DIAGNOSIS — I50.22 CHRONIC SYSTOLIC HEART FAILURE (HCC): Primary | Chronic | ICD-10-CM

## 2024-04-17 DIAGNOSIS — I10 PRIMARY HYPERTENSION: Chronic | ICD-10-CM

## 2024-04-17 DIAGNOSIS — E11.9 TYPE 2 DIABETES MELLITUS WITHOUT COMPLICATION, WITHOUT LONG-TERM CURRENT USE OF INSULIN (HCC): Chronic | ICD-10-CM

## 2024-04-17 DIAGNOSIS — I25.10 CORONARY ARTERY DISEASE INVOLVING NATIVE CORONARY ARTERY OF NATIVE HEART WITHOUT ANGINA PECTORIS: Chronic | ICD-10-CM

## 2024-04-17 DIAGNOSIS — N40.1 BENIGN PROSTATIC HYPERPLASIA WITH URINARY FREQUENCY: ICD-10-CM

## 2024-04-17 PROBLEM — N40.0 BPH (BENIGN PROSTATIC HYPERPLASIA): Status: ACTIVE | Noted: 2023-07-20

## 2024-04-17 LAB
CREAT UR-MCNC: 95.6 MG/DL
LEFT EYE DIABETIC RETINOPATHY: NORMAL
LEFT EYE IMAGE QUALITY: NORMAL
LEFT EYE MACULAR EDEMA: NORMAL
LEFT EYE OTHER RETINOPATHY: NORMAL
MICROALBUMIN UR-MCNC: 8.7 MG/L
MICROALBUMIN/CREAT 24H UR: 9 MG/G CREATININE (ref 0–30)
RIGHT EYE DIABETIC RETINOPATHY: NORMAL
RIGHT EYE IMAGE QUALITY: NORMAL
RIGHT EYE MACULAR EDEMA: NORMAL
RIGHT EYE OTHER RETINOPATHY: NORMAL
SEVERITY (EYE EXAM): NORMAL

## 2024-04-17 PROCEDURE — 99214 OFFICE O/P EST MOD 30 MIN: CPT | Performed by: PHYSICIAN ASSISTANT

## 2024-04-17 PROCEDURE — 82570 ASSAY OF URINE CREATININE: CPT | Performed by: PHYSICIAN ASSISTANT

## 2024-04-17 PROCEDURE — 82043 UR ALBUMIN QUANTITATIVE: CPT | Performed by: PHYSICIAN ASSISTANT

## 2024-04-17 RX ORDER — NITROGLYCERIN 0.4 MG/1
0.4 TABLET SUBLINGUAL
Qty: 10 TABLET | Refills: 0 | Status: SHIPPED | OUTPATIENT
Start: 2024-04-17 | End: 2024-04-18

## 2024-04-17 NOTE — ASSESSMENT & PLAN NOTE
Increase dose Jardiance to 25mg today. Previously was taking 20mg daily. Continue well-controlled and IRIS exam performed in office.   Lab Results   Component Value Date    HGBA1C 6.4 (H) 01/02/2024      F63.9

## 2024-04-17 NOTE — ASSESSMENT & PLAN NOTE
L MCA stroke in 8/2023. Continue aspirin, Eliquis and statin. Back to work since last month as  in [place].

## 2024-04-17 NOTE — ASSESSMENT & PLAN NOTE
Lab Results   Component Value Date    PSA 3.4 01/02/2024    PSA 3.75 08/03/2023    PSA 4.31 (H) 07/18/2023     Stable PSA, consider urology referrals. Normal stream, no dribbling or hesitancy. Urinating every 3 hours.

## 2024-04-17 NOTE — ASSESSMENT & PLAN NOTE
ICD in place. Reviewed last cardiology consult note in 1/2024. Most recent device check in 2/2024 with normal device function. Follow-up cardiology, patient missed phone call will call back to schedule.

## 2024-04-17 NOTE — PATIENT INSTRUCTIONS
[facility] [place] - [address] [place] [address]    [address] [place] [address]  [phone]    Joanna Aguilar - Lead Outpatient Care Manager·Case Management  [phone]

## 2024-04-17 NOTE — ASSESSMENT & PLAN NOTE
Wt Readings from Last 3 Encounters:   04/17/24 84.9 kg (187 lb 3.2 oz)   01/16/24 86.3 kg (190 lb 3.2 oz)   01/15/24 85 kg (187 lb 6.4 oz)     No signs of fluid overload on exam today. Continue Lasix, patient complaining of increased frequency used to urinate twice daily and now every 3 hours with good stream, encouraged patient to maintain low salt diet and continue same dose Lasix.

## 2024-04-17 NOTE — PROGRESS NOTES
Name: Moris Mahajan      : 1960      MRN: [id number]  Encounter Provider: Nieves Dunn PA-C  Encounter Date: 2024   Encounter department: [facility] CARE [facility]    Assessment & Plan     1. Chronic systolic heart failure (HCC)  Assessment & Plan:  Wt Readings from Last 3 Encounters:   24 84.9 kg (187 lb 3.2 oz)   24 86.3 kg (190 lb 3.2 oz)   01/15/24 85 kg (187 lb 6.4 oz)     No signs of fluid overload on exam today. Continue Lasix, patient complaining of increased frequency used to urinate twice daily and now every 3 hours with good stream, encouraged patient to maintain low salt diet and continue same dose Lasix.           Orders:  -     Empagliflozin 25 MG TABS; Take 1 tablet (25 mg total) by mouth daily    2. Type 2 diabetes mellitus without complication, without long-term current use of insulin (HCC)  Assessment & Plan:  Increase dose Jardiance to 25mg today. Previously was taking 20mg daily. Continue well-controlled and IRIS exam performed in office.   Lab Results   Component Value Date    HGBA1C 6.4 (H) 2024     Orders:  -     Albumin / creatinine urine ratio; Future  -     IRIS Diabetic eye exam  -     Albumin / creatinine urine ratio    3. Cerebrovascular accident (CVA), unspecified mechanism ([facility])  Assessment & Plan:  L MCA stroke in 2023. Continue aspirin, Eliquis and statin. Back to work since last month as  in [place].     Orders:  -     nitroglycerin (NITROSTAT) 0.4 mg SL tablet; Place 1 tablet (0.4 mg total) under the tongue every 5 (five) minutes as needed for chest pain for up to 3 days    4. ST elevation myocardial infarction (STEMI), unspecified artery ([facility])  Comments:  not requiring nitroglycerin, lost his supply, will re-order as needed  Orders:  -     nitroglycerin (NITROSTAT) 0.4 mg SL tablet; Place 1 tablet (0.4 mg total) under the tongue every 5 (five) minutes as needed for chest pain for up to 3 days    5.  Ischemic cardiomyopathy  Assessment & Plan:  ICD in place. Reviewed last cardiology consult note in 1/2024. Most recent device check in 2/2024 with normal device function. Follow-up cardiology, patient missed phone call will call back to schedule.       6. Primary hypertension  Assessment & Plan:  Well-controlled on Entresto, Lasix.       7. Coronary artery disease involving native coronary artery of native heart without angina pectoris  Assessment & Plan:  Continue statin and aspirin 81. Did not tolerate metoprolol due to bradycardia.       8. Benign prostatic hyperplasia with urinary frequency  Assessment & Plan:  Lab Results   Component Value Date    PSA 3.4 01/02/2024    PSA 3.75 08/03/2023    PSA 4.31 (H) 07/18/2023     Stable PSA, consider urology referrals. Normal stream, no dribbling or hesitancy. Urinating every 3 hours.       9. Screening for colon cancer  Comments:  failed cologuard due to insufficient stool, patient will complete colonoscopy instead  Orders:  -     Ambulatory Referral to Gastroenterology; Future           Subjective      Moris is a 63 y.o. male with a h/o controlled diabetes, CVA in 2020 and 8/2023, CAD with stent in 7/2022, HFrEF and cardiomyopathy with recent dual-chamber ICD placement who presents for routine follow-up. Works as a , was not able to have Social Security disability due to owning his mother's house.  Issues with his brother who he calls a liar and family issues with who owns the house.  Had to go back to work.  Doing well at work.  Works night shift.  Taking a bus 3 hours to and from work.  Has missed multiple phone calls as a result.  Received his short-term disability checks.  No longer requiring long-term disability.  No smoking or ETOH use. Taking medications as prescribed.  Otherwise doing well.  Was not able to get new sample for Cologuard because does not want to be came around a box at work.      Review of Systems   Constitutional:  Negative for  "fever and unexpected weight change.   Respiratory:  Negative for shortness of breath.    Cardiovascular:  Negative for chest pain, palpitations and leg swelling.   Gastrointestinal:  Negative for constipation and diarrhea.   Genitourinary:  Positive for frequency.       Current Outpatient Medications on File Prior to Visit   Medication Sig   • apixaban (Eliquis) 5 mg Take 1 tablet (5 mg total) by mouth 2 (two) times a day   • aspirin 81 mg chewable tablet Chew 1 tablet (81 mg total) daily   • atorvastatin (LIPITOR) 80 mg tablet Take 1 tablet (80 mg total) by mouth daily with dinner   • furosemide (LASIX) 20 mg tablet Take 1 tablet (20 mg total) by mouth daily   • sacubitril-valsartan (Entresto) 24-26 MG TABS Take 1 tablet by mouth 2 (two) times a day   • [DISCONTINUED] Empagliflozin (JARDIANCE) 10 MG TABS tablet Take 2 tablets (20 mg total) by mouth every morning   • [DISCONTINUED] nitroglycerin (NITROSTAT) 0.4 mg SL tablet Place 1 tablet (0.4 mg total) under the tongue every 5 (five) minutes as needed for chest pain for up to 3 days       Objective     /60 (BP Location: Left arm, Patient Position: Sitting, Cuff Size: Large)   Pulse 55   Temp 97.8 °F (36.6 °C) (Tympanic)   Resp 17   Ht 5' 8\" (1.727 m)   Wt 84.9 kg (187 lb 3.2 oz)   SpO2 96%   BMI 28.46 kg/m²     Physical Exam  Vitals and nursing note reviewed.   Constitutional:       Appearance: Normal appearance.      Comments: Wearing bright orange work uniform, ambulating normally    HENT:      Head: Normocephalic and atraumatic.   Cardiovascular:      Rate and Rhythm: Normal rate and regular rhythm.      Pulses: Normal pulses.      Heart sounds: Normal heart sounds.   Pulmonary:      Effort: Pulmonary effort is normal.      Breath sounds: Normal breath sounds.   Chest:       Neurological:      Mental Status: He is alert and oriented to person, place, and time. Mental status is at baseline.       Nieves Dunn PA-C    "

## 2024-04-18 ENCOUNTER — TELEPHONE (OUTPATIENT)
Dept: FAMILY MEDICINE CLINIC | Facility: CLINIC | Age: 64
End: 2024-04-18

## 2024-04-18 RX ORDER — NITROGLYCERIN 0.4 MG/1
0.4 TABLET SUBLINGUAL
Qty: 25 TABLET | Refills: 0 | Status: SHIPPED | OUTPATIENT
Start: 2024-04-18

## 2024-04-25 ENCOUNTER — PATIENT OUTREACH (OUTPATIENT)
Dept: FAMILY MEDICINE CLINIC | Facility: CLINIC | Age: 64
End: 2024-04-25

## 2024-04-25 NOTE — PROGRESS NOTES
YOHANA BECK has not received response to Unable to Reach letter. YOHANA BECK noted in chart that at last PCP appointment that he reported inability to receive SSDI due to owning mother's home. There are family issus surrounding who owns the home. He did return to work and works nightshift. He received his short term disability checks.     YOHANA BECK will close referral due to lost communication. YOHANA BECK will remain available for psychosocial support as needed.

## 2024-05-20 ENCOUNTER — REMOTE DEVICE CLINIC VISIT (OUTPATIENT)
Dept: CARDIOLOGY CLINIC | Facility: CLINIC | Age: 64
End: 2024-05-20
Payer: COMMERCIAL

## 2024-05-20 DIAGNOSIS — Z95.810 PRESENCE OF IMPLANTABLE CARDIOVERTER-DEFIBRILLATOR (ICD): Primary | ICD-10-CM

## 2024-05-20 PROCEDURE — 93295 DEV INTERROG REMOTE 1/2/MLT: CPT | Performed by: STUDENT IN AN ORGANIZED HEALTH CARE EDUCATION/TRAINING PROGRAM

## 2024-05-20 PROCEDURE — 93296 REM INTERROG EVL PM/IDS: CPT | Performed by: STUDENT IN AN ORGANIZED HEALTH CARE EDUCATION/TRAINING PROGRAM

## 2024-05-20 NOTE — PROGRESS NOTES
Results for orders placed or performed in visit on 05/20/24   Cardiac EP device report    Narrative    MDT DUAL ICD/ ACTIVE SYSTEM IS MRI CONDITIONAL  CARELINK TRANSMISSION: BATTERY VOLTAGE ADEQUATE (12 YRS). AP 18.1%  15.6% (AAI-DDD 60PPM); ALL AVAILABLE LEAD PARAMETERS WITHIN NORMAL LIMITS. NO SIGNIFICANT HIGH RATE EPISODES. NORMAL DEVICE FUNCTION.  ES

## 2024-05-30 ENCOUNTER — VBI (OUTPATIENT)
Dept: ADMINISTRATIVE | Facility: OTHER | Age: 64
End: 2024-05-30

## 2024-06-24 DIAGNOSIS — I63.9 CEREBROVASCULAR ACCIDENT (CVA), UNSPECIFIED MECHANISM (HCC): ICD-10-CM

## 2024-06-24 RX ORDER — ASPIRIN 81 MG
81 TABLET,CHEWABLE ORAL DAILY
Qty: 90 TABLET | Refills: 1 | Status: SHIPPED | OUTPATIENT
Start: 2024-06-24

## 2024-07-05 DIAGNOSIS — I63.9 CEREBROVASCULAR ACCIDENT (CVA), UNSPECIFIED MECHANISM (HCC): ICD-10-CM

## 2024-07-05 DIAGNOSIS — I50.22 CHRONIC SYSTOLIC HEART FAILURE (HCC): ICD-10-CM

## 2024-07-07 RX ORDER — SACUBITRIL AND VALSARTAN 24; 26 MG/1; MG/1
1 TABLET, FILM COATED ORAL 2 TIMES DAILY
Qty: 200 TABLET | Refills: 1 | Status: SHIPPED | OUTPATIENT
Start: 2024-07-07

## 2024-07-07 RX ORDER — APIXABAN 5 MG/1
5 TABLET, FILM COATED ORAL 2 TIMES DAILY
Qty: 200 TABLET | Refills: 1 | Status: SHIPPED | OUTPATIENT
Start: 2024-07-07

## 2024-07-07 RX ORDER — FUROSEMIDE 20 MG/1
20 TABLET ORAL DAILY
Qty: 100 TABLET | Refills: 1 | Status: SHIPPED | OUTPATIENT
Start: 2024-07-07

## 2024-07-07 RX ORDER — ATORVASTATIN CALCIUM 80 MG/1
80 TABLET, FILM COATED ORAL
Qty: 100 TABLET | Refills: 1 | Status: SHIPPED | OUTPATIENT
Start: 2024-07-07

## 2024-08-02 ENCOUNTER — TELEPHONE (OUTPATIENT)
Dept: FAMILY MEDICINE CLINIC | Facility: CLINIC | Age: 64
End: 2024-08-02

## 2024-08-02 NOTE — TELEPHONE ENCOUNTER
Patient came to the office and states that he is moving to [place] and will be soon having new provider and we should be receiving medical request form.

## 2025-04-04 NOTE — Clinical Note
A referral has been placed to the following department(s):   Dermatology - this department will review the referral and follow up with you to discuss scheduling options.  If you do not hear from them after 3 business days, please call [phone] to be transferred to the dermatology scheduling desk     The ECG shows a sinus rhythm   ECG rate  = 80 bpm

## 2025-04-18 ENCOUNTER — TELEPHONE (OUTPATIENT)
Dept: FAMILY MEDICINE CLINIC | Facility: CLINIC | Age: 65
End: 2025-04-18

## 2025-05-23 ENCOUNTER — TELEPHONE (OUTPATIENT)
Dept: FAMILY MEDICINE CLINIC | Facility: CLINIC | Age: 65
End: 2025-05-23

## 2025-05-23 NOTE — TELEPHONE ENCOUNTER
Patient called unable to left message for patient to call the office to schedule a Physical appt.   Will send a letter.

## 2025-05-23 NOTE — LETTER
[facility] PRIMARY [facility]ARE [address], [address]  [place] [address]  Phone#  [phone]  Fax#  [phone]      May 23, 2025      Dear:   Moris Mahajan         Our office has attempted to contact you several times regarding your Appointment.  Could you please contact our office at [phone].    Thank you.     Sincerely,      Nieves Dunn PA-C

## 2025-07-30 ENCOUNTER — TELEPHONE (OUTPATIENT)
Dept: FAMILY MEDICINE CLINIC | Facility: CLINIC | Age: 65
End: 2025-07-30

## (undated) DEVICE — GLIDESHEATH SLENDER STAINLESS STEEL KIT: Brand: GLIDESHEATH SLENDER

## (undated) DEVICE — RADIFOCUS OPTITORQUE ANGIOGRAPHIC CATHETER: Brand: OPTITORQUE

## (undated) DEVICE — MICROPUNCTURE SET 4FR 10CM .018 NITINOL TRANSITIONLESS TIP

## (undated) DEVICE — DGW .035 FC J3MM 260CM TEF: Brand: EMERALD

## (undated) DEVICE — INTRO SHEATH PEEL AWAY 9 FR

## (undated) DEVICE — HI-TORQUE PILOT 50 GUIDE WIRE .014 STRAIGHT TIP 3.0 CM X 190 CM: Brand: HI-TORQUE PILOT

## (undated) DEVICE — TR BAND RADIAL ARTERY COMPRESSION DEVICE: Brand: TR BAND

## (undated) DEVICE — CATH GUIDE LAUNCHER 6FR EBU 3.5

## (undated) DEVICE — RUNTHROUGH NS EXTRA FLOPPY PTCA GUIDEWIRE: Brand: RUNTHROUGH

## (undated) DEVICE — INTRO SHEATH PEEL AWAY 7FR

## (undated) DEVICE — GUIDEWIRE WHOLEY .035 145 CM FLOP STR TIP

## (undated) DEVICE — CATH THROMBECTOMY EXPORT 6FR 140CM